# Patient Record
Sex: FEMALE | Race: WHITE | NOT HISPANIC OR LATINO | Employment: OTHER | URBAN - METROPOLITAN AREA
[De-identification: names, ages, dates, MRNs, and addresses within clinical notes are randomized per-mention and may not be internally consistent; named-entity substitution may affect disease eponyms.]

---

## 2021-11-18 ENCOUNTER — OFFICE VISIT (OUTPATIENT)
Dept: OTHER | Facility: CLINIC | Age: 61
End: 2021-11-18

## 2021-11-18 VITALS
SYSTOLIC BLOOD PRESSURE: 163 MMHG | RESPIRATION RATE: 20 BRPM | OXYGEN SATURATION: 98 % | HEIGHT: 64 IN | DIASTOLIC BLOOD PRESSURE: 88 MMHG | WEIGHT: 131.2 LBS | BODY MASS INDEX: 22.4 KG/M2 | HEART RATE: 75 BPM

## 2021-11-18 DIAGNOSIS — C15.9 ADENOCARCINOMA OF ESOPHAGUS (HCC): ICD-10-CM

## 2021-11-18 DIAGNOSIS — K22.711 BARRETT'S ESOPHAGUS WITH HIGH GRADE DYSPLASIA: Primary | ICD-10-CM

## 2021-11-18 PROCEDURE — 99203 OFFICE O/P NEW LOW 30 MIN: CPT | Performed by: THORACIC SURGERY (CARDIOTHORACIC VASCULAR SURGERY)

## 2021-11-18 RX ORDER — PANTOPRAZOLE SODIUM 40 MG/1
40 TABLET, DELAYED RELEASE ORAL 2 TIMES DAILY
COMMUNITY
Start: 2021-10-18 | End: 2022-01-03 | Stop reason: SDUPTHER

## 2021-11-18 RX ORDER — METOPROLOL TARTRATE 50 MG/1
50 TABLET, FILM COATED ORAL DAILY
COMMUNITY
Start: 2021-10-20 | End: 2022-02-10

## 2021-11-18 RX ORDER — METHIMAZOLE 10 MG/1
10 TABLET ORAL 3 TIMES DAILY
COMMUNITY
Start: 2021-11-01 | End: 2022-12-09 | Stop reason: SDUPTHER

## 2021-11-18 RX ORDER — PRAVASTATIN SODIUM 20 MG
20 TABLET ORAL DAILY
COMMUNITY
Start: 2021-07-09

## 2021-11-18 RX ORDER — APIXABAN 5 MG/1
5 TABLET, FILM COATED ORAL EVERY 12 HOURS SCHEDULED
COMMUNITY
Start: 2021-10-20

## 2021-11-18 NOTE — PROGRESS NOTES
"Chief Complaint    Dysphagia    Subjective          Maya Ribera presents to [facility]  History of Present Illness     61-year-old female seen in the lung care center at [facility] today November 18, 2021 as part of our multidisciplinary thoracic oncology clinic.  I have reviewed her history her x-rays and have examined her.  Thank you for asking us participate in the care of Ms. Ribera.    Patient was having some pain with swallowing.  This pain was felt under the right breast and radiated to the spine.  She is also had several episodes of difficulty swallowing.  Food is never really been stuck in her esophagus.  She has lost 5 to 10 pounds of weight over the last few months.  She underwent EGD and colonoscopy.  Colonoscopy was negative.  EGD showed abnormality in the esophagus.  Biopsies showed high-grade dysplasia and adenocarcinoma in situ.  She has been referred here for further evaluation and treatment.    She is a lifelong smoker of about 1 pack of cigarettes per day.  She is active without significant shortness of breath.  She has no cough or hemoptysis.  She has no hoarseness or change in her voice.  She has had no pleuritic pain.  She has history of enlarged thyroid with hyperthyroidism and is currently on thyroid medication.  The hyperthyroid caused atrial fibrillation and she is now on Eliquis.  She also has a prolapsed mitral valve.  She has no personal history of cancer but does have a strong family history of cancer.  Mother and sister has both had breast cancer.  She is recently had a mammogram which was negative.    Objective   Vital Signs:   /88   Pulse 75   Resp 20   Ht 162.6 cm (64\")   Wt 59.5 kg (131 lb 3.2 oz)   SpO2 98%   BMI 22.52 kg/m²     Physical Exam     Neck: There are no masses and no cervical or supraclavicular adenopathy    Pulmonary: Lungs are clear to auscultation with equal breath sounds bilaterally    Cardiac: Regular rate and " rhythm.  I hear no murmurs.  No gallops and no dependent edema.    Abdomen: Soft and nontender.  No masses or organomegaly.  Good bowel sounds in all quadrants.    Result Review :   The following data was reviewed by: Hayder Finch III, MD on 11/18/2021:    CT scan of the chest performed October 22, 2021 was independently reviewed.  There is thickening at the mid esophagus.  There is no mediastinal lymphadenopathy.  No infiltrates nodules or masses.  No pleural effusions.  Enlarged thyroid gland.         Assessment and Plan    Diagnoses and all orders for this visit:    1. Cotto's esophagus with high grade dysplasia (Primary)  -     NM PET/CT Skull Base to Mid Thigh; Future  -     Ambulatory Referral to Gastroenterology    2. Adenocarcinoma of esophagus (HCC)  -     NM PET/CT Skull Base to Mid Thigh; Future  -     Ambulatory Referral to Gastroenterology      It appears that this lady has Cotto's esophagus with high-grade dysplasia and adenocarcinoma in situ.  I have recommended an EUS endoscopy to give more detail staging.  She will also need a CT PET scan for staging purposes.  Once the studies are available we can then make a decision about further treatment.  Based on today's findings I would recommend that we forego neoadjuvant radiation chemotherapy and proceed directly to esophagectomy.  We will make that decision after reviewing the PET scan and EUS.  I will keep you informed of her progress.  Thank you for allowing us to participate in the care of Ms. Ribera.    I spent 32 minutes caring for Maya on this date of service. This time includes time spent by me in the following activities:preparing for the visit, reviewing tests, obtaining and/or reviewing a separately obtained history, performing a medically appropriate examination and/or evaluation , counseling and educating the patient/family/caregiver, ordering medications, tests, or procedures, referring and communicating with other health care  professionals , documenting information in the medical record, independently interpreting results and communicating that information with the patient/family/caregiver and care coordination  Follow Up   Return for Return to clinic with test results.  Patient was given instructions and counseling regarding her condition or for health maintenance advice. Please see specific information pulled into the AVS if appropriate.

## 2021-11-23 ENCOUNTER — HOSPITAL ENCOUNTER (OUTPATIENT)
Dept: PET IMAGING | Facility: HOSPITAL | Age: 61
Discharge: HOME | End: 2021-11-23

## 2021-11-23 DIAGNOSIS — C15.9 ADENOCARCINOMA OF ESOPHAGUS (HCC): ICD-10-CM

## 2021-11-23 DIAGNOSIS — K22.711 BARRETT'S ESOPHAGUS WITH HIGH GRADE DYSPLASIA: ICD-10-CM

## 2021-11-23 LAB — GLUCOSE BLDC GLUCOMTR-MCNC: 102 MG/DL (ref 70–130)

## 2021-11-23 PROCEDURE — A9552 F18 FDG: HCPCS | Performed by: THORACIC SURGERY (CARDIOTHORACIC VASCULAR SURGERY)

## 2021-11-23 PROCEDURE — 82962 GLUCOSE BLOOD TEST: CPT

## 2021-11-23 PROCEDURE — 78815 PET IMAGE W/CT SKULL-THIGH: CPT

## 2021-11-23 PROCEDURE — 0 FLUDEOXYGLUCOSE F18 SOLUTION: Performed by: THORACIC SURGERY (CARDIOTHORACIC VASCULAR SURGERY)

## 2021-11-23 RX ADMIN — FLUDEOXYGLUCOSE F18 1 DOSE: 300 INJECTION INTRAVENOUS at 11:17

## 2021-12-01 ENCOUNTER — MDT ASSESSMENT (OUTPATIENT)
Dept: OTHER | Facility: HOSPITAL | Age: 61
End: 2021-12-01

## 2021-12-01 NOTE — PROGRESS NOTES
MULTIDISCIPLINARY TREATMENT PLANNING CONFERENCE  DATE: 12/2/2021       SPECIALTY: Thoracic Conference   PRESENTER: Hayder Finch MD   SITE: Esophagus        Please discuss clinical/working stage, TNM, Stage Group, National Treatment Guidelines, and Prognostic Indicators.       CONFERENCE SUMMARY:  Patient was seen in the multidisciplinary thoracic oncology conference with complaints of painful swallowing and dysphagia over the last several months.  She has lost 10 to 15 pounds because of this.  She underwent EGD and colonoscopy.  Colonoscopy was normal.  EGD showed a tumor at the GE junction.  Biopsies showed severe dysplasia with carcinoma in situ.  CT scan of the chest showed thickening of the mid esophagus with no lymphadenopathy.  CT PET scan uptake in the esophagus with uptake in the gastrohepatic lymph nodes.  My initial impression was that this was a stage I carcinoma of the esophagus and that we could proceed directly to surgical resection and bypass neoadjuvant therapy.  Prior to making that decision I requested a endoscopic ultrasound.  This cannot be done here at [facility] until after January 1.  I tried [facility] as well as [facility] and was unable to get anyone to do an EUS any sooner.  Dr Moseley suggested that we try the gastroenterologist at [facility].  We will contact the [facility] [place] operated by [facility] gastroenterology group and request EUS.  Once that is completed we will then make a decision about going directly to surgical resection versus neoadjuvant therapy.                          AJCC STAGE: Clinical stage I        REFERRAL SUPPORT   Psychosocial Assessment:  []                Clinical Trials:  []                Genetic Testing:  []                Geriatric Assessment:  []                Smoking Cessation:  []                Nutrition Assessment:  []                Social Work Evaluation/Barriers to Care:  []                Behavioral Oncology Evaluation:  []                Palliative  Care:  []      EVIDENCE BASED NATIONAL TREATMENT GUIDELINES:  []                   SOCIAL HISTORY:   reports that she has been smoking cigarettes. She has been smoking about 1.00 pack per day. She has never used smokeless tobacco.                  PAST MEDICAL HISTORY:   has no past medical history on file.                  PAST SURGICAL HISTORY:  @                 IMAGING:  NM PET/CT Skull Base to Mid Thigh    Result Date: 11/24/2021  1.  Intensely FDG avid thickening within the mid to distal esophagus representing patient's known neoplasm.. There are a few mildly enlarged left hilar and gastrohepatic nodes which demonstrate FDG uptake mildly above that of mediastinal blood pool. Unfortunately findings are indeterminate and metastatic disease cannot be excluded in these areas. 2.   A few scattered subcentimeter pulmonary nodules are present bilaterally and indeterminate. Continued close attention on follow-up with chest CT in 3 months is recommended. 3.  Other findings as above.  This report was finalized on 11/24/2021 8:33 AM by Dr. Rory Hollingsworth M.D.                      SURGICAL PROCEDURE / PATHOLOGY:      8/27/2021: [id number]-[id number] ([facility])                                 Labs & Biomarkers:

## 2021-12-03 RX ORDER — SUCRALFATE ORAL 1 G/10ML
1 SUSPENSION ORAL 4 TIMES DAILY
COMMUNITY
End: 2022-05-09 | Stop reason: HOSPADM

## 2021-12-04 ENCOUNTER — LAB (OUTPATIENT)
Dept: LAB | Facility: HOSPITAL | Age: 61
End: 2021-12-04

## 2021-12-04 DIAGNOSIS — Z01.818 PREOP EXAMINATION: Primary | ICD-10-CM

## 2021-12-04 LAB — SARS-COV-2 ORF1AB RESP QL NAA+PROBE: NOT DETECTED

## 2021-12-04 PROCEDURE — U0004 COV-19 TEST NON-CDC HGH THRU: HCPCS

## 2021-12-04 PROCEDURE — C9803 HOPD COVID-19 SPEC COLLECT: HCPCS

## 2021-12-06 DIAGNOSIS — C15.9 ADENOCARCINOMA OF ESOPHAGUS (HCC): Primary | ICD-10-CM

## 2021-12-07 ENCOUNTER — HOSPITAL ENCOUNTER (OUTPATIENT)
Facility: HOSPITAL | Age: 61
Setting detail: HOSPITAL OUTPATIENT SURGERY
Discharge: HOME | End: 2021-12-07
Attending: INTERNAL MEDICINE | Admitting: INTERNAL MEDICINE

## 2021-12-07 ENCOUNTER — ANESTHESIA EVENT (OUTPATIENT)
Dept: GASTROENTEROLOGY | Facility: HOSPITAL | Age: 61
End: 2021-12-07

## 2021-12-07 ENCOUNTER — ON CAMPUS - OUTPATIENT (OUTPATIENT)
Dept: URBAN - METROPOLITAN AREA HOSPITAL 85 | Facility: HOSPITAL | Age: 61
End: 2021-12-07

## 2021-12-07 ENCOUNTER — ANESTHESIA (OUTPATIENT)
Dept: GASTROENTEROLOGY | Facility: HOSPITAL | Age: 61
End: 2021-12-07

## 2021-12-07 VITALS
SYSTOLIC BLOOD PRESSURE: 142 MMHG | WEIGHT: 127.43 LBS | DIASTOLIC BLOOD PRESSURE: 68 MMHG | RESPIRATION RATE: 19 BRPM | HEIGHT: 64 IN | HEART RATE: 80 BPM | OXYGEN SATURATION: 100 % | TEMPERATURE: 98.2 F | BODY MASS INDEX: 21.75 KG/M2

## 2021-12-07 VITALS
SYSTOLIC BLOOD PRESSURE: 121 MMHG | HEART RATE: 88 BPM | OXYGEN SATURATION: 98 % | RESPIRATION RATE: 5 BRPM | DIASTOLIC BLOOD PRESSURE: 55 MMHG

## 2021-12-07 DIAGNOSIS — C15.9 MALIGNANT NEOPLASM OF ESOPHAGUS, UNSPECIFIED: ICD-10-CM

## 2021-12-07 DIAGNOSIS — C15.9 ESOPHAGEAL CANCER (HCC): ICD-10-CM

## 2021-12-07 DIAGNOSIS — R59.9 ENLARGED LYMPH NODES, UNSPECIFIED: ICD-10-CM

## 2021-12-07 PROCEDURE — 88173 CYTOPATH EVAL FNA REPORT: CPT | Performed by: INTERNAL MEDICINE

## 2021-12-07 PROCEDURE — 43242 EGD US FINE NEEDLE BX/ASPIR: CPT | Performed by: INTERNAL MEDICINE

## 2021-12-07 PROCEDURE — 25010000002 LEVOFLOXACIN PER 250 MG: Performed by: ANESTHESIOLOGIST ASSISTANT

## 2021-12-07 PROCEDURE — 25010000002 PROPOFOL 10 MG/ML EMULSION: Performed by: ANESTHESIOLOGIST ASSISTANT

## 2021-12-07 RX ORDER — PROPOFOL 10 MG/ML
VIAL (ML) INTRAVENOUS AS NEEDED
Status: DISCONTINUED | OUTPATIENT
Start: 2021-12-07 | End: 2021-12-07 | Stop reason: SURG

## 2021-12-07 RX ORDER — METRONIDAZOLE 500 MG/1
TABLET ORAL
Status: DISCONTINUED
Start: 2021-12-07 | End: 2021-12-07 | Stop reason: HOSPADM

## 2021-12-07 RX ORDER — LEVOFLOXACIN 5 MG/ML
INJECTION, SOLUTION INTRAVENOUS AS NEEDED
Status: DISCONTINUED | OUTPATIENT
Start: 2021-12-07 | End: 2021-12-07 | Stop reason: SURG

## 2021-12-07 RX ORDER — SODIUM CHLORIDE 0.9 % (FLUSH) 0.9 %
3 SYRINGE (ML) INJECTION EVERY 12 HOURS SCHEDULED
Status: DISCONTINUED | OUTPATIENT
Start: 2021-12-07 | End: 2021-12-07 | Stop reason: HOSPADM

## 2021-12-07 RX ORDER — LEVOFLOXACIN 5 MG/ML
INJECTION, SOLUTION INTRAVENOUS
Status: COMPLETED
Start: 2021-12-07 | End: 2021-12-07

## 2021-12-07 RX ORDER — ONDANSETRON 2 MG/ML
4 INJECTION INTRAMUSCULAR; INTRAVENOUS ONCE AS NEEDED
Status: DISCONTINUED | OUTPATIENT
Start: 2021-12-07 | End: 2021-12-07 | Stop reason: HOSPADM

## 2021-12-07 RX ORDER — SODIUM CHLORIDE 9 MG/ML
50 INJECTION, SOLUTION INTRAVENOUS CONTINUOUS
Status: DISCONTINUED | OUTPATIENT
Start: 2021-12-07 | End: 2021-12-07 | Stop reason: HOSPADM

## 2021-12-07 RX ORDER — SODIUM CHLORIDE 0.9 % (FLUSH) 0.9 %
10 SYRINGE (ML) INJECTION AS NEEDED
Status: DISCONTINUED | OUTPATIENT
Start: 2021-12-07 | End: 2021-12-07 | Stop reason: HOSPADM

## 2021-12-07 RX ORDER — LIDOCAINE HYDROCHLORIDE 10 MG/ML
INJECTION, SOLUTION EPIDURAL; INFILTRATION; INTRACAUDAL; PERINEURAL AS NEEDED
Status: DISCONTINUED | OUTPATIENT
Start: 2021-12-07 | End: 2021-12-07 | Stop reason: SURG

## 2021-12-07 RX ADMIN — PROPOFOL 50 MG: 10 INJECTION, EMULSION INTRAVENOUS at 16:37

## 2021-12-07 RX ADMIN — PROPOFOL 50 MG: 10 INJECTION, EMULSION INTRAVENOUS at 16:38

## 2021-12-07 RX ADMIN — GLYCOPYRROLATE 0.2 MCG: 0.2 INJECTION, SOLUTION INTRAMUSCULAR; INTRAVITREAL at 16:32

## 2021-12-07 RX ADMIN — SODIUM CHLORIDE 50 ML/HR: 0.9 INJECTION, SOLUTION INTRAVENOUS at 14:58

## 2021-12-07 RX ADMIN — LIDOCAINE HYDROCHLORIDE 80 MG: 10 INJECTION, SOLUTION EPIDURAL; INFILTRATION; INTRACAUDAL; PERINEURAL at 16:32

## 2021-12-07 RX ADMIN — SODIUM CHLORIDE: 0.9 INJECTION, SOLUTION INTRAVENOUS at 16:30

## 2021-12-07 RX ADMIN — PROPOFOL 60 MG: 10 INJECTION, EMULSION INTRAVENOUS at 16:35

## 2021-12-07 RX ADMIN — PROPOFOL 50 MG: 10 INJECTION, EMULSION INTRAVENOUS at 16:45

## 2021-12-07 RX ADMIN — PROPOFOL 50 MG: 10 INJECTION, EMULSION INTRAVENOUS at 16:41

## 2021-12-07 RX ADMIN — LEVOFLOXACIN 500 MG: 500 INJECTION, SOLUTION INTRAVENOUS at 16:59

## 2021-12-07 RX ADMIN — PROPOFOL 50 MG: 10 INJECTION, EMULSION INTRAVENOUS at 16:49

## 2021-12-07 RX ADMIN — PROPOFOL 40 MG: 10 INJECTION, EMULSION INTRAVENOUS at 16:57

## 2021-12-07 RX ADMIN — PROPOFOL 50 MG: 10 INJECTION, EMULSION INTRAVENOUS at 16:53

## 2021-12-07 NOTE — H&P
GI CONSULT  NOTE:    Referring Provider:    Iggy Nicole MD  [unfilled]    Chief complaint: <principal problem not specified>    Subjective .       Pre op diagnosis  Esophageal cancer (HCC) [C15.9]      History of present illness:      Maya Ribera is a 61 y.o. female who presents today for Procedure(s):  EUS WITH STAGING AND FINE NEEDLE BIOPSY for the indications listed below.     The updated Patient Profile was reviewed prior to the procedure, in conjunction with the Physical Exam, including medical conditions, surgical procedures, medications, allergies, family history and social history.     Pre-operatively, I reviewed the indication(s) for the procedure, the risks of the procedure [including but not limited to: unexpected bleeding possibly requiring hospitalization and/or unplanned repeat procedures, perforation possibly requiring surgical treatment, missed lesions and complications of sedation/MAC (also explained by anesthesia staff)].     I have evaluated the patient for risks associated with the planned anesthesia and the procedure to be performed and find the patient an acceptable candidate for IV sedation.    Multiple opportunities were provided for any questions or concerns, and all questions were answered satisfactorily before any anesthesia was administered. We will proceed with the planned procedure.    Past Medical History:  Past Medical History:   Diagnosis Date   • A-fib (HCC)    • Disease of thyroid gland    • Gastric ulcer    • GERD (gastroesophageal reflux disease)    • Hyperlipidemia    • Hypertension    • Irritable bowel        Past Surgical History:  Past Surgical History:   Procedure Laterality Date   • CHOLECYSTECTOMY     • EXTERNAL EAR SURGERY     • KNEE SURGERY Left    • REPLACEMENT TOTAL KNEE Left        Social History:  Social History     Tobacco Use   • Smoking status: Current Every Day Smoker     Packs/day: 1.50     Years: 40.00     Pack years: 60.00     Types:  Cigarettes   • Smokeless tobacco: Never Used   Vaping Use   • Vaping Use: Never used   Substance Use Topics   • Alcohol use: Not Currently   • Drug use: Never       Family History:  Family History   Problem Relation Age of Onset   • Breast cancer Mother    • Diabetes Mother    • Bipolar disorder Father    • Diabetes Father    • Hypertension Father    • Breast cancer Sister    • COPD Sister    • Diabetes Sister    • Alcohol abuse Brother    • Asthma Brother    • Bipolar disorder Brother    • Diabetes Brother    • Seizures Brother    • Breast cancer Maternal Grandmother    • Diabetes Maternal Grandmother    • Diabetes Maternal Grandfather    • Diabetes Paternal Grandmother    • Diabetes Paternal Grandfather        Medications:  Medications Prior to Admission   Medication Sig Dispense Refill Last Dose   • Eliquis 5 MG tablet tablet Take 5 mg by mouth Every 12 (Twelve) Hours. LD 12/4   Past Week at Unknown time   • methIMAzole (TAPAZOLE) 10 MG tablet Take 5 mg by mouth Daily. Take DOS   12/7/2021 at Unknown time   • metoprolol tartrate (LOPRESSOR) 50 MG tablet Take 50 mg by mouth 2 (Two) Times a Day. Take DOS   12/7/2021 at Unknown time   • pantoprazole (PROTONIX) 40 MG EC tablet Take 40 mg by mouth 2 (Two) Times a Day.   12/6/2021 at Unknown time   • pravastatin (PRAVACHOL) 20 MG tablet Take 20 mg by mouth Daily.   12/7/2021 at Unknown time   • sucralfate (CARAFATE) 1 GM/10ML suspension Take 1 g by mouth 4 (Four) Times a Day.   12/6/2021 at Unknown time       Scheduled Meds:   Continuous Infusions:sodium chloride, 50 mL/hr, Last Rate: 50 mL/hr (12/07/21 1458)      PRN Meds:.    ALLERGIES:  Codeine    ROS:  The following systems were reviewed and negative;  Constitution:  No fevers, chills, no unintentional weight loss  Skin: no rash, no jaundice  Eyes:  No blurry vision, no eye pain  HENT:  No change in hearing or smell  Resp:  No dyspnea or cough  CV:  No chest pain or palpitations  :  No dysuria,  "hematuria  Musculoskeletal:  No leg cramps or arthralgias  Neuro:  No tremor, no numbness  Psych:  No depression or confsusion    Objective     Vital Signs:   Vitals:    12/03/21 1611 12/07/21 1438   BP:  144/57   BP Location:  Left arm   Patient Position:  Sitting   Pulse:  63   Resp:  15   Temp:  98.2 °F (36.8 °C)   TempSrc:  Oral   SpO2:  97%   Weight: 56.7 kg (125 lb) 57.8 kg (127 lb 6.8 oz)   Height: 162.6 cm (64\") 162.6 cm (64\")       Physical Exam:       General Appearance:    Awake and alert, in no acute distress   Head:    Normocephalic, without obvious abnormality, atraumatic   Throat:   No oral lesions, no thrush, oral mucosa moist   Lungs:     respirations regular, even and unlabored   Skin:   No rash, no jaundice       Results Review:  Lab Results (last 24 hours)     ** No results found for the last 24 hours. **          Imaging Results (Last 24 Hours)     ** No results found for the last 24 hours. **           I reviewed the patient's labs and imaging.    ASSESSMENT AND PLAN:      Active Problems:    * No active hospital problems. *       Procedure(s):  EUS WITH STAGING AND FINE NEEDLE BIOPSY      I discussed the patient's findings and my recommendations with the patient.    Amrit Green MD  12/07/21  16:06 EST              "

## 2021-12-07 NOTE — DISCHARGE INSTRUCTIONS
A responsible adult should stay with you and you should rest quietly for the rest of the day.    Do not drink alcohol, drive, operate any heavy machinery or power tools or make any legal/important decisions for the next 24 hours.     Progress your diet as tolerated.  If you begin to experience severe pain, increased shortness of breath, racing heartbeat or a fever above 101 F, seek immediate medical attention.     Follow up with MD as instructed. Call office for results in 3 to 5 days if needed.  Recommendations:  1. No NSAIDs or blood thinners for 7 days.   2. Follow up bx results   3.  Follow-up with oncology and thoracic surgery

## 2021-12-07 NOTE — ANESTHESIA POSTPROCEDURE EVALUATION
Patient: Maya Ribera    Procedure Summary     Date: 12/07/21 Room / Location: [facility] ENDOSCOPY 2 / [facility] ENDOSCOPY    Anesthesia Start: 1629 Anesthesia Stop: 1712    Procedure: Esophagogastroduodenoscopy with endoscopic ultrasound  WITH STAGING AND FINE NEEDLE BIOPSY (N/A ) Diagnosis:       Esophageal cancer (HCC)      (Esophageal cancer (HCC) [C15.9])    Surgeons: Amrit Green MD Provider: Filiberto Manuel MD    Anesthesia Type: MAC ASA Status: 3          Anesthesia Type: MAC    Vitals  Vitals Value Taken Time   /67 12/07/21 1727   Temp     Pulse 82 12/07/21 1728   Resp 22 12/07/21 1711   SpO2 96 % 12/07/21 1728   Vitals shown include unvalidated device data.        Post Anesthesia Care and Evaluation    Patient location during evaluation: PACU  Patient participation: complete - patient participated  Level of consciousness: awake  Pain scale: See nurse's notes for pain score.  Pain management: adequate  Airway patency: patent  Anesthetic complications: No anesthetic complications  PONV Status: none  Cardiovascular status: acceptable  Respiratory status: acceptable  Hydration status: acceptable    Comments: Patient seen and examined postoperatively; vital signs stable; SpO2 greater than or equal to 90%; cardiopulmonary status stable; nausea/vomiting adequately controlled; pain adequately controlled; no apparent anesthesia complications; patient discharged from anesthesia care when discharge criteria were met

## 2021-12-07 NOTE — OP NOTE
"ESOPHAGOGASTRODUODENOSCOPY WITH ULTRASOUND AND FINE NEEDLE ASPIRATION Procedure Report    Patient Name:  Maya Ribera  YOB: 1960    Date of Surgery:  12/7/2021     Pre-Op Diagnosis:  Esophageal cancer (HCC) [C15.9]       Post-Op Diagnosis Codes:     * Esophageal cancer (HCC) [C15.9]   Celiac axis lymph node  Mediastinal lymph node      Procedure/CPT® Codes:      Procedure(s):  Esophagogastroduodenoscopy with endoscopic ultrasound  WITH STAGING AND FINE NEEDLE BIOPSY    Staff:  Surgeon(s):  Amrit Green MD      Anesthesia: Monitored Anesthesia Care    Description of Procedure:  A description of the procedure as well as risks, benefits and alternative methods were explained to the patient who voiced understanding and signed the corresponding consent form. A physical exam was performed and vital signs were monitored throughout the procedure.    An upper GI endoscope was placed into the mouth and proceeded through the esophagus, stomach and second portion of the duodenum without difficulty. The scope was then retroflexed and the fundus was visualized. The procedure was not difficult and there were no immediate complications.    A pentex Radial echoendoscope was advanced into the mouth, esophagus, and into the stomach. The celiac axis was visualized, next the scope was used to visualize the pancreatic neck, body, and tail as well as the L lobe of the liver. The scope was advanced into the duodenal bulb where the pancreatic head and ampulla were visualized as well as the biliary tree and R lobe of the liver. The scope was then advanced to the second portion of the duodenum where the uncinate was brought into view and the ampulla in the \" kissing the papilla\" view. The scope was then withdrawn back into the stomach and out of the esophagus. There was no blood loss.    Next, a Linear echoendoscope was advanced into the mouth, esophagus, and into the stomach. The celiac axis was visualized, next the " "scope was used to visualize the pancreatic neck, body, and tail as well as the L lobe of the liver. The scope was advanced into the duodenal bulb where the pancreatic head and ampulla were visualized as well as the biliary tree and R lobe of the liver. The scope was then advanced to the second portion of the duodenum where the uncinate was brought into view and the ampulla in the \" kissing the papilla\" view.  FNB of the celiac axis lymph node was visualized and FNB x1 was performed using 25-gauge acquire needle with 4 slides made at the bedside.  The tools were removed and the scope was withdrawn.  There was no blood loss.       Impression:    EGD Findings:   1.  A 5 cm esophageal mass was present in the distal third of the esophagus.  There was esophagus distal to the mass as it did not extend to the GE junction or into the cardia.  2.  Normal gastric mucosa entire stomach  3.  Normal duodenum mucosa visualized to D2      EUS Findings:   1.  Esophageal mass extending into and through the muscularis propria to the adventitia.  It did not penetrate through the adventitia.  2.  A single 1 cm gastrohepatic/celiac axis lymph node was visualized, FNB x1 was performed using a 25-gauge FNB needle with slides made at the bedside.  3.  Normal pancreatic EUS  4.  No obvious lesions in the liver    This is most likely a T2 N1 M0 distal esophageal cancer.  The brenden status is pending on biopsy results      Recommendations:  1. No NSAIDs or blood thinners for 7 days.   2. Follow up bx results   3.  Follow-up with oncology and thoracic surgery      Amrit Green MD     Date: 12/7/2021    Time: 17:12 EST        "

## 2021-12-07 NOTE — ANESTHESIA PREPROCEDURE EVALUATION
Anesthesia Evaluation     Patient summary reviewed and Nursing notes reviewed   NPO Solid Status: > 8 hours  NPO Liquid Status: > 8 hours           Airway   Mallampati: II  TM distance: >3 FB  Neck ROM: full  No difficulty expected  Dental - normal exam     Pulmonary - normal exam   (+) a smoker,   Cardiovascular - normal exam    (+) hypertension, dysrhythmias Atrial Fib, hyperlipidemia,       Neuro/Psych  GI/Hepatic/Renal/Endo    (+)  GERD, PUD,      Musculoskeletal     Abdominal  - normal exam    Bowel sounds: normal.   Substance History      OB/GYN          Other                      Anesthesia Plan    ASA 3     MAC     intravenous induction     Anesthetic plan, all risks, benefits, and alternatives have been provided, discussed and informed consent has been obtained with: patient.

## 2021-12-08 ENCOUNTER — LAB (OUTPATIENT)
Dept: LAB | Facility: HOSPITAL | Age: 61
End: 2021-12-08

## 2021-12-08 ENCOUNTER — CONSULT (OUTPATIENT)
Dept: ONCOLOGY | Facility: CLINIC | Age: 61
End: 2021-12-08

## 2021-12-08 VITALS
RESPIRATION RATE: 18 BRPM | OXYGEN SATURATION: 97 % | BODY MASS INDEX: 21.41 KG/M2 | HEART RATE: 73 BPM | DIASTOLIC BLOOD PRESSURE: 79 MMHG | WEIGHT: 125.4 LBS | HEIGHT: 64 IN | SYSTOLIC BLOOD PRESSURE: 148 MMHG | TEMPERATURE: 97.3 F

## 2021-12-08 DIAGNOSIS — C15.9 ADENOCARCINOMA OF ESOPHAGUS (HCC): Primary | ICD-10-CM

## 2021-12-08 DIAGNOSIS — C15.5 MALIGNANT NEOPLASM OF LOWER THIRD OF ESOPHAGUS (HCC): Primary | ICD-10-CM

## 2021-12-08 LAB
BASOPHILS # BLD AUTO: 0.06 10*3/MM3 (ref 0–0.2)
BASOPHILS NFR BLD AUTO: 0.5 % (ref 0–1.5)
DEPRECATED RDW RBC AUTO: 42.2 FL (ref 37–54)
EOSINOPHIL # BLD AUTO: 0.17 10*3/MM3 (ref 0–0.4)
EOSINOPHIL NFR BLD AUTO: 1.4 % (ref 0.3–6.2)
ERYTHROCYTE [DISTWIDTH] IN BLOOD BY AUTOMATED COUNT: 13 % (ref 12.3–15.4)
HCT VFR BLD AUTO: 46.1 % (ref 34–46.6)
HGB BLD-MCNC: 15 G/DL (ref 12–15.9)
IMM GRANULOCYTES # BLD AUTO: 0.06 10*3/MM3 (ref 0–0.05)
IMM GRANULOCYTES NFR BLD AUTO: 0.5 % (ref 0–0.5)
LYMPHOCYTES # BLD AUTO: 3.23 10*3/MM3 (ref 0.7–3.1)
LYMPHOCYTES NFR BLD AUTO: 25.9 % (ref 19.6–45.3)
MCH RBC QN AUTO: 28.7 PG (ref 26.6–33)
MCHC RBC AUTO-ENTMCNC: 32.5 G/DL (ref 31.5–35.7)
MCV RBC AUTO: 88.1 FL (ref 79–97)
MONOCYTES # BLD AUTO: 0.94 10*3/MM3 (ref 0.1–0.9)
MONOCYTES NFR BLD AUTO: 7.5 % (ref 5–12)
NEUTROPHILS NFR BLD AUTO: 64.2 % (ref 42.7–76)
NEUTROPHILS NFR BLD AUTO: 8 10*3/MM3 (ref 1.7–7)
NRBC BLD AUTO-RTO: 0 /100 WBC (ref 0–0.2)
PLATELET # BLD AUTO: 236 10*3/MM3 (ref 140–450)
PMV BLD AUTO: 10.3 FL (ref 6–12)
RBC # BLD AUTO: 5.23 10*6/MM3 (ref 3.77–5.28)
WBC NRBC COR # BLD: 12.46 10*3/MM3 (ref 3.4–10.8)

## 2021-12-08 PROCEDURE — 36415 COLL VENOUS BLD VENIPUNCTURE: CPT

## 2021-12-08 PROCEDURE — 99205 OFFICE O/P NEW HI 60 MIN: CPT | Performed by: INTERNAL MEDICINE

## 2021-12-08 PROCEDURE — 85025 COMPLETE CBC W/AUTO DIFF WBC: CPT

## 2021-12-08 NOTE — PROGRESS NOTES
Subjective Discussed patient's recent status, findings thus far and potential treatment plan.      REASON FOR CONSULTATION: Distal esophageal cancer  Provide an opinion on any further workup or treatment                             REQUESTING PHYSICIAN: Iggy Nicole MD, Hayder Finch MD    RECORDS OBTAINED:  Records of the patients history including those obtained from the referring provider were reviewed and summarized in detail.    HISTORY OF PRESENT ILLNESS:  The patient is a 61 y.o. year old female who is here for an opinion about the above issue.    History of Present Illness      The patient is a 61-year-old female who had been seen in multidisciplinary thoracic oncology conference with complaints of painful swallowing and dysphagia over the previous several months with 10 to 15 pound weight loss.  This led to EGD and colonoscopy with normal colonoscopy but EGD demonstrated tumor at the GE junction with biopsies consistent with severe dysplasia including carcinoma in situ.  The patient states that she saw a number of physicians and attempting to have a diagnosis completed.  CT of chest demonstrated thickening of the mid esophagus with no lymphadenopathy and CT PET demonstrated uptake in the esophagus gastropathic lymph nodes it is felt that she likely had early stage carcinoma of the esophagus and that we could present to record with surgical resection.  Endoscopic ultrasound, however, was requested and we could not have this done any sooner than GI physicians available at [facility].     This was performed 12/7/2021 revealing a 5 cm esophageal mass present in the distal third esophagus without extension into the GE junction into the cardia, the mass extended into into the muscularis propria into the adventitia not penetrating through the adventitia with a single 1 cm gastrohepatic/celiac axis lymph node and FNB x1 performed-?  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      The patient is seen with her sister and son all indicating that she has had difficulty with swallowing since late summer likely July 2021 and has been attempting to have a diagnosis made since that time.  They are all somewhat frustrated about the length of time to obtain an answer for this problem and we have spent considerable time discussing her findings thus far and how we might proceed to treat what appears to be a contained malignancy.  This has, additionally, been discussed with Dr. Stef cruz who feels that she is not immediately and operative candidate and should proceed with chemo radiation therapy initially-neoadjuvant treatment before consideration for subsequent surgery.  This has been discussed with the patient, her sister and her son again in detail 12/8/2021.    Past Medical History:   Diagnosis Date   • A-fib (HCC)    • Disease of thyroid gland    • Gastric ulcer    • GERD (gastroesophageal reflux disease)    • Hyperlipidemia    • Hypertension    • Irritable bowel         Past Surgical History:   Procedure Laterality Date   • CHOLECYSTECTOMY     • EXTERNAL EAR SURGERY     • KNEE SURGERY Left    • REPLACEMENT TOTAL KNEE Left         Current Outpatient Medications on File Prior to Visit   Medication Sig Dispense Refill   • Eliquis 5 MG tablet tablet Take 5 mg by mouth Every 12 (Twelve) Hours. LD 12/4     • methIMAzole (TAPAZOLE) 10 MG tablet Take 5 mg by mouth Daily. Take DOS     • metoprolol tartrate (LOPRESSOR) 50 MG tablet Take 50 mg by mouth 2 (Two) Times a Day. Take DOS     • pantoprazole (PROTONIX) 40 MG EC tablet Take 40 mg by mouth 2 (Two) Times a Day.     • pravastatin (PRAVACHOL) 20 MG tablet Take 20 mg by mouth Daily.     • sucralfate (CARAFATE) 1 GM/10ML suspension Take 1 g by mouth 4 (Four) Times a Day.       Current Facility-Administered  Medications on File Prior to Visit   Medication Dose Route Frequency Provider Last Rate Last Admin   • [DISCONTINUED] metroNIDAZOLE (FLAGYL) 500 MG tablet  - ADS Override Pull            • [DISCONTINUED] ondansetron (ZOFRAN) injection 4 mg  4 mg Intravenous Once PRN Amrit Green MD       • [DISCONTINUED] sodium chloride 0.9 % infusion  50 mL/hr Intravenous Continuous Amrit Green MD 50 mL/hr at 12/07/21 1630 New Bag at 12/07/21 1630        ALLERGIES:    Allergies   Allergen Reactions   • Codeine Hives        Social History     Socioeconomic History   • Marital status: Single   Tobacco Use   • Smoking status: Current Every Day Smoker     Packs/day: 1.50     Years: 40.00     Pack years: 60.00     Types: Cigarettes   • Smokeless tobacco: Never Used   Vaping Use   • Vaping Use: Never used   Substance and Sexual Activity   • Alcohol use: Not Currently   • Drug use: Never   • Sexual activity: Defer        Family History   Problem Relation Age of Onset   • Breast cancer Mother    • Diabetes Mother    • Bipolar disorder Father    • Diabetes Father    • Hypertension Father    • Breast cancer Sister    • COPD Sister    • Diabetes Sister    • Alcohol abuse Brother    • Asthma Brother    • Bipolar disorder Brother    • Diabetes Brother    • Seizures Brother    • Breast cancer Maternal Grandmother    • Diabetes Maternal Grandmother    • Diabetes Maternal Grandfather    • Diabetes Paternal Grandmother    • Diabetes Paternal Grandfather         Review of Systems   Constitutional: Positive for activity change, appetite change, fatigue and unexpected weight change (15 to 20 pound weight loss-5 to 6 months).   HENT: Negative.    Eyes: Negative.    Respiratory: Positive for cough and shortness of breath.    Cardiovascular: Positive for chest pain (Including swallowing).   Gastrointestinal: Negative for diarrhea and nausea.        Dysphagia, requiring her food to be cut very small pieces to slowly eat   Endocrine: Negative.   "  Genitourinary: Negative.    Musculoskeletal: Negative.    Neurological: Negative.    Psychiatric/Behavioral: Negative.         Objective     Vitals:    12/08/21 1507   BP: 148/79   Pulse: 73   Resp: 18   Temp: 97.3 °F (36.3 °C)   TempSrc: Temporal   SpO2: 97%   Weight: 56.9 kg (125 lb 6.4 oz)   Height: 162.6 cm (64.02\")   PainSc: 0-No pain     Current Status 12/8/2021   ECOG score 0       Physical Exam  Constitutional:       Appearance: Normal appearance.   HENT:      Head: Normocephalic and atraumatic.      Nose: Nose normal.      Mouth/Throat:      Mouth: Mucous membranes are moist.      Pharynx: Oropharynx is clear.   Eyes:      Extraocular Movements: Extraocular movements intact.      Conjunctiva/sclera: Conjunctivae normal.      Pupils: Pupils are equal, round, and reactive to light.   Cardiovascular:      Rate and Rhythm: Normal rate and regular rhythm.      Pulses: Normal pulses.      Heart sounds: Normal heart sounds.   Pulmonary:      Effort: Pulmonary effort is normal.      Breath sounds: Normal breath sounds.   Abdominal:      General: Bowel sounds are normal.      Palpations: Abdomen is soft. There is mass (Fullness epigastric region).      Tenderness: There is abdominal tenderness (Midepigastrium).   Musculoskeletal:         General: Normal range of motion.      Cervical back: Normal range of motion and neck supple.   Skin:     General: Skin is warm and dry.      Findings: No rash.   Neurological:      General: No focal deficit present.      Mental Status: She is alert and oriented to person, place, and time.           RECENT LABS:  Hematology WBC   Date Value Ref Range Status   12/08/2021 12.46 (H) 3.40 - 10.80 10*3/mm3 Final     RBC   Date Value Ref Range Status   12/08/2021 5.23 3.77 - 5.28 10*6/mm3 Final     Hemoglobin   Date Value Ref Range Status   12/08/2021 15.0 12.0 - 15.9 g/dL Final     Hematocrit   Date Value Ref Range Status   12/08/2021 46.1 34.0 - 46.6 % Final     Platelets   Date Value " Ref Range Status   12/08/2021 236 140 - 450 10*3/mm3 Final           Assessment/Plan          61-year-old female with a history of hyperthyroidism, previous gastric ulcer, GE reflux, atrial fibrillation, hyperlipidemia, hypertension irritable bowel syndrome, 60-pack-year history of smoking with dysphagia and odynophagia developing over several months associated 10 to 15 pound weight loss and EGD and colonoscopy in June 2021 demonstrating severe dysplasia including carcinoma in situ.  Again there has been a number of physicians involved in the patient's case and several months before she was ultimately seen by Dr. Finch at [facility].  CT scan of the chest demonstrating thickening of the mid esophagus with no lymphadenopathy and PET/CT demonstrated uptake in the mid to distal esophagus with SUV intensely elevated at 16 with a few nonenlarged left hilar and gastrohepatic lymph nodes with mild increased FDG activity, few scattered subcentimeter pulmonary nodules bilaterally indeterminate.     There was the possibility of moving to resection initially but EUS was felt necessary and ultimately obtained at [facility] performed 12/7/2021 revealing a 5 cm esophageal mass present in the distal third esophagus without extension into the GE junction into the cardia, the mass extended into into the muscularis propria into the adventitia not penetrating through the adventitia with a single 1 cm gastrohepatic/celiac axis lymph node and FNB x1 performed-?  T2 N1 M0 distal esophageal cancer-clinical stage III, pathologic stage IIIa  In contacting the [facility] pathology department the results of this FNB are not yet available and will be by 12/9/2021.      The patient is seen with her sister and son all indicating that she has had difficulty with swallowing since late summer likely July 2021 and has been attempting to have a diagnosis made since that time.  They are all somewhat frustrated about  the length of time to obtain an answer for this problem and we have spent considerable time discussing her findings thus far and how we might proceed to treat what appears to be a contained malignancy.    This has, additionally, been discussed with Dr. Stef cruz who feels that she is not immediately and operative candidate and should proceed with chemo radiation therapy initially-neoadjuvant treatment before consideration for subsequent surgery.  This has been discussed with the patient, her sister and her son again in detail 12/8/2021.  There are several additional issues that need to be addressed including surgical jejunostomy tube placement, port placement, radiation therapy consultation, chemotherapy teaching that will need to be obtained before proceeding further as well as additional pain control.  Again a lengthy conversation is held with the patient, her sister and her son.    Plan:  *Initiate pain control with hydrocodone/acetaminophen 7.5/325 mg / 15 cc solution, dispense 118 mL bottles x2    *General surgical referral to Dr. Bhanu Hollis-requesting port placement and jejunostomy feeding tube    *Oncology nutrition consultation    *Radiation therapy consultation-concurrent weekly Carboplatin Taxol/XRT    *Teaching for weekly Carboplatin Taxol    * to see per general concerns    *Return approximately 12/20 MD to initiate chemotherapy    I spent72 minutes caring for Maya on this date of service. This time includes time spent by me in the following activities: preparing for the visit, reviewing tests, obtaining and/or reviewing a separately obtained history, performing a medically appropriate examination and/or evaluation, counseling and educating the patient/family/caregiver, ordering medications, tests, or procedures, referring and communicating with other health care professionals, documenting information in the medical record, independently interpreting results and communicating  that information with the patient/family/caregiver and care coordination.

## 2021-12-09 LAB
LAB AP CASE REPORT: NORMAL
LAB AP DIAGNOSIS COMMENT: NORMAL
PATH REPORT.FINAL DX SPEC: NORMAL
PATH REPORT.GROSS SPEC: NORMAL

## 2021-12-13 ENCOUNTER — LAB (OUTPATIENT)
Dept: LAB | Facility: HOSPITAL | Age: 61
End: 2021-12-13

## 2021-12-13 ENCOUNTER — OFFICE VISIT (OUTPATIENT)
Dept: SURGERY | Facility: CLINIC | Age: 61
End: 2021-12-13

## 2021-12-13 ENCOUNTER — TRANSCRIBE ORDERS (OUTPATIENT)
Dept: ADMINISTRATIVE | Facility: HOSPITAL | Age: 61
End: 2021-12-13

## 2021-12-13 VITALS — HEIGHT: 64 IN | WEIGHT: 126.4 LBS | BODY MASS INDEX: 21.58 KG/M2

## 2021-12-13 DIAGNOSIS — C15.5 MALIGNANT NEOPLASM OF LOWER THIRD OF ESOPHAGUS (HCC): Primary | ICD-10-CM

## 2021-12-13 DIAGNOSIS — Z01.818 OTHER SPECIFIED PRE-OPERATIVE EXAMINATION: Primary | ICD-10-CM

## 2021-12-13 LAB — SARS-COV-2 ORF1AB RESP QL NAA+PROBE: NOT DETECTED

## 2021-12-13 PROCEDURE — U0004 COV-19 TEST NON-CDC HGH THRU: HCPCS | Performed by: SURGERY

## 2021-12-13 PROCEDURE — C9803 HOPD COVID-19 SPEC COLLECT: HCPCS | Performed by: SURGERY

## 2021-12-13 PROCEDURE — 99203 OFFICE O/P NEW LOW 30 MIN: CPT | Performed by: SURGERY

## 2021-12-13 NOTE — PROGRESS NOTES
Cc: Esophageal cancer    History of presenting illness:   This is a nice 61-year-old lady with dysphagia beginning this summer and some associated weight loss of around 10 or 15 pounds since that time.  She had an upper endoscopy done a couple of weeks ago which demonstrated a near obstructing mass in the esophagus.  She had biopsies which demonstrated severe dysplasia with carcinoma in situ, but the PET demonstrated an approximately 5 cm mass.  There is no obvious metastatic disease and it is felt that may well be resectable but plans have been made for neoadjuvant chemoradiation.  She is really not able to swallow much at this time and as such she was referred for port placement and feeding access.    Past Medical History: Hypertension, hyperlipidemia, atrial fibrillation    Past Surgical History: Laparoscopic cholecystectomy done at least 10 years ago, recent upper endoscopy, left total knee replacement    Medications: Currently includes methimazole, metoprolol, Pravachol, Carafate and Eliquis which has been held for the last several days    Allergies: Codeine    Social History: Patient is a 1 pack/day smoker for the last 40 years she is everything to quit    Family History: Breast cancer in her mother and sister, negative for known esophageal or colorectal cancer    Review of Systems:  Constitutional: Positive for unintentional weight loss of around 10 to 15 pounds over the last few months, negative for fever or chills  Neck: Positive for dysphagia and odynophagia  Respiratory: negative for SOB, cough, hemoptysis or wheezing  Cardiovascular: negative for chest pain, palpitations or peripheral edema  Gastrointestinal: Negative for nausea, vomiting, abdominal pain      Physical Exam:  BMI: 21.7  General: alert and oriented, appropriate, no acute distress  Eyes: No scleral icterus, extraocular movements are intact  Neck: Supple without lymphadenopathy or thyromegaly, trachea is in the midline  Respiratory: There is  good bilateral chest expansion, no use of accessory muscles is noted  Cardiovascular: No jugular venous distention or peripheral edema is seen  Gastrointestinal: Soft and benign, there is no mass, there is no hernia    Laboratory data: Recent white blood cell count 12.4 hemoglobin 15 platelet count normal    Imaging data: CT PET reviewed by me.  There is a 5 cm esophageal mass.  There is some mildly enlarged lymph nodes which have intermediate FDG avid uptake but no clear evidence of metastatic disease is identified.      Assessment and plan:   -Esophageal cancer, felt at this time to be potentially resectable.  Plans are for neoadjuvant chemoradiation and then reevaluation with consideration for possible future esophagogastrectomy.  -Tentative plans are for treatment to start next week.  I have recommended that we arrange for port placement as well as feeding jejunostomy.  I have sent a message to Dr. Finch in case he thinks that gastrostomy would be more appropriate, but I think that is unlikely at this time as there is no clear evidence that she is unresectable.  She seems to be otherwise in reasonably good health.  She has a history of atrial fibrillation but her Eliquis has been held for her recent endoscopy and EUS.  We had a discussion surrounding the risks associated with port placement and feeding access.  Patient understands these risks include, but not be limited to, bleeding, infection, pneumothorax, device failure, increased risk of blood clots and with regard to feeding access the possibility of infection, pain, clogging of the tube, possible need for revision, drainage and dislodgment.  She also understands that with jejunostomy she will likely need to be connected to the pump for longer periods of time as bolus feeding will not be an option.  I have tentatively scheduled her for Wednesday of this week.      Bhanu Hollis MD, FACS  General, Minimally Invasive and Endoscopic Surgery  [facility]    [address], [address]  [place], [place], [address]  P: [phone]  F: [phone]

## 2021-12-13 NOTE — H&P (VIEW-ONLY)
Cc: Esophageal cancer    History of presenting illness:   This is a nice 61-year-old lady with dysphagia beginning this summer and some associated weight loss of around 10 or 15 pounds since that time.  She had an upper endoscopy done a couple of weeks ago which demonstrated a near obstructing mass in the esophagus.  She had biopsies which demonstrated severe dysplasia with carcinoma in situ, but the PET demonstrated an approximately 5 cm mass.  There is no obvious metastatic disease and it is felt that may well be resectable but plans have been made for neoadjuvant chemoradiation.  She is really not able to swallow much at this time and as such she was referred for port placement and feeding access.    Past Medical History: Hypertension, hyperlipidemia, atrial fibrillation    Past Surgical History: Laparoscopic cholecystectomy done at least 10 years ago, recent upper endoscopy, left total knee replacement    Medications: Currently includes methimazole, metoprolol, Pravachol, Carafate and Eliquis which has been held for the last several days    Allergies: Codeine    Social History: Patient is a 1 pack/day smoker for the last 40 years she is everything to quit    Family History: Breast cancer in her mother and sister, negative for known esophageal or colorectal cancer    Review of Systems:  Constitutional: Positive for unintentional weight loss of around 10 to 15 pounds over the last few months, negative for fever or chills  Neck: Positive for dysphagia and odynophagia  Respiratory: negative for SOB, cough, hemoptysis or wheezing  Cardiovascular: negative for chest pain, palpitations or peripheral edema  Gastrointestinal: Negative for nausea, vomiting, abdominal pain      Physical Exam:  BMI: 21.7  General: alert and oriented, appropriate, no acute distress  Eyes: No scleral icterus, extraocular movements are intact  Neck: Supple without lymphadenopathy or thyromegaly, trachea is in the midline  Respiratory: There is  good bilateral chest expansion, no use of accessory muscles is noted  Cardiovascular: No jugular venous distention or peripheral edema is seen  Gastrointestinal: Soft and benign, there is no mass, there is no hernia    Laboratory data: Recent white blood cell count 12.4 hemoglobin 15 platelet count normal    Imaging data: CT PET reviewed by me.  There is a 5 cm esophageal mass.  There is some mildly enlarged lymph nodes which have intermediate FDG avid uptake but no clear evidence of metastatic disease is identified.      Assessment and plan:   -Esophageal cancer, felt at this time to be potentially resectable.  Plans are for neoadjuvant chemoradiation and then reevaluation with consideration for possible future esophagogastrectomy.  -Tentative plans are for treatment to start next week.  I have recommended that we arrange for port placement as well as feeding jejunostomy.  I have sent a message to Dr. Finch in case he thinks that gastrostomy would be more appropriate, but I think that is unlikely at this time as there is no clear evidence that she is unresectable.  She seems to be otherwise in reasonably good health.  She has a history of atrial fibrillation but her Eliquis has been held for her recent endoscopy and EUS.  We had a discussion surrounding the risks associated with port placement and feeding access.  Patient understands these risks include, but not be limited to, bleeding, infection, pneumothorax, device failure, increased risk of blood clots and with regard to feeding access the possibility of infection, pain, clogging of the tube, possible need for revision, drainage and dislodgment.  She also understands that with jejunostomy she will likely need to be connected to the pump for longer periods of time as bolus feeding will not be an option.  I have tentatively scheduled her for Wednesday of this week.      Bhanu Hollis MD, FACS  General, Minimally Invasive and Endoscopic Surgery  [facility]    [address], [address]  [place], [place], [address]  P: [phone]  F: [phone]

## 2021-12-15 ENCOUNTER — ANESTHESIA EVENT (OUTPATIENT)
Dept: PERIOP | Facility: HOSPITAL | Age: 61
End: 2021-12-15

## 2021-12-15 ENCOUNTER — APPOINTMENT (OUTPATIENT)
Dept: GENERAL RADIOLOGY | Facility: HOSPITAL | Age: 61
End: 2021-12-15

## 2021-12-15 ENCOUNTER — ANESTHESIA (OUTPATIENT)
Dept: PERIOP | Facility: HOSPITAL | Age: 61
End: 2021-12-15

## 2021-12-15 ENCOUNTER — HOSPITAL ENCOUNTER (OUTPATIENT)
Facility: HOSPITAL | Age: 61
Discharge: HOME | End: 2021-12-20
Attending: SURGERY | Admitting: SURGERY

## 2021-12-15 DIAGNOSIS — C15.5 MALIGNANT NEOPLASM OF LOWER THIRD OF ESOPHAGUS (HCC): ICD-10-CM

## 2021-12-15 LAB
ANION GAP SERPL CALCULATED.3IONS-SCNC: 9.7 MMOL/L (ref 5–15)
BUN SERPL-MCNC: 11 MG/DL (ref 8–23)
BUN/CREAT SERPL: 23.9 (ref 7–25)
CALCIUM SPEC-SCNC: 9.7 MG/DL (ref 8.6–10.5)
CHLORIDE SERPL-SCNC: 103 MMOL/L (ref 98–107)
CO2 SERPL-SCNC: 25.3 MMOL/L (ref 22–29)
CREAT SERPL-MCNC: 0.46 MG/DL (ref 0.57–1)
GFR SERPL CREATININE-BSD FRML MDRD: 138 ML/MIN/1.73
GLUCOSE SERPL-MCNC: 124 MG/DL (ref 65–99)
POTASSIUM SERPL-SCNC: 4 MMOL/L (ref 3.5–5.2)
QT INTERVAL: 408 MS
SODIUM SERPL-SCNC: 138 MMOL/L (ref 136–145)

## 2021-12-15 PROCEDURE — 44300 OPEN BOWEL TO SKIN: CPT | Performed by: SURGERY

## 2021-12-15 PROCEDURE — 25010000002 SUCCINYLCHOLINE PER 20 MG: Performed by: NURSE ANESTHETIST, CERTIFIED REGISTERED

## 2021-12-15 PROCEDURE — 25010000002 HEPARIN (PORCINE) PER 1000 UNITS: Performed by: SURGERY

## 2021-12-15 PROCEDURE — 93005 ELECTROCARDIOGRAM TRACING: CPT | Performed by: ANESTHESIOLOGY

## 2021-12-15 PROCEDURE — 0 AMPICILLIN-SULBACTAM PER 1.5 G: Performed by: SURGERY

## 2021-12-15 PROCEDURE — 36561 INSERT TUNNELED CV CATH: CPT | Performed by: SURGERY

## 2021-12-15 PROCEDURE — 80048 BASIC METABOLIC PNL TOTAL CA: CPT | Performed by: SURGERY

## 2021-12-15 PROCEDURE — 76000 FLUOROSCOPY <1 HR PHYS/QHP: CPT

## 2021-12-15 PROCEDURE — 25010000002 PROPOFOL 10 MG/ML EMULSION: Performed by: NURSE ANESTHETIST, CERTIFIED REGISTERED

## 2021-12-15 PROCEDURE — 25010000002 ONDANSETRON PER 1 MG: Performed by: NURSE ANESTHETIST, CERTIFIED REGISTERED

## 2021-12-15 PROCEDURE — 77001 FLUOROGUIDE FOR VEIN DEVICE: CPT | Performed by: SURGERY

## 2021-12-15 PROCEDURE — 25010000002 DEXAMETHASONE PER 1 MG: Performed by: NURSE ANESTHETIST, CERTIFIED REGISTERED

## 2021-12-15 PROCEDURE — 93010 ELECTROCARDIOGRAM REPORT: CPT | Performed by: INTERNAL MEDICINE

## 2021-12-15 PROCEDURE — 25010000002 HYDROMORPHONE PER 4 MG: Performed by: NURSE ANESTHETIST, CERTIFIED REGISTERED

## 2021-12-15 PROCEDURE — C1729 CATH, DRAINAGE: HCPCS | Performed by: SURGERY

## 2021-12-15 PROCEDURE — 25010000002 NEOSTIGMINE 5 MG/10ML SOLUTION: Performed by: NURSE ANESTHETIST, CERTIFIED REGISTERED

## 2021-12-15 PROCEDURE — G0378 HOSPITAL OBSERVATION PER HR: HCPCS

## 2021-12-15 PROCEDURE — 25010000002 FENTANYL CITRATE (PF) 50 MCG/ML SOLUTION: Performed by: NURSE ANESTHETIST, CERTIFIED REGISTERED

## 2021-12-15 PROCEDURE — 25010000002 KETOROLAC TROMETHAMINE PER 15 MG: Performed by: NURSE ANESTHETIST, CERTIFIED REGISTERED

## 2021-12-15 PROCEDURE — C1788 PORT, INDWELLING, IMP: HCPCS | Performed by: SURGERY

## 2021-12-15 DEVICE — POWERPORT CLEARVUE IMPLANTABLE PORT WITH ATTACHABLE 8F POLYURETHANE OPEN-ENDED SINGLE-LUMEN VENOUS CATHETER INTERMEDIATE KIT
Type: IMPLANTABLE DEVICE | Site: CHEST | Status: FUNCTIONAL
Brand: POWERPORT CLEARVUE

## 2021-12-15 RX ORDER — PANTOPRAZOLE SODIUM 40 MG/1
40 TABLET, DELAYED RELEASE ORAL 2 TIMES DAILY
Status: DISCONTINUED | OUTPATIENT
Start: 2021-12-15 | End: 2021-12-20 | Stop reason: HOSPADM

## 2021-12-15 RX ORDER — HYDRALAZINE HYDROCHLORIDE 20 MG/ML
5 INJECTION INTRAMUSCULAR; INTRAVENOUS
Status: DISCONTINUED | OUTPATIENT
Start: 2021-12-15 | End: 2021-12-15 | Stop reason: HOSPADM

## 2021-12-15 RX ORDER — EPHEDRINE SULFATE 50 MG/ML
5 INJECTION, SOLUTION INTRAVENOUS ONCE AS NEEDED
Status: DISCONTINUED | OUTPATIENT
Start: 2021-12-15 | End: 2021-12-15 | Stop reason: HOSPADM

## 2021-12-15 RX ORDER — SUCCINYLCHOLINE CHLORIDE 20 MG/ML
INJECTION INTRAMUSCULAR; INTRAVENOUS AS NEEDED
Status: DISCONTINUED | OUTPATIENT
Start: 2021-12-15 | End: 2021-12-15 | Stop reason: SURG

## 2021-12-15 RX ORDER — METHIMAZOLE 5 MG/1
5 TABLET ORAL DAILY
Status: DISCONTINUED | OUTPATIENT
Start: 2021-12-15 | End: 2021-12-20 | Stop reason: HOSPADM

## 2021-12-15 RX ORDER — SODIUM CHLORIDE 0.9 % (FLUSH) 0.9 %
3-10 SYRINGE (ML) INJECTION AS NEEDED
Status: DISCONTINUED | OUTPATIENT
Start: 2021-12-15 | End: 2021-12-15 | Stop reason: HOSPADM

## 2021-12-15 RX ORDER — LIDOCAINE HYDROCHLORIDE 10 MG/ML
0.5 INJECTION, SOLUTION EPIDURAL; INFILTRATION; INTRACAUDAL; PERINEURAL ONCE AS NEEDED
Status: DISCONTINUED | OUTPATIENT
Start: 2021-12-15 | End: 2021-12-15 | Stop reason: HOSPADM

## 2021-12-15 RX ORDER — FENTANYL CITRATE 50 UG/ML
50 INJECTION, SOLUTION INTRAMUSCULAR; INTRAVENOUS
Status: DISCONTINUED | OUTPATIENT
Start: 2021-12-15 | End: 2021-12-15 | Stop reason: HOSPADM

## 2021-12-15 RX ORDER — GLYCOPYRROLATE 0.2 MG/ML
INJECTION INTRAMUSCULAR; INTRAVENOUS AS NEEDED
Status: DISCONTINUED | OUTPATIENT
Start: 2021-12-15 | End: 2021-12-15 | Stop reason: SURG

## 2021-12-15 RX ORDER — FLUMAZENIL 0.1 MG/ML
0.2 INJECTION INTRAVENOUS AS NEEDED
Status: DISCONTINUED | OUTPATIENT
Start: 2021-12-15 | End: 2021-12-15 | Stop reason: HOSPADM

## 2021-12-15 RX ORDER — FAMOTIDINE 10 MG/ML
20 INJECTION, SOLUTION INTRAVENOUS ONCE
Status: COMPLETED | OUTPATIENT
Start: 2021-12-15 | End: 2021-12-15

## 2021-12-15 RX ORDER — NEOSTIGMINE METHYLSULFATE 0.5 MG/ML
INJECTION, SOLUTION INTRAVENOUS AS NEEDED
Status: DISCONTINUED | OUTPATIENT
Start: 2021-12-15 | End: 2021-12-15 | Stop reason: SURG

## 2021-12-15 RX ORDER — LIDOCAINE HYDROCHLORIDE 20 MG/ML
INJECTION, SOLUTION INFILTRATION; PERINEURAL AS NEEDED
Status: DISCONTINUED | OUTPATIENT
Start: 2021-12-15 | End: 2021-12-15 | Stop reason: SURG

## 2021-12-15 RX ORDER — LABETALOL HYDROCHLORIDE 5 MG/ML
5 INJECTION, SOLUTION INTRAVENOUS
Status: DISCONTINUED | OUTPATIENT
Start: 2021-12-15 | End: 2021-12-15 | Stop reason: HOSPADM

## 2021-12-15 RX ORDER — ROCURONIUM BROMIDE 10 MG/ML
INJECTION, SOLUTION INTRAVENOUS AS NEEDED
Status: DISCONTINUED | OUTPATIENT
Start: 2021-12-15 | End: 2021-12-15 | Stop reason: SURG

## 2021-12-15 RX ORDER — NALOXONE HCL 0.4 MG/ML
0.2 VIAL (ML) INJECTION AS NEEDED
Status: DISCONTINUED | OUTPATIENT
Start: 2021-12-15 | End: 2021-12-15 | Stop reason: HOSPADM

## 2021-12-15 RX ORDER — PROMETHAZINE HYDROCHLORIDE 25 MG/1
25 SUPPOSITORY RECTAL ONCE AS NEEDED
Status: DISCONTINUED | OUTPATIENT
Start: 2021-12-15 | End: 2021-12-15 | Stop reason: HOSPADM

## 2021-12-15 RX ORDER — IBUPROFEN 600 MG/1
600 TABLET ORAL ONCE AS NEEDED
Status: DISCONTINUED | OUTPATIENT
Start: 2021-12-15 | End: 2021-12-15 | Stop reason: HOSPADM

## 2021-12-15 RX ORDER — ONDANSETRON 2 MG/ML
4 INJECTION INTRAMUSCULAR; INTRAVENOUS EVERY 6 HOURS PRN
Status: DISCONTINUED | OUTPATIENT
Start: 2021-12-15 | End: 2021-12-20 | Stop reason: HOSPADM

## 2021-12-15 RX ORDER — ONDANSETRON 2 MG/ML
4 INJECTION INTRAMUSCULAR; INTRAVENOUS ONCE AS NEEDED
Status: DISCONTINUED | OUTPATIENT
Start: 2021-12-15 | End: 2021-12-15 | Stop reason: HOSPADM

## 2021-12-15 RX ORDER — KETOROLAC TROMETHAMINE 30 MG/ML
INJECTION, SOLUTION INTRAMUSCULAR; INTRAVENOUS AS NEEDED
Status: DISCONTINUED | OUTPATIENT
Start: 2021-12-15 | End: 2021-12-15 | Stop reason: SURG

## 2021-12-15 RX ORDER — MAGNESIUM HYDROXIDE 1200 MG/15ML
LIQUID ORAL AS NEEDED
Status: DISCONTINUED | OUTPATIENT
Start: 2021-12-15 | End: 2021-12-15 | Stop reason: HOSPADM

## 2021-12-15 RX ORDER — DEXAMETHASONE SODIUM PHOSPHATE 10 MG/ML
INJECTION INTRAMUSCULAR; INTRAVENOUS AS NEEDED
Status: DISCONTINUED | OUTPATIENT
Start: 2021-12-15 | End: 2021-12-15 | Stop reason: SURG

## 2021-12-15 RX ORDER — MIDAZOLAM HYDROCHLORIDE 1 MG/ML
1 INJECTION INTRAMUSCULAR; INTRAVENOUS
Status: DISCONTINUED | OUTPATIENT
Start: 2021-12-15 | End: 2021-12-15 | Stop reason: HOSPADM

## 2021-12-15 RX ORDER — HYDROMORPHONE HYDROCHLORIDE 1 MG/ML
0.5 INJECTION, SOLUTION INTRAMUSCULAR; INTRAVENOUS; SUBCUTANEOUS
Status: DISCONTINUED | OUTPATIENT
Start: 2021-12-15 | End: 2021-12-15 | Stop reason: HOSPADM

## 2021-12-15 RX ORDER — FENTANYL CITRATE 50 UG/ML
INJECTION, SOLUTION INTRAMUSCULAR; INTRAVENOUS AS NEEDED
Status: DISCONTINUED | OUTPATIENT
Start: 2021-12-15 | End: 2021-12-15 | Stop reason: SURG

## 2021-12-15 RX ORDER — DIPHENHYDRAMINE HYDROCHLORIDE 50 MG/ML
12.5 INJECTION INTRAMUSCULAR; INTRAVENOUS
Status: DISCONTINUED | OUTPATIENT
Start: 2021-12-15 | End: 2021-12-15 | Stop reason: HOSPADM

## 2021-12-15 RX ORDER — SODIUM CHLORIDE, SODIUM LACTATE, POTASSIUM CHLORIDE, CALCIUM CHLORIDE 600; 310; 30; 20 MG/100ML; MG/100ML; MG/100ML; MG/100ML
9 INJECTION, SOLUTION INTRAVENOUS CONTINUOUS
Status: DISCONTINUED | OUTPATIENT
Start: 2021-12-15 | End: 2021-12-15

## 2021-12-15 RX ORDER — WOUND DRESSING ADHESIVE - LIQUID
LIQUID MISCELLANEOUS AS NEEDED
Status: DISCONTINUED | OUTPATIENT
Start: 2021-12-15 | End: 2021-12-15 | Stop reason: HOSPADM

## 2021-12-15 RX ORDER — SODIUM CHLORIDE 0.9 % (FLUSH) 0.9 %
3 SYRINGE (ML) INJECTION EVERY 12 HOURS SCHEDULED
Status: DISCONTINUED | OUTPATIENT
Start: 2021-12-15 | End: 2021-12-15 | Stop reason: HOSPADM

## 2021-12-15 RX ORDER — PROMETHAZINE HYDROCHLORIDE 25 MG/1
25 TABLET ORAL ONCE AS NEEDED
Status: DISCONTINUED | OUTPATIENT
Start: 2021-12-15 | End: 2021-12-15 | Stop reason: HOSPADM

## 2021-12-15 RX ORDER — NALOXONE HCL 0.4 MG/ML
0.1 VIAL (ML) INJECTION
Status: DISCONTINUED | OUTPATIENT
Start: 2021-12-15 | End: 2021-12-20 | Stop reason: HOSPADM

## 2021-12-15 RX ORDER — METOPROLOL TARTRATE 50 MG/1
50 TABLET, FILM COATED ORAL 2 TIMES DAILY
Status: DISCONTINUED | OUTPATIENT
Start: 2021-12-15 | End: 2021-12-20 | Stop reason: HOSPADM

## 2021-12-15 RX ORDER — HYDROMORPHONE HYDROCHLORIDE 1 MG/ML
0.25 INJECTION, SOLUTION INTRAMUSCULAR; INTRAVENOUS; SUBCUTANEOUS
Status: DISCONTINUED | OUTPATIENT
Start: 2021-12-15 | End: 2021-12-17

## 2021-12-15 RX ORDER — DIPHENHYDRAMINE HCL 25 MG
25 CAPSULE ORAL
Status: DISCONTINUED | OUTPATIENT
Start: 2021-12-15 | End: 2021-12-15 | Stop reason: HOSPADM

## 2021-12-15 RX ORDER — ONDANSETRON 2 MG/ML
INJECTION INTRAMUSCULAR; INTRAVENOUS AS NEEDED
Status: DISCONTINUED | OUTPATIENT
Start: 2021-12-15 | End: 2021-12-15 | Stop reason: SURG

## 2021-12-15 RX ORDER — PROPOFOL 10 MG/ML
VIAL (ML) INTRAVENOUS AS NEEDED
Status: DISCONTINUED | OUTPATIENT
Start: 2021-12-15 | End: 2021-12-15 | Stop reason: SURG

## 2021-12-15 RX ORDER — ONDANSETRON 4 MG/1
4 TABLET, FILM COATED ORAL EVERY 6 HOURS PRN
Status: DISCONTINUED | OUTPATIENT
Start: 2021-12-15 | End: 2021-12-20 | Stop reason: HOSPADM

## 2021-12-15 RX ADMIN — FENTANYL CITRATE 50 MCG: 50 INJECTION INTRAMUSCULAR; INTRAVENOUS at 13:56

## 2021-12-15 RX ADMIN — PROPOFOL 120 MG: 10 INJECTION, EMULSION INTRAVENOUS at 12:16

## 2021-12-15 RX ADMIN — SUCCINYLCHOLINE CHLORIDE 110 MG: 20 INJECTION, SOLUTION INTRAMUSCULAR; INTRAVENOUS; PARENTERAL at 12:16

## 2021-12-15 RX ADMIN — FAMOTIDINE 20 MG: 10 INJECTION INTRAVENOUS at 11:38

## 2021-12-15 RX ADMIN — SODIUM CHLORIDE, POTASSIUM CHLORIDE, SODIUM LACTATE AND CALCIUM CHLORIDE 9 ML/HR: 600; 310; 30; 20 INJECTION, SOLUTION INTRAVENOUS at 15:45

## 2021-12-15 RX ADMIN — FENTANYL CITRATE 50 MCG: 0.05 INJECTION, SOLUTION INTRAMUSCULAR; INTRAVENOUS at 13:09

## 2021-12-15 RX ADMIN — SODIUM CHLORIDE 3 G: 9 INJECTION, SOLUTION INTRAVENOUS at 12:06

## 2021-12-15 RX ADMIN — ROCURONIUM BROMIDE 20 MG: 50 INJECTION INTRAVENOUS at 12:26

## 2021-12-15 RX ADMIN — METOPROLOL TARTRATE 50 MG: 50 TABLET, FILM COATED ORAL at 21:01

## 2021-12-15 RX ADMIN — GLYCOPYRROLATE 0.4 MG: 0.2 INJECTION INTRAMUSCULAR; INTRAVENOUS at 13:27

## 2021-12-15 RX ADMIN — HYDROMORPHONE HYDROCHLORIDE 0.5 MG: 1 INJECTION, SOLUTION INTRAMUSCULAR; INTRAVENOUS; SUBCUTANEOUS at 14:16

## 2021-12-15 RX ADMIN — FENTANYL CITRATE 25 MCG: 0.05 INJECTION, SOLUTION INTRAMUSCULAR; INTRAVENOUS at 12:54

## 2021-12-15 RX ADMIN — DEXAMETHASONE SODIUM PHOSPHATE 8 MG: 10 INJECTION INTRAMUSCULAR; INTRAVENOUS at 12:21

## 2021-12-15 RX ADMIN — SODIUM CHLORIDE, POTASSIUM CHLORIDE, SODIUM LACTATE AND CALCIUM CHLORIDE 9 ML/HR: 600; 310; 30; 20 INJECTION, SOLUTION INTRAVENOUS at 11:38

## 2021-12-15 RX ADMIN — ONDANSETRON 4 MG: 2 INJECTION INTRAMUSCULAR; INTRAVENOUS at 12:58

## 2021-12-15 RX ADMIN — FENTANYL CITRATE 25 MCG: 0.05 INJECTION, SOLUTION INTRAMUSCULAR; INTRAVENOUS at 12:57

## 2021-12-15 RX ADMIN — SODIUM CHLORIDE, PRESERVATIVE FREE 3 ML: 5 INJECTION INTRAVENOUS at 11:42

## 2021-12-15 RX ADMIN — PANTOPRAZOLE SODIUM 40 MG: 40 TABLET, DELAYED RELEASE ORAL at 21:01

## 2021-12-15 RX ADMIN — KETOROLAC TROMETHAMINE 30 MG: 30 INJECTION, SOLUTION INTRAMUSCULAR at 13:11

## 2021-12-15 RX ADMIN — HYDROCODONE BITARTRATE AND ACETAMINOPHEN 15 ML: 7.5; 325 SOLUTION ORAL at 21:01

## 2021-12-15 RX ADMIN — LIDOCAINE HYDROCHLORIDE 100 MG: 20 INJECTION, SOLUTION INFILTRATION; PERINEURAL at 12:16

## 2021-12-15 RX ADMIN — NEOSTIGMINE METHYLSULFATE 2 MG: 0.5 INJECTION INTRAVENOUS at 13:27

## 2021-12-15 RX ADMIN — HYDROMORPHONE HYDROCHLORIDE 0.5 MG: 1 INJECTION, SOLUTION INTRAMUSCULAR; INTRAVENOUS; SUBCUTANEOUS at 15:23

## 2021-12-15 NOTE — ANESTHESIA POSTPROCEDURE EVALUATION
"Patient: Maya Ribera    Procedure Summary     Date: 12/15/21 Room / Location: [facility] OR 03 / [facility] MAIN OR    Anesthesia Start: 1211 Anesthesia Stop: 1349    Procedures:       INSERTION OF PORTACATH (Left )      open JEJUNOSTOMY tube placement (N/A Abdomen) Diagnosis:       Malignant neoplasm of lower third of esophagus (HCC)      (Malignant neoplasm of lower third of esophagus (HCC) [C15.5])    Surgeons: Bhanu Hollis MD Provider: Jenniffer Tony MD    Anesthesia Type: general ASA Status: 3          Anesthesia Type: general    Vitals  Vitals Value Taken Time   /51 12/15/21 1706   Temp 36.5 °C (97.7 °F) 12/15/21 1346   Pulse 57 12/15/21 1714   Resp 12 12/15/21 1635   SpO2 98 % 12/15/21 1714   Vitals shown include unvalidated device data.        Post Anesthesia Care and Evaluation    Patient location during evaluation: bedside  Patient participation: complete - patient participated  Level of consciousness: awake  Pain management: adequate  Airway patency: patent  Anesthetic complications: No anesthetic complications    Cardiovascular status: acceptable  Respiratory status: acceptable  Hydration status: acceptable    Comments: /54 (BP Location: Right arm, Patient Position: Lying)   Pulse 57   Temp 36.5 °C (97.7 °F) (Oral)   Resp 12   Ht 162.6 cm (64\")   Wt 57 kg (125 lb 9.6 oz)   SpO2 98%   BMI 21.56 kg/m²       "

## 2021-12-15 NOTE — ANESTHESIA PREPROCEDURE EVALUATION
Anesthesia Evaluation     NPO Solid Status: > 8 hours  NPO Liquid Status: > 2 hours           Airway   Mallampati: II  TM distance: >3 FB  Neck ROM: full  Dental          Pulmonary - normal exam   (+) a smoker Current,   Cardiovascular - normal exam  Exercise tolerance: good (4-7 METS)    PT is on anticoagulation therapy  Patient on routine beta blocker and Beta blocker given within 24 hours of surgery  Rhythm: regular  Rate: normal    (+) hypertension, valvular problems/murmurs (mild bileaflet MVP on echo from [facility]) MVP and MR, dysrhythmias (EKG pending today, on exam feels like sinus ) Atrial Fib, hyperlipidemia,       Neuro/Psych- negative ROS  GI/Hepatic/Renal/Endo    (+)  GERD, PUD,  thyroid problem hyperthyroidism    ROS Comment: Esophageal cancer and cannot swallow solids, has some difficulty with liquids     Musculoskeletal     Abdominal    Substance History      OB/GYN          Other      history of cancer active                    Anesthesia Plan    ASA 3     general   Rapid sequence(RSI for esophageal cancer )  intravenous induction     Anesthetic plan, all risks, benefits, and alternatives have been provided, discussed and informed consent has been obtained with: patient.

## 2021-12-15 NOTE — ANESTHESIA PROCEDURE NOTES
Airway  Urgency: elective    Date/Time: 12/15/2021 12:17 PM  Airway not difficult    General Information and Staff    Patient location during procedure: OR  Anesthesiologist: Jenniffer Tony MD  CRNA: Chandana Paniagua CRNA    Indications and Patient Condition  Indications for airway management: airway protection    Preoxygenated: yes  Mask difficulty assessment: 0 - not attempted    Final Airway Details  Final airway type: endotracheal airway      Successful airway: ETT  Cuffed: yes   Successful intubation technique: direct laryngoscopy and RSI  Facilitating devices/methods: intubating stylet  Endotracheal tube insertion site: oral  Blade: Holt  Blade size: 2  ETT size (mm): 7.0  Cormack-Lehane Classification: grade I - full view of glottis  Placement verified by: chest auscultation and capnometry   Measured from: lips  ETT/EBT  to lips (cm): 22  Number of attempts at approach: 1  Assessment: lips, teeth, and gum same as pre-op and atraumatic intubation    Additional Comments  Pre 02 100%, RSII, DL x1, atraumatic intubation, BLBS, Positive ETC02.

## 2021-12-15 NOTE — OP NOTE
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    Anesthesia:  General (general endotracheal tube)    EBL:   none    Specimens:   None    Indications:  · 61-year-old female with near obstructing esophageal cancer and associated malnutrition presenting for neoadjuvant treatment and feeding access.    Findings:   · None    Recommendations:   · Routine port and J-tube care    Description of procedure:    After obtaining informed consent, patient brought to the operating room placed under a general anesthetic.  Her chest and neck were sterilely prepped and draped.  Access to the left subclavian vein was gained and the wire was then passed through the needle under fluoroscopic guidance and directed to the superior vena cava.  Needle was withdrawn.  Site for the pocket was marked and created through the skin.  Subcutaneous tissues were  with electrocautery and then a pocket was bluntly created.  The stick site was enlarged with a #11 blade and the vein dilator and sheath were passed over the wire under fluoroscopic guidance into the superior vena cava.  The vein dilator and wire were then withdrawn and replaced with the 8 Persian catheter which was directed to the superior vena cava.  The peel-away sheath was removed.  The catheter was tunneled down into the pocket, cut to the appropriate length and then attached to the Bard clearvue port.  The port was secured to the chest wall with 3-0  Prolene.  I was able to easily withdraw blood and then flushed the catheter.  The catheter was then locked with the concentrated heparin solution.  Subcutaneous tissues were reapproximated with 3-0 Vicryl and skin was closed with glue.    Drapes were then taken away and the abdomen was then sterilely prepped and draped.  Supra umbilical skin incision made dissected through the subcutaneous tissues onto the fascia.  Fascia was incised in the midline.  A small Kehinde wound retractor was introduced and the omentum was reflected superiorly.  The transverse colon was identified and lifted up.  Small bowel was identified and run back to the level of the ligament of Treitz.  Approximately 20 cm distal to this an area appropriate for jejunostomy was identified.  A 20 Filipino T-tube was then brought through the abdominal wall and trimmed appropriately.  A pursestring suture of 2-0 silk was then placed on the antimesenteric side of the small bowel and then an incision of the small bowel was made in the midst of this pursestring suture.  The 20 Filipino feeding tube was introduced and oriented appropriately.  The superior side is oriented proximally and the inferior side is oriented distally.  The tube was then Stammed up against the abdominal wall with 2-0 silk suture.  The tube was secured to the skin with a 2-0 nylon suture.  The fascia was closed with running 0 PDS.  Subcutaneous tissues were reapproximated with 3-0 Vicryl and skin was closed with 4-0 Monocryl.    Bhanu Hollis MD  General and Endoscopic Surgery  [facility]    [address], [address]  [place], [place], [address]  P: [phone]  F: [phone]

## 2021-12-16 ENCOUNTER — TELEPHONE (OUTPATIENT)
Dept: ONCOLOGY | Facility: CLINIC | Age: 61
End: 2021-12-16

## 2021-12-16 ENCOUNTER — APPOINTMENT (OUTPATIENT)
Dept: GENERAL RADIOLOGY | Facility: HOSPITAL | Age: 61
End: 2021-12-16

## 2021-12-16 LAB
ANION GAP SERPL CALCULATED.3IONS-SCNC: 11.1 MMOL/L (ref 5–15)
BASOPHILS # BLD AUTO: 0.03 10*3/MM3 (ref 0–0.2)
BASOPHILS NFR BLD AUTO: 0.2 % (ref 0–1.5)
BUN SERPL-MCNC: 16 MG/DL (ref 8–23)
BUN/CREAT SERPL: 37.2 (ref 7–25)
CALCIUM SPEC-SCNC: 9.1 MG/DL (ref 8.6–10.5)
CHLORIDE SERPL-SCNC: 103 MMOL/L (ref 98–107)
CO2 SERPL-SCNC: 23.9 MMOL/L (ref 22–29)
CREAT SERPL-MCNC: 0.43 MG/DL (ref 0.57–1)
DEPRECATED RDW RBC AUTO: 39.4 FL (ref 37–54)
EOSINOPHIL # BLD AUTO: 0.01 10*3/MM3 (ref 0–0.4)
EOSINOPHIL NFR BLD AUTO: 0.1 % (ref 0.3–6.2)
ERYTHROCYTE [DISTWIDTH] IN BLOOD BY AUTOMATED COUNT: 12.1 % (ref 12.3–15.4)
GFR SERPL CREATININE-BSD FRML MDRD: 149 ML/MIN/1.73
GLUCOSE SERPL-MCNC: 105 MG/DL (ref 65–99)
HCT VFR BLD AUTO: 40.5 % (ref 34–46.6)
HGB BLD-MCNC: 13.1 G/DL (ref 12–15.9)
IMM GRANULOCYTES # BLD AUTO: 0.05 10*3/MM3 (ref 0–0.05)
IMM GRANULOCYTES NFR BLD AUTO: 0.3 % (ref 0–0.5)
LYMPHOCYTES # BLD AUTO: 1.94 10*3/MM3 (ref 0.7–3.1)
LYMPHOCYTES NFR BLD AUTO: 12.9 % (ref 19.6–45.3)
MCH RBC QN AUTO: 28.7 PG (ref 26.6–33)
MCHC RBC AUTO-ENTMCNC: 32.3 G/DL (ref 31.5–35.7)
MCV RBC AUTO: 88.8 FL (ref 79–97)
MONOCYTES # BLD AUTO: 1.04 10*3/MM3 (ref 0.1–0.9)
MONOCYTES NFR BLD AUTO: 6.9 % (ref 5–12)
NEUTROPHILS NFR BLD AUTO: 11.98 10*3/MM3 (ref 1.7–7)
NEUTROPHILS NFR BLD AUTO: 79.6 % (ref 42.7–76)
NRBC BLD AUTO-RTO: 0 /100 WBC (ref 0–0.2)
PLATELET # BLD AUTO: 246 10*3/MM3 (ref 140–450)
PMV BLD AUTO: 10.5 FL (ref 6–12)
POTASSIUM SERPL-SCNC: 4.2 MMOL/L (ref 3.5–5.2)
RBC # BLD AUTO: 4.56 10*6/MM3 (ref 3.77–5.28)
SODIUM SERPL-SCNC: 138 MMOL/L (ref 136–145)
WBC NRBC COR # BLD: 15.05 10*3/MM3 (ref 3.4–10.8)

## 2021-12-16 PROCEDURE — 71045 X-RAY EXAM CHEST 1 VIEW: CPT

## 2021-12-16 PROCEDURE — 99024 POSTOP FOLLOW-UP VISIT: CPT | Performed by: SURGERY

## 2021-12-16 PROCEDURE — 80048 BASIC METABOLIC PNL TOTAL CA: CPT | Performed by: SURGERY

## 2021-12-16 PROCEDURE — G0378 HOSPITAL OBSERVATION PER HR: HCPCS

## 2021-12-16 PROCEDURE — 85025 COMPLETE CBC W/AUTO DIFF WBC: CPT | Performed by: SURGERY

## 2021-12-16 PROCEDURE — 25010000002 HYDROMORPHONE PER 4 MG: Performed by: SURGERY

## 2021-12-16 PROCEDURE — 99213 OFFICE O/P EST LOW 20 MIN: CPT | Performed by: NURSE PRACTITIONER

## 2021-12-16 RX ADMIN — HYDROMORPHONE HYDROCHLORIDE 0.25 MG: 1 INJECTION, SOLUTION INTRAMUSCULAR; INTRAVENOUS; SUBCUTANEOUS at 04:56

## 2021-12-16 RX ADMIN — HYDROCODONE BITARTRATE AND ACETAMINOPHEN 15 ML: 7.5; 325 SOLUTION ORAL at 20:19

## 2021-12-16 RX ADMIN — METHIMAZOLE 5 MG: 5 TABLET ORAL at 08:53

## 2021-12-16 RX ADMIN — METOPROLOL TARTRATE 50 MG: 50 TABLET, FILM COATED ORAL at 08:53

## 2021-12-16 RX ADMIN — HYDROMORPHONE HYDROCHLORIDE 0.25 MG: 1 INJECTION, SOLUTION INTRAMUSCULAR; INTRAVENOUS; SUBCUTANEOUS at 09:00

## 2021-12-16 RX ADMIN — METOPROLOL TARTRATE 50 MG: 50 TABLET, FILM COATED ORAL at 21:13

## 2021-12-16 RX ADMIN — PANTOPRAZOLE SODIUM 40 MG: 40 TABLET, DELAYED RELEASE ORAL at 08:53

## 2021-12-16 RX ADMIN — HYDROMORPHONE HYDROCHLORIDE 0.25 MG: 1 INJECTION, SOLUTION INTRAMUSCULAR; INTRAVENOUS; SUBCUTANEOUS at 02:01

## 2021-12-16 RX ADMIN — HYDROCODONE BITARTRATE AND ACETAMINOPHEN 15 ML: 7.5; 325 SOLUTION ORAL at 14:21

## 2021-12-16 RX ADMIN — HYDROCODONE BITARTRATE AND ACETAMINOPHEN 15 ML: 7.5; 325 SOLUTION ORAL at 04:19

## 2021-12-16 RX ADMIN — PANTOPRAZOLE SODIUM 40 MG: 40 TABLET, DELAYED RELEASE ORAL at 21:14

## 2021-12-16 NOTE — PROGRESS NOTES
Postop day 1 port/J-tube    Subjective:  Complains of abdominal pain, seemingly fairly tolerable.  Taking clear liquids.  Has been up and walking.    Objective:  Afebrile, vitals are stable  General: Awake and alert, no distress  Abdomen: Soft and appropriately tender, dressing is clean, J-tube site is clean.  Port site okay.    Morning labs are pending.    Assessment and plan:  -Esophageal cancer, now status post port placement and J-tube placement.  -Nutrition to see to initiate J-tube feeds today  - to see to assist with planning for home tube feeds  -Hopefully home tomorrow/Saturday    Bhanu Hollis MD  General and Endoscopic Surgery  [facility]    [address], [address]  [place], [place], [address]  P: [phone]  F: [phone]

## 2021-12-16 NOTE — PROGRESS NOTES
"    Chief Complaint: Esophageal mass  S/P: J-tube and port placement  POD # 1    Subjective:  Symptoms:  Stable.  No shortness of breath, cough or weakness.    Diet:  Adequate intake.  No nausea or vomiting.    Activity level: Impaired due to weakness.    Pain:  She complains of pain that is mild.  Pain is well controlled.        Vital Signs:  Temp:  [96.8 °F (36 °C)-97.9 °F (36.6 °C)] 96.8 °F (36 °C)  Heart Rate:  [57-73] 59  Resp:  [12-16] 15  BP: (105-169)/(53-83) 110/62    Intake & Output (last day)       12/15 0701  12/16 0700 12/16 0701  12/17 0700    I.V. (mL/kg) 1000 (17.5)     NG/GT 60     Total Intake(mL/kg) 1060 (18.6)     Urine (mL/kg/hr) 300     Total Output 300     Net +760                 Objective:  General Appearance:  Comfortable and well-appearing.    Vital signs: (most recent): Blood pressure 112/62, pulse 55, temperature 96.8 °F (36 °C), temperature source Oral, resp. rate 16, height 162.6 cm (64.02\"), weight 57 kg (125 lb 9.6 oz), SpO2 95 %.    Output: Producing urine.    HEENT: Normal HEENT exam.    Lungs:  Normal effort.  She is not in respiratory distress.    Heart: Normal rate.    Abdomen: Abdomen is soft.  (J-tube site intact).    Extremities: Normal range of motion.    Neurological: Patient is alert.    Pupils:  Pupils are equal, round, and reactive to light.    Skin:  Warm.  (Dermabond to port site intact.  No purulence or erythema.)            Results Review:     I reviewed the patient's new clinical results.  I reviewed the patient's new imaging results and agree with the interpretation.  I reviewed the patient's other test results and agree with the interpretation  Discussed with patient, RN and Dr. Finch    Imaging Results (Last 24 Hours)     Procedure Component Value Units Date/Time    IR PICC W Fluoro Surgery Only [[id number]] Collected: 12/15/21 1305     Updated: 12/15/21 1309    Narrative:      IR PICC W FLUORO SURGERY ONLY-     INDICATIONS: Chest port placement     TECHNIQUE: " FLUOROSCOPIC ASSISTANCE IN THE OPERATING ROOM.     FINDINGS:     14 intraoperative fluoroscopic spot views were obtained and recorded the  PACS for review, revealing left chest port extending to the cavoatrial  junction region. Please see operative report for full details.     Fluoroscopy time: 16.7 seconds       Impression:         As described.     This report was finalized on 12/15/2021 1:05 PM by Dr. Dano Parmar M.D.             Lab Results:     Lab Results (last 24 hours)     Procedure Component Value Units Date/Time    Basic Metabolic Panel [[id number]]  (Abnormal) Collected: 12/16/21 0722    Specimen: Blood Updated: 12/16/21 0956     Glucose 105 mg/dL      BUN 16 mg/dL      Creatinine 0.43 mg/dL      Sodium 138 mmol/L      Potassium 4.2 mmol/L      Chloride 103 mmol/L      CO2 23.9 mmol/L      Calcium 9.1 mg/dL      eGFR Non African Amer 149 mL/min/1.73      BUN/Creatinine Ratio 37.2     Anion Gap 11.1 mmol/L     Narrative:      GFR Normal >60  Chronic Kidney Disease <60  Kidney Failure <15      CBC & Differential [[id number]]  (Abnormal) Collected: 12/16/21 0722    Specimen: Blood Updated: 12/16/21 0903    Narrative:      The following orders were created for panel order CBC & Differential.  Procedure                               Abnormality         Status                     ---------                               -----------         ------                     CBC Auto Differential[[id number]]        Abnormal            Final result                 Please view results for these tests on the individual orders.    CBC Auto Differential [[id number]]  (Abnormal) Collected: 12/16/21 0722    Specimen: Blood Updated: 12/16/21 0903     WBC 15.05 10*3/mm3      RBC 4.56 10*6/mm3      Hemoglobin 13.1 g/dL      Hematocrit 40.5 %      MCV 88.8 fL      MCH 28.7 pg      MCHC 32.3 g/dL      RDW 12.1 %      RDW-SD 39.4 fl      MPV 10.5 fL      Platelets 246 10*3/mm3      Neutrophil % 79.6 %      Lymphocyte % 12.9 %       Monocyte % 6.9 %      Eosinophil % 0.1 %      Basophil % 0.2 %      Immature Grans % 0.3 %      Neutrophils, Absolute 11.98 10*3/mm3      Lymphocytes, Absolute 1.94 10*3/mm3      Monocytes, Absolute 1.04 10*3/mm3      Eosinophils, Absolute 0.01 10*3/mm3      Basophils, Absolute 0.03 10*3/mm3      Immature Grans, Absolute 0.05 10*3/mm3      nRBC 0.0 /100 WBC            Assessment/Plan       Esophageal cancer (HCC)       Assessment & Plan    Ms. Ribera is an established patient of Dr. Finch for esophageal malignancy.  She is s/p port and J-tube placement by Dr. Hollis yesterday.  She is scheduled to begin chemotherapy on Monday.  We will arrange a follow-up appointment for her after she has completed her therapy for further evaluation of esophagectomy.      Saba Whitfield DNP, APRN  [facility]  12/16/21  12:11 EST        I wore protective equipment throughout this patient encounter including a face mask, gloves and protective eyewear.  Hand hygiene was performed before donning protective equipment and after removal when leaving the room.

## 2021-12-16 NOTE — PLAN OF CARE
Goal Outcome Evaluation:      A&O x 4, room air, ambulating in room and to  with assist of 1, tube feedings began this shift currently at 10mL/hr to advance by 10 to 20 at 2230, no complaints of pain at present, tolerating clear liquid diet well, WCTM.

## 2021-12-16 NOTE — CONSULTS
"Adult Nutrition  Assessment/PES    Patient Name:  Maya Ribera  YOB: 1960  MRN: [id number]  Admit Date:  12/15/2021    Assessment Date:  12/16/2021    Comments:  Nutrition Consult: Tube Feed assessment  61 y.o female with esophageal cancer.  Has had 15 lb weight loss since about July 2021.  Spoke with patient over the phone who reports her usual weight was 140 lb.  Current weight: 125 lb. She has been able to tolerate minimal PO intake, small portions, and softer foods.    Limited MSA completed. Based on her minimal PO intake and percentage of weight loss, meets criteria for chronic illness related - moderate malnutrition.      POD 1 s/p MP placement and PEJ tube d/t Esophagel CA.  To begin tube feeds today via Jtube. Currently on a clear liquid diet.      Recommend:   1.  Start Nutren 2.0 at 10ml/hr.  Advance by 10mL every 8hrs to goal rate of 60ml/hr (x 16hrs per day).  Flush with 30ml/hr free water (x 16hrs per day).    2.  Monitor K, Mg, and Phos and replace as needed.     RD to follow for tolerance of TF advancement.       Reason for Assessment     Row Name 12/16/21 1109          Reason for Assessment    Reason For Assessment TF/PN; physician consult     Diagnosis cancer diagnosis/related complications; other (see comments)  Malignant neoplasm of lower third of esophagus. s/p port placement and Jtube placement     Identified At Risk by Screening Criteria tube feeding or parenteral nutrition                Nutrition/Diet History     Row Name 12/16/21 1110          Nutrition/Diet History    Typical Food/Fluid Intake s/p MP placement and PEJ tube d/t Espophagel CA.  To begin tube feeds today.                Anthropometrics     Row Name 12/16/21 1129 12/16/21 1114       Anthropometrics    Height 162.6 cm (64.02\") 162.6 cm (64.02\")    Weight -- 57 kg (125 lb 9.6 oz)  not weighed by RD       Ideal Body Weight (IBW)    Ideal Body Weight (IBW) (kg) 55.04 55.04    % Ideal Body Weight -- 103.51       " "Usual Body Weight (UBW)    Usual Body Weight -- 63.5 kg (140 lb)    % Usual Body Weight -- 89.71    % of Usual Body Weight Assessment -- 85-95% - mild deficit    Weight Loss -- unintentional  15 lb weight loss over 5 months       Body Mass Index (BMI)    BMI (kg/m2) -- 21.59    BMI Assessment -- BMI 18.5-24.9: normal               Labs/Tests/Procedures/Meds     Row Name 12/16/21 1126          Labs/Procedures/Meds    Lab Results Reviewed reviewed, pertinent     Lab Results Comments Glucose, Cr, WBC            Diagnostic Tests/Procedures    Diagnostic Test/Procedure Reviewed reviewed, pertinent     Diagnostic Test/Procedures Comments s/p MP placement and PEJ tube d/t Espophagel CA            Medications    Pertinent Medications Reviewed reviewed, pertinent     Pertinent Medications Comments Dilaudid, Protonix                Physical Findings     Row Name 12/16/21 1128          Physical Findings    Tubes jejunostomy tube     Skin surgical incision; other (see comments)  abdominal incision                Estimated/Assessed Needs     Row Name 12/16/21 1129 12/16/21 1114       Calculation Measurements    Weight Used For Calculations 57 kg (125 lb 10.6 oz) --    Height 162.6 cm (64.02\") 162.6 cm (64.02\")       Estimated/Assessed Needs    Additional Documentation KCAL/KG (Group); Protein Requirements (Group); Fluid Requirements (Group) --       KCAL/KG    KCAL/KG 35 Kcal/Kg (kcal); 40 Kcal/Kg (kcal) --    35 Kcal/Kg (kcal) 1995 --    40 Kcal/Kg (kcal) 2280 --       Protein Requirements    Weight Used For Protein Calculations 57 kg (125 lb 10.6 oz) --    Est Protein Requirement Amount (gms/kg) 1.5 gm protein --    Estimated Protein Requirements (gms/day) 85.5 --       Fluid Requirements    Fluid Requirements (mL/day) 1995 --    Estimated Fluid Requirement Method RDA Method --    RDA Method (mL) 1995 --               Nutrition Prescription Ordered     Row Name 12/16/21 1135          Nutrition Prescription PO    Current PO " Diet Clear Liquid                Evaluation of Received Nutrient/Fluid Intake     Row Name 12/16/21 1136          Intake Assessment    Energy/Calorie Requirement Assessment not meeting needs                  Malnutrition Severity Assessment     Row Name 12/16/21 1137          Malnutrition Severity Assessment    Malnutrition Type Chronic Disease - Related Malnutrition            Insufficient Energy Intake     Insufficient Energy Intake Findings Moderate     Insufficient Energy Intake  <75% of est. energy requirement for > or equal to 3 months            Unintentional Weight Loss     Unintentional Weight Loss Findings Moderate     Unintentional Weight Loss  Weight loss of 10% in six months            Criteria Met (Must meet criteria for severity in at least 2 of these categories: M Wasting, Fat Loss, Fluid, Secondary Signs, Wt. Status, Intake)    Patient meets criteria for  Moderate (non-severe) Malnutrition                 Problem/Interventions:   Problem 1     Row Name 12/16/21 1139 12/16/21 1136       Nutrition Diagnoses Problem 1    Problem 1 Malnutrition Needs Alternate Route    Etiology (related to) Medical Diagnosis Medical Diagnosis    Oncology Esophageal cancer Esophageal cancer    Signs/Symptoms (evidenced by) Report of Mnimal PO Intake; Unintended Weight Change Clear Liquid Diet; Report of Mnimal PO Intake; Unintended Weight Change; Other (comment)  s/p jtube placement    Unintended Weight Change Loss Loss    Number of Pounds Lost 15 15    Weight loss time period 5 months 5 months                     Intervention Goal     Row Name 12/16/21 1140          Intervention Goal    General Maintain nutrition; Nutrition support treatment; Meet nutritional needs for age/condition; Disease management/therapy     PO Tolerate PO     TF/PN Tolerate TF at goal; Inititiate TF/PN     Weight Maintain weight                Nutrition Intervention     Row Name 12/16/21 1140          Nutrition Intervention    RD/Tech Action  Follow Tx progress; Care plan reviewd; Recommend/ordered     Recommended/Ordered EN                Nutrition Prescription     Row Name 12/16/21 1140          Nutrition Prescription EN    Enteral Prescription Enteral begin/change; Enteral to supply     Enteral Route Jejunostomy     Product Nutren 2 angeles     TF Delivery Method Cyclic     Cyclic TF Goal Volume (mL) 60 mL     Cyclic TF Starting Volume (mL) 20 mL     Cyclic TF Cycle Over (hrs)  16     Water flush (mL)  30 mL     Water Flush Frequency Per hour     New EN Prescription Ordered? Yes            EN to Supply    Kcal/Day 1920 Kcal/Day     Kcal/Kg 33.7 Kcal/Kg     Protein (gm/day) 80.6 gm/day     Meet Estimated Kcal Need (%) 96 %     Meet Estimated Protein Need (%) 94 %     TF Free H2O (mL) 662.4 mL     Total Free H2O (mL/day) 1142 mL/day                Education/Evaluation     Row Name 12/16/21 1519          Education    Education Will Instruct as appropriate            Monitor/Evaluation    Monitor Per protocol; PO intake; Pertinent labs; TF delivery/tolerance; Weight; Symptoms     Education Follow-up Reinforce PRN                 Electronically signed by:  Angelica Shelton RD  12/16/21 11:45 EST

## 2021-12-16 NOTE — TELEPHONE ENCOUNTER
S/W Rebecca in radiation about scheduling pt for consultation. Their first availability is 12/23. D/W Dr. Harrell, he is OK with this date. Informed Rebecca who will call pt to schedule.     ----- Message from Aleida Jernigan sent at 12/16/2021 12:29 PM EST -----  Hi Angelica [phone] [facility] asking if you could give her a call. Thank you, Papi

## 2021-12-16 NOTE — PLAN OF CARE
Goal Outcome Evaluation:  Plan of Care Reviewed With: patient        Progress: no change  Outcome Summary: s/p MP placement and PEJ tube d/t Espophagel CA.  Abdominal cramp like pain throughout the night.  Dilaudid x 1, Hycet via PEJ x 2 this shift.  RD consulted for TF recommendation.  MP not accessed.  Significant other at bedside.  Cont to monitor.

## 2021-12-17 ENCOUNTER — APPOINTMENT (OUTPATIENT)
Dept: LAB | Facility: HOSPITAL | Age: 61
End: 2021-12-17

## 2021-12-17 PROCEDURE — 25010000002 HYDROMORPHONE PER 4 MG: Performed by: SURGERY

## 2021-12-17 PROCEDURE — G0378 HOSPITAL OBSERVATION PER HR: HCPCS

## 2021-12-17 PROCEDURE — 99024 POSTOP FOLLOW-UP VISIT: CPT | Performed by: SURGERY

## 2021-12-17 RX ADMIN — METOPROLOL TARTRATE 50 MG: 50 TABLET, FILM COATED ORAL at 11:02

## 2021-12-17 RX ADMIN — PANTOPRAZOLE SODIUM 40 MG: 40 TABLET, DELAYED RELEASE ORAL at 20:57

## 2021-12-17 RX ADMIN — HYDROCODONE BITARTRATE AND ACETAMINOPHEN 15 ML: 7.5; 325 SOLUTION ORAL at 01:44

## 2021-12-17 RX ADMIN — PANTOPRAZOLE SODIUM 40 MG: 40 TABLET, DELAYED RELEASE ORAL at 11:02

## 2021-12-17 RX ADMIN — HYDROMORPHONE HYDROCHLORIDE 0.25 MG: 1 INJECTION, SOLUTION INTRAMUSCULAR; INTRAVENOUS; SUBCUTANEOUS at 02:27

## 2021-12-17 RX ADMIN — APIXABAN 5 MG: 5 TABLET, FILM COATED ORAL at 14:26

## 2021-12-17 RX ADMIN — METHIMAZOLE 5 MG: 5 TABLET ORAL at 11:02

## 2021-12-17 RX ADMIN — APIXABAN 5 MG: 5 TABLET, FILM COATED ORAL at 20:57

## 2021-12-17 RX ADMIN — METOPROLOL TARTRATE 50 MG: 50 TABLET, FILM COATED ORAL at 20:58

## 2021-12-17 RX ADMIN — HYDROCODONE BITARTRATE AND ACETAMINOPHEN 15 ML: 7.5; 325 SOLUTION ORAL at 17:41

## 2021-12-17 RX ADMIN — HYDROCODONE BITARTRATE AND ACETAMINOPHEN 15 ML: 7.5; 325 SOLUTION ORAL at 12:14

## 2021-12-17 NOTE — PLAN OF CARE
Goal Outcome Evaluation:           Progress: improving  Outcome Summary: Bowel sounds improving, increased tube feeds to 40 ml/hr and tolerating well, pt continues to moan in pain with ambulation- utilizing hycet for pain, will continue to monitor.

## 2021-12-17 NOTE — PLAN OF CARE
Goal Outcome Evaluation:  Plan of Care Reviewed With: patient, significant other        Progress: no change  Outcome Summary: Tolerating TF.  Currently at 20 ml/h with 30ml/h H2O flush.  d/t be increased to 30 ml at shift change.  Dilaudid x 1.  Encouraging Hycet for pain.  Pain tolerable w/o activity but patient grimace & moans with activity.  DC planned today or tomorrow.  Chemo due Monday.  Cont to monitor.

## 2021-12-17 NOTE — PROGRESS NOTES
Postop day 2 port/J-tube placement    Subjective:  Overall doing well, pain adequately controlled.  She has been up and around.  Currently tolerating tube feeds at 30 cc an hour and taking minimal clear liquids.    Objective:  Patient is afebrile with stable vitals  General: Awake and alert, no distress  Abdomen: Incisions are in good order, J-tube site is clean  Port site also clean    Assessment and plan:  -Esophageal cancer  -Malnutrition  -Now 2 days out from port placement and jejunostomy, doing well to this point  -Continue to advance tube feeds to goal  -Home health to follow, anticipate possible discharge tomorrow if she continues to progress    Bhanu Hollis MD  General and Endoscopic Surgery  [facility]    [address], [address]  [place], [place], [address]  P: [phone]  F: [phone]

## 2021-12-18 PROBLEM — E44.0 MODERATE MALNUTRITION: Status: ACTIVE | Noted: 2021-12-18

## 2021-12-18 PROCEDURE — 25010000002 ONDANSETRON PER 1 MG: Performed by: SURGERY

## 2021-12-18 PROCEDURE — G0378 HOSPITAL OBSERVATION PER HR: HCPCS

## 2021-12-18 PROCEDURE — 99024 POSTOP FOLLOW-UP VISIT: CPT | Performed by: SURGERY

## 2021-12-18 RX ORDER — AMOXICILLIN 250 MG
1 CAPSULE ORAL 2 TIMES DAILY
Status: DISCONTINUED | OUTPATIENT
Start: 2021-12-18 | End: 2021-12-20 | Stop reason: HOSPADM

## 2021-12-18 RX ADMIN — APIXABAN 5 MG: 5 TABLET, FILM COATED ORAL at 21:34

## 2021-12-18 RX ADMIN — PANTOPRAZOLE SODIUM 40 MG: 40 TABLET, DELAYED RELEASE ORAL at 21:33

## 2021-12-18 RX ADMIN — ONDANSETRON 4 MG: 2 INJECTION INTRAMUSCULAR; INTRAVENOUS at 07:54

## 2021-12-18 RX ADMIN — HYDROCODONE BITARTRATE AND ACETAMINOPHEN 15 ML: 7.5; 325 SOLUTION ORAL at 07:54

## 2021-12-18 RX ADMIN — METHIMAZOLE 5 MG: 5 TABLET ORAL at 10:30

## 2021-12-18 RX ADMIN — ONDANSETRON 4 MG: 2 INJECTION INTRAMUSCULAR; INTRAVENOUS at 22:09

## 2021-12-18 RX ADMIN — ONDANSETRON 4 MG: 2 INJECTION INTRAMUSCULAR; INTRAVENOUS at 13:55

## 2021-12-18 RX ADMIN — METOPROLOL TARTRATE 50 MG: 50 TABLET, FILM COATED ORAL at 21:33

## 2021-12-18 RX ADMIN — APIXABAN 5 MG: 5 TABLET, FILM COATED ORAL at 10:30

## 2021-12-18 RX ADMIN — HYDROCODONE BITARTRATE AND ACETAMINOPHEN 15 ML: 7.5; 325 SOLUTION ORAL at 00:54

## 2021-12-18 RX ADMIN — METOPROLOL TARTRATE 50 MG: 50 TABLET, FILM COATED ORAL at 10:28

## 2021-12-18 RX ADMIN — DOCUSATE SODIUM 50MG AND SENNOSIDES 8.6MG 1 TABLET: 8.6; 5 TABLET, FILM COATED ORAL at 10:28

## 2021-12-18 RX ADMIN — HYDROCODONE BITARTRATE AND ACETAMINOPHEN 15 ML: 7.5; 325 SOLUTION ORAL at 16:00

## 2021-12-18 RX ADMIN — DOCUSATE SODIUM 50MG AND SENNOSIDES 8.6MG 1 TABLET: 8.6; 5 TABLET, FILM COATED ORAL at 21:33

## 2021-12-18 RX ADMIN — PANTOPRAZOLE SODIUM 40 MG: 40 TABLET, DELAYED RELEASE ORAL at 10:29

## 2021-12-18 NOTE — NURSING NOTE
At this time TF remains off as pt has c.o feeling too full and wanted to see if we stopped TF this would help her to feel better.

## 2021-12-18 NOTE — PROGRESS NOTES
Postop day 3 after port and J-tube placement    Subjective:  Felt she was doing better yesterday but began to have a little bit more pain overnight.  As her tube feeds have increased she has had a little bit of bloating and nausea and had a little bit of heaving which she thinks exacerbated her abdominal pain.  She is taking a small amount of clear liquids.  She really has not had any GI function to this point.    Objective:  Afebrile with stable vitals  General: Awake and alert, no distress but appears uncomfortable  Abdomen: Soft, appropriately tender, incision is in good order, J-tube site is clean    Assessment and plan:  -Advanced esophageal cancer  -Malnutrition  -Now 3 days out from port placement and open jejunostomy  -Still does not look quite well enough to go home.  Her tube feeds are up to 50 cc an hour currently with a goal of 60.  I think we would be wise to watch her another day.  Slow progress overall.    Bhanu Hollis MD  General and Endoscopic Surgery  [facility]    [address], [address]  [place], [place], [address]  P: [phone]  F: [phone]

## 2021-12-19 LAB
ANION GAP SERPL CALCULATED.3IONS-SCNC: 10.2 MMOL/L (ref 5–15)
BASOPHILS # BLD AUTO: 0.04 10*3/MM3 (ref 0–0.2)
BASOPHILS NFR BLD AUTO: 0.3 % (ref 0–1.5)
BUN SERPL-MCNC: 8 MG/DL (ref 8–23)
BUN/CREAT SERPL: 18.6 (ref 7–25)
CALCIUM SPEC-SCNC: 9.6 MG/DL (ref 8.6–10.5)
CHLORIDE SERPL-SCNC: 100 MMOL/L (ref 98–107)
CO2 SERPL-SCNC: 25.8 MMOL/L (ref 22–29)
CREAT SERPL-MCNC: 0.43 MG/DL (ref 0.57–1)
DEPRECATED RDW RBC AUTO: 36.8 FL (ref 37–54)
EOSINOPHIL # BLD AUTO: 0.3 10*3/MM3 (ref 0–0.4)
EOSINOPHIL NFR BLD AUTO: 2.3 % (ref 0.3–6.2)
ERYTHROCYTE [DISTWIDTH] IN BLOOD BY AUTOMATED COUNT: 11.8 % (ref 12.3–15.4)
GFR SERPL CREATININE-BSD FRML MDRD: 149 ML/MIN/1.73
GLUCOSE SERPL-MCNC: 141 MG/DL (ref 65–99)
HCT VFR BLD AUTO: 40.6 % (ref 34–46.6)
HGB BLD-MCNC: 13.8 G/DL (ref 12–15.9)
IMM GRANULOCYTES # BLD AUTO: 0.05 10*3/MM3 (ref 0–0.05)
IMM GRANULOCYTES NFR BLD AUTO: 0.4 % (ref 0–0.5)
LYMPHOCYTES # BLD AUTO: 2.11 10*3/MM3 (ref 0.7–3.1)
LYMPHOCYTES NFR BLD AUTO: 16 % (ref 19.6–45.3)
MCH RBC QN AUTO: 29.1 PG (ref 26.6–33)
MCHC RBC AUTO-ENTMCNC: 34 G/DL (ref 31.5–35.7)
MCV RBC AUTO: 85.5 FL (ref 79–97)
MONOCYTES # BLD AUTO: 1.04 10*3/MM3 (ref 0.1–0.9)
MONOCYTES NFR BLD AUTO: 7.9 % (ref 5–12)
NEUTROPHILS NFR BLD AUTO: 73.1 % (ref 42.7–76)
NEUTROPHILS NFR BLD AUTO: 9.66 10*3/MM3 (ref 1.7–7)
NRBC BLD AUTO-RTO: 0 /100 WBC (ref 0–0.2)
PLATELET # BLD AUTO: 237 10*3/MM3 (ref 140–450)
PMV BLD AUTO: 10.4 FL (ref 6–12)
POTASSIUM SERPL-SCNC: 3.9 MMOL/L (ref 3.5–5.2)
RBC # BLD AUTO: 4.75 10*6/MM3 (ref 3.77–5.28)
SODIUM SERPL-SCNC: 136 MMOL/L (ref 136–145)
WBC NRBC COR # BLD: 13.2 10*3/MM3 (ref 3.4–10.8)

## 2021-12-19 PROCEDURE — 85025 COMPLETE CBC W/AUTO DIFF WBC: CPT | Performed by: SURGERY

## 2021-12-19 PROCEDURE — G0378 HOSPITAL OBSERVATION PER HR: HCPCS

## 2021-12-19 PROCEDURE — 80048 BASIC METABOLIC PNL TOTAL CA: CPT | Performed by: SURGERY

## 2021-12-19 PROCEDURE — 99024 POSTOP FOLLOW-UP VISIT: CPT | Performed by: SURGERY

## 2021-12-19 RX ORDER — BISACODYL 10 MG
10 SUPPOSITORY, RECTAL RECTAL DAILY
Status: DISCONTINUED | OUTPATIENT
Start: 2021-12-19 | End: 2021-12-20 | Stop reason: HOSPADM

## 2021-12-19 RX ADMIN — PANTOPRAZOLE SODIUM 40 MG: 40 TABLET, DELAYED RELEASE ORAL at 20:55

## 2021-12-19 RX ADMIN — DOCUSATE SODIUM 50MG AND SENNOSIDES 8.6MG 1 TABLET: 8.6; 5 TABLET, FILM COATED ORAL at 08:43

## 2021-12-19 RX ADMIN — METOPROLOL TARTRATE 50 MG: 50 TABLET, FILM COATED ORAL at 08:43

## 2021-12-19 RX ADMIN — HYDROCODONE BITARTRATE AND ACETAMINOPHEN 15 ML: 7.5; 325 SOLUTION ORAL at 16:44

## 2021-12-19 RX ADMIN — APIXABAN 5 MG: 5 TABLET, FILM COATED ORAL at 22:46

## 2021-12-19 RX ADMIN — METOPROLOL TARTRATE 50 MG: 50 TABLET, FILM COATED ORAL at 20:55

## 2021-12-19 RX ADMIN — HYDROCODONE BITARTRATE AND ACETAMINOPHEN 15 ML: 7.5; 325 SOLUTION ORAL at 21:03

## 2021-12-19 RX ADMIN — BISACODYL 10 MG: 10 SUPPOSITORY RECTAL at 14:09

## 2021-12-19 RX ADMIN — PANTOPRAZOLE SODIUM 40 MG: 40 TABLET, DELAYED RELEASE ORAL at 08:43

## 2021-12-19 RX ADMIN — HYDROCODONE BITARTRATE AND ACETAMINOPHEN 15 ML: 7.5; 325 SOLUTION ORAL at 05:22

## 2021-12-19 RX ADMIN — METHIMAZOLE 5 MG: 5 TABLET ORAL at 08:42

## 2021-12-19 RX ADMIN — APIXABAN 5 MG: 5 TABLET, FILM COATED ORAL at 08:43

## 2021-12-19 RX ADMIN — DOCUSATE SODIUM 50MG AND SENNOSIDES 8.6MG 1 TABLET: 8.6; 5 TABLET, FILM COATED ORAL at 20:54

## 2021-12-19 NOTE — PROGRESS NOTES
Postop day #4    Patient states that overall she feels better.  She is currently tolerating her tube feeds at 60 cc an hour.  She feels full.  She has passed some flatus but no bowel movement.    Objective:  Afebrile with stable vitals  General: Appears fairly comfortable  Abdomen: Remains tender, mildly distended.  Incision okay.    Labs reviewed.  White blood cell count slightly improved.  Chemistries look okay.    Assessment and plan:  -Esophageal cancer  -Malnutrition, now status post port placement and J-tube  -Tube feeds are up to goal.  We are going to try to stimulate her to get a bowel movement today.  Possible discharge later today but more likely tomorrow.  She will need home health.    Bhanu Hollis MD  General and Endoscopic Surgery  [facility]    [address], [address]  [place], [address]  P: [phone]  F: [phone]

## 2021-12-19 NOTE — PLAN OF CARE
Goal Outcome Evaluation:  Plan of Care Reviewed With: patient, significant other           Outcome Summary: pt a.o with c/o pain/discomfort not neccesarily from pt herself but from SO. pt up in chair, I have educated several times the s/e 2/2 tube feeding, placement, dx in general but SO feels too much feeding and that the main cause is TF. Adv can stop for a bit like yesterday if that is what they want and they adv no they would just leave TF infusing? pain meds gvn but no real c/o nausea to me. pt up ad mario in room but not in halls as of yet. clears tolerated per pt No s/s of distress noted. Will continue to monitor and support as needed.   Patient was seen and assessed while wearing personal protective equipment including facemask, protective eyewear and gloves.  Hand hygiene performed prior to entering the room and upon exiting with doffing of gloves.

## 2021-12-20 ENCOUNTER — APPOINTMENT (OUTPATIENT)
Dept: ONCOLOGY | Facility: HOSPITAL | Age: 61
End: 2021-12-20

## 2021-12-20 ENCOUNTER — READMISSION MANAGEMENT (OUTPATIENT)
Dept: CALL CENTER | Facility: HOSPITAL | Age: 61
End: 2021-12-20

## 2021-12-20 ENCOUNTER — APPOINTMENT (OUTPATIENT)
Dept: LAB | Facility: HOSPITAL | Age: 61
End: 2021-12-20

## 2021-12-20 ENCOUNTER — HOME HEALTH ADMISSION (OUTPATIENT)
Dept: HOME HEALTH SERVICES | Facility: HOME HEALTHCARE | Age: 61
End: 2021-12-20

## 2021-12-20 VITALS
TEMPERATURE: 97.7 F | DIASTOLIC BLOOD PRESSURE: 61 MMHG | WEIGHT: 125.6 LBS | RESPIRATION RATE: 16 BRPM | OXYGEN SATURATION: 83 % | BODY MASS INDEX: 21.44 KG/M2 | HEIGHT: 64 IN | HEART RATE: 95 BPM | SYSTOLIC BLOOD PRESSURE: 105 MMHG

## 2021-12-20 PROCEDURE — 99024 POSTOP FOLLOW-UP VISIT: CPT | Performed by: SURGERY

## 2021-12-20 PROCEDURE — G0378 HOSPITAL OBSERVATION PER HR: HCPCS

## 2021-12-20 RX ADMIN — BISACODYL 10 MG: 10 SUPPOSITORY RECTAL at 09:22

## 2021-12-20 RX ADMIN — METHIMAZOLE 5 MG: 5 TABLET ORAL at 09:22

## 2021-12-20 RX ADMIN — METOPROLOL TARTRATE 50 MG: 50 TABLET, FILM COATED ORAL at 09:22

## 2021-12-20 RX ADMIN — MAGNESIUM HYDROXIDE 10 ML: 2400 SUSPENSION ORAL at 15:55

## 2021-12-20 RX ADMIN — PANTOPRAZOLE SODIUM 40 MG: 40 TABLET, DELAYED RELEASE ORAL at 09:22

## 2021-12-20 RX ADMIN — DOCUSATE SODIUM 50MG AND SENNOSIDES 8.6MG 1 TABLET: 8.6; 5 TABLET, FILM COATED ORAL at 09:22

## 2021-12-20 RX ADMIN — APIXABAN 5 MG: 5 TABLET, FILM COATED ORAL at 09:22

## 2021-12-20 RX ADMIN — HYDROCODONE BITARTRATE AND ACETAMINOPHEN 15 ML: 7.5; 325 SOLUTION ORAL at 13:46

## 2021-12-20 NOTE — CASE MANAGEMENT/SOCIAL WORK
Case Management Discharge Note      Final Note: Home with family, Margie SHEN and [facility]. Tube feed/supplies delivered to bedside. Per Varsha/Margie SHEN, nursing will see this evening. No additional needs noted.....PRC    Provided Post Acute Provider List?: Yes  Post Acute Provider List: Home Health    Selected Continued Care - Admitted Since 12/15/2021     Destination    No services have been selected for the patient.              Durable Medical Equipment    No services have been selected for the patient.              Dialysis/Infusion     Service Provider Selected Services Address Phone Fax Patient Preferred    [facility]  Infusion and IV Therapy [address], [facility] [address] [phone] --          Home Medical Care Coordination complete.    Service Provider Selected Services Address Phone Fax Patient Preferred    [facility]  Home Health Services [address], [place], [address], [facility] [address] [phone] --          Therapy    No services have been selected for the patient.              Community Resources    No services have been selected for the patient.              Community [facility]    No services have been selected for the patient.                       Final Discharge Disposition Code: 06 - home with home health care

## 2021-12-20 NOTE — CASE MANAGEMENT/SOCIAL WORK
Continued Stay Note  [facility]     Patient Name: Maya Ribera  MRN: [id number]  Today's Date: 12/20/2021    Admit Date: 12/15/2021     Discharge Plan     Row Name 12/20/21 1328       Plan    Plan Plans dc home with family, [facility] and [facility].    Patient/Family in Agreement with Plan yes    Plan Comments Received call from [facility]. Says unsure if able to take if [facility] can't accept. Call placed to [facility]  to confirm if able to accept (noted in EPIC). Says [facility] will follow ([facility]st choice). Call placed to [facility]. Says able to accept and tube feed/supplies are covered at 85%. Will inform patient/family at bedside. Continue to follow.....[facility]    Row Name 12/20/21 3804       Plan    Plan Home with partner,  referral pending    Patient/Family in Agreement with Plan yes    Plan Comments CCP spoke with pt re: d/c planning. Facesheet verified. Pt resides in a single level home, uses no DME, is unable to recall HH agency used in the distant past, and denies past SNF. Pt uses [facility] [address] [facility]. Pt agreeable to HH referrals, first choice [facility] (Varsha denies due to staffing), second choice [facility] (Maura robledo). CCP to follow. Stacy Smyth LCSW               Discharge Codes    No documentation.               Expected Discharge Date and Time     Expected Discharge Date Expected Discharge Time    Dec 21, 2021             Le Rodrigues, RN

## 2021-12-20 NOTE — PAYOR COMM NOTE
"INITIAL CLINICAL FOR REVIEW  OBSERVATION TO INPATIENT     REF #[id number]  F:  [phone]        Maya Burleson (61 y.o. Female)             Date of Birth Social Security Number Address Home Phone MRN    1960  [id number] [facility] [address] [phone] [id number]    Orthodox Marital Status             Unknown Single       Admission Date Admission Type Admitting Provider Attending Provider Department, Room/Bed    12/15/21 Elective Bhanu Hollis MD Englund, Graham D, MD [facility] [address] [place], P398/1    Discharge Date Discharge Disposition Discharge Destination                         Attending Provider: Bhanu Hollis MD    Allergies: Codeine    Isolation: None   Infection: None   Code Status: CPR   Advance Care Planning Activity    Ht: 162.6 cm (64.02\")   Wt: 57 kg (125 lb 9.6 oz)    Admission Cmt: None   Principal Problem: Esophageal cancer (HCC) [C15.9]                 Active Insurance as of 12/15/2021     Primary Coverage     Payor Plan Insurance Group Employer/Plan Group    On license of [facility] BLUE [id number]     Payor Plan Address Payor Plan Phone Number Payor Plan Fax Number Effective Dates    [address]   1/2/2010 - None Entered    [place] [address]       Subscriber Name Subscriber Birth Date Member ID       MAYA BURLESON 1960 [id number]                 Emergency Contacts      (Rel.) Home Phone Work Phone Mobile Phone    [email or web address] (Other) [phone] -- [phone]    Haile Burleson (Son) -- -- [phone]               History & Physical      Bhanu Hollis MD at 12/15/21 1108          H&P reviewed. The patient was examined and there are no changes to the H&P.          Electronically signed by Bhanu Hollis MD at 12/15/21 1100   Source Note        Cc: Esophageal cancer    History of presenting illness:   This is a nice 61-year-old lady with dysphagia beginning this summer and some associated weight loss of around 10 " or 15 pounds since that time.  She had an upper endoscopy done a couple of weeks ago which demonstrated a near obstructing mass in the esophagus.  She had biopsies which demonstrated severe dysplasia with carcinoma in situ, but the PET demonstrated an approximately 5 cm mass.  There is no obvious metastatic disease and it is felt that may well be resectable but plans have been made for neoadjuvant chemoradiation.  She is really not able to swallow much at this time and as such she was referred for port placement and feeding access.    Past Medical History: Hypertension, hyperlipidemia, atrial fibrillation    Past Surgical History: Laparoscopic cholecystectomy done at least 10 years ago, recent upper endoscopy, left total knee replacement    Medications: Currently includes methimazole, metoprolol, Pravachol, Carafate and Eliquis which has been held for the last several days    Allergies: Codeine    Social History: Patient is a 1 pack/day smoker for the last 40 years she is everything to quit    Family History: Breast cancer in her mother and sister, negative for known esophageal or colorectal cancer    Review of Systems:  Constitutional: Positive for unintentional weight loss of around 10 to 15 pounds over the last few months, negative for fever or chills  Neck: Positive for dysphagia and odynophagia  Respiratory: negative for SOB, cough, hemoptysis or wheezing  Cardiovascular: negative for chest pain, palpitations or peripheral edema  Gastrointestinal: Negative for nausea, vomiting, abdominal pain      Physical Exam:  BMI: 21.7  General: alert and oriented, appropriate, no acute distress  Eyes: No scleral icterus, extraocular movements are intact  Neck: Supple without lymphadenopathy or thyromegaly, trachea is in the midline  Respiratory: There is good bilateral chest expansion, no use of accessory muscles is noted  Cardiovascular: No jugular venous distention or peripheral edema is seen  Gastrointestinal: Soft and  benign, there is no mass, there is no hernia    Laboratory data: Recent white blood cell count 12.4 hemoglobin 15 platelet count normal    Imaging data: CT PET reviewed by me.  There is a 5 cm esophageal mass.  There is some mildly enlarged lymph nodes which have intermediate FDG avid uptake but no clear evidence of metastatic disease is identified.      Assessment and plan:   -Esophageal cancer, felt at this time to be potentially resectable.  Plans are for neoadjuvant chemoradiation and then reevaluation with consideration for possible future esophagogastrectomy.  -Tentative plans are for treatment to start next week.  I have recommended that we arrange for port placement as well as feeding jejunostomy.  I have sent a message to Dr. Finch in case he thinks that gastrostomy would be more appropriate, but I think that is unlikely at this time as there is no clear evidence that she is unresectable.  She seems to be otherwise in reasonably good health.  She has a history of atrial fibrillation but her Eliquis has been held for her recent endoscopy and EUS.  We had a discussion surrounding the risks associated with port placement and feeding access.  Patient understands these risks include, but not be limited to, bleeding, infection, pneumothorax, device failure, increased risk of blood clots and with regard to feeding access the possibility of infection, pain, clogging of the tube, possible need for revision, drainage and dislodgment.  She also understands that with jejunostomy she will likely need to be connected to the pump for longer periods of time as bolus feeding will not be an option.  I have tentatively scheduled her for Wednesday of this week.      Bhanu Hollis MD, FACS  General, Minimally Invasive and Endoscopic Surgery  [facility]    [address], [address]  [place], [place], [address]  P: [phone]  F: [phone]           Electronically signed by Bhanu Hollis MD at 12/13/21  1327             Bhanu Hollis MD at 12/13/21 1042        Cc: Esophageal cancer    History of presenting illness:   This is a nice 61-year-old lady with dysphagia beginning this summer and some associated weight loss of around 10 or 15 pounds since that time.  She had an upper endoscopy done a couple of weeks ago which demonstrated a near obstructing mass in the esophagus.  She had biopsies which demonstrated severe dysplasia with carcinoma in situ, but the PET demonstrated an approximately 5 cm mass.  There is no obvious metastatic disease and it is felt that may well be resectable but plans have been made for neoadjuvant chemoradiation.  She is really not able to swallow much at this time and as such she was referred for port placement and feeding access.    Past Medical History: Hypertension, hyperlipidemia, atrial fibrillation    Past Surgical History: Laparoscopic cholecystectomy done at least 10 years ago, recent upper endoscopy, left total knee replacement    Medications: Currently includes methimazole, metoprolol, Pravachol, Carafate and Eliquis which has been held for the last several days    Allergies: Codeine    Social History: Patient is a 1 pack/day smoker for the last 40 years she is everything to quit    Family History: Breast cancer in her mother and sister, negative for known esophageal or colorectal cancer    Review of Systems:  Constitutional: Positive for unintentional weight loss of around 10 to 15 pounds over the last few months, negative for fever or chills  Neck: Positive for dysphagia and odynophagia  Respiratory: negative for SOB, cough, hemoptysis or wheezing  Cardiovascular: negative for chest pain, palpitations or peripheral edema  Gastrointestinal: Negative for nausea, vomiting, abdominal pain      Physical Exam:  BMI: 21.7  General: alert and oriented, appropriate, no acute distress  Eyes: No scleral icterus, extraocular movements are intact  Neck: Supple without lymphadenopathy  or thyromegaly, trachea is in the midline  Respiratory: There is good bilateral chest expansion, no use of accessory muscles is noted  Cardiovascular: No jugular venous distention or peripheral edema is seen  Gastrointestinal: Soft and benign, there is no mass, there is no hernia    Laboratory data: Recent white blood cell count 12.4 hemoglobin 15 platelet count normal    Imaging data: CT PET reviewed by me.  There is a 5 cm esophageal mass.  There is some mildly enlarged lymph nodes which have intermediate FDG avid uptake but no clear evidence of metastatic disease is identified.      Assessment and plan:   -Esophageal cancer, felt at this time to be potentially resectable.  Plans are for neoadjuvant chemoradiation and then reevaluation with consideration for possible future esophagogastrectomy.  -Tentative plans are for treatment to start next week.  I have recommended that we arrange for port placement as well as feeding jejunostomy.  I have sent a message to Dr. Finch in case he thinks that gastrostomy would be more appropriate, but I think that is unlikely at this time as there is no clear evidence that she is unresectable.  She seems to be otherwise in reasonably good health.  She has a history of atrial fibrillation but her Eliquis has been held for her recent endoscopy and EUS.  We had a discussion surrounding the risks associated with port placement and feeding access.  Patient understands these risks include, but not be limited to, bleeding, infection, pneumothorax, device failure, increased risk of blood clots and with regard to feeding access the possibility of infection, pain, clogging of the tube, possible need for revision, drainage and dislodgment.  She also understands that with jejunostomy she will likely need to be connected to the pump for longer periods of time as bolus feeding will not be an option.  I have tentatively scheduled her for Wednesday of this week.      Bhanu Hollis MD,  FACS  General, Minimally Invasive and Endoscopic Surgery  [facility]    [address], [address]  [place], [address]  P: [phone]  F: [phone]           Electronically signed by Bhanu Hollis MD at 12/13/21 1327       {Outbreak/Travel/Exposure Documentation......;  Question Available Choices Patient Response   COVID-19 Outbreak Screen:  Do you currently have a new onset of the following symptoms?        Fever/Chills, Cough, Shortness of air, Loss of taste or smell, No, Unknown  No (12/15/21 1028)   COVID-19 Outbreak Screen: In the last 14 days, have you had contact with anyone who is ill, has show any of the symptoms listed above and/or has been diagnosis with the 2019 Novel Coronavirus? This includes any immediate household members but excludes any patients with whom you have been in contact within your normal work duties wearing proper PPE, if you are a healthcare worker.  Yes, No, Unknown              No (12/15/21 1028)   COVID-19 Outbreak Screen: Who was notified? Free text (not recorded)   Ebola Screening Outbreak Screen: Have you traveled to the [place] or [place] within the past 21 days?  Yes, No, Unknown (not recorded)   Ebola Screening Outbreak Screen: Do you have ANY of the following symptoms: Fever/Chills, Vomiting, Diarrhea, Fatigue, Headache, Muscle pain, Unexplained bleeding, Abdominal (stomach) pain, No, Unknown (not recorded)   Ebola Screening Outbreak Screen: Name of Person notified Free text (not recorded)   Travel Screen: Have you traveled in the last month? If so, to what country have you traveled? If [place] what state? Yes, No, Unknown  List of all countries  List of all States No (12/15/21 1028)  (not recorded)  (not recorded)   Infection Risk: Do you currently have the following symptoms?  (If cough is selected, the Tuberculosis Screen is performed.) Cough, Fever, Rash, No No (12/15/21 1028)   Tuberculosis Screen: Do you have any of the  following Tuberculosis Risks?  · Have you lived or spent time with anyone who had or may have TB?  · Have you lived in or visited any of the following areas for more than one month: [place], [place], [place], [place] or [place], the [place] or [place]?  · Do you have HIV/AIDS?  · Have you lived in or worked in a nursing home, homeless shelter, correctional facility, or substance abuse treatment facility?   · No    If Yes do you have any of the following symptoms? Yes responses display to the right    If Yes, symptoms listed are:  Cough greater than or equal to 3 weeks, Loss of appetite, Unexplained weight loss, Night sweats, Bloody sputum or hemoptysis, Hoarseness, Fever, Fatigue, Chest pain, No (not recorded)  (not recorded)   Exposure Screen: Have you been exposed to any of these contagious diseases in the last month? Measles, Chickenpox, Meningitis, Pertussis, Whooping Cough, No No (12/15/21 1028)       Vital Signs (last day)     Date/Time Temp Temp src Pulse Resp BP Patient Position SpO2    12/20/21 0758 97.1 (36.2) Oral 77 -- 118/73 Lying 93    12/20/21 0430 97 (36.1) Oral 83 16 112/64 Lying 93    12/19/21 2034 97.4 (36.3) Oral 81 16 147/75 Lying 96    12/19/21 1450 98.9 (37.2) -- 83 16 113/64 -- 93    12/19/21 0759 97.2 (36.2) -- 81 16 119/70 -- 94    12/19/21 0431 97.2 (36.2) Oral 71 18 130/75 Lying 94          Oxygen Therapy (last day)     Date/Time SpO2 Device (Oxygen Therapy) Flow (L/min) Oxygen Concentration (%) ETCO2 (mmHg)    12/20/21 0822 -- room air -- -- --    12/20/21 0758 93 room air -- -- --    12/20/21 0430 93 room air -- -- --    12/19/21 2103 -- room air -- -- --    12/19/21 2034 96 room air -- -- --    12/19/21 1450 93 room air -- -- --    12/19/21 0759 94 room air -- -- --    12/19/21 0431 94 room air -- -- --          Lines, Drains & Airways     Active LDAs     Name Placement date Placement time Site Days    Peripheral IV 12/15/21 1032 Anterior; Right Forearm 12/15/21  1032   Forearm  5    Gastrostomy/Enterostomy Jejunostomy 1 20 Fr. LLQ 12/15/21  1323  LLQ  4    Single Lumen Implantable Port 12/15/21 Left Chest 12/15/21  1243  Chest  4                  Medication Administration Report for Maya Ribera as of 12/20/21 1044   Legend:    Given Hold Not Given Due Canceled Entry Other Actions    Time Time (Time) Time  Time-Action       Discontinued     Completed     Future     MAR Hold     Linked           Medications 12/14/21 12/15/21 12/16/21 12/17/21 12/18/21 12/19/21 12/20/21    apixaban (ELIQUIS) tablet 5 mg  Dose: 5 mg  Freq: Every 12 Hours Scheduled Route: PO  Indications of Use: ATRIAL FIBRILLATION  Start: 12/17/21 1145   Admin Instructions:   Tablet may be crushed and suspended in 60 mL of water or D5W and immediately delivered via NG tube.          1426-Given       2057-Given           1030-Given       2134-Given           0843-Given       2246-Given [C]           0922-Given       2100           bisacodyl (DULCOLAX) suppository 10 mg  Dose: 10 mg  Freq: Daily Route: RE  Start: 12/19/21 1130            1409-Given           0922-Given           methIMAzole (TAPAZOLE) tablet 5 mg  Dose: 5 mg  Freq: Daily Route: PO  Start: 12/15/21 1900   Admin Instructions:   Caution: Look alike/sound alike drug alert        (2101)-Not Given [C]           0853-Given           1102-Given           1030-Given           0842-Given           0922-Given           metoprolol tartrate (LOPRESSOR) tablet 50 mg  Dose: 50 mg  Freq: 2 Times Daily Route: PO  Start: 12/15/21 2100        2101-Given           0853-Given       2113-Given           1102-Given       2058-Given           1028-Given       2133-Given           0843-Given       2055-Given           0922-Given       2100           pantoprazole (PROTONIX) EC tablet 40 mg  Dose: 40 mg  Freq: 2 Times Daily Route: PO  Start: 12/15/21 2100   Admin Instructions:   Swallow whole; do not crush, split, or chew.        2101-Given           0853-Given        2114-Given           1102-Given       2057-Given           1029-Given       2133-Given           0843-Given       2055-Given           0922-Given       2100           sennosides-docusate (PERICOLACE) 8.6-50 MG per tablet 1 tablet  Dose: 1 tablet  Freq: 2 Times Daily Route: PO  Start: 12/18/21 1015           1028-Given       2133-Given           0843-Given       2054-Given           0922-Given       2100          Completed Medications  Medications 12/14/21 12/15/21 12/16/21 12/17/21 12/18/21 12/19/21 12/20/21       ampicillin-sulbactam (UNASYN) 3 g in sodium chloride 0.9 % 100 mL IVPB-VTB  Dose: 3 g  Freq: Once Route: IV  Indications of Use: PERIOPERATIVE PHARMACOPROPHYLAXIS  Start: 12/15/21 1011   End: 12/15/21 1919   Admin Instructions:   Administer Within 1 Hour of Surgical Incision  Redose 2 Hours From Pre-Op Dose if Procedure Ongoing or Blood Loss Greater Than 1.5 Liters  Activate vial before using.        1206-New Bag       1919-Stopped                famotidine (PEPCID) injection 20 mg  Dose: 20 mg  Freq: Once Route: IV  Start: 12/15/21 1111   End: 12/15/21 1138   Admin Instructions:   Dilute to 10 mL total volume and give IV push over 2 minutes.        1138-Given               Discontinued Medications  Medications 12/14/21 12/15/21 12/16/21 12/17/21 12/18/21 12/19/21 12/20/21       sodium chloride 0.9 % flush 3 mL  Dose: 3 mL  Freq: Every 12 Hours Scheduled Route: IV  Start: 12/15/21 1111   End: 12/15/21 1344        1142-Given                     ,   Medication Administration Report for Maya Ribera as of 12/20/21 1044   Legend:    Given Hold Not Given Due Canceled Entry Other Actions    Time Time (Time) Time  Time-Action       Discontinued     Completed     Future     MAR Hold     Linked           Medications 12/14/21 12/15/21 12/16/21 12/17/21 12/18/21 12/19/21 12/20/21   Discontinued Medications    lactated ringers infusion  Rate: 9 mL/hr Dose: 9 mL/hr  Freq: Continuous Route: IV  Start: 12/15/21  1111   End: 12/15/21 1813        1138-New Bag       1211-Currently Infusing       1349-Anesthesia Volume Adjustment       1545-New Bag       1919-Stopped                      and   Medication Administration Report for Maya Ribera as of 12/20/21 1044   Legend:    Given Hold Not Given Due Canceled Entry Other Actions    Time Time (Time) Time  Time-Action       Discontinued     Completed     Future     MAR Hold     Linked           Medications 12/14/21 12/15/21 12/16/21 12/17/21 12/18/21 12/19/21 12/20/21    HYDROcodone-acetaminophen (HYCET) 7.5-325 MG/15ML solution 15 mL  Dose: 15 mL  Freq: Every 4 Hours PRN Route: PO  PRN Reason: Moderate Pain   Start: 12/19/21 2100   Admin Instructions:   [JOSH]    Do not exceed 4 grams of acetaminophen in a 24 hr period. Max dose of 2gm for AST/ALT greater than 120 units/L        If given for pain, use the following pain scale:   Mild Pain = Pain Score of 1-3, CPOT 1-2  Moderate Pain = Pain Score of 4-6, CPOT 3-4  Severe Pain = Pain Score of 7-10, CPOT 5-8            2103-Given            HYDROmorphone (DILAUDID) injection 0.25 mg  Dose: 0.25 mg  Freq: Every 3 Hours PRN Route: IV  PRN Reason: Severe Pain   Start: 12/15/21 1812   End: 12/17/21 1047   Admin Instructions:   If given for pain, use the following pain scale:  Mild Pain = Pain Score of 1-3, CPOT 1-2  Moderate Pain = Pain Score of 4-6, CPOT 3-4  Severe Pain = Pain Score of 7-10, CPOT 5-8         0201-Given       0456-Given       0900-Given           0227-Given             And  naloxone (NARCAN) injection 0.1 mg  Dose: 0.1 mg  Freq: Every 5 Minutes PRN Route: IV  PRN Reason: Respiratory Depression  Start: 12/15/21 1812   Admin Instructions:   If respiratory rate is less than 8 breaths/minute or patient is difficult to arouse stop any narcotics and contact physician. Administer slow IV push. Repeat as ordered until patient's respiratory rate is greater than 12 breaths/minute.              ondansetron (ZOFRAN) tablet 4  mg  Dose: 4 mg  Freq: Every 6 Hours PRN Route: PO  PRN Reasons: Nausea,Vomiting  Start: 12/15/21 1812   Admin Instructions:   Give Zofran first. Give Phenergan if Zofran not effective. Then if Phenergan not effective, give metoclopramide.           0754-Not Given:  See Alt       1355-Not Given:  See Alt       2209-Not Given:  See Alt            Or  ondansetron (ZOFRAN) injection 4 mg  Dose: 4 mg  Freq: Every 6 Hours PRN Route: IV  PRN Reasons: Nausea,Vomiting  Start: 12/15/21 1812   Admin Instructions:   Give Zofran first. Give Phenergan if Zofran not effective. Then if Phenergan not effective, give metoclopramide.           0754-Given [C]       1355-Given       2209-Given            Discontinued Medications  Medications 12/14/21 12/15/21 12/16/21 12/17/21 12/18/21 12/19/21 12/20/21       bupivacaine (PF) 0.25 % 30 mL mixture  Freq: As Needed  Start: 12/15/21 1236   End: 12/15/21 1343        1236-Given [C]                diphenhydrAMINE (BENADRYL) capsule 25 mg  Dose: 25 mg  Freq: Every 30 Minutes PRN Route: PO  PRN Reason: Itching  PRN Comment: May repeat x 1  Indications of Use: EXTRAPYRAMIDAL REACTION,PRURITUS  Start: 12/15/21 1338   End: 12/15/21 1805   Admin Instructions:   Caution: Look alike/sound alike drug alert. This med may be ordered in other forms and routes. Before giving verify the last time the drug was given by any route/form.                diphenhydrAMINE (BENADRYL) injection 12.5 mg  Dose: 12.5 mg  Freq: Every 15 Minutes PRN Route: IV  PRN Reason: Itching  PRN Comment: May repeat x 1  Start: 12/15/21 1338   End: 12/15/21 1805   Admin Instructions:   Caution: Look alike/sound alike drug alert. This med may be ordered in other forms and routes. Before giving verify the last time the drug was given by any route/form.                ePHEDrine injection 5 mg  Dose: 5 mg  Freq: Once As Needed Route: IV  PRN Comment: symptomatic hypotension - Notify attending anesthesiologist if this needs to be  given  Start: 12/15/21 1338   End: 12/15/21 1805   Admin Instructions:   Caution: Look alike/sound alike drug alert   Dilute with NS to 5-10 mg/mL.  Central line preferred, if unavailable use large bore IV access with frequent nurse monitoring of IV site.              fentaNYL citrate (PF) (SUBLIMAZE) injection 50 mcg  Dose: 50 mcg  Freq: Every 5 Minutes PRN Route: IV  PRN Reasons: Moderate Pain ,Severe Pain   Start: 12/15/21 1338   End: 12/15/21 1805   Admin Instructions:   May alternate fentanyl with hydromorphone using fentanyl first.    Maximum total dose of fentanyl is 200 mcg.  If given for pain, use the following pain scale:  Mild Pain = Pain Score of 1-3, CPOT 1-2  Moderate Pain = Pain Score of 4-6, CPOT 3-4  Severe Pain = Pain Score of 7-10, CPOT 5-8        1356-Given                fentaNYL citrate (PF) (SUBLIMAZE) injection 50 mcg  Dose: 50 mcg  Freq: Every 10 Minutes PRN Route: IV  PRN Reason: Severe Pain   Start: 12/15/21 1109   End: 12/15/21 1344   Admin Instructions:   Maximum total dose of fentanyl is 100 mcg.  If given for pain, use the following pain scale:  Mild Pain = Pain Score of 1-3, CPOT 1-2  Moderate Pain = Pain Score of 4-6, CPOT 3-4  Severe Pain = Pain Score of 7-10, CPOT 5-8              flumazenil (ROMAZICON) injection 0.2 mg  Dose: 0.2 mg  Freq: As Needed Route: IV  PRN Comment: for benzodiazepine induced unresponsiveness or sedation  Indications of Use: BENZODIAZEPINE-INDUCED SEDATION  Start: 12/15/21 1338   End: 12/15/21 1805   Admin Instructions:   Notify Anesthesia if given  ** give IV over 15-30 seconds **              hydrALAZINE (APRESOLINE) injection 5 mg  Dose: 5 mg  Freq: Every 10 Minutes PRN Route: IV  PRN Reason: High Blood Pressure  PRN Comment: for systolic blood pressure greater than 180 mmHg or diastolic blood pressure greater than 105 mmHg  Start: 12/15/21 1338   End: 12/15/21 1805   Admin Instructions:   Up to 20 mg.  Caution: Look alike/sound alike drug alert               HYDROcodone-acetaminophen (HYCET) 7.5-325 MG/15ML solution 15 mL  Dose: 15 mL  Freq: Every 6 Hours PRN Route: PO  PRN Reason: Moderate Pain   Start: 12/15/21 1813   End: 12/19/21 2053   Admin Instructions:   [JOSH]    Do not exceed 4 grams of acetaminophen in a 24 hr period. Max dose of 2gm for AST/ALT greater than 120 units/L        If given for pain, use the following pain scale:   Mild Pain = Pain Score of 1-3, CPOT 1-2  Moderate Pain = Pain Score of 4-6, CPOT 3-4  Severe Pain = Pain Score of 7-10, CPOT 5-8        2101-Given           0419-Given       1421-Given       2019-Given           0144-Given       1214-Given       1741-Given           0054-Given       0754-Given       1600-Given           0522-Given       1644-Given            HYDROmorphone (DILAUDID) injection 0.5 mg  Dose: 0.5 mg  Freq: Every 5 Minutes PRN Route: IV  PRN Reasons: Moderate Pain ,Severe Pain   Start: 12/15/21 1404   End: 12/15/21 1805   Admin Instructions:   If given for pain, use the following pain scale:  Mild Pain = Pain Score of 1-3, CPOT 1-2  Moderate Pain = Pain Score of 4-6, CPOT 3-4  Severe Pain = Pain Score of 7-10, CPOT 5-8        1416-Given       1523-Given                ibuprofen (ADVIL,MOTRIN) tablet 600 mg  Dose: 600 mg  Freq: Once As Needed Route: PO  PRN Reason: Mild Pain   Start: 12/15/21 1338   End: 12/15/21 1805   Admin Instructions:   If given for pain, use the following pain scale:  Mild Pain = Pain Score of 1-3, CPOT 1-2  Moderate Pain = Pain Score of 4-6, CPOT 3-4  Severe Pain = Pain Score of 7-10, CPOT 5-8              labetalol (NORMODYNE,TRANDATE) injection 5 mg  Dose: 5 mg  Freq: Every 5 Minutes PRN Route: IV  PRN Reason: High Blood Pressure  PRN Comment: for systolic blood pressure greater than 180 mmHg or diastolic blood pressure greater than 105 mmHg  Start: 12/15/21 1338   End: 12/15/21 1805   Admin Instructions:   Hold for heart rate less than 60.  Give by slow IV Push each 20mg (or less) over 2 minutes               lidocaine PF 1% (XYLOCAINE) injection 0.5 mL  Dose: 0.5 mL  Freq: Once As Needed Route: IJ  PRN Comment: IV Start  Start: 12/15/21 1109   End: 12/15/21 1344              Mastisol liquid adhesive  Freq: As Needed  Start: 12/15/21 1332   End: 12/15/21 1343        1332-Given                midazolam (VERSED) injection 1 mg  Dose: 1 mg  Freq: Every 10 Minutes PRN Route: IV  PRN Comment: Anxiety prophylaxis, Pre-op comfort  Start: 12/15/21 1109   End: 12/15/21 1344   Admin Instructions:   May repeat dose in 10 minutes one time then contact provider for additional orders.                naloxone (NARCAN) injection 0.2 mg  Dose: 0.2 mg  Freq: As Needed Route: IV  PRN Reasons: Opioid Reversal,Respiratory Depression  PRN Comment: unresponsiveness, decrease oxygen saturation  Indications of Use: ACUTE RESPIRATORY FAILURE,OPIOID-INDUCED RESPIRATORY DEPRESSION  Start: 12/15/21 1338   End: 12/15/21 1805   Admin Instructions:   Notify Anesthesia if given              ondansetron (ZOFRAN) injection 4 mg  Dose: 4 mg  Freq: Once As Needed Route: IV  PRN Reasons: Nausea,Vomiting  Indications of Use: POSTOPERATIVE NAUSEA AND VOMITING  Start: 12/15/21 1338   End: 12/15/21 1805   Admin Instructions:   If BOTH ondansetron (ZOFRAN) and promethazine (PHENERGAN) are ordered use ondansetron first and THEN promethazine IF ondansetron is ineffective.              promethazine (PHENERGAN) suppository 25 mg  Dose: 25 mg  Freq: Once As Needed Route: RE  PRN Reasons: Nausea,Vomiting  Start: 12/15/21 1338   End: 12/15/21 1805   Admin Instructions:   If BOTH ondansetron (ZOFRAN) and promethazine (PHENERGAN) are ordered use ondansetron first and THEN promethazine IF ondansetron is ineffective.             Or  promethazine (PHENERGAN) tablet 25 mg  Dose: 25 mg  Freq: Once As Needed Route: PO  PRN Reasons: Nausea,Vomiting  Start: 12/15/21 1338   End: 12/15/21 1805   Admin Instructions:   If BOTH ondansetron (ZOFRAN) and promethazine  (PHENERGAN) are ordered use ondansetron first and THEN promethazine IF ondansetron is ineffective.                sodium chloride (NS) irrigation solution  Freq: As Needed  Start: 12/15/21 1236   End: 12/15/21 1343        1236-Given [C]                sodium chloride 0.9 % flush 3-10 mL  Dose: 3-10 mL  Freq: As Needed Route: IV  PRN Reason: Line Care  Start: 12/15/21 1109   End: 12/15/21 1344              sodium chloride 500 mL with heparin (porcine) 5000 UNIT/ML 5,000 Units mixture  Freq: As Needed  Start: 12/15/21 1237   End: 12/15/21 1343        1237-Given [C]                sodium chloride 9 mL with heparin (porcine) 1,000 Units mixture  Freq: As Needed  Start: 12/15/21 1237   End: 12/15/21 1343        1237-Given [C]                       Orders (active)      Start     Ordered    12/19/21 2100  HYDROcodone-acetaminophen (HYCET) 7.5-325 MG/15ML solution 15 mL  Every 4 Hours PRN         12/19/21 2053 12/19/21 1130  bisacodyl (DULCOLAX) suppository 10 mg  Daily         12/19/21 1040    12/19/21 0822  Diet Clear Liquid  Diet Effective Now         12/19/21 0821    12/18/21 1015  sennosides-docusate (PERICOLACE) 8.6-50 MG per tablet 1 tablet  2 Times Daily         12/18/21 0922    12/17/21 1145  apixaban (ELIQUIS) tablet 5 mg  Every 12 Hours Scheduled         12/17/21 1050    12/16/21 1154  Elevate Head Of Bed 30-45 Degrees  Continuous         12/16/21 1202    12/16/21 1154  Nutren 2.0 (TwoCal); Tube Feeding Type: Cyclic / Intermittent; Feeding Start Time: 6:00 AM; Feeding End Time: 10:00 PM; Cyclic Feeding Start Rate (mL/hr): Other; Other: 10; To Goal Rate of (mL/hr): 60; Supplemental Bolus Feeding: No; Total Daily V...  Diet Effective Now        Comments: Start tube feeds at 10ml/hr and increase by 10ml every 8hrs to goal rate of 60ml/hr.   Once feeds at goal rate, adjust to cyclic feeding schedule of 60ml/hr x 16hrs per day.    12/16/21 1202    12/16/21 0206  Opioid Administration - Continuous Pulse Oximetry  "(SpO2) Monitoring  Per Order Details         12/16/21 0205 12/16/21 0206  Opioid Administration - Document SpO2 Value With Each Set of Vitals & Any Change in Patient Status  Per Order Details         12/16/21 0205    12/16/21 0206  Opioid Administration - Notify Provider Pulse Oximetry (SpO2)  Until Discontinued         12/16/21 0205    12/15/21 2100  metoprolol tartrate (LOPRESSOR) tablet 50 mg  2 Times Daily         12/15/21 1813    12/15/21 2100  pantoprazole (PROTONIX) EC tablet 40 mg  2 Times Daily         12/15/21 1813    12/15/21 1900  methIMAzole (TAPAZOLE) tablet 5 mg  Daily         12/15/21 1813    12/15/21 1814  Elevate Head Of Bed 30-45 Degrees  Continuous         12/15/21 1813    12/15/21 1814  Administer Tube Feeding Via Feeding Tube Already in Place  Continuous         12/15/21 1813    12/15/21 1814  Inpatient Case Management  Consult  Once        Provider:  (Not yet assigned)    12/15/21 1813    12/15/21 1813  Strict Intake and Output  Every Shift         12/15/21 1813    12/15/21 1813  Maintain Sequential Compression Device  Continuous         12/15/21 1813    12/15/21 1813  Code Status and Medical Interventions:  Continuous         12/15/21 1813    12/15/21 1813  Ambulate Patient  Every Shift       12/15/21 1813    12/15/21 1812  ondansetron (ZOFRAN) tablet 4 mg  Every 6 Hours PRN        \"Or\" Linked Group Details    12/15/21 1813    12/15/21 1812  ondansetron (ZOFRAN) injection 4 mg  Every 6 Hours PRN        \"Or\" Linked Group Details    12/15/21 1813    12/15/21 1812  naloxone (NARCAN) injection 0.1 mg  Every 5 Minutes PRN        \"And\" Linked Group Details    12/15/21 1813    Unscheduled  Oxygen Therapy- Nasal Cannula; Titrate for SPO2: per policy  Continuous PRN       12/15/21 1340    Unscheduled  Sadi and Document Tube Depth (in cm)  As Needed       12/15/21 1813    Unscheduled  Up in Chair  As Needed       12/15/21 1813    Unscheduled  Sadi and Document Tube Depth (in cm)  As " Needed       12/16/21 1202                   Operative/Procedure Notes       Bhanu Hollis MD at 12/15/21 1231  Version 1 of 1       Operative Note :   Bhanu Hollis MD    Maya Ribera  1960    Procedure Date: 12/15/21    Pre-op Diagnosis:  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    Anesthesia:  General (general endotracheal tube)    EBL:   none    Specimens:   None    Indications:  · 61-year-old female with near obstructing esophageal cancer and associated malnutrition presenting for neoadjuvant treatment and feeding access.    Findings:   · None    Recommendations:   · Routine port and J-tube care    Description of procedure:    After obtaining informed consent, patient brought to the operating room placed under a general anesthetic.  Her chest and neck were sterilely prepped and draped.  Access to the left subclavian vein was gained and the wire was then passed through the needle under fluoroscopic guidance and directed to the superior vena cava.  Needle was withdrawn.  Site for the pocket was marked and created through the skin.  Subcutaneous tissues were  with electrocautery and then a pocket was bluntly created.  The stick site was enlarged with a #11 blade and the vein dilator and sheath were passed over the wire under fluoroscopic guidance into the superior vena cava.  The vein dilator and wire were then withdrawn and replaced with the 8 Maltese catheter which was directed to the superior vena cava.  The peel-away sheath was removed.  The catheter was tunneled down into  the pocket, cut to the appropriate length and then attached to the Bard clearvue port.  The port was secured to the chest wall with 3-0 Prolene.  I was able to easily withdraw blood and then flushed the catheter.  The catheter was then locked with the concentrated heparin solution.  Subcutaneous tissues were reapproximated with 3-0 Vicryl and skin was closed with glue.    Drapes were then taken away and the abdomen was then sterilely prepped and draped.  Supra umbilical skin incision made dissected through the subcutaneous tissues onto the fascia.  Fascia was incised in the midline.  A small Kehinde wound retractor was introduced and the omentum was reflected superiorly.  The transverse colon was identified and lifted up.  Small bowel was identified and run back to the level of the ligament of Treitz.  Approximately 20 cm distal to this an area appropriate for jejunostomy was identified.  A 20 [place] T-tube was then brought through the abdominal wall and trimmed appropriately.  A pursestring suture of 2-0 silk was then placed on the antimesenteric side of the small bowel and then an incision of the small bowel was made in the midst of this pursestring suture.  The 20 [place] feeding tube was introduced and oriented appropriately.  The superior side is oriented proximally and the inferior side is oriented distally.  The tube was then Stammed up against the abdominal wall with 2-0 silk suture.  The tube was secured to the skin with a 2-0 nylon suture.  The fascia was closed with running 0 PDS.  Subcutaneous tissues were reapproximated with 3-0 Vicryl and skin was closed with 4-0 Monocryl.    Bhanu Hollis MD  General and Endoscopic Surgery  [facility]    [address] [place] [address], [address]  [place], [place], [address]  P: [phone]  F: [phone]        Electronically signed by Bhanu Hollis MD at 12/15/21 7284          Physician Progress Notes       Bhanu Hollis MD at 12/19/21 1212        Postop day  #4    Patient states that overall she feels better.  She is currently tolerating her tube feeds at 60 cc an hour.  She feels full.  She has passed some flatus but no bowel movement.    Objective:  Afebrile with stable vitals  General: Appears fairly comfortable  Abdomen: Remains tender, mildly distended.  Incision okay.    Labs reviewed.  White blood cell count slightly improved.  Chemistries look okay.    Assessment and plan:  -Esophageal cancer  -Malnutrition, now status post port placement and J-tube  -Tube feeds are up to goal.  We are going to try to stimulate her to get a bowel movement today.  Possible discharge later today but more likely tomorrow.  She will need home health.    Bhanu Hollis MD  General and Endoscopic Surgery  [facility]    [address], [address]  [place], [place], [address]  P: [phone]  F: [phone]        Electronically signed by Bhanu Hollis MD at 12/19/21 1213     Bhanu Hollis MD at 12/18/21 0919        Postop day 3 after port and J-tube placement    Subjective:  Felt she was doing better yesterday but began to have a little bit more pain overnight.  As her tube feeds have increased she has had a little bit of bloating and nausea and had a little bit of heaving which she thinks exacerbated her abdominal pain.  She is taking a small amount of clear liquids.  She really has not had any GI function to this point.    Objective:  Afebrile with stable vitals  General: Awake and alert, no distress but appears uncomfortable  Abdomen: Soft, appropriately tender, incision is in good order, J-tube site is clean    Assessment and plan:  -Advanced esophageal cancer  -Malnutrition  -Now 3 days out from port placement and open jejunostomy  -Still does not look quite well enough to go home.  Her tube feeds are up to 50 cc an hour currently with a goal of 60.  I think we would be wise to watch her another day.  Slow progress overall.    Bhanu Hollis MD  General  and Endoscopic Surgery  [facility]    [address], [address]  [place], [address]  P: [phone]  F: [phone]        Electronically signed by Bhanu Hollis MD at 12/18/21 0921     Bhanu Hollis MD at 12/17/21 1046        Postop day 2 port/J-tube placement    Subjective:  Overall doing well, pain adequately controlled.  She has been up and around.  Currently tolerating tube feeds at 30 cc an hour and taking minimal clear liquids.    Objective:  Patient is afebrile with stable vitals  General: Awake and alert, no distress  Abdomen: Incisions are in good order, J-tube site is clean  Port site also clean    Assessment and plan:  -Esophageal cancer  -Malnutrition  -Now 2 days out from port placement and jejunostomy, doing well to this point  -Continue to advance tube feeds to goal  -Home health to follow, anticipate possible discharge tomorrow if she continues to progress    Bhanu Hollis MD  General and Endoscopic Surgery  [facility]    [address], [address]  [place], [address]  P: [phone]  F: [phone]        Electronically signed by Bhanu Hollis MD at 12/17/21 1047     Saba Whitfield, DNP, APRN at 12/16/21 1211     Attestation signed by Hayder Finch III, MD at 12/17/21 0718    I have reviewed this documentation and agree.                        Chief Complaint: Esophageal mass  S/P: J-tube and port placement  POD # 1    Subjective:  Symptoms:  Stable.  No shortness of breath, cough or weakness.    Diet:  Adequate intake.  No nausea or vomiting.    Activity level: Impaired due to weakness.    Pain:  She complains of pain that is mild.  Pain is well controlled.        Vital Signs:  Temp:  [96.8 °F (36 °C)-97.9 °F (36.6 °C)] 96.8 °F (36 °C)  Heart Rate:  [57-73] 59  Resp:  [12-16] 15  BP: (105-169)/(53-83) 110/62    Intake & Output (last day)       12/15 0701  12/16 0700 12/16 0701  12/17 0700    I.V. (mL/kg) 1000 (17.5)     NG/GT 60   "   Total Intake(mL/kg) 1060 (18.6)     Urine (mL/kg/hr) 300     Total Output 300     Net +760                 Objective:  General Appearance:  Comfortable and well-appearing.    Vital signs: (most recent): Blood pressure 112/62, pulse 55, temperature 96.8 °F (36 °C), temperature source Oral, resp. rate 16, height 162.6 cm (64.02\"), weight 57 kg (125 lb 9.6 oz), SpO2 95 %.    Output: Producing urine.    HEENT: Normal HEENT exam.    Lungs:  Normal effort.  She is not in respiratory distress.    Heart: Normal rate.    Abdomen: Abdomen is soft.  (J-tube site intact).    Extremities: Normal range of motion.    Neurological: Patient is alert.    Pupils:  Pupils are equal, round, and reactive to light.    Skin:  Warm.  (Dermabond to port site intact.  No purulence or erythema.)            Results Review:     I reviewed the patient's new clinical results.  I reviewed the patient's new imaging results and agree with the interpretation.  I reviewed the patient's other test results and agree with the interpretation  Discussed with patient, RN and Dr. Finch    Imaging Results (Last 24 Hours)     Procedure Component Value Units Date/Time    IR PICC W Fluoro Surgery Only [[id number]] Collected: 12/15/21 1305     Updated: 12/15/21 1309    Narrative:      IR PICC W FLUORO SURGERY ONLY-     INDICATIONS: Chest port placement     TECHNIQUE: FLUOROSCOPIC ASSISTANCE IN THE OPERATING ROOM.     FINDINGS:     14 intraoperative fluoroscopic spot views were obtained and recorded the  PACS for review, revealing left chest port extending to the cavoatrial  junction region. Please see operative report for full details.     Fluoroscopy time: 16.7 seconds       Impression:         As described.     This report was finalized on 12/15/2021 1:05 PM by Dr. Dano Parmar M.D.             Lab Results:     Lab Results (last 24 hours)     Procedure Component Value Units Date/Time    Basic Metabolic Panel [[id number]]  (Abnormal) Collected: 12/16/21 " 0722    Specimen: Blood Updated: 12/16/21 0956     Glucose 105 mg/dL      BUN 16 mg/dL      Creatinine 0.43 mg/dL      Sodium 138 mmol/L      Potassium 4.2 mmol/L      Chloride 103 mmol/L      CO2 23.9 mmol/L      Calcium 9.1 mg/dL      eGFR Non African Amer 149 mL/min/1.73      BUN/Creatinine Ratio 37.2     Anion Gap 11.1 mmol/L     Narrative:      GFR Normal >60  Chronic Kidney Disease <60  Kidney Failure <15      CBC & Differential [[id number]]  (Abnormal) Collected: 12/16/21 0722    Specimen: Blood Updated: 12/16/21 0903    Narrative:      The following orders were created for panel order CBC & Differential.  Procedure                               Abnormality         Status                     ---------                               -----------         ------                     CBC Auto Differential[[id number]]        Abnormal            Final result                 Please view results for these tests on the individual orders.    CBC Auto Differential [[id number]]  (Abnormal) Collected: 12/16/21 0722    Specimen: Blood Updated: 12/16/21 0903     WBC 15.05 10*3/mm3      RBC 4.56 10*6/mm3      Hemoglobin 13.1 g/dL      Hematocrit 40.5 %      MCV 88.8 fL      MCH 28.7 pg      MCHC 32.3 g/dL      RDW 12.1 %      RDW-SD 39.4 fl      MPV 10.5 fL      Platelets 246 10*3/mm3      Neutrophil % 79.6 %      Lymphocyte % 12.9 %      Monocyte % 6.9 %      Eosinophil % 0.1 %      Basophil % 0.2 %      Immature Grans % 0.3 %      Neutrophils, Absolute 11.98 10*3/mm3      Lymphocytes, Absolute 1.94 10*3/mm3      Monocytes, Absolute 1.04 10*3/mm3      Eosinophils, Absolute 0.01 10*3/mm3      Basophils, Absolute 0.03 10*3/mm3      Immature Grans, Absolute 0.05 10*3/mm3      nRBC 0.0 /100 WBC            Assessment/Plan       Esophageal cancer (HCC)       Assessment & Plan    Ms. Ribera is an established patient of Dr. Finch for esophageal malignancy.  She is s/p port and J-tube placement by Dr. Hollis yesterday.  She is  scheduled to begin chemotherapy on Monday.  We will arrange a follow-up appointment for her after she has completed her therapy for further evaluation of esophagectomy.      Saba Whitfield DNP, APRN  [facility]  12/16/21  12:11 EST        I wore protective equipment throughout this patient encounter including a face mask, gloves and protective eyewear.  Hand hygiene was performed before donning protective equipment and after removal when leaving the room.      Electronically signed by Hayder Finch III, MD at 12/17/21 0749     Bhanu Hollis MD at 12/16/21 0836        Postop day 1 port/J-tube    Subjective:  Complains of abdominal pain, seemingly fairly tolerable.  Taking clear liquids.  Has been up and walking.    Objective:  Afebrile, vitals are stable  General: Awake and alert, no distress  Abdomen: Soft and appropriately tender, dressing is clean, J-tube site is clean.  Port site okay.    Morning labs are pending.    Assessment and plan:  -Esophageal cancer, now status post port placement and J-tube placement.  -Nutrition to see to initiate J-tube feeds today  - to see to assist with planning for home tube feeds  -Hopefully home tomorrow/Saturday    Bhanu Hollis MD  General and Endoscopic Surgery  [facility]    [address], [address]  [place], [place], [address]  P: [phone]  F: [phone]        Electronically signed by Bhanu Hollis MD at 12/16/21 7048

## 2021-12-20 NOTE — PROGRESS NOTES
Postop day 5    Continues to improve, wants to go home today.  Tolerating tube feeds.  Pain is improving.    Objective:  Afebrile with stable vitals  General: Awake and alert without distress  Abdomen: Soft, mildly tender, incision looks good.    Assessment and plan:  -Esophageal cancer  -Malnutrition  -Discharge home today.  We will give magnesium citrate.  Follow-up with me in the office in 1 week.    Bhanu Hollis MD  General and Endoscopic Surgery  [facility]    [address], [address]  [place], [place], [address]  P: [phone]  F: [phone]       No

## 2021-12-20 NOTE — PROGRESS NOTES
Adult Nutrition  Assessment/PES    Patient Name:  Maya Ribera  YOB: 1960  MRN: [id number]  Admit Date:  12/15/2021    Assessment Date:  12/20/2021  Nutrition follow up.   Spoke with patient-tolerating PEJ TF Nutren 2.0 goal rate @ 60 cc/hr x 16 hours. Fluid flushes 30 cc q hour. Went over home regimen: TF need to run for 16 hours (doesn't have to run 16 hours consecutive).  Clear liquids-tolerating water and ice chips. Will continue to follow/monitor.   Reason for Assessment     Row Name 12/20/21 1027          Reason for Assessment    Reason For Assessment follow-up protocol; TF/PN                Nutrition/Diet History     Row Name 12/20/21 1027          Nutrition/Diet History    Typical Food/Fluid Intake PEJ, tolerating TF Nutren 2.0 @ 60 cc/hr x 16 hours; awaiting bowel movement                  Labs/Tests/Procedures/Meds     Row Name 12/20/21 1028          Labs/Procedures/Meds    Lab Results Reviewed reviewed, pertinent     Lab Results Comments Glu            Diagnostic Tests/Procedures    Diagnostic Test/Procedure Reviewed reviewed, pertinent            Medications    Pertinent Medications Reviewed reviewed, pertinent                Physical Findings     Row Name 12/20/21 1028          Physical Findings    Gastrointestinal feeding tube     Tubes jejunostomy tube     Skin surgical incision                  Nutrition Prescription Ordered     Row Name 12/20/21 1028          Nutrition Prescription PO    Current PO Diet Clear Liquid            Nutrition Prescription EN    Enteral Route Jejunostomy     Product Nutren 2.0 (TwoCal)     TF Delivery Method Cyclic     Cyclic TF Goal Rate (mL/hr) 60 mL/hr     Cyclic TF Cycle Over (hrs)  16     Water flush (mL)  30 mL     Water Flush Frequency Per hour                Evaluation of Received Nutrient/Fluid Intake     Row Name 12/20/21 1028          Calories Evaluation    Enteral Calories (kcal) 1920            Protein Evaluation    Enteral Protein (gm)  80.6            Intake Assessment    Energy/Calorie Requirement Assessment meeting needs     Protein Requirement Assessment meeting needs            Fluid Intake Evaluation    Enteral (Free Water) Fluid (mL) 662.4     Free Water Flush Fluid (mL) 480     Total Free Water Intake (mL) 1142.4 mL                     Problem/Interventions:           Intervention Goal     Row Name 12/20/21 1028          Intervention Goal    General Maintain nutrition; Nutrition support treatment; Disease management/therapy; Meet nutritional needs for age/condition     TF/PN Tolerate TF at goal; Maintain TF/PN     Weight Maintain weight                Nutrition Intervention     Row Name 12/20/21 1029          Nutrition Intervention    RD/Tech Action Follow Tx progress; Care plan reviewd; Interview for preference                Nutrition Prescription     Row Name 12/20/21 1029          Nutrition Prescription EN    Enteral Prescription Continue same protocol                Education/Evaluation     Row Name 12/20/21 1029          Education    Education Will Instruct as appropriate            Monitor/Evaluation    Monitor Per protocol; I&O; Pertinent labs; TF delivery/tolerance; Weight; Skin status; Symptoms                 Electronically signed by:  Tana Finn RD  12/20/21 10:29 EST

## 2021-12-20 NOTE — PROGRESS NOTES
Discharge Planning Assessment  [facility]     Patient Name: Maya Ribera  MRN: [id number]  Today's Date: 12/20/2021    Admit Date: 12/15/2021     Discharge Needs Assessment     Row Name 12/20/21 1039       Living Environment    Lives With significant other    Name(s) of Who Lives With Patient SOCristy, [phone]    Current Living Arrangements home/apartment/condo    Primary Care Provided by self    Provides Primary Care For no one    Family Caregiver if Needed child(anay), adult; significant other    Family Caregiver Names son, Haile Ribera, [phone]    Quality of Family Relationships helpful; involved; supportive    Able to Return to Prior Arrangements yes       Resource/Environmental Concerns    Resource/Environmental Concerns none       Transition Planning    Patient/Family Anticipates Transition to home with family; home with help/services    Patient/Family Anticipated Services at Transition home health care    Transportation Anticipated family or friend will provide       Discharge Needs Assessment    Equipment Currently Used at Home none    Concerns to be Addressed discharge planning    Outpatient/Agency/Support Group Needs homecare agency; infusion therapy, home    Discharge Facility/Level of Care Needs home with home health    Provided Post Acute Provider List? Yes    Post Acute Provider List Home Health    Discharge Coordination/Progress HH referral pending               Discharge Plan     Row Name 12/20/21 105       Plan    Plan Home with partner, HH referral pending    Patient/Family in Agreement with Plan yes    Plan Comments CCP spoke with pt re: d/c planning. Facesheet verified. Pt resides in a single level home, uses no DME, is unable to recall HH agency used in the distant past, and denies past SNF. [facility]. Pt agreeable to HH referrals, first choice [facility] (Varsha denies due to staffing), second choice [facility] (Maura Gould to venkat). CCP to follow.  Stacy Smyth LCSW              Continued Care and Services - Admitted Since 12/15/2021     Home Medical Care     Service Provider Request Status Selected Services Address Phone Fax Patient Preferred     [facility]  Pending - Request Sent N/A [address] [address] [phone] --    [facility]  Declined  Unable to staff. N/A [address] [facility], [address], [facility] [address] [phone] --                 Demographic Summary     Row Name 12/20/21 1039       General Information    Admission Type inpatient    Arrived From home    Referral Source admission list    Reason for Consult discharge planning    Preferred Language English               Functional Status     Row Name 12/20/21 1039       Functional Status    Usual Activity Tolerance good    Current Activity Tolerance good       Functional Status, IADL    Medications independent    Meal Preparation independent    Housekeeping independent    Laundry independent    Shopping independent       Mental Status Summary    Recent Changes in Mental Status/Cognitive Functioning no changes               Psychosocial    No documentation.                Abuse/Neglect    No documentation.                Legal    No documentation.                Substance Abuse    No documentation.                Patient Forms    No documentation.                   Khushi Smyth LCSW

## 2021-12-20 NOTE — DISCHARGE PLACEMENT REQUEST
"Maya Burleson (61 y.o. Female)             Date of Birth Social Security Number Address Home Phone MRN    1960  [address] [id number] [phone] [id number]    Rastafarian Marital Status             Unknown Single       Admission Date Admission Type Admitting Provider Attending Provider Department, Room/Bed    12/15/21 Elective Bhanu Hollis MD Englund, Graham D, MD [address], P398/1    Discharge Date Discharge Disposition Discharge Destination                         Attending Provider: Bhanu Hollis MD    Allergies: Codeine    Isolation: None   Infection: None   Code Status: CPR   Advance Care Planning Activity    Ht: 162.6 cm (64.02\")   Wt: 57 kg (125 lb 9.6 oz)    Admission Cmt: None   Principal Problem: Esophageal cancer (HCC) [C15.9]                 Active Insurance as of 12/15/2021     Primary Coverage     Payor Plan Insurance Group Employer/Plan Group    [id number]     Payor Plan Address Payor Plan Phone Number Payor Plan Fax Number Effective Dates    [address]   1/2/2010 - None Entered    [place] [address]       Subscriber Name Subscriber Birth Date Member ID       MAYA BURLESON 1960 [id number]                 Emergency Contacts      (Rel.) Home Phone Work Phone Mobile Phone    magan paz (Other) [phone] -- [phone]    Haile Burleson (Son) -- -- [phone]              "

## 2021-12-20 NOTE — DISCHARGE SUMMARY
Discharge Summary    Patient name: Maya Ribera    Medical record number: [id number]    Admission date: 12/15/2021  Discharge date: 12/20/2021    Attending physician: Bhanu Hollis MD    Primary care physician: Iggy Nicole MD    Referring physician: Bhanu Hollis MD  [address] [address]    Consulting physician(s): None    Condition on discharge: improving    Primary Diagnoses: Esophageal cancer    Secondary Diagnoses: Malnutrition    Operative Procedure: Port placement and open jejunostomy    Hospital Course: The patient is a  61 y.o. female that was admitted to the hospital after undergoing the operation as described in her operative report.  Postoperatively she had a slow recovery.  Tube feeds were gradually advanced.  Pain control was an issue initially but by day 5 she was doing well.    Discharge medications:      Discharge Medications      Continue These Medications      Instructions Start Date   Eliquis 5 MG tablet tablet  Generic drug: apixaban   5 mg, Oral, Every 12 Hours Scheduled, LD 12/4      HYDROcodone-acetaminophen 7.5-325 MG/15ML solution  Commonly known as: HYCET   15 mL, Oral, Every 6 Hours PRN      methIMAzole 10 MG tablet  Commonly known as: TAPAZOLE   5 mg, Oral, Daily, Take DOS      metoprolol tartrate 50 MG tablet  Commonly known as: LOPRESSOR   50 mg, Oral, 2 Times Daily, Take DOS      pantoprazole 40 MG EC tablet  Commonly known as: PROTONIX   40 mg, Oral, 2 Times Daily      pravastatin 20 MG tablet  Commonly known as: PRAVACHOL   20 mg, Oral, Daily      sucralfate 1 GM/10ML suspension  Commonly known as: CARAFATE   1 g, Oral, 4 Times Daily             Discharge instructions: Tube feeds per nutrition recommendations.  No lifting over 10 pounds.      Follow-up appointment: Follow up with Bhanu Hollis MD in the office in 1 week. Call for appointment at [phone].

## 2021-12-21 ENCOUNTER — OFFICE VISIT (OUTPATIENT)
Dept: RADIATION ONCOLOGY | Facility: HOSPITAL | Age: 61
End: 2021-12-21

## 2021-12-21 ENCOUNTER — CONSULT (OUTPATIENT)
Dept: RADIATION ONCOLOGY | Facility: HOSPITAL | Age: 61
End: 2021-12-21

## 2021-12-21 VITALS
BODY MASS INDEX: 21.82 KG/M2 | SYSTOLIC BLOOD PRESSURE: 143 MMHG | DIASTOLIC BLOOD PRESSURE: 77 MMHG | WEIGHT: 127.2 LBS | HEART RATE: 93 BPM | OXYGEN SATURATION: 97 %

## 2021-12-21 DIAGNOSIS — C15.5 MALIGNANT NEOPLASM OF LOWER THIRD OF ESOPHAGUS (HCC): Primary | ICD-10-CM

## 2021-12-21 PROCEDURE — 99204 OFFICE O/P NEW MOD 45 MIN: CPT | Performed by: RADIOLOGY

## 2021-12-21 PROCEDURE — G0463 HOSPITAL OUTPT CLINIC VISIT: HCPCS | Performed by: RADIOLOGY

## 2021-12-21 PROCEDURE — 77263 THER RADIOLOGY TX PLNG CPLX: CPT | Performed by: RADIOLOGY

## 2021-12-21 NOTE — PAYOR COMM NOTE
"DISCHARGED  REF #[id number]      Maya Ribera (61 y.o. Female)             Date of Birth Social Security Number Address Home Phone MRN    1960  [address] [facility] [id number] [phone] [id number]    Muslim Marital Status             Unknown Single       Admission Date Admission Type Admitting Provider Attending Provider Department, Room/Bed    12/15/21 Elective Bhanu Esparza MD  [address], P398/1    Discharge Date Discharge Disposition Discharge Destination          12/20/2021 Home or Self Care              Attending Provider: (none)   Allergies: Codeine    Isolation: None   Infection: None   Code Status: Prior   Advance Care Planning Activity    Ht: 162.6 cm (64.02\")   Wt: 57 kg (125 lb 9.6 oz)    Admission Cmt: None   Principal Problem: Esophageal cancer (HCC) [C15.9]                 Active Insurance as of 12/15/2021     Primary Coverage     Payor Plan Insurance Group Employer/Plan Group    [id number]     Payor Plan Address Payor Plan Phone Number Payor Plan Fax Number Effective Dates    [address]   1/2/2010 - None Entered    [place] [address]       Subscriber Name Subscriber Birth Date Member ID       MAYA RIBERA 1960 [id number]                 Emergency Contacts      (Rel.) Home Phone Work Phone Mobile Phone    magan paz (Other) [phone] -- [phone]    Haile Ribera (Son) -- -- [phone]            Discharge Order (From admission, onward)     Start     Ordered    12/20/21 1721  Discharge patient  Once        Expected Discharge Date: 12/20/21    Discharge Disposition: Home or Self Care    Physician of Record for Attribution - Please select from Treatment Team: BHANU ESPARZA [[id number]]    Review needed by CMO to determine Physician of Record: No       Question Answer Comment   Physician of Record for Attribution - Please select from Treatment Team BHANU ESPARZA    Review " needed by CMO to determine Physician of Record No        12/20/21 1721    12/20/21 1525  Discharge patient  Once,   Status:  Canceled        Expected Discharge Date: 12/20/21    Discharge Disposition: Home or Self Care    Physician of Record for Attribution - Please select from Treatment Team: LIBAN ESPARZA [[id number]]    Review needed by CMO to determine Physician of Record: No       Question Answer Comment   Physician of Record for Attribution - Please select from Treatment Team LIBAN ESPARZA    Review needed by CMO to determine Physician of Record No        12/20/21 1529

## 2021-12-21 NOTE — OUTREACH NOTE
Prep Survey      Responses   [facility] facility patient discharged from? [facility]   Is LACE score < 7 ? No   Emergency Room discharge w/ pulse ox? No   Eligibility Readm Mgmt   Discharge diagnosis Port placement and open jejunostomy d/t esophageal cancer   Does the patient have one of the following disease processes/diagnoses(primary or secondary)? General Surgery   Does the patient have Home health ordered? Yes   [facility]    Is there a DME ordered? Yes   What DME was ordered? [facility]   Prep survey completed? Yes          Madison Holt RN

## 2021-12-21 NOTE — PROGRESS NOTES
"Radiation Oncology Consult Note    Name: Maya Ribera  YOB: 1960  MRN #: [id number]  Date of Service: 12/21/2021  Referring Provider: Iggy Harrell MD  [address] [place] [address]  Primary Care Provider: Iggy Nicole MD          DIAGNOSIS: Distal esophageal cancer    REASON FOR CONSULTATION/CHIEF COMPLAINT:  \"esophageal cancer\"  I was asked to see the patient at the request of the referring provider noted below for advice and recommendations regarding this diagnosis and the role of radiation therapy.                              REQUESTING PHYSICIAN:  Iggy Harrell Md  [address] [place] [address]    RECORDS OBTAINED:  Records of the patients history including those obtained from the referring provider were reviewed and summarized in detail.    HISTORY OF PRESENT ILLNESS:  Maya Ribera is a 61 y.o. female who was having pain with swallowing; noted as under the right breast and radiated to the spine; she also had several episodes of difficulty swallowing, but notes that food has never really been stuck in her esophagus; she notes a 5-10 lb weight loss over the last few month; She had work-up (see below) at  including CTs, EGD and Colonoscopy; colonoscopy was negative. EGD showed abnormality in the esophagus. Biopsies showed high-grade dysplasia and adenocarcinoma in situ (with pathology comment for underlying invasive component if clinically correlating but not called).        She saw Dr. Finch 11/18/2021.  He ordered a PET/CT, referred her to Dr. Cooley and presented her case at tumor board.    PETCT was on 11/23/2021--A long segment of masslike FDG uptake in the distal esophagus was noted with significant wall thickening on non-contrast CT images; SUV 16. No mediastinal adenopathy adenopathy. Non-specific 1 cm left hilar node measures 1 cm in short axis dimension, SUV 3.3.  IMPRESSION:  Intensely FDG avid thickening within the mid " to distal esophagus representing the patient's known neoplasm.  There are a few mildly enlarged left hilar and gastrohepatic nodes which have mild FDG uptake and are called indeterminate.    After Multi-D Treatment planning conference he was sent to [facility] for EUS and biopsies:    [facility] performed 12/7/2021 revealing a 5 cm esophageal mass present in the distal third esophagus without extension into the GE junction or into the cardia, the mass extended into into the muscularis propria into the adventitia not penetrating through the adventitia with a single 1 cm gastrohepatic/celiac axis lymph node and FNB x1 performed- [facility] called it a  T2 N1 M0 distal esophageal cancer-clinical stage III, pathologic stage IIIa   EGD findings:   1.  A 5 cm esophageal mass was present in the distal  third of the  esophagus.  There was esophagus distal to the mass as it  did not  extend to the GE junction or into the cardia.   2.  Normal gastric mucosa entire stomach   3.  Normal duodenum mucosa visualized to D2      EUS findings:1.  Esophageal mass extending into and through the  muscularis propria to the adventitia.  It did not penetrate through the  adventitia.   2.  A single 1 cm gastrohepatic/celiac axis lymph node was visualized,  FNB x1 was performed using a 25-gauge FNB needle with slides  made at the bedside.   3.  Normal pancreatic EUS   4.  No obvious lesions in the liver    The gastrohepatic lymph node with FNA showed only benign lymphcytes and no evidence of malignancy.    On 12/15/2021--she went for an open jejunostomy with Dr. Hollis and also got a left subclavian port.    She is asked to have short interval for healing before neoadjuvant chemoradiation. Dr. Hollis plans to see her on 12/27/2021 and potentially release her for chemo at that time.    She is a lifelong smoker of about 1 pack per day, active--discussed cessation today.  She has no cough or hemoptysis.  She has no hoarseness or change in voice.   She has a history of enlarged thyroid with hyperthyroidism and is currently on thyroid medication (caused atrial fibrillation and is now on Eliquis).  She also has a prolapsed mitral valve.  She has a strong family history of cancer--Mother and sister both had breast cancer. Her most recent MMG was negative.    The following portions of the patient's history were reviewed and updated as appropriate: allergies, current medications, past family history, past medical history, past social history, past surgical history and problem list. Reviewed with the patient and remain unchanged.    PAST MEDICAL HISTORY:  she  has a past medical history of A-fib (HCC), Boil, breast (12/13/2021), Disease of thyroid gland, Esophageal cancer (HCC), Gastric ulcer, GERD (gastroesophageal reflux disease), Hyperlipidemia, Hypertension, Irritable bowel, and Mitral valve prolapse.  MEDICATIONS:   Current Outpatient Medications:   •  Eliquis 5 MG tablet tablet, Take 5 mg by mouth Every 12 (Twelve) Hours. LD 12/4, Disp: , Rfl:   •  HYDROcodone-acetaminophen (HYCET) 7.5-325 MG/15ML solution, Take 15 mL by mouth Every 6 (Six) Hours As Needed for Moderate Pain ., Disp: 236 mL, Rfl: 0  •  methIMAzole (TAPAZOLE) 10 MG tablet, Take 5 mg by mouth Daily. Take DOS, Disp: , Rfl:   •  metoprolol tartrate (LOPRESSOR) 50 MG tablet, Take 50 mg by mouth 2 (Two) Times a Day. Take DOS, Disp: , Rfl:   •  pantoprazole (PROTONIX) 40 MG EC tablet, Take 40 mg by mouth 2 (Two) Times a Day., Disp: , Rfl:   •  pravastatin (PRAVACHOL) 20 MG tablet, Take 20 mg by mouth Daily., Disp: , Rfl:   •  sucralfate (CARAFATE) 1 GM/10ML suspension, Take 1 g by mouth 4 (Four) Times a Day., Disp: , Rfl:   No current facility-administered medications for this visit.  ALLERGIES:   Allergies   Allergen Reactions   • Codeine Hives     Patient states this is when she was very young.      PAST SURGICAL HISTORY: she has a past surgical history that includes Cholecystectomy; External ear  surgery; Knee surgery (Left); Replacement total knee (Left); Upper endoscopic ultrasound w/ FNA (N/A, 12/7/2021); Colonoscopy (08/27/2021); Venous Access Device (Port) (Left, 12/15/2021); and Jejunostomy (N/A, 12/15/2021).  PREVIOUS RADIOTHERAPY OR CHEMOTHERAPY: no  FAMILY HISTORY: her family history includes Alcohol abuse in her brother; Asthma in her brother; Bipolar disorder in her brother and father; Breast cancer in her maternal grandmother, mother, and sister; COPD in her sister; Diabetes in her brother, father, maternal grandfather, maternal grandmother, mother, paternal grandfather, paternal grandmother, sister, and sister; Hypertension in her father, sister, sister, and son; Kidney cancer in her sister; Seizures in her brother.  SOCIAL HISTORY: she  reports that she has been smoking cigarettes. She has a 40.00 pack-year smoking history. She has never used smokeless tobacco. She reports previous alcohol use. She reports that she does not use drugs.  PAIN AND PAIN MANAGEMENT:   Vitals:    12/21/21 1704   BP: 143/77   Pulse: 93   SpO2: 97%   Weight: 57.7 kg (127 lb 3.2 oz)   PainSc:   6   PainLoc: Abdomen     NUTRITIONAL STATUS: no issues  KPS:80      PHQ-9 Total Score: 10   Patient screened positive for depression based on a PHQ-9 score of 10 on 12/21/2021. Follow-up recommendations include: Referral to Social Work.    Review of Systems: Filled out and reviewed; discussed recent feeding tube; notes fatigue, weight change and appetite chagne as noted above. Some nausea, post feeding tube discomfort, some chronic back pain and confusion/nervousness about diagnosis.      Otherwise negative as below.     General: No fevers, chills, weight change, or drenching night sweats. Skin: No rashes or jaundice.  HEENT: No change in vision or hearing, no headaches.  Neck: No dysphagia or masses.  Heme/Lymph: No easy bruising or bleeding.  Respiratory System: No shortness of breath or cough.  Cardiovascular: No chest pain,  palpitations, or dyspnea on exertion.  - Pacemaker. GI: As noted above  : No dysuria or hematuria.  Endocrine: No heat or cold intolerance. Musculoskeletal: No myalgias or arthralgias.  Neuro: No weakness, numbness, syncope, or seizures. Psych: No mood changes or depression. Ext: Denies swelling.        Objective     Vitals:  Vitals:    12/21/21 1704   BP: 143/77   Pulse: 93   SpO2: 97%   Weight: 57.7 kg (127 lb 3.2 oz)   PainSc:   6   PainLoc: Abdomen         PHYSICAL EXAM:  GENERAL: in no apparent distress, sitting comfortably in room.    HEENT: normocephalic, atraumatic. Pupils are equal, round, reactive to light. Sclera anicteric. Conjunctiva not injected. Oropharynx without erythema, ulcerations or thrush.   NECK: Supple with no masses.  LYMPHATIC: no cervical, supraclavicular or axillary adenopathy appreciated bilaterally.   CARDIOVASCULAR: S1 & S2 detected; no murmurs, rubs or gallops.  CHEST: clear to auscultation bilaterally; no wheezes, crackles or rubs. Work of breathing normal.  ABDOMEN: Limited exam given recent procedure, + BS.   MUSCULOSKELETAL: no tenderness to palpation along the spine or scapulae. Normal range of motion.  EXTREMITIES: no clubbing, cyanosis, edema.  SKIN: no erythema, rashes, ulcerations noted.   NEUROLOGIC: cranial nerves II-XII grossly intact bilaterally. No focal neurologic deficits.  PSYCHIATRIC:  alert, aware, and appropriate.      PERTINENT IMAGING/PATHOLOGY/LABS (Medical Decision Making):     COORDINATION OF CARE: A copy of this note is sent to the referring provider.    PATHOLOGY (Reviewed): Biopsy 08/27/2021 at --Stomach biopsies negative; distal esophagus at 32 cm--squamocolumnar and detached gastric mucosa with mild chronic inflammation consistent with gastroesophagitis; no definite malignancy or metaplasia at this area.  Esophageal lesion at 35-37 cm---Markedly dysplastic squamous mucosa (comment called carcinoma in situ and noted that since the fragments were  superficial, a formal underlying invasive carcinoma cannot be excluded; asked to correlate with endoscopic impression.      IMAGING (Reviewed): CT chest 10/22/2021---Thickening of the mid/distal thoracic esophagus with no mediastinal adenopathy.  The radiologist noted an extent of 4 cm craniocaudal of short segment circumferential wall thickening seen of the esophagus mid/distal, thickness of 1.2 cm.    LABS (Reviewed):  Hematology WBC   Date Value Ref Range Status   12/19/2021 13.20 (H) 3.40 - 10.80 10*3/mm3 Final     RBC   Date Value Ref Range Status   12/19/2021 4.75 3.77 - 5.28 10*6/mm3 Final     Hemoglobin   Date Value Ref Range Status   12/19/2021 13.8 12.0 - 15.9 g/dL Final     Hematocrit   Date Value Ref Range Status   12/19/2021 40.6 34.0 - 46.6 % Final     Platelets   Date Value Ref Range Status   12/19/2021 237 140 - 450 10*3/mm3 Final      Chemistry   Lab Results   Component Value Date    GLUCOSE 141 (H) 12/19/2021    BUN 8 12/19/2021    CREATININE 0.43 (L) 12/19/2021    EGFRIFNONA 149 12/19/2021    BCR 18.6 12/19/2021    K 3.9 12/19/2021    CO2 25.8 12/19/2021    CALCIUM 9.6 12/19/2021       Assessment/Plan     ASSESSMENT AND PLAN:  Distal esophagus adenocarcinoma  [id number] vs. [id number]    Neoadjuvant chemoradiation being considered.    Needs 4D CT sim and planning.    Discussed radiation therapy in great detail today.      Healing and will get assessment from Dr. Hollis on interval healing before any chemoradiation.    Case will require IMRT/IGRT given the size of the lesion for cardiac, lung, liver, spinal cord sparing.    Dosing will be an initial 4140 cGy in 23 fractions; I will discuss with Dr. Cooley and Dr. Finch her surgical resectability and will consider boost accordingly.  All questions are answered.      This assessment comes from my review of the imaging, pathology, physician notes and other pertinent information as mentioned.    DISPOSITION: CT sim ordered.      TIME SPENT WITH PATIENT:    I spent 45 minutes caring for Maya on this date of service. This time includes time spent by me in the following activities: preparing for the visit, reviewing tests, obtaining and/or reviewing a separately obtained history, performing a medically appropriate examination and/or evaluation, counseling and educating the patient/family/caregiver, documenting information in the medical record and care coordination           CC: Iggy Harrell, Iggy Baird MD Robert Linker, MD John A Cox, MD  12/21/2021  5:25 PM EST

## 2021-12-22 ENCOUNTER — CLINICAL SUPPORT (OUTPATIENT)
Dept: OTHER | Facility: HOSPITAL | Age: 61
End: 2021-12-22

## 2021-12-22 ENCOUNTER — TELEPHONE (OUTPATIENT)
Dept: SURGERY | Facility: CLINIC | Age: 61
End: 2021-12-22

## 2021-12-22 VITALS — WEIGHT: 127 LBS | HEIGHT: 64 IN | BODY MASS INDEX: 21.68 KG/M2

## 2021-12-22 DIAGNOSIS — C15.5 MALIGNANT NEOPLASM OF LOWER THIRD OF ESOPHAGUS (HCC): Primary | ICD-10-CM

## 2021-12-22 PROCEDURE — 77334 RADIATION TREATMENT AID(S): CPT | Performed by: RADIOLOGY

## 2021-12-22 PROCEDURE — 77470 SPECIAL RADIATION TREATMENT: CPT | Performed by: RADIOLOGY

## 2021-12-22 PROCEDURE — 77399 UNLISTED PX MED RADJ PHYSICS: CPT | Performed by: RADIOLOGY

## 2021-12-22 NOTE — TELEPHONE ENCOUNTER
Patient's partner called stating pt has had quite a bit of drainage from her J-tube site s/p port & open jejunostomy 12/15. They have changed her bandage multiple time since being discharged from the hospital on 12/20. Her follow-up appointment is scheduled 12/27. Advised to continue changing her dressing when it becomes saturated to keep the moisture off of her skin. Advised that I would send a message to MD for any further recommendations.

## 2021-12-22 NOTE — PROGRESS NOTES
"Outpatient Oncology Nutrition  Assessment/PES    Patient Name:  Maya Ribera  YOB: 1960  MRN: [id number]    Assessment Date:  12/22/2021    Comments: Met patient and her significant other today in radiation oncology. The patient has had issues with constipation but had a BM earlier. Also having some leakage from JT site which has been addressed by surgeons office. TF is infusion x 16 hours, adding water to the bag of Nutren 2.0 and infusion over 8 hours at 90cc/hr. The patient feels full and uncomfortable. Her wife is very stressed about making sure all of the nutrition is infused, worried about how they will fit it all in once treatment begins. Explained that this will have to be flexible on treatment days. Also suggested that if needs the TF rate could be decreased if she was open to being on the pump for a longer period of time. They are not interested in that option now. They are very overwhelmed by the diagnosis, upset that they don't know when chemotherapy will begin, upset about radiation time, the amount of time they will have to be here getting treatment,  upset about having to use the feeding tube, etc. Offered my support and explained RD role in the patients treatment. Will follow closely and be available as needed.      General Info     Row Name 12/22/21 1627       Oncology Specific Assessment    Type of treatment Chemotherapy; Radiation    Frequency of treatment radiation scheduled to begin 1/3    Reported symptoms Weight loss; Dysphagia    Row Name 12/22/21 1626       Today's Session    Person(s) attending today's session Patient; Significant other       General Information    Oncology patient? yes  esophageal cancer, s/p open JT               Physical Findings     Row Name 12/22/21 1627          Physical Findings    Gastrointestinal feeding tube     Tubes jejunostomy tube                Anthropometrics     Row Name 12/22/21 1627          Anthropometrics    Height 162.6 cm (64.02\") " "    Weight 57.6 kg (127 lb)            Ideal Body Weight (IBW)    Ideal Body Weight (IBW) (kg) 55.04     % Ideal Body Weight 104.66            Usual Body Weight (UBW)    Usual Body Weight 63.5 kg (140 lb)     % Usual Body Weight 90.71     Weight Loss unintentional     Weight Loss Time Frame 6 months            Body Mass Index (BMI)    BMI (kg/m2) 21.83                  Home Nutrition Report     Row Name 12/22/21 1628          Nutrition Prescription EN    Enteral Route Jejunostomy     Product Nutren 2.0 (TwoCal)     TF Delivery Method Cyclic     Cyclic TF Goal Volume (mL) 960 mL     Cyclic TF Goal Rate (mL/hr) --  plus water, infusing at 90 over 16 hours     Cyclic TF Cycle Over (hrs)  16                Estimated/Assessed Needs     Row Name 12/22/21 1630 12/22/21 1629       Calculation Measurements    Weight Used For Calculations 57.6 kg (127 lb) 57.6 kg (127 lb)       Estimated/Assessed Needs    Additional Documentation -- KCAL/KG (Group); Protein Requirements (Group); Fluid Requirements (Group)       KCAL/KG    KCAL/KG -- 30 Kcal/Kg (kcal); 35 Kcal/Kg (kcal)    30 Kcal/Kg (kcal) 1728.21 1728.21    35 Kcal/Kg (kcal) 2016.245 2016.245       Protein Requirements    Weight Used For Protein Calculations -- 57.6 kg (127 lb)    Est Protein Requirement Amount (gms/kg) -- 1.5 gm protein    Estimated Protein Requirements (gms/day) -- 86.41       Fluid Requirements    Fluid Requirements (mL/day) -- 2000    Estimated Fluid Requirement Method -- RDA Method    RDA Method (mL) -- 2000    Row Name 12/22/21 1627          Calculation Measurements    Height 162.6 cm (64.02\")                Evaluation of Prescribed Nutrient/Fluid Intake     Row Name 12/22/21 1630          Calculation Measurements    Weight Used For Calculations 57.6 kg (127 lb)            Evaluation of Prescribed Nutrient/Fluid Intake    Nutrition Prescribed Protein Evaluation; Calorie Evaluation; Fluid Evaluation; Fiber Evaluation            Calories at Prescribed " Goal    Enteral Calories (kcal) 1920     Total Calories (kcal) 1920     Total Calories (kcal/Kg) 33.33 kcal/kg     % Kcal Needs 100            Protein at Prescribed Goal    Weight Used for Protein Calculation 57.6 kg (127 lb)     Enteral Protein (gm) 80     Total Protein (gm) 80     Total Protein (Gm/Kg) 1.39 gm/kg     % Protein Needs 100            Fluid at Prescribed Goal    Enteral  Fluid (mL) 960     Free Water Flush Fluid (mL) 480     Total Fluid Intake (mL) 1440     Total Fluid Intake (mL/kg) 25 mL/kg            Fiber at Prescribed Goal    Fiber 0                Labs/Tests/Procedures/Meds     Row Name 12/22/21 1631          Labs/Procedures/Meds    Lab Results Reviewed reviewed            Diagnostic Tests/Procedures    Diagnostic Test/Procedure Reviewed reviewed            Medications    Pertinent Medications Reviewed reviewed     Pertinent Medications Comments hycet, protonix, carafate                   Problem/Interventions:   Problem 1     Row Name 12/22/21 1631          Nutrition Diagnoses Problem 1    Problem 1 Needs Alternate     Etiology (related to) Medical Diagnosis; Factors Affecting Nutrition; Functional Diagnosis     Oncology Esophageal cancer     Functional Diagnosis Dysphagia     Signs/Symptoms (evidenced by) Unintended Weight Change     Unintended Weight Change Loss     Number of Pounds Lost 15     Weight loss time period 6 mo                        Intervention Goal     Row Name 12/22/21 1632          Intervention Goal    General Nutrition support treatment; Disease management/therapy; Provide information regarding MNT for treatment/condition; Meet nutritional needs for age/condition     PO Tolerate PO     TF/PN Maintain TF/PN; Tolerate TF at goal; Deliver estimated need (%)     Deliver % of Estimated Need 100 %     Weight No significant weight loss                    Education/Evaluation     Row Name 12/22/21 1632          Education    Education Education topics; Provided education regarding      Education Topics TF instruction            Monitor/Evaluation    Monitor PO intake; Pertinent labs; TF delivery/tolerance; Weight     Education Follow-up --  follow throughout treatment                 Electronically signed by:  Tracey Lozano RD, LD  12/22/21 16:33 EST

## 2021-12-23 ENCOUNTER — READMISSION MANAGEMENT (OUTPATIENT)
Dept: CALL CENTER | Facility: HOSPITAL | Age: 61
End: 2021-12-23

## 2021-12-23 NOTE — OUTREACH NOTE
General Surgery Week 1 Survey      Responses   [facility] patient discharged from? [facility]   Does the patient have one of the following disease processes/diagnoses(primary or secondary)? General Surgery   Week 1 attempt successful? Yes   Call start time 1219   Call end time 1225   Discharge diagnosis Port placement and open jejunostomy d/t esophageal cancer   Is patient permission given to speak with other caregiver? No   Meds reviewed with patient/caregiver? Yes   Does the patient have all medications related to this admission filled (includes all antibiotics, pain medications, etc.) Yes   Is the patient taking all medications as directed (includes completed medication regime)? Yes   Does the patient have a follow up appointment scheduled with their surgeon? Yes  [Monday 12/27/21]   Has the patient kept scheduled appointments due by today? N/A   What is the Home health agency?  [facility]    Has home health visited the patient within 72 hours of discharge? Yes   What DME was ordered? [facility] for tube feeding.    Has all DME been delivered? Yes   Psychosocial issues? No   Did the patient receive a copy of their discharge instructions? Yes   Nursing interventions Reviewed instructions with patient   What is the patient's perception of their health status since discharge? Improving   Nursing interventions Nurse provided patient education   Is the patient /caregiver able to teach back basic post-op care? Keep incision areas clean,dry and protected   Is the patient/caregiver able to teach back signs and symptoms of incisional infection? Increased redness, swelling or pain at the incisonal site,  Increased drainage or bleeding,  Pus or odor from incision,  Fever   Is the patient/caregiver able to teach back steps to recovery at home? Set small, achievable goals for return to baseline health,  Rest and rebuild strength, gradually increase activity   Is the patient/caregiver able to teach back the  hierarchy of who to call/visit for symptoms/problems? PCP, Specialist, Home health nurse, Urgent Care, ED, 911 Yes   Week 1 call completed? Yes          Pati Donovan RN

## 2021-12-27 ENCOUNTER — OFFICE VISIT (OUTPATIENT)
Dept: SURGERY | Facility: CLINIC | Age: 61
End: 2021-12-27

## 2021-12-27 VITALS — BODY MASS INDEX: 21.51 KG/M2 | HEIGHT: 64 IN | WEIGHT: 126 LBS

## 2021-12-27 DIAGNOSIS — C15.5 MALIGNANT NEOPLASM OF LOWER THIRD OF ESOPHAGUS (HCC): Primary | ICD-10-CM

## 2021-12-27 PROCEDURE — 99024 POSTOP FOLLOW-UP VISIT: CPT | Performed by: SURGERY

## 2021-12-27 NOTE — PROGRESS NOTES
Postop port placement/jejunostomy    Overall doing well.  Abdominal pain is improving, tolerating J-tube feeds, even taken a little bit by mouth and now having good bowel function.    Objective:  Weight 126 pounds today  General: Awake and alert, chronic ill appearance but no acute distress  Abdomen: Soft and benign, incision is healing nicely, J-tube site is clean with minimal induration and irritation around the site    Assessment and plan:  -Esophageal cancer  -Malnutrition  -Continue J-tube feeds  -Patient appears to be healing well, she is nearly 2 weeks out and from my standpoint probably okay to start chemotherapy  -I will see her back in about 4 weeks time    Bhanu Hollis MD  General and Endoscopic Surgery  [facility]    [address], [address]  [place], [place], [address]  P: [phone]  F: [phone]

## 2021-12-28 DIAGNOSIS — C15.5 MALIGNANT NEOPLASM OF LOWER THIRD OF ESOPHAGUS (HCC): Primary | ICD-10-CM

## 2021-12-28 NOTE — PROGRESS NOTES
Pt hospitalized last week and missed MD visit with Dr. Harrell and chemo start. Per Dr. Harrell, he would like to see her this week with possible start of her chemo next week. Chemo education will also need to be rescheduled. Message sent to scheduling.

## 2021-12-29 ENCOUNTER — OFFICE VISIT (OUTPATIENT)
Dept: ONCOLOGY | Facility: CLINIC | Age: 61
End: 2021-12-29

## 2021-12-29 ENCOUNTER — LAB (OUTPATIENT)
Dept: LAB | Facility: HOSPITAL | Age: 61
End: 2021-12-29

## 2021-12-29 VITALS
OXYGEN SATURATION: 95 % | DIASTOLIC BLOOD PRESSURE: 71 MMHG | HEIGHT: 64 IN | HEART RATE: 76 BPM | BODY MASS INDEX: 20.84 KG/M2 | RESPIRATION RATE: 18 BRPM | SYSTOLIC BLOOD PRESSURE: 116 MMHG | WEIGHT: 122.1 LBS | TEMPERATURE: 97.7 F

## 2021-12-29 DIAGNOSIS — C15.5 MALIGNANT NEOPLASM OF LOWER THIRD OF ESOPHAGUS: ICD-10-CM

## 2021-12-29 DIAGNOSIS — C15.5 MALIGNANT NEOPLASM OF LOWER THIRD OF ESOPHAGUS (HCC): Primary | ICD-10-CM

## 2021-12-29 LAB
BASOPHILS # BLD AUTO: 0.07 10*3/MM3 (ref 0–0.2)
BASOPHILS NFR BLD AUTO: 0.5 % (ref 0–1.5)
DEPRECATED RDW RBC AUTO: 41.6 FL (ref 37–54)
EOSINOPHIL # BLD AUTO: 0.23 10*3/MM3 (ref 0–0.4)
EOSINOPHIL NFR BLD AUTO: 1.8 % (ref 0.3–6.2)
ERYTHROCYTE [DISTWIDTH] IN BLOOD BY AUTOMATED COUNT: 12.9 % (ref 12.3–15.4)
HCT VFR BLD AUTO: 41 % (ref 34–46.6)
HGB BLD-MCNC: 13.5 G/DL (ref 12–15.9)
IMM GRANULOCYTES # BLD AUTO: 0.1 10*3/MM3 (ref 0–0.05)
IMM GRANULOCYTES NFR BLD AUTO: 0.8 % (ref 0–0.5)
LYMPHOCYTES # BLD AUTO: 2.91 10*3/MM3 (ref 0.7–3.1)
LYMPHOCYTES NFR BLD AUTO: 22.5 % (ref 19.6–45.3)
MCH RBC QN AUTO: 29 PG (ref 26.6–33)
MCHC RBC AUTO-ENTMCNC: 32.9 G/DL (ref 31.5–35.7)
MCV RBC AUTO: 88 FL (ref 79–97)
MONOCYTES # BLD AUTO: 0.95 10*3/MM3 (ref 0.1–0.9)
MONOCYTES NFR BLD AUTO: 7.3 % (ref 5–12)
NEUTROPHILS NFR BLD AUTO: 67.1 % (ref 42.7–76)
NEUTROPHILS NFR BLD AUTO: 8.7 10*3/MM3 (ref 1.7–7)
NRBC BLD AUTO-RTO: 0 /100 WBC (ref 0–0.2)
PLATELET # BLD AUTO: 207 10*3/MM3 (ref 140–450)
PMV BLD AUTO: 10.8 FL (ref 6–12)
RBC # BLD AUTO: 4.66 10*6/MM3 (ref 3.77–5.28)
WBC NRBC COR # BLD: 12.96 10*3/MM3 (ref 3.4–10.8)

## 2021-12-29 PROCEDURE — 36415 COLL VENOUS BLD VENIPUNCTURE: CPT

## 2021-12-29 PROCEDURE — 85025 COMPLETE CBC W/AUTO DIFF WBC: CPT

## 2021-12-29 PROCEDURE — 99215 OFFICE O/P EST HI 40 MIN: CPT | Performed by: INTERNAL MEDICINE

## 2021-12-30 PROCEDURE — 77338 DESIGN MLC DEVICE FOR IMRT: CPT | Performed by: RADIOLOGY

## 2021-12-30 PROCEDURE — 77293 RESPIRATOR MOTION MGMT SIMUL: CPT | Performed by: RADIOLOGY

## 2021-12-30 PROCEDURE — 77300 RADIATION THERAPY DOSE PLAN: CPT | Performed by: RADIOLOGY

## 2021-12-30 PROCEDURE — 77301 RADIOTHERAPY DOSE PLAN IMRT: CPT | Performed by: RADIOLOGY

## 2021-12-30 NOTE — PROGRESS NOTES
Subjective Discussed findings with patient and her partner  REASON FOR FOLLOW-UP: Distal esophageal cancer      History of Present Illness      The patient is a 61-year-old female who had been seen in multidisciplinary thoracic oncology conference with complaints of painful swallowing and dysphagia over the previous several months with 10 to 15 pound weight loss.  This led to EGD and colonoscopy with normal colonoscopy but EGD demonstrated tumor at the GE junction with biopsies consistent with severe dysplasia including carcinoma in situ.  The patient states that she saw a number of physicians and attempting to have a diagnosis completed.  CT of chest demonstrated thickening of the mid esophagus with no lymphadenopathy and CT PET demonstrated uptake in the esophagus gastropathic lymph nodes it is felt that she likely had early stage carcinoma of the esophagus and that we could present to record with surgical resection.  Endoscopic ultrasound, however, was requested and we could not have this done any sooner than GI physicians available at [facility].     This was performed 12/7/2021 revealing a 5 cm esophageal mass present in the distal third esophagus without extension into the GE junction into the cardia, the mass extended into into the muscularis propria into the adventitia not penetrating through the adventitia with a single 1 cm gastrohepatic/celiac axis lymph node and FNB x1 performed-?  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      The patient is seen with her sister and son all indicating that she has had difficulty with swallowing since late summer likely July 2021 and has been attempting to have a diagnosis made since that time.  They are all somewhat frustrated about the length of time to obtain an answer for this problem and we have  "spent considerable time discussing her findings thus far and how we might proceed to treat what appears to be a contained malignancy.  This has, additionally, been discussed with Dr. Stef cruz who feels that she is not immediately and operative candidate and should proceed with chemo radiation therapy initially-neoadjuvant treatment before consideration for subsequent surgery.  This has been discussed with the patient, her sister and her son again in detail 12/8/2021.  We went on to initiate pain control with liquid hydrocodone, referred the patient to general surgery and oncology nutrition.    The patient was seen by  who proceeded 12/15/2021 to PowerPort placement and open jejunostomy.  She was seen during her hospital stay by oncology nutrition required hospitalization up to including 12/19/2021.    The patient required hospitalization to 12/20/2021 and after discharge along with her partner's health was able to gradually improve her caloric intake and was seen back 12/27 by Dr. Hollis who felt she had improved enough to initiate chemotherapy.  The patient was seen by radiation therapy 12/21 with plans to proceed with 40 CT simulation and planning-IMRT/IGR T-4140 cGy in 23 fractions with pleased to be considered.    The patient is seen back with her partner Cristy 12/29/2021 now able to \"manage\" and we have discussed extensively concurrent chemotherapy radiation therapy using Carboplatin/Taxol weekly.  Currently the patient is scheduled to begin radiation therapy 1/3/2021, undergoing teaching 1/4/2021 and will begin concurrent chemotherapy 1/5/2021 seeing in NP that day.  Past Medical History:   Diagnosis Date   • A-fib (HCC)    • Boil, breast 12/13/2021    HEALING UNDER LEFT BREAST    • Disease of thyroid gland    • Esophageal cancer (HCC)    • Gastric ulcer    • GERD (gastroesophageal reflux disease)    • Hyperlipidemia    • Hypertension    • Irritable bowel    • Mitral valve prolapse     " FOLLOWED BY          Past Surgical History:   Procedure Laterality Date   • CHOLECYSTECTOMY     • COLONOSCOPY  08/27/2021    [facility]   • EXTERNAL EAR SURGERY     • JEJUNOSTOMY N/A 12/15/2021    Procedure: open JEJUNOSTOMY tube placement;  Surgeon: Bhanu Hollis MD;  Location: [facility];  Service: General;  Laterality: N/A;   • KNEE SURGERY Left    • REPLACEMENT TOTAL KNEE Left    • UPPER ENDOSCOPIC ULTRASOUND W/ FNA N/A 12/7/2021    Procedure: Esophagogastroduodenoscopy with endoscopic ultrasound  WITH STAGING AND FINE NEEDLE BIOPSY;  Surgeon: Amrit Green MD;  Location: [facility] ENDOSCOPY;  Service: Gastroenterology;  Laterality: N/A;  post op: inlet patch, esophageal mass, T2   • VENOUS ACCESS DEVICE (PORT) INSERTION Left 12/15/2021    Procedure: INSERTION OF PORTACATH;  Surgeon: Bhanu Hollis MD;  Location: [facility];  Service: General;  Laterality: Left;        Current Outpatient Medications on File Prior to Visit   Medication Sig Dispense Refill   • Eliquis 5 MG tablet tablet Take 5 mg by mouth Every 12 (Twelve) Hours. LD 12/4     • HYDROcodone-acetaminophen (HYCET) 7.5-325 MG/15ML solution Take 15 mL by mouth Every 6 (Six) Hours As Needed for Moderate Pain . 236 mL 0   • methIMAzole (TAPAZOLE) 10 MG tablet Take 5 mg by mouth Daily. Take DOS     • metoprolol tartrate (LOPRESSOR) 50 MG tablet Take 50 mg by mouth 2 (Two) Times a Day. Take DOS     • pantoprazole (PROTONIX) 40 MG EC tablet Take 40 mg by mouth 2 (Two) Times a Day.     • pravastatin (PRAVACHOL) 20 MG tablet Take 20 mg by mouth Daily.     • sucralfate (CARAFATE) 1 GM/10ML suspension Take 1 g by mouth 4 (Four) Times a Day.       No current facility-administered medications on file prior to visit.        ALLERGIES:    Allergies   Allergen Reactions   • Codeine Hives     Patient states this is when she was very young.         Social History     Socioeconomic History   • Marital status: Single   • Number of children:  "1   • Years of education: High school   Tobacco Use   • Smoking status: Current Every Day Smoker     Packs/day: 1.00     Years: 40.00     Pack years: 40.00     Types: Cigarettes     Start date: 1981   • Smokeless tobacco: Never Used   • Tobacco comment: last cigarette 12/15 @ 0800   Vaping Use   • Vaping Use: Never used   Substance and Sexual Activity   • Alcohol use: Not Currently   • Drug use: Never   • Sexual activity: Defer        Family History   Problem Relation Age of Onset   • Breast cancer Mother    • Diabetes Mother    • Bipolar disorder Father    • Diabetes Father    • Hypertension Father    • Breast cancer Sister    • COPD Sister    • Diabetes Sister    • Hypertension Sister    • Alcohol abuse Brother    • Asthma Brother    • Bipolar disorder Brother    • Diabetes Brother    • Seizures Brother    • Breast cancer Maternal Grandmother    • Diabetes Maternal Grandmother    • Diabetes Maternal Grandfather    • Diabetes Paternal Grandmother    • Diabetes Paternal Grandfather    • Hypertension Son    • Kidney cancer Sister    • Hypertension Sister    • Diabetes Sister    • Malig Hyperthermia Neg Hx         Review of Systems   Constitutional: Positive for activity change, appetite change, fatigue and unexpected weight change (15 to 20 pound weight loss-5 to 6 months).   HENT: Negative.    Eyes: Negative.    Respiratory: Positive for cough and shortness of breath.    Cardiovascular: Positive for chest pain (Including swallowing).   Gastrointestinal: Negative for diarrhea and nausea.        Dysphagia, requiring her food to be cut very small pieces to slowly eat   Endocrine: Negative.    Genitourinary: Negative.    Musculoskeletal: Negative.    Neurological: Negative.    Psychiatric/Behavioral: Negative.         Objective     Vitals:    12/29/21 1146   BP: 116/71   Pulse: 76   Resp: 18   Temp: 97.7 °F (36.5 °C)   TempSrc: Temporal   SpO2: 95%   Weight: 55.4 kg (122 lb 1.6 oz)   Height: 162.6 cm (64.02\")   PainSc:  "  6   PainLoc: Back  Comment: middle of back     Current Status 12/29/2021   ECOG score 0       Physical Exam  Constitutional:       Appearance: Normal appearance.   HENT:      Head: Normocephalic and atraumatic.      Nose: Nose normal.      Mouth/Throat:      Mouth: Mucous membranes are moist.      Pharynx: Oropharynx is clear.   Eyes:      Extraocular Movements: Extraocular movements intact.      Conjunctiva/sclera: Conjunctivae normal.      Pupils: Pupils are equal, round, and reactive to light.   Cardiovascular:      Rate and Rhythm: Normal rate and regular rhythm.      Pulses: Normal pulses.      Heart sounds: Normal heart sounds.   Pulmonary:      Effort: Pulmonary effort is normal.      Breath sounds: Normal breath sounds.   Abdominal:      General: Bowel sounds are normal.      Palpations: Abdomen is soft. There is mass (Fullness epigastric region).      Tenderness: There is abdominal tenderness (Midepigastrium).   Musculoskeletal:         General: Normal range of motion.      Cervical back: Normal range of motion and neck supple.   Skin:     General: Skin is warm and dry.      Findings: No rash.   Neurological:      General: No focal deficit present.      Mental Status: She is alert and oriented to person, place, and time.           RECENT LABS:  Hematology WBC   Date Value Ref Range Status   12/29/2021 12.96 (H) 3.40 - 10.80 10*3/mm3 Final     RBC   Date Value Ref Range Status   12/29/2021 4.66 3.77 - 5.28 10*6/mm3 Final     Hemoglobin   Date Value Ref Range Status   12/29/2021 13.5 12.0 - 15.9 g/dL Final     Hematocrit   Date Value Ref Range Status   12/29/2021 41.0 34.0 - 46.6 % Final     Platelets   Date Value Ref Range Status   12/29/2021 207 140 - 450 10*3/mm3 Final           Assessment/Plan          61-year-old female with a history of hyperthyroidism, previous gastric ulcer, GE reflux, atrial fibrillation, hyperlipidemia, hypertension irritable bowel syndrome, 60-pack-year history of smoking with  dysphagia and odynophagia developing over several months associated 10 to 15 pound weight loss and EGD and colonoscopy in June 2021 demonstrating severe dysplasia including carcinoma in situ.  Again there has been a number of physicians involved in the patient's case and several months before she was ultimately seen by Dr. Finch at [facility].  CT scan of the chest demonstrating thickening of the mid esophagus with no lymphadenopathy and PET/CT demonstrated uptake in the mid to distal esophagus with SUV intensely elevated at 16 with a few nonenlarged left hilar and gastrohepatic lymph nodes with mild increased FDG activity, few scattered subcentimeter pulmonary nodules bilaterally indeterminate.     There was the possibility of moving to resection initially but EUS was felt necessary and ultimately obtained at [facility] performed 12/7/2021 revealing a 5 cm esophageal mass present in the distal third esophagus without extension into the GE junction into the cardia, the mass extended into into the muscularis propria into the adventitia not penetrating through the adventitia with a single 1 cm gastrohepatic/celiac axis lymph node and FNB x1 performed-?  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      The patient is seen with her sister and son all indicating that she has had difficulty with swallowing since late summer likely July 2021 and has been attempting to have a diagnosis made since that time.  They are all somewhat frustrated about the length of time to obtain an answer for this problem and we have spent considerable time discussing her findings thus far and how we might proceed to treat what appears to be a contained malignancy.    This has, additionally, been discussed with Dr. Finch directly who feels that she is not  "immediately and operative candidate and should proceed with chemo radiation therapy initially-neoadjuvant treatment before consideration for subsequent surgery.  This has been discussed with the patient, her sister and her son again in detail 12/8/2021.  There are several additional issues that need to be addressed including surgical jejunostomy tube placement, port placement, radiation therapy consultation, chemotherapy teaching that will need to be obtained before proceeding further as well as additional pain control.  Again a lengthy conversation is held with the patient, her sister and her son Amisha plans initiate pain control, general surgical referral, oncology nutrition assessment and radiation therapy consultation.    The patient was seen by  who proceeded 12/15/2021 to PowerPort placement and open jejunostomy.  She was seen during her hospital stay by oncology nutrition required hospitalization up to including 12/19/2021.    The patient required hospitalization to 12/20/2021 and after discharge along with her partner's health was able to gradually improve her caloric intake and was seen back 12/27 by Dr. Hollis who felt she had improved enough to initiate chemotherapy.  The patient was seen by radiation therapy 12/21 with plans to proceed with 40 CT simulation and planning-IMRT/IGR T-4140 cGy in 23 fractions with pleased to be considered.    The patient is seen back with her partner Cristy 12/29/2021 now able to \"manage\" and we have discussed extensively concurrent chemotherapy radiation therapy using Carboplatin/Taxol weekly.  Currently the patient is scheduled to begin radiation therapy 1/3/2021, undergoing teaching 1/4/2021 and will begin concurrent chemotherapy 1/5/2021 seeing in NP that day.      Plan:  *Schedule as above with initial radiation therapy to begin 1/3/2021, chemotherapy education 1/4/2021, initial chemotherapy with weekly Carboplatin Taxol 1/5/2021 (NP visit)  *1 week later " MD follow-up with second week Carboplatin/Taxol.  *Medications reviewed, pain controlled    I spent46 minutes caring for Maya on this date of service. This time includes time spent by me in the following activities: preparing for the visit, reviewing tests, obtaining and/or reviewing a separately obtained history, performing a medically appropriate examination and/or evaluation, counseling and educating the patient/family/caregiver, ordering medications, tests, or procedures, referring and communicating with other health care professionals, documenting information in the medical record, independently interpreting results and communicating that information with the patient/family/caregiver and care coordination.

## 2022-01-01 ENCOUNTER — APPOINTMENT (OUTPATIENT)
Dept: RADIATION ONCOLOGY | Facility: HOSPITAL | Age: 62
End: 2022-01-01

## 2022-01-03 ENCOUNTER — APPOINTMENT (OUTPATIENT)
Dept: RADIATION ONCOLOGY | Facility: HOSPITAL | Age: 62
End: 2022-01-03

## 2022-01-03 ENCOUNTER — READMISSION MANAGEMENT (OUTPATIENT)
Dept: CALL CENTER | Facility: HOSPITAL | Age: 62
End: 2022-01-03

## 2022-01-03 DIAGNOSIS — C15.5 MALIGNANT NEOPLASM OF LOWER THIRD OF ESOPHAGUS: ICD-10-CM

## 2022-01-03 PROCEDURE — 77386: CPT | Performed by: RADIOLOGY

## 2022-01-03 PROCEDURE — 77427 RADIATION TX MANAGEMENT X5: CPT | Performed by: RADIOLOGY

## 2022-01-03 PROCEDURE — 77014 CHG CT GUIDANCE RADIATION THERAPY FLDS PLACEMENT: CPT | Performed by: RADIOLOGY

## 2022-01-03 PROCEDURE — 77386 CHG INTENSITY MODULATED RADIATION TX DLVR COMPLEX: CPT | Performed by: RADIOLOGY

## 2022-01-03 RX ORDER — PANTOPRAZOLE SODIUM 40 MG/1
40 TABLET, DELAYED RELEASE ORAL DAILY
Qty: 30 TABLET | Refills: 2 | Status: SHIPPED | OUTPATIENT
Start: 2022-01-03 | End: 2022-01-19 | Stop reason: SDUPTHER

## 2022-01-03 NOTE — OUTREACH NOTE
General Surgery Week 2 Survey      Responses   [facility] patient discharged from? [place]   Does the patient have one of the following disease processes/diagnoses(primary or secondary)? General Surgery   Week 2 attempt successful? Yes   Call start time 1617   Call end time 1622   Discharge diagnosis Port placement and open jejunostomy d/t esophageal cancer   Person spoke with today (if not patient) and relationship patient   Meds reviewed with patient/caregiver? Yes   Is the patient having any side effects they believe may be caused by any medication additions or changes? No   Does the patient have all medications related to this admission filled (includes all antibiotics, pain medications, etc.) Yes   Is the patient taking all medications as directed (includes completed medication regime)? Yes   Does the patient have a follow up appointment scheduled with their surgeon? Yes   Has the patient kept scheduled appointments due by today? Yes   What is the Home health agency?  [facility]    Home health comments Refused    Psychosocial issues? No   What is the patient's perception of their health status since discharge? Improving   Nursing interventions Nurse provided patient education   Is the patient /caregiver able to teach back basic post-op care? Take showers only when approved by MD-sponge bathe until then,  No tub bath, swimming, or hot tub until instructed by MD,  Keep incision areas clean,dry and protected,  Lifting as instructed by MD in discharge instructions   Is the patient/caregiver able to teach back signs and symptoms of incisional infection? Increased redness, swelling or pain at the incisonal site,  Increased drainage or bleeding,  Incisional warmth,  Pus or odor from incision,  Fever   Is the patient/caregiver able to teach back steps to recovery at home? Rest and rebuild strength, gradually increase activity,  Eat a well-balance diet   If the patient is a current smoker, are they able to  teach back resources for cessation? [phone]   Week 2 call completed? Yes   Wrap up additional comments Pt states she is doing ok,  reports no increased redness, drainage, swelling at surgical site. No questions/concerns.          Princess Cheema RN

## 2022-01-03 NOTE — TELEPHONE ENCOUNTER
Pt came up to office stating that she needs a refill on her Hycet. She is also requesting a refill on her protonix. Per Dr. Harrell, pt only needs to take this once daily now. Informed pt of this. Scripts sent to pt's pharmacy.

## 2022-01-04 ENCOUNTER — APPOINTMENT (OUTPATIENT)
Dept: RADIATION ONCOLOGY | Facility: HOSPITAL | Age: 62
End: 2022-01-04

## 2022-01-04 ENCOUNTER — TELEMEDICINE (OUTPATIENT)
Dept: ONCOLOGY | Facility: CLINIC | Age: 62
End: 2022-01-04

## 2022-01-04 DIAGNOSIS — C15.5 MALIGNANT NEOPLASM OF LOWER THIRD OF ESOPHAGUS: Primary | ICD-10-CM

## 2022-01-04 PROCEDURE — 77386: CPT | Performed by: RADIOLOGY

## 2022-01-04 PROCEDURE — 77014 CHG CT GUIDANCE RADIATION THERAPY FLDS PLACEMENT: CPT | Performed by: RADIOLOGY

## 2022-01-04 PROCEDURE — 77336 RADIATION PHYSICS CONSULT: CPT | Performed by: RADIOLOGY

## 2022-01-04 PROCEDURE — 77386 CHG INTENSITY MODULATED RADIATION TX DLVR COMPLEX: CPT | Performed by: RADIOLOGY

## 2022-01-04 PROCEDURE — 99215 OFFICE O/P EST HI 40 MIN: CPT | Performed by: NURSE PRACTITIONER

## 2022-01-04 RX ORDER — ONDANSETRON HYDROCHLORIDE 8 MG/1
8 TABLET, FILM COATED ORAL 3 TIMES DAILY PRN
Qty: 30 TABLET | Refills: 5 | Status: SHIPPED | OUTPATIENT
Start: 2022-01-04 | End: 2022-01-21

## 2022-01-04 NOTE — PROGRESS NOTES
____________________PATIENT EDUCATION____________________    PATIENT EDUCATION:  Today I met with the patient to discuss the chemotherapy regimen recommended for treatment of her Malignant neoplasm of lower third of esophagus (HCC)  - ondansetron (ZOFRAN) 8 MG tablet  .  The patient was given explanation of treatment premed side effects including office policy that prohibits patients to drive if sedating medications are administered, MD explanation given regarding benefits, side effects, toxicities and goals of treatment.  The patient received a Chemotherapy/Biotherapy Plan Summary including diagnosis and explanation of specific treatment plan.    SIDE EFFECTS:  Common side effects were discussed with the patient and her partner.  Discussion included where applicable hair loss/discoloration, anemia/fatigue, infection/chills/fever, appetite, bleeding risk/precautions, constipation, diarrhea, mouth sores, taste alteration, loss of appetite, nausea/vomiting, peripheral neuropathy, skin/nail changes, rash, muscle aches/weakness, photosensitivity, weight gain/loss, hearing loss, dizziness, menopausal symptoms, menstrual irregularity, sterility, high blood pressure, heart damage, liver damage, lung damage, kidney damage, DVT/PE risk, fluid retention, pleural/pericardial effusion, somnolence, electrolyte/LFT imbalance, vein exercises and/or the possible need for vascular access/port placement.  The patient was advised that although uncommon, leakage of an infused medication from the vein or venous access device may lead to skin breakdown and/or other tissue damage.  The patient was advised that he/she may have pain, bleeding, and/or bruising from the insertion of a needle in their vein or venous access device (port).  The patient was further advised that, in spite of proper technique, infection with redness and irritation may rarely occur at the site where the needle was inserted.  The patient was advised that if  complications occur, additional medical treatment is available.      Discussion also included side effects specific to drugs in the treatment plan, specifically carboplatin, taxol.    Reproductive risks were discussed, including appropriate use of birth control and protection during sexual relations.    I spent 55 minutes caring for Maya on this date of service. This time includes time spent by me in the following activities: preparing for the visit, counseling and educating the patient/family/caregiver and documenting information in the medical record.

## 2022-01-05 ENCOUNTER — INFUSION (OUTPATIENT)
Dept: ONCOLOGY | Facility: HOSPITAL | Age: 62
End: 2022-01-05

## 2022-01-05 ENCOUNTER — OFFICE VISIT (OUTPATIENT)
Dept: ONCOLOGY | Facility: CLINIC | Age: 62
End: 2022-01-05

## 2022-01-05 ENCOUNTER — APPOINTMENT (OUTPATIENT)
Dept: RADIATION ONCOLOGY | Facility: HOSPITAL | Age: 62
End: 2022-01-05

## 2022-01-05 ENCOUNTER — RADIATION ONCOLOGY WEEKLY ASSESSMENT (OUTPATIENT)
Dept: RADIATION ONCOLOGY | Facility: HOSPITAL | Age: 62
End: 2022-01-05

## 2022-01-05 ENCOUNTER — CLINICAL SUPPORT (OUTPATIENT)
Dept: OTHER | Facility: HOSPITAL | Age: 62
End: 2022-01-05

## 2022-01-05 VITALS — WEIGHT: 122.1 LBS | HEIGHT: 64 IN | BODY MASS INDEX: 20.84 KG/M2

## 2022-01-05 VITALS
BODY MASS INDEX: 21.27 KG/M2 | WEIGHT: 124 LBS | SYSTOLIC BLOOD PRESSURE: 111 MMHG | OXYGEN SATURATION: 94 % | HEART RATE: 78 BPM | DIASTOLIC BLOOD PRESSURE: 60 MMHG

## 2022-01-05 VITALS
RESPIRATION RATE: 18 BRPM | DIASTOLIC BLOOD PRESSURE: 73 MMHG | OXYGEN SATURATION: 95 % | HEART RATE: 80 BPM | WEIGHT: 122.1 LBS | SYSTOLIC BLOOD PRESSURE: 122 MMHG | BODY MASS INDEX: 20.84 KG/M2 | HEIGHT: 64 IN | TEMPERATURE: 97.5 F

## 2022-01-05 VITALS — DIASTOLIC BLOOD PRESSURE: 83 MMHG | SYSTOLIC BLOOD PRESSURE: 138 MMHG | HEART RATE: 72 BPM

## 2022-01-05 DIAGNOSIS — C15.5 MALIGNANT NEOPLASM OF LOWER THIRD OF ESOPHAGUS: ICD-10-CM

## 2022-01-05 DIAGNOSIS — C15.5 MALIGNANT NEOPLASM OF LOWER THIRD OF ESOPHAGUS: Primary | ICD-10-CM

## 2022-01-05 DIAGNOSIS — Z79.899 HIGH RISK MEDICATION USE: ICD-10-CM

## 2022-01-05 PROBLEM — Q39.8 SEVERE ESOPHAGEAL DYSPLASIA: Status: ACTIVE | Noted: 2021-10-18

## 2022-01-05 PROBLEM — R63.4 WEIGHT LOSS: Status: ACTIVE | Noted: 2019-07-11

## 2022-01-05 PROBLEM — I83.90 ASYMPTOMATIC VARICOSE VEINS OF UNSPECIFIED LOWER EXTREMITY: Status: ACTIVE | Noted: 2019-12-12

## 2022-01-05 PROBLEM — L30.9 ECZEMA: Status: ACTIVE | Noted: 2021-10-18

## 2022-01-05 PROBLEM — R74.8 ALKALINE PHOSPHATASE RAISED: Status: ACTIVE | Noted: 2017-10-05

## 2022-01-05 PROBLEM — Z12.11 SCREENING FOR MALIGNANT NEOPLASM OF COLON: Status: ACTIVE | Noted: 2021-08-18

## 2022-01-05 PROBLEM — Z12.4 ENCOUNTER FOR SCREENING FOR MALIGNANT NEOPLASM OF CERVIX: Status: ACTIVE | Noted: 2017-10-02

## 2022-01-05 PROBLEM — E05.90 HYPERTHYROIDISM: Status: ACTIVE | Noted: 2019-07-15

## 2022-01-05 PROBLEM — K58.9 IRRITABLE BOWEL SYNDROME: Status: ACTIVE | Noted: 2021-10-18

## 2022-01-05 PROBLEM — M19.90 ARTHRITIS: Status: ACTIVE | Noted: 2021-10-18

## 2022-01-05 PROBLEM — K22.89 SEVERE ESOPHAGEAL DYSPLASIA: Status: ACTIVE | Noted: 2021-10-18

## 2022-01-05 PROBLEM — K20.90 ESOPHAGITIS: Status: ACTIVE | Noted: 2021-09-21

## 2022-01-05 PROBLEM — I48.0 PAROXYSMAL ATRIAL FIBRILLATION: Status: ACTIVE | Noted: 2019-11-18

## 2022-01-05 PROBLEM — K25.9 GASTRIC ULCER: Status: ACTIVE | Noted: 2021-10-18

## 2022-01-05 PROBLEM — R10.13 EPIGASTRIC DISCOMFORT: Status: ACTIVE | Noted: 2021-08-18

## 2022-01-05 PROBLEM — N81.10 FEMALE CYSTOCELE: Status: ACTIVE | Noted: 2019-11-08

## 2022-01-05 PROBLEM — Q39.9 SEVERE ESOPHAGEAL DYSPLASIA: Status: ACTIVE | Noted: 2021-10-18

## 2022-01-05 PROBLEM — K44.9 HIATAL HERNIA: Status: ACTIVE | Noted: 2021-09-21

## 2022-01-05 LAB
ALBUMIN SERPL-MCNC: 3.8 G/DL (ref 3.5–5.2)
ALBUMIN/GLOB SERPL: 1.2 G/DL (ref 1.1–2.4)
ALP SERPL-CCNC: 109 U/L (ref 38–116)
ALT SERPL W P-5'-P-CCNC: 6 U/L (ref 0–33)
ANION GAP SERPL CALCULATED.3IONS-SCNC: 10.8 MMOL/L (ref 5–15)
AST SERPL-CCNC: 15 U/L (ref 0–32)
BASOPHILS # BLD AUTO: 0.04 10*3/MM3 (ref 0–0.2)
BASOPHILS NFR BLD AUTO: 0.4 % (ref 0–1.5)
BILIRUB SERPL-MCNC: 0.3 MG/DL (ref 0.2–1.2)
BUN SERPL-MCNC: 16 MG/DL (ref 6–20)
BUN/CREAT SERPL: 36.4 (ref 7.3–30)
CALCIUM SPEC-SCNC: 10.2 MG/DL (ref 8.5–10.2)
CHLORIDE SERPL-SCNC: 100 MMOL/L (ref 98–107)
CO2 SERPL-SCNC: 27.2 MMOL/L (ref 22–29)
CREAT SERPL-MCNC: 0.44 MG/DL (ref 0.6–1.1)
DEPRECATED RDW RBC AUTO: 43.7 FL (ref 37–54)
EOSINOPHIL # BLD AUTO: 0.23 10*3/MM3 (ref 0–0.4)
EOSINOPHIL NFR BLD AUTO: 2.1 % (ref 0.3–6.2)
ERYTHROCYTE [DISTWIDTH] IN BLOOD BY AUTOMATED COUNT: 13.2 % (ref 12.3–15.4)
GFR SERPL CREATININE-BSD FRML MDRD: 145 ML/MIN/1.73
GLOBULIN UR ELPH-MCNC: 3.2 GM/DL (ref 1.8–3.5)
GLUCOSE SERPL-MCNC: 152 MG/DL (ref 74–124)
HCT VFR BLD AUTO: 41.6 % (ref 34–46.6)
HGB BLD-MCNC: 13.4 G/DL (ref 12–15.9)
IMM GRANULOCYTES # BLD AUTO: 0.02 10*3/MM3 (ref 0–0.05)
IMM GRANULOCYTES NFR BLD AUTO: 0.2 % (ref 0–0.5)
LYMPHOCYTES # BLD AUTO: 1.47 10*3/MM3 (ref 0.7–3.1)
LYMPHOCYTES NFR BLD AUTO: 13.2 % (ref 19.6–45.3)
MCH RBC QN AUTO: 29.1 PG (ref 26.6–33)
MCHC RBC AUTO-ENTMCNC: 32.2 G/DL (ref 31.5–35.7)
MCV RBC AUTO: 90.2 FL (ref 79–97)
MONOCYTES # BLD AUTO: 1.07 10*3/MM3 (ref 0.1–0.9)
MONOCYTES NFR BLD AUTO: 9.6 % (ref 5–12)
NEUTROPHILS NFR BLD AUTO: 74.5 % (ref 42.7–76)
NEUTROPHILS NFR BLD AUTO: 8.32 10*3/MM3 (ref 1.7–7)
NRBC BLD AUTO-RTO: 0 /100 WBC (ref 0–0.2)
PLATELET # BLD AUTO: 297 10*3/MM3 (ref 140–450)
PMV BLD AUTO: 9.2 FL (ref 6–12)
POTASSIUM SERPL-SCNC: 4.6 MMOL/L (ref 3.5–4.7)
PROT SERPL-MCNC: 7 G/DL (ref 6.3–8)
RBC # BLD AUTO: 4.61 10*6/MM3 (ref 3.77–5.28)
SODIUM SERPL-SCNC: 138 MMOL/L (ref 134–145)
WBC NRBC COR # BLD: 11.15 10*3/MM3 (ref 3.4–10.8)

## 2022-01-05 PROCEDURE — 25010000002 PALONOSETRON PER 25 MCG: Performed by: INTERNAL MEDICINE

## 2022-01-05 PROCEDURE — 25010000002 PACLITAXEL PER 1 MG: Performed by: INTERNAL MEDICINE

## 2022-01-05 PROCEDURE — 96375 TX/PRO/DX INJ NEW DRUG ADDON: CPT

## 2022-01-05 PROCEDURE — 25010000002 CARBOPLATIN PER 50 MG: Performed by: INTERNAL MEDICINE

## 2022-01-05 PROCEDURE — 77386: CPT | Performed by: RADIOLOGY

## 2022-01-05 PROCEDURE — 25010000002 DIPHENHYDRAMINE PER 50 MG: Performed by: INTERNAL MEDICINE

## 2022-01-05 PROCEDURE — 96413 CHEMO IV INFUSION 1 HR: CPT

## 2022-01-05 PROCEDURE — 96417 CHEMO IV INFUS EACH ADDL SEQ: CPT

## 2022-01-05 PROCEDURE — 77386 CHG INTENSITY MODULATED RADIATION TX DLVR COMPLEX: CPT | Performed by: RADIOLOGY

## 2022-01-05 PROCEDURE — FACE2FACE: Performed by: RADIOLOGY

## 2022-01-05 PROCEDURE — 25010000002 DEXAMETHASONE SODIUM PHOSPHATE 100 MG/10ML SOLUTION: Performed by: INTERNAL MEDICINE

## 2022-01-05 PROCEDURE — 99214 OFFICE O/P EST MOD 30 MIN: CPT | Performed by: NURSE PRACTITIONER

## 2022-01-05 PROCEDURE — 80053 COMPREHEN METABOLIC PANEL: CPT

## 2022-01-05 PROCEDURE — 85025 COMPLETE CBC W/AUTO DIFF WBC: CPT

## 2022-01-05 PROCEDURE — 77014 CHG CT GUIDANCE RADIATION THERAPY FLDS PLACEMENT: CPT | Performed by: RADIOLOGY

## 2022-01-05 RX ORDER — DIPHENHYDRAMINE HYDROCHLORIDE 50 MG/ML
50 INJECTION INTRAMUSCULAR; INTRAVENOUS AS NEEDED
Status: CANCELLED | OUTPATIENT
Start: 2022-01-05

## 2022-01-05 RX ORDER — SODIUM CHLORIDE 9 MG/ML
250 INJECTION, SOLUTION INTRAVENOUS ONCE
Status: COMPLETED | OUTPATIENT
Start: 2022-01-05 | End: 2022-01-05

## 2022-01-05 RX ORDER — PALONOSETRON 0.05 MG/ML
0.25 INJECTION, SOLUTION INTRAVENOUS ONCE
Status: COMPLETED | OUTPATIENT
Start: 2022-01-05 | End: 2022-01-05

## 2022-01-05 RX ORDER — FAMOTIDINE 10 MG/ML
20 INJECTION, SOLUTION INTRAVENOUS ONCE
Status: COMPLETED | OUTPATIENT
Start: 2022-01-05 | End: 2022-01-05

## 2022-01-05 RX ORDER — FAMOTIDINE 10 MG/ML
20 INJECTION, SOLUTION INTRAVENOUS AS NEEDED
Status: CANCELLED | OUTPATIENT
Start: 2022-01-05

## 2022-01-05 RX ADMIN — FAMOTIDINE 20 MG: 10 INJECTION INTRAVENOUS at 09:47

## 2022-01-05 RX ADMIN — PALONOSETRON HYDROCHLORIDE 0.25 MG: 0.25 INJECTION INTRAVENOUS at 09:48

## 2022-01-05 RX ADMIN — SODIUM CHLORIDE 250 ML: 9 INJECTION, SOLUTION INTRAVENOUS at 09:48

## 2022-01-05 RX ADMIN — DEXAMETHASONE SODIUM PHOSPHATE 12 MG: 10 INJECTION, SOLUTION INTRAMUSCULAR; INTRAVENOUS at 10:07

## 2022-01-05 RX ADMIN — DIPHENHYDRAMINE HYDROCHLORIDE 50 MG: 50 INJECTION, SOLUTION INTRAMUSCULAR; INTRAVENOUS at 09:51

## 2022-01-05 RX ADMIN — CARBOPLATIN 290 MG: 10 INJECTION INTRAVENOUS at 12:19

## 2022-01-05 RX ADMIN — PACLITAXEL 80 MG: 6 INJECTION, SOLUTION INTRAVENOUS at 11:05

## 2022-01-05 NOTE — NURSING NOTE
Patient's 1st Taxol/Carbo Tx. Pt wore cold mits on both hands and feet during Taxol administration. Pt tolerated tx well. RN advised patient to increase PO fluids when at home.

## 2022-01-05 NOTE — PROGRESS NOTES
Subjective Discussed findings with patient and her partner  REASON FOR FOLLOW-UP: Distal esophageal cancer      History of Present Illness    This is a 61-year-old female who presents to the office today for initiation of concurrent chemotherapy with weekly carboplatin/Taxol. She is currently taking Hycet for pain. She reports that he pain is controlled with her current medication. She continues use of her feeding tube. She states she is utilizing her tube for 8-10 hours a day. She does have some oral intake and is drinking a 20g boost a day. She is currently tolerating oral intake and denies nausea or vomiting. She denies diarrhea or constipation. She denies any new concerns today.       ONCOLOGY HISTORY     The patient is a 61-year-old female who had been seen in multidisciplinary thoracic oncology conference with complaints of painful swallowing and dysphagia over the previous several months with 10 to 15 pound weight loss.  This led to EGD and colonoscopy with normal colonoscopy but EGD demonstrated tumor at the GE junction with biopsies consistent with severe dysplasia including carcinoma in situ.  The patient states that she saw a number of physicians and attempting to have a diagnosis completed.  CT of chest demonstrated thickening of the mid esophagus with no lymphadenopathy and CT PET demonstrated uptake in the esophagus gastropathic lymph nodes it is felt that she likely had early stage carcinoma of the esophagus and that we could present to record with surgical resection.  Endoscopic ultrasound, however, was requested and we could not have this done any sooner than GI physicians available at [facility].     This was performed 12/7/2021 revealing a 5 cm esophageal mass present in the distal third esophagus without extension into the GE junction into the cardia, the mass extended into into the muscularis propria into the adventitia not penetrating through the adventitia with a single 1 cm  "gastrohepatic/celiac axis lymph node and FNB x1 performed-?  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      The patient is seen with her sister and son all indicating that she has had difficulty with swallowing since late summer likely July 2021 and has been attempting to have a diagnosis made since that time.  They are all somewhat frustrated about the length of time to obtain an answer for this problem and we have spent considerable time discussing her findings thus far and how we might proceed to treat what appears to be a contained malignancy.  This has, additionally, been discussed with Dr. Stef cruz who feels that she is not immediately and operative candidate and should proceed with chemo radiation therapy initially-neoadjuvant treatment before consideration for subsequent surgery.  This has been discussed with the patient, her sister and her son again in detail 12/8/2021.  We went on to initiate pain control with liquid hydrocodone, referred the patient to general surgery and oncology nutrition.    The patient was seen by  who proceeded 12/15/2021 to PowerPort placement and open jejunostomy.  She was seen during her hospital stay by oncology nutrition required hospitalization up to including 12/19/2021.    The patient required hospitalization to 12/20/2021 and after discharge along with her partner's health was able to gradually improve her caloric intake and was seen back 12/27 by Dr. Hollis who felt she had improved enough to initiate chemotherapy.  The patient was seen by radiation therapy 12/21 with plans to proceed with 40 CT simulation and planning-IMRT/IGR T-4140 cGy in 23 fractions with pleased to be considered.    The patient is seen back with her partner Cristy 12/29/2021 now able to \"manage\" and we have discussed " extensively concurrent chemotherapy radiation therapy using Carboplatin/Taxol weekly.    Currently the patient is scheduled to begin radiation therapy 1/3/2021, undergoing teaching 1/4/2021 and will begin concurrent chemotherapy 1/5/2021      Past Medical History:   Diagnosis Date   • A-fib (HCC)    • Boil, breast 12/13/2021    HEALING UNDER LEFT BREAST    • Disease of thyroid gland    • Esophageal cancer (HCC)    • Gastric ulcer    • GERD (gastroesophageal reflux disease)    • Hyperlipidemia    • Hypertension    • Irritable bowel    • Mitral valve prolapse     FOLLOWED BY          Past Surgical History:   Procedure Laterality Date   • CHOLECYSTECTOMY     • COLONOSCOPY  08/27/2021    [facility]   • EXTERNAL EAR SURGERY     • JEJUNOSTOMY N/A 12/15/2021    Procedure: open JEJUNOSTOMY tube placement;  Surgeon: Bhanu Hollis MD;  Location: [facility];  Service: General;  Laterality: N/A;   • KNEE SURGERY Left    • REPLACEMENT TOTAL KNEE Left    • UPPER ENDOSCOPIC ULTRASOUND W/ FNA N/A 12/7/2021    Procedure: Esophagogastroduodenoscopy with endoscopic ultrasound  WITH STAGING AND FINE NEEDLE BIOPSY;  Surgeon: Amrit Green MD;  Location: [facility] ENDOSCOPY;  Service: Gastroenterology;  Laterality: N/A;  post op: inlet patch, esophageal mass, T2   • VENOUS ACCESS DEVICE (PORT) INSERTION Left 12/15/2021    Procedure: INSERTION OF PORTACATH;  Surgeon: Bhanu Hollis MD;  Location: [facility];  Service: General;  Laterality: Left;        Current Outpatient Medications on File Prior to Visit   Medication Sig Dispense Refill   • Eliquis 5 MG tablet tablet Take 5 mg by mouth Every 12 (Twelve) Hours. LD 12/4     • HYDROcodone-acetaminophen (HYCET) 7.5-325 MG/15ML solution Take 15 mL by mouth Every 6 (Six) Hours As Needed for Moderate Pain . 236 mL 0   • methIMAzole (TAPAZOLE) 10 MG tablet Take 5 mg by mouth Daily. Take DOS     • metoprolol tartrate (LOPRESSOR) 50 MG tablet Take 50 mg by mouth 2  (Two) Times a Day. Take DOS     • ondansetron (ZOFRAN) 8 MG tablet Take 1 tablet by mouth 3 (Three) Times a Day As Needed for Nausea or Vomiting. 30 tablet 5   • pantoprazole (PROTONIX) 40 MG EC tablet Take 1 tablet by mouth Daily. 30 tablet 2   • pravastatin (PRAVACHOL) 20 MG tablet Take 20 mg by mouth Daily.     • sucralfate (CARAFATE) 1 GM/10ML suspension Take 1 g by mouth 4 (Four) Times a Day.       No current facility-administered medications on file prior to visit.        ALLERGIES:    Allergies   Allergen Reactions   • Codeine Hives     Patient states this is when she was very young.         Social History     Socioeconomic History   • Marital status: Single   • Number of children: 1   • Years of education: High school   Tobacco Use   • Smoking status: Current Every Day Smoker     Packs/day: 1.00     Years: 40.00     Pack years: 40.00     Types: Cigarettes     Start date: 1981   • Smokeless tobacco: Never Used   • Tobacco comment: last cigarette 12/15 @ 0800   Vaping Use   • Vaping Use: Never used   Substance and Sexual Activity   • Alcohol use: Not Currently   • Drug use: Never   • Sexual activity: Defer        Family History   Problem Relation Age of Onset   • Breast cancer Mother    • Diabetes Mother    • Bipolar disorder Father    • Diabetes Father    • Hypertension Father    • Breast cancer Sister    • COPD Sister    • Diabetes Sister    • Hypertension Sister    • Alcohol abuse Brother    • Asthma Brother    • Bipolar disorder Brother    • Diabetes Brother    • Seizures Brother    • Breast cancer Maternal Grandmother    • Diabetes Maternal Grandmother    • Diabetes Maternal Grandfather    • Diabetes Paternal Grandmother    • Diabetes Paternal Grandfather    • Hypertension Son    • Kidney cancer Sister    • Hypertension Sister    • Diabetes Sister    • Malig Hyperthermia Neg Hx           Objective     Vitals:    01/05/22 0751   BP: 122/73   Pulse: 80   Resp: 18   Temp: 97.5 °F (36.4 °C)   TempSrc:  "Temporal   SpO2: 95%   Weight: 55.4 kg (122 lb 1.6 oz)   Height: 162.6 cm (64.02\")   PainSc:   4   PainLoc: Back  Comment: middle of back     Current Status 1/5/2022   ECOG score 0       Physical Exam  Constitutional:       Appearance: Normal appearance.   HENT:      Head: Normocephalic and atraumatic.      Nose: Nose normal.      Mouth/Throat:      Mouth: Mucous membranes are moist.      Pharynx: Oropharynx is clear.   Eyes:      Extraocular Movements: Extraocular movements intact.      Conjunctiva/sclera: Conjunctivae normal.      Pupils: Pupils are equal, round, and reactive to light.   Cardiovascular:      Rate and Rhythm: Normal rate and regular rhythm.      Pulses: Normal pulses.      Heart sounds: Normal heart sounds.   Pulmonary:      Effort: Pulmonary effort is normal.      Breath sounds: Normal breath sounds.   Abdominal:      General: Bowel sounds are normal.      Palpations: Abdomen is soft. There is mass (Fullness epigastric region).      Tenderness: There is abdominal tenderness (Midepigastrium).   Musculoskeletal:         General: Normal range of motion.      Cervical back: Normal range of motion.   Skin:     General: Skin is warm and dry.      Findings: No rash.      Comments: Healing port site   Neurological:      General: No focal deficit present.      Mental Status: She is alert and oriented to person, place, and time.   Psychiatric:         Mood and Affect: Mood normal.        I have reviewed the above and todays findings are consistent with the above .   BRANDI Matias      RECENT LABS:  Hematology WBC   Date Value Ref Range Status   01/05/2022 11.15 (H) 3.40 - 10.80 10*3/mm3 Final     RBC   Date Value Ref Range Status   01/05/2022 4.61 3.77 - 5.28 10*6/mm3 Final     Hemoglobin   Date Value Ref Range Status   01/05/2022 13.4 12.0 - 15.9 g/dL Final     Hematocrit   Date Value Ref Range Status   01/05/2022 41.6 34.0 - 46.6 % Final     Platelets   Date Value Ref Range Status   01/05/2022 " 297 140 - 450 10*3/mm3 Final      *Labs reviewed 01/05/2022     Assessment/Plan          61-year-old female with a history of hyperthyroidism, previous gastric ulcer, GE reflux, atrial fibrillation, hyperlipidemia, hypertension irritable bowel syndrome,             1.  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  · here has been a number of physicians involved in the patient's case and several months before she was ultimately seen by Dr. Finch at [facility].   · CT scan of the chest demonstrating thickening of the mid esophagus with no lymphadenopathy and PET/CT demonstrated uptake in the mid to distal esophagus with SUV intensely elevated at 16 with a few nonenlarged left hilar and gastrohepatic lymph nodes with mild increased FDG activity, few scattered subcentimeter pulmonary nodules bilaterally indeterminate.  · EUS was felt necessary and ultimately obtained at [facility] performed 12/7/2021 revealing a 5 cm esophageal mass present in the distal third esophagus without extension into the GE junction into the cardia, the mass extended into into the muscularis propria into the adventitia not penetrating through the adventitia with a single 1 cm gastrohepatic/celiac axis lymph node and FNB x1 performed-?   · Discussed with Dr. Finch directly who feels that she is not immediately and operative candidate and should proceed with chemo radiation therapy initially-neoadjuvant treatment before consideration for subsequent surgery. Discussed extensively concurrent chemotherapy radiation therapy using Carboplatin/Taxol weekly.  ·  who proceeded 12/15/2021 to PowerPort placement and open jejunostomy.  She was seen during her  hospital stay by oncology nutrition required hospitalization up to including 12/19/2021.  · seen by radiation therapy 12/21 with plans to proceed with 40 CT simulation and planning-IMRT/IGR T-4140 cGy in 23 fractions with pleased to be considered.   · Radiation therapy initiated 1/3/2022  · 1/5/2021 cycle 1 weekly carboplatin/Taxol.  Patient has had her education session, and is ready to proceed.      2.  Nutrition  · Use of Jejunostomy tube. Continuous feed 8-10 hours a day. Tolerating small oral intake and supplementing with boost. Weight today unchanged from previous visit on 12/29/2022    3.  A. fib: On chronic anticoagulation with Eliquis 5 mg twice daily.      Plan:  1. Initiate cycle 1 of Carboplain/Taxol with concurrent radiation  2. Continue Hycet 7.5-325 for pain  3. Continue use of J-tube   4. Follow up in one week with NP for cycle 2 Carboplatin/Taxol  5. Follow up in two weeks with Dr. Harrell for cycle 3 Carboplatin/Taxol  6. Patient to call our office or return for new or worsening symptoms    This patient continues on high risk medication requiring close monitoring     BRANDI Matias       The patient was seen by both me and Brittany PAZ today, the plan of care was discussed.  I reviewed the progress note and agree with her documentation except as edited. BRANDI Matias

## 2022-01-05 NOTE — PROGRESS NOTES
"Outpatient Oncology Nutrition  Assessment/PES    Patient Name:  Maya Ribera  YOB: 1960  MRN: [id number]    Assessment Date:  1/5/2022    Comments:  Follow up in infusion today. Begins C1 Carbotaxol. Started radiation on Monday.  Weight 122 lb, decreased 4 lb x 2 weeks. The patient reports that TF is only infusing an average of 8-10 hours each day due to appointments and not being home or able to hook up to pump. She is drinking a Boost plus each day and has been able to eat small bites of food.   Goal is Nutren 2.0 x 16 hours per day (960ml Nutren 2.0 plus 480ml water at 90cc/hr x 16 hours). Encouraged them to increase infusion time as they are able to in order to come close to meeting needs.   Will follow closely for tolerance.      General Info     Row Name 01/05/22 1035       Today's Session    Person(s) attending today's session Patient; Significant other       General Information    Oncology patient? yes  esophageal cancer       Oncology Specific Assessment    Type of treatment Chemotherapy; Radiation    Frequency of treatment carboplatin, taxol    Goal of treatment Currative               Physical Findings     Row Name 01/05/22 1040          Physical Findings    Gastrointestinal feeding tube     Tubes jejunostomy tube                Anthropometrics     Row Name 01/05/22 1040          Anthropometrics    Height 162.6 cm (64.02\")     Weight 55.4 kg (122 lb 1.6 oz)            Ideal Body Weight (IBW)    Ideal Body Weight (IBW) (kg) 55.04     % Ideal Body Weight 100.63            Usual Body Weight (UBW)    Weight Loss unintentional     Weight Loss Time Frame 2 weeks            Body Mass Index (BMI)    BMI (kg/m2) 20.99                Nutritional Info/Activity     Row Name 01/05/22 1042       Nutritional Information    Supplemental Drinks/Foods/Additives Boost plus one per day               Home Nutrition Report     Row Name 01/05/22 1042          Home Nutrition Report    Fluid/Beverage Intake " "Oral supplements used     Typical Food/Fluid Intake soft foods, minimal intake, bites here and there     Supplemental Drinks/Foods/Additives Boost plus one per day     Factors Affecting Nutritional Intake difficulty/impaired swallowing            Nutrition Prescription EN    Enteral Route Jejunostomy     Product Nutren 2.0 (TwoCal)     TF Delivery Method Cyclic     Cyclic TF Goal Volume (mL) 960 mL     Cyclic TF Current Volume (mL) 480 mL     Cyclic TF Goal Rate (mL/hr) 60 mL/hr     Cyclic TF Cycle Over (hrs)  Goal is 16 hours though only infusing for about 8 hours     Water flush (mL)  30 mL  total 90cc/hr x 8 hours (480 ml TF plus 240ml water)     Water Flush Frequency Per hour                Estimated/Assessed Needs     Row Name 01/05/22 1046 01/05/22 1040       Calculation Measurements    Weight Used For Calculations 55.3 kg (122 lb) --    Height -- 162.6 cm (64.02\")       Estimated/Assessed Needs    Additional Documentation KCAL/KG (Group); Protein Requirements (Group); Fluid Requirements (Group) --       KCAL/KG    KCAL/KG 30 Kcal/Kg (kcal); 35 Kcal/Kg (kcal) --    30 Kcal/Kg (kcal) 1660.17 --    35 Kcal/Kg (kcal) 1936.865 --       Protein Requirements    Weight Used For Protein Calculations 55.3 kg (122 lb) --    Est Protein Requirement Amount (gms/kg) 1.5 gm protein --    Estimated Protein Requirements (gms/day) 83.01 --       Fluid Requirements    Fluid Requirements (mL/day) 2000 --    Estimated Fluid Requirement Method RDA Method --    RDA Method (mL) 2000 --               Evaluation of Prescribed Nutrient/Fluid Intake     Row Name 01/05/22 1046          Evaluation of Prescribed Nutrient/Fluid Intake    Nutrition Prescribed Protein Evaluation; Calorie Evaluation; RDI; Fluid Evaluation; Free Water Evaluation; Fiber Evaluation            Calories at Prescribed Goal    Enteral Calories (kcal) 960     Total Calories (kcal) 960     Total Calories (kcal/Kg) 17.35 kcal/kg     % Kcal Needs 50            Protein at " Prescribed Goal    Weight Used for Protein Calculation 55.3 kg (122 lb)     Enteral Protein (gm) 40     Total Protein (gm) 40     Total Protein (Gm/Kg) 0.72 gm/kg     % Protein Needs 48            Fluid at Prescribed Goal    Enteral  Fluid (mL) 332     Free Water Flush Fluid (mL) 240     Total Fluid Intake (mL) 572     Total Fluid Intake (mL/kg) 10.34 mL/kg            Recommended Daily Intake at Prescribed Goal    RDI Not Met                Labs/Tests/Procedures/Meds     Row Name 01/05/22 1048          Labs/Procedures/Meds    Lab Results Reviewed reviewed            Diagnostic Tests/Procedures    Diagnostic Test/Procedure Reviewed reviewed            Medications    Pertinent Medications Reviewed reviewed     Pertinent Medications Comments eliquis, hycet, lopressor, zofran, protonix, carafate. Aloxi, decadron with chemo today                   Problem/Interventions:   Problem 1     Row Name 01/05/22 1053          Nutrition Diagnoses Problem 1    Problem 1 Needs Alternate Route     Etiology (related to) Medical Diagnosis; Functional Diagnosis     Oncology Esophageal cancer     Functional Diagnosis Dysphagia     Signs/Symptoms (evidenced by) Unintended Weight Change     Unintended Weight Change Loss     Number of Pounds Lost 15     Weight loss time period 6 months                        Intervention Goal     Row Name 01/05/22 1055          Intervention Goal    General Maintain nutrition; Nutrition support treatment; Meet nutritional needs for age/condition; Disease management/therapy     PO Tolerate PO; Modify texture/consistency     TF/PN Tolerate TF at goal; Deliver estimated need (%)     Deliver % of Estimated Need 100 %     Weight Appropriate weight gain                  Nutrition Prescription     Row Name 01/05/22 1055          Nutrition Prescription EN    Enteral Prescription Enteral begin/change; Enteral to supply     Enteral Route Jejunostomy     Product Nutren 2 [place]     TF Delivery Method Cyclic     Cyclic TF  Goal Volume (mL) 960 mL     Cyclic TF Goal Rate (mL/hr) 60 mL/hr     Cyclic TF Cycle Over (hrs)  x16 hours     Water flush (mL)  30 mL     Water Flush Frequency Per hour  TF + water to run at 90cc/hr over 16 hours (960ml TF plus 480ml water)            EN to Supply    Kcal/Day 1920 Kcal/Day     Kcal/Kg 34.6 Kcal/Kg     Protein (gm/day) 80.6 gm/day     Meet Estimated Kcal Need (%) 100 %     Meet Estimated Protein Need (%) 100 %     TF Free H2O (mL) 664 mL     Total Free H2O (mL/day) 1144 mL/day     Fiber Per Day (gm/day) 0 gm/day                Education/Evaluation     Row Name 01/05/22 1058          Monitor/Evaluation    Education Follow-up --  will follow closely throughout treatment                 Electronically signed by:  Tracey Lozano RD, DEVIN  01/05/22 10:59 EST

## 2022-01-06 ENCOUNTER — APPOINTMENT (OUTPATIENT)
Dept: RADIATION ONCOLOGY | Facility: HOSPITAL | Age: 62
End: 2022-01-06

## 2022-01-07 ENCOUNTER — APPOINTMENT (OUTPATIENT)
Dept: RADIATION ONCOLOGY | Facility: HOSPITAL | Age: 62
End: 2022-01-07

## 2022-01-07 PROCEDURE — 77386: CPT | Performed by: RADIOLOGY

## 2022-01-07 PROCEDURE — 77386 CHG INTENSITY MODULATED RADIATION TX DLVR COMPLEX: CPT | Performed by: RADIOLOGY

## 2022-01-07 PROCEDURE — 77014 CHG CT GUIDANCE RADIATION THERAPY FLDS PLACEMENT: CPT | Performed by: RADIOLOGY

## 2022-01-07 NOTE — PROGRESS NOTES
Patient Name: Maya Ribera Order Date: 2022       : 1960        MRN #: [id number] Diagnosis:   Encounter Diagnosis   Name Primary?   • Malignant neoplasm of lower third of esophagus (HCC) Yes                     RADIATION ON TREATMENT VISIT NOTE - CHEST    Treatment Summary    [x] Concurrent Chemo         Treatment Site Ref. ID Energy Dose/Fx (cGy) #Fx Dose Correction (cGy) Total Dose (cGy) Start Date End Date Elapsed Days   RA Esoph 41 Rx: Esophagus 6X 180 3 / 23 0 540 1/3/2022 2022 2          General:           Review of Systems    [x] No new complaints [] Fever/chills  Night sweats  [] Decreased energy level [] Decreased appetite [] Oxygen   [] Dry cough [] Productive cough [] SOB  [] Hemoptysis [] Dysphagia      [] Fatigue,  severity: ---------------- [] Pain,  severity: ----------------, location:     Pain medication regimen: NONE      Esophagitis regimen: NONE      Skin regimen: NONE     Comments/Notes:  No issues with feeding tube  Weight up 2 lbs.    KPS: 80%       Vital Signs: /60   Pulse 78   Wt 56.2 kg (124 lb)   SpO2 94%   BMI 21.27 kg/m²     Weight:   Wt Readings from Last 3 Encounters:   22 56.2 kg (124 lb)   22 55.4 kg (122 lb 1.6 oz)   22 55.4 kg (122 lb 1.6 oz)       Medication:   Current Outpatient Medications:   •  Eliquis 5 MG tablet tablet, Take 5 mg by mouth Every 12 (Twelve) Hours. LD , Disp: , Rfl:   •  HYDROcodone-acetaminophen (HYCET) 7.5-325 MG/15ML solution, Take 15 mL by mouth Every 6 (Six) Hours As Needed for Moderate Pain ., Disp: 236 mL, Rfl: 0  •  methIMAzole (TAPAZOLE) 10 MG tablet, Take 5 mg by mouth Daily. Take DOS, Disp: , Rfl:   •  metoprolol tartrate (LOPRESSOR) 50 MG tablet, Take 50 mg by mouth 2 (Two) Times a Day. Take DOS, Disp: , Rfl:   •  ondansetron (ZOFRAN) 8 MG tablet, Take 1 tablet by mouth 3 (Three) Times a Day As Needed for Nausea or Vomiting., Disp: 30 tablet, Rfl: 5  •  pantoprazole (PROTONIX) 40 MG EC tablet,  Take 1 tablet by mouth Daily., Disp: 30 tablet, Rfl: 2  •  pravastatin (PRAVACHOL) 20 MG tablet, Take 20 mg by mouth Daily., Disp: , Rfl:   •  sucralfate (CARAFATE) 1 GM/10ML suspension, Take 1 g by mouth 4 (Four) Times a Day., Disp: , Rfl:        LABS (Reviewed):  Hematology WBC   Date Value Ref Range Status   01/05/2022 11.15 (H) 3.40 - 10.80 10*3/mm3 Final     RBC   Date Value Ref Range Status   01/05/2022 4.61 3.77 - 5.28 10*6/mm3 Final     Hemoglobin   Date Value Ref Range Status   01/05/2022 13.4 12.0 - 15.9 g/dL Final     Hematocrit   Date Value Ref Range Status   01/05/2022 41.6 34.0 - 46.6 % Final     Platelets   Date Value Ref Range Status   01/05/2022 297 140 - 450 10*3/mm3 Final      Chemistry   Lab Results   Component Value Date    GLUCOSE 152 (H) 01/05/2022    BUN 16 01/05/2022    CREATININE 0.44 (L) 01/05/2022    EGFRIFNONA 145 01/05/2022    BCR 36.4 (H) 01/05/2022    K 4.6 01/05/2022    CO2 27.2 01/05/2022    CALCIUM 10.2 01/05/2022    ALBUMIN 3.80 01/05/2022    AST 15 01/05/2022    ALT 6 01/05/2022         Physical Exam:         Pulmonary: [] Cough:     [] Dyspnea:  Neck:  [] Lymphadenopathy  Lungs:  [x] Clear to auscultation  CVS:  [x] Regular rate & rhythm  Skin:  stGstrstastdstest:st st1st GI:  [] Dysphagia:     [] Esophagitis:     Comments/Notes: labs reviewed with patient.    [x] Review of labs, images, dosimetry, dose delivered, & treatment parameters.    Comments:     [x] Patient treatment setup reviewed.    Comments:     Recommendations: continue XRT and supportive measures.    [x] Continue RT  [] Change RT Plan [] Hold RT, length:                Confidentiality of this medical record shall be maintained except when use or disclosure is required or permitted by law, regulation or written authorization by the patient.

## 2022-01-10 ENCOUNTER — APPOINTMENT (OUTPATIENT)
Dept: RADIATION ONCOLOGY | Facility: HOSPITAL | Age: 62
End: 2022-01-10

## 2022-01-10 DIAGNOSIS — C15.5 MALIGNANT NEOPLASM OF LOWER THIRD OF ESOPHAGUS: Primary | ICD-10-CM

## 2022-01-10 PROCEDURE — 77386: CPT | Performed by: RADIOLOGY

## 2022-01-10 PROCEDURE — 77386 CHG INTENSITY MODULATED RADIATION TX DLVR COMPLEX: CPT | Performed by: RADIOLOGY

## 2022-01-10 PROCEDURE — 77014 CHG CT GUIDANCE RADIATION THERAPY FLDS PLACEMENT: CPT | Performed by: RADIOLOGY

## 2022-01-11 ENCOUNTER — APPOINTMENT (OUTPATIENT)
Dept: OTHER | Facility: HOSPITAL | Age: 62
End: 2022-01-11

## 2022-01-11 PROCEDURE — 77014 CHG CT GUIDANCE RADIATION THERAPY FLDS PLACEMENT: CPT | Performed by: RADIOLOGY

## 2022-01-11 PROCEDURE — 77427 RADIATION TX MANAGEMENT X5: CPT | Performed by: RADIOLOGY

## 2022-01-11 PROCEDURE — 77386 CHG INTENSITY MODULATED RADIATION TX DLVR COMPLEX: CPT | Performed by: RADIOLOGY

## 2022-01-11 PROCEDURE — 77386: CPT | Performed by: RADIOLOGY

## 2022-01-12 ENCOUNTER — INFUSION (OUTPATIENT)
Dept: ONCOLOGY | Facility: HOSPITAL | Age: 62
End: 2022-01-12

## 2022-01-12 ENCOUNTER — RADIATION ONCOLOGY WEEKLY ASSESSMENT (OUTPATIENT)
Dept: RADIATION ONCOLOGY | Facility: HOSPITAL | Age: 62
End: 2022-01-12

## 2022-01-12 ENCOUNTER — OFFICE VISIT (OUTPATIENT)
Dept: ONCOLOGY | Facility: CLINIC | Age: 62
End: 2022-01-12

## 2022-01-12 ENCOUNTER — DOCUMENTATION (OUTPATIENT)
Dept: OTHER | Facility: HOSPITAL | Age: 62
End: 2022-01-12

## 2022-01-12 ENCOUNTER — APPOINTMENT (OUTPATIENT)
Dept: RADIATION ONCOLOGY | Facility: HOSPITAL | Age: 62
End: 2022-01-12

## 2022-01-12 ENCOUNTER — CLINICAL SUPPORT (OUTPATIENT)
Dept: OTHER | Facility: HOSPITAL | Age: 62
End: 2022-01-12

## 2022-01-12 VITALS — DIASTOLIC BLOOD PRESSURE: 65 MMHG | HEART RATE: 82 BPM | SYSTOLIC BLOOD PRESSURE: 105 MMHG

## 2022-01-12 VITALS
WEIGHT: 120.7 LBS | RESPIRATION RATE: 18 BRPM | HEART RATE: 91 BPM | DIASTOLIC BLOOD PRESSURE: 77 MMHG | HEIGHT: 64 IN | OXYGEN SATURATION: 94 % | BODY MASS INDEX: 20.61 KG/M2 | TEMPERATURE: 98.4 F | SYSTOLIC BLOOD PRESSURE: 138 MMHG

## 2022-01-12 VITALS — WEIGHT: 124 LBS | BODY MASS INDEX: 21.27 KG/M2

## 2022-01-12 DIAGNOSIS — C15.5 MALIGNANT NEOPLASM OF LOWER THIRD OF ESOPHAGUS: Primary | ICD-10-CM

## 2022-01-12 DIAGNOSIS — C15.5 MALIGNANT NEOPLASM OF LOWER THIRD OF ESOPHAGUS: ICD-10-CM

## 2022-01-12 DIAGNOSIS — E44.0 MODERATE MALNUTRITION: ICD-10-CM

## 2022-01-12 DIAGNOSIS — Z79.899 HIGH RISK MEDICATION USE: ICD-10-CM

## 2022-01-12 LAB
ALBUMIN SERPL-MCNC: 4 G/DL (ref 3.5–5.2)
ALBUMIN/GLOB SERPL: 1.3 G/DL (ref 1.1–2.4)
ALP SERPL-CCNC: 109 U/L (ref 38–116)
ALT SERPL W P-5'-P-CCNC: 8 U/L (ref 0–33)
ANION GAP SERPL CALCULATED.3IONS-SCNC: 11.5 MMOL/L (ref 5–15)
AST SERPL-CCNC: 15 U/L (ref 0–32)
BASOPHILS # BLD AUTO: 0.03 10*3/MM3 (ref 0–0.2)
BASOPHILS NFR BLD AUTO: 0.4 % (ref 0–1.5)
BILIRUB SERPL-MCNC: 0.4 MG/DL (ref 0.2–1.2)
BUN SERPL-MCNC: 18 MG/DL (ref 6–20)
BUN/CREAT SERPL: 36 (ref 7.3–30)
CALCIUM SPEC-SCNC: 10.1 MG/DL (ref 8.5–10.2)
CHLORIDE SERPL-SCNC: 97 MMOL/L (ref 98–107)
CO2 SERPL-SCNC: 28.5 MMOL/L (ref 22–29)
CREAT SERPL-MCNC: 0.5 MG/DL (ref 0.6–1.1)
DEPRECATED RDW RBC AUTO: 41.2 FL (ref 37–54)
EOSINOPHIL # BLD AUTO: 0.1 10*3/MM3 (ref 0–0.4)
EOSINOPHIL NFR BLD AUTO: 1.2 % (ref 0.3–6.2)
ERYTHROCYTE [DISTWIDTH] IN BLOOD BY AUTOMATED COUNT: 12.8 % (ref 12.3–15.4)
GFR SERPL CREATININE-BSD FRML MDRD: 125 ML/MIN/1.73
GLOBULIN UR ELPH-MCNC: 3 GM/DL (ref 1.8–3.5)
GLUCOSE SERPL-MCNC: 136 MG/DL (ref 74–124)
HCT VFR BLD AUTO: 39 % (ref 34–46.6)
HGB BLD-MCNC: 12.9 G/DL (ref 12–15.9)
IMM GRANULOCYTES # BLD AUTO: 0.05 10*3/MM3 (ref 0–0.05)
IMM GRANULOCYTES NFR BLD AUTO: 0.6 % (ref 0–0.5)
LYMPHOCYTES # BLD AUTO: 0.98 10*3/MM3 (ref 0.7–3.1)
LYMPHOCYTES NFR BLD AUTO: 11.6 % (ref 19.6–45.3)
MCH RBC QN AUTO: 28.9 PG (ref 26.6–33)
MCHC RBC AUTO-ENTMCNC: 33.1 G/DL (ref 31.5–35.7)
MCV RBC AUTO: 87.4 FL (ref 79–97)
MONOCYTES # BLD AUTO: 0.6 10*3/MM3 (ref 0.1–0.9)
MONOCYTES NFR BLD AUTO: 7.1 % (ref 5–12)
NEUTROPHILS NFR BLD AUTO: 6.69 10*3/MM3 (ref 1.7–7)
NEUTROPHILS NFR BLD AUTO: 79.1 % (ref 42.7–76)
NRBC BLD AUTO-RTO: 0 /100 WBC (ref 0–0.2)
PLATELET # BLD AUTO: 244 10*3/MM3 (ref 140–450)
PMV BLD AUTO: 10 FL (ref 6–12)
POTASSIUM SERPL-SCNC: 4.6 MMOL/L (ref 3.5–4.7)
PROT SERPL-MCNC: 7 G/DL (ref 6.3–8)
RBC # BLD AUTO: 4.46 10*6/MM3 (ref 3.77–5.28)
SODIUM SERPL-SCNC: 137 MMOL/L (ref 134–145)
WBC NRBC COR # BLD: 8.45 10*3/MM3 (ref 3.4–10.8)

## 2022-01-12 PROCEDURE — 96417 CHEMO IV INFUS EACH ADDL SEQ: CPT

## 2022-01-12 PROCEDURE — 25010000002 PALONOSETRON PER 25 MCG: Performed by: NURSE PRACTITIONER

## 2022-01-12 PROCEDURE — 96375 TX/PRO/DX INJ NEW DRUG ADDON: CPT

## 2022-01-12 PROCEDURE — 85025 COMPLETE CBC W/AUTO DIFF WBC: CPT

## 2022-01-12 PROCEDURE — 25010000002 DIPHENHYDRAMINE PER 50 MG: Performed by: NURSE PRACTITIONER

## 2022-01-12 PROCEDURE — 80053 COMPREHEN METABOLIC PANEL: CPT

## 2022-01-12 PROCEDURE — 25010000002 DEXAMETHASONE SODIUM PHOSPHATE 100 MG/10ML SOLUTION: Performed by: NURSE PRACTITIONER

## 2022-01-12 PROCEDURE — 25010000002 PACLITAXEL PER 1 MG: Performed by: NURSE PRACTITIONER

## 2022-01-12 PROCEDURE — 99214 OFFICE O/P EST MOD 30 MIN: CPT | Performed by: NURSE PRACTITIONER

## 2022-01-12 PROCEDURE — 96413 CHEMO IV INFUSION 1 HR: CPT

## 2022-01-12 PROCEDURE — FACE2FACE: Performed by: RADIOLOGY

## 2022-01-12 PROCEDURE — 77386 CHG INTENSITY MODULATED RADIATION TX DLVR COMPLEX: CPT | Performed by: RADIOLOGY

## 2022-01-12 PROCEDURE — 25010000002 CARBOPLATIN PER 50 MG: Performed by: NURSE PRACTITIONER

## 2022-01-12 PROCEDURE — 77386: CPT | Performed by: RADIOLOGY

## 2022-01-12 PROCEDURE — 77014 CHG CT GUIDANCE RADIATION THERAPY FLDS PLACEMENT: CPT | Performed by: RADIOLOGY

## 2022-01-12 RX ORDER — FAMOTIDINE 10 MG/ML
20 INJECTION, SOLUTION INTRAVENOUS ONCE
Status: CANCELLED | OUTPATIENT
Start: 2022-01-12

## 2022-01-12 RX ORDER — SODIUM CHLORIDE 9 MG/ML
250 INJECTION, SOLUTION INTRAVENOUS ONCE
Status: COMPLETED | OUTPATIENT
Start: 2022-01-12 | End: 2022-01-12

## 2022-01-12 RX ORDER — PALONOSETRON 0.05 MG/ML
0.25 INJECTION, SOLUTION INTRAVENOUS ONCE
Status: CANCELLED | OUTPATIENT
Start: 2022-01-12

## 2022-01-12 RX ORDER — SODIUM CHLORIDE 9 MG/ML
250 INJECTION, SOLUTION INTRAVENOUS ONCE
Status: CANCELLED | OUTPATIENT
Start: 2022-01-12

## 2022-01-12 RX ORDER — FAMOTIDINE 10 MG/ML
20 INJECTION, SOLUTION INTRAVENOUS AS NEEDED
Status: CANCELLED | OUTPATIENT
Start: 2022-01-12

## 2022-01-12 RX ORDER — PALONOSETRON 0.05 MG/ML
0.25 INJECTION, SOLUTION INTRAVENOUS ONCE
Status: COMPLETED | OUTPATIENT
Start: 2022-01-12 | End: 2022-01-12

## 2022-01-12 RX ORDER — FAMOTIDINE 10 MG/ML
20 INJECTION, SOLUTION INTRAVENOUS ONCE
Status: COMPLETED | OUTPATIENT
Start: 2022-01-12 | End: 2022-01-12

## 2022-01-12 RX ORDER — DIPHENHYDRAMINE HYDROCHLORIDE 50 MG/ML
50 INJECTION INTRAMUSCULAR; INTRAVENOUS AS NEEDED
Status: CANCELLED | OUTPATIENT
Start: 2022-01-12

## 2022-01-12 RX ADMIN — CARBOPLATIN 250 MG: 10 INJECTION INTRAVENOUS at 10:49

## 2022-01-12 RX ADMIN — DIPHENHYDRAMINE HYDROCHLORIDE 25 MG: 50 INJECTION, SOLUTION INTRAMUSCULAR; INTRAVENOUS at 08:54

## 2022-01-12 RX ADMIN — SODIUM CHLORIDE 250 ML: 9 INJECTION, SOLUTION INTRAVENOUS at 08:51

## 2022-01-12 RX ADMIN — FAMOTIDINE 20 MG: 10 INJECTION, SOLUTION INTRAVENOUS at 08:54

## 2022-01-12 RX ADMIN — PALONOSETRON HYDROCHLORIDE 0.25 MG: 0.25 INJECTION, SOLUTION INTRAVENOUS at 08:51

## 2022-01-12 RX ADMIN — DEXAMETHASONE SODIUM PHOSPHATE 12 MG: 10 INJECTION, SOLUTION INTRAMUSCULAR; INTRAVENOUS at 09:06

## 2022-01-12 RX ADMIN — PACLITAXEL 80 MG: 6 INJECTION, SOLUTION INTRAVENOUS at 09:49

## 2022-01-12 NOTE — PROGRESS NOTES
Subjective     REASON FOR FOLLOW-UP: Distal esophageal cancer      History of Present Illness    This is a 61 y.o. female with recent diagnosis of distal esophageal cancer, initiating concurrent chemoradiation with carboplatin/Taxol on 1/5/2022.  She returns today for week 2 of therapy.  She had some dry heaving over the weekend responsive to Zofran each time.  She has had difficulty with constipation mainly related to narcotics.  She is taking stool softener each morning and occasional MiraLAX and feels that this is helping.  Her energy remains fair.    She is receiving continuous feeding at night via a pump.  She is slowly working up to recommended 90 cc/hr to be given over 16 hours.  Of course this timing is difficult if she has appointments or other things taking her away from her home.  At this point she states she is getting about 8 hours consistently feeding running at that rate.  She is still getting and 1 boost during the day and able to take her pills by mouth at this time.  She has been followed by Tracey Lozano, oncology dietitian.    Her pain is currently well controlled.  She denies other concerns at this time.    ONCOLOGY HISTORY     The patient is a 61-year-old female who had been seen in multidisciplinary thoracic oncology conference with complaints of painful swallowing and dysphagia over the previous several months with 10 to 15 pound weight loss.  This led to EGD and colonoscopy with normal colonoscopy but EGD demonstrated tumor at the GE junction with biopsies consistent with severe dysplasia including carcinoma in situ.  The patient states that she saw a number of physicians and attempting to have a diagnosis completed.  CT of chest demonstrated thickening of the mid esophagus with no lymphadenopathy and CT PET demonstrated uptake in the esophagus gastropathic lymph nodes it is felt that she likely had early stage carcinoma of the esophagus and that we could present to record with surgical  resection.  Endoscopic ultrasound, however, was requested and we could not have this done any sooner than GI physicians available at [facility].     This was performed 12/7/2021 revealing a 5 cm esophageal mass present in the distal third esophagus without extension into the GE junction into the cardia, the mass extended into into the muscularis propria into the adventitia not penetrating through the adventitia with a single 1 cm gastrohepatic/celiac axis lymph node and FNB x1 performed-?  T2 N1 M0 distal esophageal cancer-clinical stage III, pathologic stage IIIa  In contacting the [facility] pathology department the results of this FNB are not yet available and will be by 12/9/2021.      The patient is seen with her sister and son all indicating that she has had difficulty with swallowing since late summer likely July 2021 and has been attempting to have a diagnosis made since that time.  They are all somewhat frustrated about the length of time to obtain an answer for this problem and we have spent considerable time discussing her findings thus far and how we might proceed to treat what appears to be a contained malignancy.  This has, additionally, been discussed with Dr. Stef cruz who feels that she is not immediately and operative candidate and should proceed with chemo radiation therapy initially-neoadjuvant treatment before consideration for subsequent surgery.  This has been discussed with the patient, her sister and her son again in detail 12/8/2021.  We went on to initiate pain control with liquid hydrocodone, referred the patient to general surgery and oncology nutrition.    The patient was seen by  who proceeded 12/15/2021 to PowerPort placement and open jejunostomy.  She was seen during her hospital stay by oncology nutrition required hospitalization up to including 12/19/2021.    The patient required hospitalization to 12/20/2021 and after discharge along with her  "partner's health was able to gradually improve her caloric intake and was seen back 12/27 by Dr. Hollis who felt she had improved enough to initiate chemotherapy.  The patient was seen by radiation therapy 12/21 with plans to proceed with 40 CT simulation and planning-IMRT/IGR T-4140 cGy in 23 fractions with pleased to be considered.    The patient is seen back with her partner Cristy 12/29/2021 now able to \"manage\" and we have discussed extensively concurrent chemotherapy radiation therapy using Carboplatin/Taxol weekly.    Currently the patient is scheduled to begin radiation therapy 1/3/2021, undergoing teaching 1/4/2021 and will begin concurrent chemotherapy 1/5/2021      Past Medical History:   Diagnosis Date   • A-fib (HCC)    • Boil, breast 12/13/2021    HEALING UNDER LEFT BREAST    • Disease of thyroid gland    • Esophageal cancer (HCC)    • Gastric ulcer    • GERD (gastroesophageal reflux disease)    • Hyperlipidemia    • Hypertension    • Irritable bowel    • Mitral valve prolapse     FOLLOWED BY          Past Surgical History:   Procedure Laterality Date   • CHOLECYSTECTOMY     • COLONOSCOPY  08/27/2021    [facility]   • EXTERNAL EAR SURGERY     • JEJUNOSTOMY N/A 12/15/2021    Procedure: open JEJUNOSTOMY tube placement;  Surgeon: Bhanu Hollis MD;  Location: [facility];  Service: General;  Laterality: N/A;   • KNEE SURGERY Left    • REPLACEMENT TOTAL KNEE Left    • UPPER ENDOSCOPIC ULTRASOUND W/ FNA N/A 12/7/2021    Procedure: Esophagogastroduodenoscopy with endoscopic ultrasound  WITH STAGING AND FINE NEEDLE BIOPSY;  Surgeon: Amrit Green MD;  Location: [facility] ENDOSCOPY;  Service: Gastroenterology;  Laterality: N/A;  post op: inlet patch, esophageal mass, T2   • VENOUS ACCESS DEVICE (PORT) INSERTION Left 12/15/2021    Procedure: INSERTION OF PORTACATH;  Surgeon: Bhanu Hollis MD;  Location: [facility];  Service: General;  Laterality: Left;        Current Outpatient " Medications on File Prior to Visit   Medication Sig Dispense Refill   • Eliquis 5 MG tablet tablet Take 5 mg by mouth Every 12 (Twelve) Hours. LD 12/4     • HYDROcodone-acetaminophen (HYCET) 7.5-325 MG/15ML solution Take 15 mL by mouth Every 6 (Six) Hours As Needed for Moderate Pain . 236 mL 0   • methIMAzole (TAPAZOLE) 10 MG tablet Take 5 mg by mouth Daily. Take DOS     • metoprolol tartrate (LOPRESSOR) 50 MG tablet Take 50 mg by mouth 2 (Two) Times a Day. Take DOS     • ondansetron (ZOFRAN) 8 MG tablet Take 1 tablet by mouth 3 (Three) Times a Day As Needed for Nausea or Vomiting. 30 tablet 5   • pantoprazole (PROTONIX) 40 MG EC tablet Take 1 tablet by mouth Daily. 30 tablet 2   • pravastatin (PRAVACHOL) 20 MG tablet Take 20 mg by mouth Daily.     • sucralfate (CARAFATE) 1 GM/10ML suspension Take 1 g by mouth 4 (Four) Times a Day.       No current facility-administered medications on file prior to visit.        ALLERGIES:    Allergies   Allergen Reactions   • Codeine Hives     Patient states this is when she was very young.         Social History     Socioeconomic History   • Marital status: Single   • Number of children: 1   • Years of education: High school   Tobacco Use   • Smoking status: Current Every Day Smoker     Packs/day: 1.00     Years: 40.00     Pack years: 40.00     Types: Cigarettes     Start date: 1981   • Smokeless tobacco: Never Used   • Tobacco comment: last cigarette 12/15 @ 0800   Vaping Use   • Vaping Use: Never used   Substance and Sexual Activity   • Alcohol use: Not Currently   • Drug use: Never   • Sexual activity: Defer        Family History   Problem Relation Age of Onset   • Breast cancer Mother    • Diabetes Mother    • Bipolar disorder Father    • Diabetes Father    • Hypertension Father    • Breast cancer Sister    • COPD Sister    • Diabetes Sister    • Hypertension Sister    • Alcohol abuse Brother    • Asthma Brother    • Bipolar disorder Brother    • Diabetes Brother    • Seizures  "Brother    • Breast cancer Maternal Grandmother    • Diabetes Maternal Grandmother    • Diabetes Maternal Grandfather    • Diabetes Paternal Grandmother    • Diabetes Paternal Grandfather    • Hypertension Son    • Kidney cancer Sister    • Hypertension Sister    • Diabetes Sister    • Malig Hyperthermia Neg Hx           Objective     Vitals:    01/12/22 0812   BP: 138/77   Pulse: 91   Resp: 18   Temp: 98.4 °F (36.9 °C)   TempSrc: Temporal   SpO2: 94%   Weight: 54.7 kg (120 lb 11.2 oz)   Height: 162.6 cm (64.02\")   PainSc: 0-No pain     Current Status 1/12/2022   ECOG score 0       Physical Exam  Constitutional:       Appearance: Normal appearance.   HENT:      Head: Normocephalic and atraumatic.      Nose: Nose normal.      Mouth/Throat:      Mouth: Mucous membranes are moist.      Pharynx: Oropharynx is clear.   Eyes:      Extraocular Movements: Extraocular movements intact.      Conjunctiva/sclera: Conjunctivae normal.      Pupils: Pupils are equal, round, and reactive to light.   Cardiovascular:      Rate and Rhythm: Normal rate and regular rhythm.      Pulses: Normal pulses.      Heart sounds: Normal heart sounds.   Pulmonary:      Effort: Pulmonary effort is normal.      Breath sounds: Normal breath sounds.   Abdominal:      General: Bowel sounds are normal.      Palpations: Abdomen is soft. There is mass (Fullness epigastric region).      Tenderness: There is abdominal tenderness (Midepigastrium).      Comments: J-tube clean, dry, and intact   Musculoskeletal:         General: Normal range of motion.      Cervical back: Normal range of motion.   Skin:     General: Skin is warm and dry.      Findings: No rash.   Neurological:      General: No focal deficit present.      Mental Status: She is alert and oriented to person, place, and time.   Psychiatric:         Mood and Affect: Mood normal.        I have reexamined the patient and the results are consistent with the previously documented exam except as updtaed. " Sharon Cobb, APRN       RECENT LABS:  Results from last 7 days   Lab Units 01/12/22  0735   WBC 10*3/mm3 8.45   NEUTROS ABS 10*3/mm3 6.69   HEMOGLOBIN g/dL 12.9   HEMATOCRIT % 39.0   PLATELETS 10*3/mm3 244     Results from last 7 days   Lab Units 01/12/22  0735   SODIUM mmol/L 137   POTASSIUM mmol/L 4.6   CHLORIDE mmol/L 97*   CO2 mmol/L 28.5   BUN mg/dL 18   CREATININE mg/dL 0.50*   CALCIUM mg/dL 10.1   ALBUMIN g/dL 4.00   BILIRUBIN mg/dL 0.4   ALK PHOS U/L 109   ALT (SGPT) U/L 8   AST (SGOT) U/L 15   GLUCOSE mg/dL 136*             *Labs reviewed 01/05/2022     Assessment/Plan          61-year-old female with a history of hyperthyroidism, previous gastric ulcer, GE reflux, atrial fibrillation, hyperlipidemia, hypertension irritable bowel syndrome,          1.  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  · here has been a number of physicians involved in the patient's case and several months before she was ultimately seen by Dr. Finch at [facility].   · CT scan of the chest demonstrating thickening of the mid esophagus with no lymphadenopathy and PET/CT demonstrated uptake in the mid to distal esophagus with SUV intensely elevated at 16 with a few nonenlarged left hilar and gastrohepatic lymph nodes with mild increased FDG activity, few scattered subcentimeter pulmonary nodules bilaterally indeterminate.  · EUS was felt necessary and ultimately obtained at [facility] performed 12/7/2021 revealing a 5 cm esophageal mass present in the distal third esophagus without extension into the GE junction into the cardia, the mass extended into into the muscularis propria into the adventitia not penetrating through the adventitia  with a single 1 cm gastrohepatic/celiac axis lymph node and FNB x1 performed-?   · Discussed with Dr. Stef cruz who feels that she is not immediately and operative candidate and should proceed with chemo radiation therapy initially-neoadjuvant treatment before consideration for subsequent surgery. Discussed extensively concurrent chemotherapy radiation therapy using Carboplatin/Taxol weekly.  ·  who proceeded 12/15/2021 to PowerPort placement and open jejunostomy.  She was seen during her hospital stay by oncology nutrition required hospitalization up to including 12/19/2021.  · seen by radiation therapy 12/21 with plans to proceed with 40 CT simulation and planning-IMRT/IGR T-4140 cGy in 23 fractions with pleased to be considered.   · Radiation therapy initiated 1/3/2022  · 1/5/2021 cycle 1 weekly carboplatin/Taxol.      2.  Nutrition  · Use of Jejunostomy tube. Continuous feed currently 8 hours a night, slowly working up to recommended 16 hours/day at 90 cc/h  · She is still trying to drink at least 1 boost a day and able to take pills by mouth.  · Continue to monitor weight closely.    3.  A. fib: On chronic anticoagulation with Eliquis 5 mg twice daily.    4. Narcotic induced constipation  · Patient currently taking 1 stool softener each morning and MiraLAX every few days as needed.  We discussed that she can add a stool softener at night as well or take MiraLAX every day if needed.  Encouraged her to keep up with this and not get behind so she does not get into more pain.    5.  Mild chemotherapy-induced nausea/dry heaving  · Positive to Zofran.  Continue as needed.      Plan:  1. Proceed with week 2 Carboplain/Taxol with concurrent radiation  2. Continue Hycet 7.5-325 for pain  3. Continue feedings via J-tube as outlined and continue follow-up with oncology dietitian.  4. Continue Zofran as needed for nausea.  5. Continue bowel regimen to control constipation.  6. Follow up in one week with   Sharri in week 3 Carboplatin/Taxol  7. Patient to call our office or return for new or worsening symptoms    This patient continues on high risk medication requiring close monitoring     Sharon Cobb, APRN

## 2022-01-12 NOTE — PROGRESS NOTES
Oncology Social Work  [facility]    PHQ 9 = 10   Distress Screening = 6 ( 12/8/2021) ( emotional concerns, fatigue, sleep, pain ).    Referral received from Dr. Pelletier to see for psychosocial support.  Chart reviewed. Patient recently diagnosed with esophageal cancer stage III. Patient to undergo concurrent chemo + radiation. Patient has J-Tube and states that she uses this for all her nutritional needs.     OSW spoke to patient after her radiation treatment.  Explained role of OSW and services we can assist with.  Patient lives with her partner, Cristy.  She is a retired  and worked for [employer] for 31 years.  She has health insurance through [employer].  Patient also reports that she has a cancer policy that she is working on opening a claim.   Patient is active and independent with ADL's.   She uses no DME at home and currently has no home care needs.  Patient with limited support but good support from her partner, and son.   Discussed her emotional/ mental health.  Patient became tearful as we talked about the diagnosis, hair loss and upcoming treatment.  Normalized feelings.  Offered information on support groups, peer support and supportive counseling.  Patient not interested at this time, but is glad to know it is available.  Patient states no history of mental illness or psychiatric medications.  She states that she has always been an upbeat person but that this cancer has definitely been a hard diagnosis to accept.     Discussed Living will and provided booklet in order to get this completed.     OSW will follow up in RAD Center during her treatments to check in for practical, home and emotional needs.     Julia Morrow, SHERLYW, CSW

## 2022-01-12 NOTE — PROGRESS NOTES
Outpatient Oncology Nutrition      Patient Name:  Maya Ribera  YOB: 1960  MRN: [id number]    Assessment Date:  1/12/2022    Comments:  Visited patient during infusion. Receiving C2 carboplatin and taxol, daily radiation. She is averaging about 8 hours of TF (2 cartons, 50% of goal and 240ml water). Hooking up around 7:30pm to 4:30am. She is taking small amounts by mouth but takes a long time to eat, chewing food well. Drinking Boost plus and regular Boost, one per day.Labs reviewed.    Weight 120 lb.   Wt Readings from Last 3 Encounters:   01/12/22 56.2 kg (124 lb)   01/12/22 54.7 kg (120 lb 11.2 oz)   01/05/22 56.2 kg (124 lb)     Suggested that as time allows to try to hook up at some time during the day for 4 hours and  infuse TF during the night for 12 hours.   I wrote out a sample schedule for her. Recommended 250ml Nutren 2.0 with 110ml water to infuse at 90 x 4 hours 3pm to 7pm, or 125ml Nutren plus 55ml water x 2 hours (example 10am to 12) at 90ml/hr then repeat later in the day (example 4-6pm) and then 750ml Nutren 2.0 plus 250ml water to infuse at 90ml/hr over 12 hours (8pm to 8am).   Hopefully this will help her get some sleep and night without having to turn off the pump and will help her get to TF goal.     Will follow for tolerance.                           Problem/Interventions:                      Electronically signed by:  Tracey Lozano RD, DEVIN  01/12/22 14:24 EST

## 2022-01-13 ENCOUNTER — APPOINTMENT (OUTPATIENT)
Dept: RADIATION ONCOLOGY | Facility: HOSPITAL | Age: 62
End: 2022-01-13

## 2022-01-13 PROCEDURE — 77014 CHG CT GUIDANCE RADIATION THERAPY FLDS PLACEMENT: CPT | Performed by: RADIOLOGY

## 2022-01-13 PROCEDURE — 77386: CPT | Performed by: RADIOLOGY

## 2022-01-13 PROCEDURE — 77386 CHG INTENSITY MODULATED RADIATION TX DLVR COMPLEX: CPT | Performed by: RADIOLOGY

## 2022-01-13 NOTE — PROGRESS NOTES
Patient Name: Maya Ribera Date: 2022       : 1960        MRN #: [id number] Diagnosis:   Encounter Diagnosis   Name Primary?   • Malignant neoplasm of lower third of esophagus (HCC) Yes                     RADIATION ON TREATMENT VISIT NOTE - GENERAL     Treatment Summary    [x] Concurrent Chemo         Treatment Site Ref. ID Energy Dose/Fx (cGy) #Fx Dose Correction (cGy) Total Dose (cGy) Start Date End Date Elapsed Days   RA Esoph 41 Rx: Esophagus 6X 180  0 1,260 1/3/2022 2022 9       General:           Review of Systems    [x] No new complaints [] Cough [] Dysphagia  [] Bowel changes [] Fever/chills [] Nausea/vomiting  [] Skin itching/soreness/breakdown  [] Fatigue,  severity: ---------------- [] Pain,  severity: ----------------, location:       Nausea regimen: NONE   Pain medication regimen: NONE   Bowel regimen: NONE   Skin regimen: Aquaphor     Comments/Notes:  Still taking some po  Weight is stable.    KPS: 90%       Vital Signs: Wt 56.2 kg (124 lb)   BMI 21.27 kg/m²     Weight:   Wt Readings from Last 3 Encounters:   22 56.2 kg (124 lb)   22 54.7 kg (120 lb 11.2 oz)   22 56.2 kg (124 lb)       Medication:   Current Outpatient Medications:   •  Eliquis 5 MG tablet tablet, Take 5 mg by mouth Every 12 (Twelve) Hours. LD , Disp: , Rfl:   •  HYDROcodone-acetaminophen (HYCET) 7.5-325 MG/15ML solution, Take 15 mL by mouth Every 6 (Six) Hours As Needed for Moderate Pain ., Disp: 236 mL, Rfl: 0  •  methIMAzole (TAPAZOLE) 10 MG tablet, Take 5 mg by mouth Daily. Take DOS, Disp: , Rfl:   •  metoprolol tartrate (LOPRESSOR) 50 MG tablet, Take 50 mg by mouth 2 (Two) Times a Day. Take DOS, Disp: , Rfl:   •  ondansetron (ZOFRAN) 8 MG tablet, Take 1 tablet by mouth 3 (Three) Times a Day As Needed for Nausea or Vomiting., Disp: 30 tablet, Rfl: 5  •  pantoprazole (PROTONIX) 40 MG EC tablet, Take 1 tablet by mouth Daily., Disp: 30 tablet, Rfl: 2  •  pravastatin (PRAVACHOL) 20 MG  tablet, Take 20 mg by mouth Daily., Disp: , Rfl:   •  sucralfate (CARAFATE) 1 GM/10ML suspension, Take 1 g by mouth 4 (Four) Times a Day., Disp: , Rfl:        LABS (Reviewed):  Hematology WBC   Date Value Ref Range Status   01/12/2022 8.45 3.40 - 10.80 10*3/mm3 Final     RBC   Date Value Ref Range Status   01/12/2022 4.46 3.77 - 5.28 10*6/mm3 Final     Hemoglobin   Date Value Ref Range Status   01/12/2022 12.9 12.0 - 15.9 g/dL Final     Hematocrit   Date Value Ref Range Status   01/12/2022 39.0 34.0 - 46.6 % Final     Platelets   Date Value Ref Range Status   01/12/2022 244 140 - 450 10*3/mm3 Final      Chemistry   Lab Results   Component Value Date    GLUCOSE 136 (H) 01/12/2022    BUN 18 01/12/2022    CREATININE 0.50 (L) 01/12/2022    EGFRIFNONA 125 01/12/2022    BCR 36.0 (H) 01/12/2022    K 4.6 01/12/2022    CO2 28.5 01/12/2022    CALCIUM 10.1 01/12/2022    ALBUMIN 4.00 01/12/2022    AST 15 01/12/2022    ALT 8 01/12/2022         Physical Exam:         Neck: [] Lymphadenopathy  Lungs: [x] Clear to auscultation  CVS: [x] Regular rate & rhythm  Skin: [x] stGstrstastdstest:st st1st Comments/Notes:     [x] Review of labs, images, dosimetry, dose delivered, & treatment parameters.   Comments:     [x] Patient treatment setup reviewed.    Comments:     Recommendations:  Continue XRT and supportive measures      [x] Continue RT  [] Change RT Plan [] Hold RT, length:                Confidentiality of this medical record shall be maintained except when use or disclosure is required or permitted by law, regulation or written authorization by the patient.

## 2022-01-14 ENCOUNTER — APPOINTMENT (OUTPATIENT)
Dept: RADIATION ONCOLOGY | Facility: HOSPITAL | Age: 62
End: 2022-01-14

## 2022-01-14 PROCEDURE — 77386: CPT | Performed by: RADIOLOGY

## 2022-01-14 PROCEDURE — 77014 CHG CT GUIDANCE RADIATION THERAPY FLDS PLACEMENT: CPT | Performed by: RADIOLOGY

## 2022-01-14 PROCEDURE — 77386 CHG INTENSITY MODULATED RADIATION TX DLVR COMPLEX: CPT | Performed by: RADIOLOGY

## 2022-01-17 ENCOUNTER — APPOINTMENT (OUTPATIENT)
Dept: RADIATION ONCOLOGY | Facility: HOSPITAL | Age: 62
End: 2022-01-17

## 2022-01-17 DIAGNOSIS — C15.5 MALIGNANT NEOPLASM OF LOWER THIRD OF ESOPHAGUS: Primary | ICD-10-CM

## 2022-01-17 DIAGNOSIS — C15.5 MALIGNANT NEOPLASM OF LOWER THIRD OF ESOPHAGUS: ICD-10-CM

## 2022-01-17 PROCEDURE — 77386: CPT | Performed by: RADIOLOGY

## 2022-01-17 PROCEDURE — 77014 CHG CT GUIDANCE RADIATION THERAPY FLDS PLACEMENT: CPT | Performed by: RADIOLOGY

## 2022-01-17 PROCEDURE — 77386 CHG INTENSITY MODULATED RADIATION TX DLVR COMPLEX: CPT | Performed by: RADIOLOGY

## 2022-01-18 ENCOUNTER — RADIATION ONCOLOGY WEEKLY ASSESSMENT (OUTPATIENT)
Dept: RADIATION ONCOLOGY | Facility: HOSPITAL | Age: 62
End: 2022-01-18

## 2022-01-18 ENCOUNTER — APPOINTMENT (OUTPATIENT)
Dept: RADIATION ONCOLOGY | Facility: HOSPITAL | Age: 62
End: 2022-01-18

## 2022-01-18 VITALS
WEIGHT: 122 LBS | DIASTOLIC BLOOD PRESSURE: 70 MMHG | BODY MASS INDEX: 20.93 KG/M2 | OXYGEN SATURATION: 98 % | SYSTOLIC BLOOD PRESSURE: 116 MMHG | HEART RATE: 100 BPM

## 2022-01-18 DIAGNOSIS — C15.5 MALIGNANT NEOPLASM OF LOWER THIRD OF ESOPHAGUS: Primary | ICD-10-CM

## 2022-01-18 PROCEDURE — 77014 CHG CT GUIDANCE RADIATION THERAPY FLDS PLACEMENT: CPT | Performed by: RADIOLOGY

## 2022-01-18 PROCEDURE — 77336 RADIATION PHYSICS CONSULT: CPT | Performed by: RADIOLOGY

## 2022-01-18 PROCEDURE — 77386 CHG INTENSITY MODULATED RADIATION TX DLVR COMPLEX: CPT | Performed by: RADIOLOGY

## 2022-01-18 PROCEDURE — 77427 RADIATION TX MANAGEMENT X5: CPT | Performed by: RADIOLOGY

## 2022-01-18 PROCEDURE — 77386: CPT | Performed by: RADIOLOGY

## 2022-01-18 NOTE — PROGRESS NOTES
Patient Name: Maya Ribera Order Date: 2022       : 1960 Physician: No primary care provider on file.       MRN #: [id number] Diagnosis:   Encounter Diagnosis   Name Primary?   • Malignant neoplasm of lower third of esophagus (HCC) Yes                     RADIATION ON TREATMENT VISIT NOTE - CHEST    Treatment Summary    [x] Concurrent Chemo   Regimen:       The patient has received 2160/4140 cGy  () to date.  General:     She is able to swallow with no difficulty.    Review of Systems    [] No new complaints [] Fever/chills  Night sweats  [] Decreased energy level [] Decreased appetite [] Oxygen   [] Dry cough [] Productive cough [] SOB  [] Hemoptysis [] Dysphagia      [x] Fatigue,  severity: ---------------- [] Pain,  severity: ----------------, location:     Pain medication regimen: NONE      Esophagitis regimen: NONE      Skin regimen: NONE     Comments/Notes:      KPS: Choose%       Vital Signs: /70   Pulse 100   Wt 55.3 kg (122 lb)   SpO2 98%   BMI 20.93 kg/m²     Weight:   Wt Readings from Last 3 Encounters:   22 55.3 kg (122 lb)   22 56.2 kg (124 lb)   22 54.7 kg (120 lb 11.2 oz)       Medication:   Current Outpatient Medications:   •  Eliquis 5 MG tablet tablet, Take 5 mg by mouth Every 12 (Twelve) Hours. LD , Disp: , Rfl:   •  HYDROcodone-acetaminophen (HYCET) 7.5-325 MG/15ML solution, Take 15 mL by mouth Every 6 (Six) Hours As Needed for Moderate Pain ., Disp: 236 mL, Rfl: 0  •  methIMAzole (TAPAZOLE) 10 MG tablet, Take 5 mg by mouth Daily. Take DOS, Disp: , Rfl:   •  metoprolol tartrate (LOPRESSOR) 50 MG tablet, Take 50 mg by mouth 2 (Two) Times a Day. Take DOS, Disp: , Rfl:   •  ondansetron (ZOFRAN) 8 MG tablet, Take 1 tablet by mouth 3 (Three) Times a Day As Needed for Nausea or Vomiting., Disp: 30 tablet, Rfl: 5  •  pantoprazole (PROTONIX) 40 MG EC tablet, Take 1 tablet by mouth Daily., Disp: 30 tablet, Rfl: 2  •  pravastatin (PRAVACHOL) 20 MG  tablet, Take 20 mg by mouth Daily., Disp: , Rfl:   •  sucralfate (CARAFATE) 1 GM/10ML suspension, Take 1 g by mouth 4 (Four) Times a Day., Disp: , Rfl:        LABS (Reviewed):  Hematology WBC   Date Value Ref Range Status   01/12/2022 8.45 3.40 - 10.80 10*3/mm3 Final     RBC   Date Value Ref Range Status   01/12/2022 4.46 3.77 - 5.28 10*6/mm3 Final     Hemoglobin   Date Value Ref Range Status   01/12/2022 12.9 12.0 - 15.9 g/dL Final     Hematocrit   Date Value Ref Range Status   01/12/2022 39.0 34.0 - 46.6 % Final     Platelets   Date Value Ref Range Status   01/12/2022 244 140 - 450 10*3/mm3 Final      Chemistry   Lab Results   Component Value Date    GLUCOSE 136 (H) 01/12/2022    BUN 18 01/12/2022    CREATININE 0.50 (L) 01/12/2022    EGFRIFNONA 125 01/12/2022    BCR 36.0 (H) 01/12/2022    K 4.6 01/12/2022    CO2 28.5 01/12/2022    CALCIUM 10.1 01/12/2022    ALBUMIN 4.00 01/12/2022    AST 15 01/12/2022    ALT 8 01/12/2022         Physical Exam:         Pulmonary: [] Cough:     [] Dyspnea:  Neck:  [] Lymphadenopathy  Lungs:  [] Clear to auscultation  CVS:  [] Regular rate & rhythm  Skin:  Grade: SELECT  GI:  [] Dysphagia:     [] Esophagitis:     Comments/Notes:     [x] Review of labs, images, dosimetry, dose delivered, & treatment parameters.    Comments:     [x] Patient treatment setup reviewed.    Comments:     Recommendations:     [x] Continue RT  [] Change RT Plan [] Hold RT, length:        Approved Electronically By:  Tamiko Bo MD, 1/18/2022, 14:08 EST          Confidentiality of this medical record shall be maintained except when use or disclosure is required or permitted by law, regulation or written authorization by the patient.

## 2022-01-18 NOTE — PROGRESS NOTES
Subjective Patient noting periodic nausea, difficulty with constipation requiring disimpaction    REASON FOR FOLLOW-UP: Distal esophageal cancer      History of Present Illness    This is a 61 y.o. female with recent diagnosis of distal esophageal cancer, initiating concurrent chemoradiation with carboplatin/Taxol on 1/5/2022.  She returns today for week 3  of therapy.  She had some dry heaving over the weekend responsive to Zofran each time.  She has had difficulty with constipation mainly related to narcotics.  She is taking stool softener each morning and occasional MiraLAX and feels that this is helping.  Unfortunately she did have to manually disimpact at least on 1 occasion.  She now continues with additional milk of magnesia.  Her energy remains fair.    She is receiving continuous feeding at night via a pump.  She is slowly working up to recommended 90 cc/hr to be given over 16 hours.  Of course this timing is difficult if she has appointments or other things taking her away from her home.  At this point she states she is getting about 8 hours consistently feeding running at that rate having come back as result of the diarrhea.  She is still getting and 1 boost during the day and able to take her pills by mouth at this time.  She has been followed by Tracey Lozano, oncology dietitian.    Her pain is currently well controlled.  She denies other concerns at this time.    The patient is next seen 1/19/2022 having received approximately half the course of scheduled radiation therapy documented 1/18/2022.    ONCOLOGY HISTORY     The patient is a 61-year-old female who had been seen in multidisciplinary thoracic oncology conference with complaints of painful swallowing and dysphagia over the previous several months with 10 to 15 pound weight loss.  This led to EGD and colonoscopy with normal colonoscopy but EGD demonstrated tumor at the GE junction with biopsies consistent with severe dysplasia including  carcinoma in situ.  The patient states that she saw a number of physicians and attempting to have a diagnosis completed.  CT of chest demonstrated thickening of the mid esophagus with no lymphadenopathy and CT PET demonstrated uptake in the esophagus gastropathic lymph nodes it is felt that she likely had early stage carcinoma of the esophagus and that we could present to record with surgical resection.  Endoscopic ultrasound, however, was requested and we could not have this done any sooner than GI physicians available at [facility].     This was performed 12/7/2021 revealing a 5 cm esophageal mass present in the distal third esophagus without extension into the GE junction into the cardia, the mass extended into into the muscularis propria into the adventitia not penetrating through the adventitia with a single 1 cm gastrohepatic/celiac axis lymph node and FNB x1 performed-?  T2 N1 M0 distal esophageal cancer-clinical stage III, pathologic stage IIIa  In contacting the [facility] pathology department the results of this FNB are not yet available and will be by 12/9/2021.      The patient is seen with her sister and son all indicating that she has had difficulty with swallowing since late summer likely July 2021 and has been attempting to have a diagnosis made since that time.  They are all somewhat frustrated about the length of time to obtain an answer for this problem and we have spent considerable time discussing her findings thus far and how we might proceed to treat what appears to be a contained malignancy.  This has, additionally, been discussed with Dr. Stef cruz who feels that she is not immediately and operative candidate and should proceed with chemo radiation therapy initially-neoadjuvant treatment before consideration for subsequent surgery.  This has been discussed with the patient, her sister and her son again in detail 12/8/2021.  We went on to initiate pain control with liquid  "hydrocodone, referred the patient to general surgery and oncology nutrition.    The patient was seen by  who proceeded 12/15/2021 to PowerPort placement and open jejunostomy.  She was seen during her hospital stay by oncology nutrition required hospitalization up to including 12/19/2021.    The patient required hospitalization to 12/20/2021 and after discharge along with her partner's health was able to gradually improve her caloric intake and was seen back 12/27 by Dr. Hollis who felt she had improved enough to initiate chemotherapy.  The patient was seen by radiation therapy 12/21 with plans to proceed with 40 CT simulation and planning-IMRT/IGR T-4140 cGy in 23 fractions with pleased to be considered.    The patient is seen back with her partner Cristy 12/29/2021 now able to \"manage\" and we have discussed extensively concurrent chemotherapy radiation therapy using Carboplatin/Taxol weekly.    Currently the patient is scheduled to begin radiation therapy 1/3/2021, undergoing teaching 1/4/2022 and will begin concurrent chemotherapy 1/5/2022      Past Medical History:   Diagnosis Date   • A-fib (HCC)    • Boil, breast 12/13/2021    HEALING UNDER LEFT BREAST    • Disease of thyroid gland    • Esophageal cancer (HCC)    • Gastric ulcer    • GERD (gastroesophageal reflux disease)    • Hyperlipidemia    • Hypertension    • Irritable bowel    • Mitral valve prolapse     FOLLOWED BY          Past Surgical History:   Procedure Laterality Date   • CHOLECYSTECTOMY     • COLONOSCOPY  08/27/2021    [facility]   • EXTERNAL EAR SURGERY     • JEJUNOSTOMY N/A 12/15/2021    Procedure: open JEJUNOSTOMY tube placement;  Surgeon: Bhanu Hollis MD;  Location: [facility];  Service: General;  Laterality: N/A;   • KNEE SURGERY Left    • REPLACEMENT TOTAL KNEE Left    • UPPER ENDOSCOPIC ULTRASOUND W/ FNA N/A 12/7/2021    Procedure: Esophagogastroduodenoscopy with endoscopic ultrasound  WITH STAGING AND " FINE NEEDLE BIOPSY;  Surgeon: Amrit Green MD;  Location: [facility] ENDOSCOPY;  Service: Gastroenterology;  Laterality: N/A;  post op: inlet patch, esophageal mass, T2   • VENOUS ACCESS DEVICE (PORT) INSERTION Left 12/15/2021    Procedure: INSERTION OF PORTACATH;  Surgeon: Bhanu Hollis MD;  Location: [facility] MAIN OR;  Service: General;  Laterality: Left;        Current Outpatient Medications on File Prior to Visit   Medication Sig Dispense Refill   • Eliquis 5 MG tablet tablet Take 5 mg by mouth Every 12 (Twelve) Hours. LD 12/4     • methIMAzole (TAPAZOLE) 10 MG tablet Take 5 mg by mouth Daily. Take DOS     • metoprolol tartrate (LOPRESSOR) 50 MG tablet Take 50 mg by mouth 2 (Two) Times a Day. Take DOS     • ondansetron (ZOFRAN) 8 MG tablet Take 1 tablet by mouth 3 (Three) Times a Day As Needed for Nausea or Vomiting. 30 tablet 5   • pravastatin (PRAVACHOL) 20 MG tablet Take 20 mg by mouth Daily.     • sucralfate (CARAFATE) 1 GM/10ML suspension Take 1 g by mouth 4 (Four) Times a Day.     • [DISCONTINUED] HYDROcodone-acetaminophen (HYCET) 7.5-325 MG/15ML solution Take 15 mL by mouth Every 6 (Six) Hours As Needed for Moderate Pain . 236 mL 0   • [DISCONTINUED] pantoprazole (PROTONIX) 40 MG EC tablet Take 1 tablet by mouth Daily. 30 tablet 2     No current facility-administered medications on file prior to visit.        ALLERGIES:    Allergies   Allergen Reactions   • Codeine Hives     Patient states this is when she was very young.         Social History     Socioeconomic History   • Marital status: Single   • Number of children: 1   • Years of education: High school   Tobacco Use   • Smoking status: Current Every Day Smoker     Packs/day: 1.00     Years: 40.00     Pack years: 40.00     Types: Cigarettes     Start date: 1981   • Smokeless tobacco: Never Used   • Tobacco comment: last cigarette 12/15 @ 0800   Vaping Use   • Vaping Use: Never used   Substance and Sexual Activity   • Alcohol use: Not Currently   •  "Drug use: Never   • Sexual activity: Defer        Family History   Problem Relation Age of Onset   • Breast cancer Mother    • Diabetes Mother    • Bipolar disorder Father    • Diabetes Father    • Hypertension Father    • Breast cancer Sister    • COPD Sister    • Diabetes Sister    • Hypertension Sister    • Alcohol abuse Brother    • Asthma Brother    • Bipolar disorder Brother    • Diabetes Brother    • Seizures Brother    • Breast cancer Maternal Grandmother    • Diabetes Maternal Grandmother    • Diabetes Maternal Grandfather    • Diabetes Paternal Grandmother    • Diabetes Paternal Grandfather    • Hypertension Son    • Kidney cancer Sister    • Hypertension Sister    • Diabetes Sister    • Malig Hyperthermia Neg Hx           Objective     Vitals:    01/19/22 0757   BP: 109/69   Pulse: 84   Resp: 18   Temp: 97.8 °F (36.6 °C)   TempSrc: Temporal   SpO2: 97%   Weight: 54.3 kg (119 lb 12.8 oz)   Height: 162.6 cm (64.02\")   PainSc: 0-No pain     Current Status 1/19/2022   ECOG score 0       Physical Exam  Constitutional:       Appearance: Normal appearance.   HENT:      Head: Normocephalic and atraumatic.      Nose: Nose normal.      Mouth/Throat:      Mouth: Mucous membranes are moist.      Pharynx: Oropharynx is clear.   Eyes:      Extraocular Movements: Extraocular movements intact.      Conjunctiva/sclera: Conjunctivae normal.      Pupils: Pupils are equal, round, and reactive to light.   Cardiovascular:      Rate and Rhythm: Normal rate and regular rhythm.      Pulses: Normal pulses.      Heart sounds: Normal heart sounds.   Pulmonary:      Effort: Pulmonary effort is normal.      Breath sounds: Normal breath sounds.   Abdominal:      General: Bowel sounds are normal.      Palpations: Abdomen is soft. There is mass (Fullness epigastric region).      Tenderness: There is abdominal tenderness (Midepigastrium).      Comments: J-tube clean, dry, and intact   Musculoskeletal:         General: Normal range of " motion.      Cervical back: Normal range of motion.   Skin:     General: Skin is warm and dry.      Findings: No rash.   Neurological:      General: No focal deficit present.      Mental Status: She is alert and oriented to person, place, and time.   Psychiatric:         Mood and Affect: Mood normal.        I have reexamined the patient and the results are consistent with the previously documented exam except as updtaed. Iggy Harrell MD       RECENT LABS:  Results from last 7 days   Lab Units 01/19/22  0733   WBC 10*3/mm3 2.35*   NEUTROS ABS 10*3/mm3 1.53*   HEMOGLOBIN g/dL 12.2   HEMATOCRIT % 37.9   PLATELETS 10*3/mm3 113*     Results from last 7 days   Lab Units 01/19/22  0733   SODIUM mmol/L 135   POTASSIUM mmol/L 4.1   CHLORIDE mmol/L 98   CO2 mmol/L 26.3   BUN mg/dL 18   CREATININE mg/dL 0.51*   CALCIUM mg/dL 9.0   ALBUMIN g/dL 3.70   BILIRUBIN mg/dL 0.2   ALK PHOS U/L 101   ALT (SGPT) U/L 20   AST (SGOT) U/L 29   GLUCOSE mg/dL 147*             *Labs reviewed 01/05/2022     Assessment/Plan          61-year-old female with a history of hyperthyroidism, previous gastric ulcer, GE reflux, atrial fibrillation, hyperlipidemia, hypertension irritable bowel syndrome,          1.  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  · here has been a number of physicians involved in the patient's case and several months before she was ultimately seen by Dr. Finch at [facility].   · CT scan of the chest demonstrating thickening of the mid esophagus with no lymphadenopathy and PET/CT demonstrated uptake in the mid to distal esophagus with SUV intensely elevated at 16 with a few nonenlarged left hilar and gastrohepatic lymph nodes with mild increased  FDG activity, few scattered subcentimeter pulmonary nodules bilaterally indeterminate.  · EUS was felt necessary and ultimately obtained at [facility] performed 12/7/2021 revealing a 5 cm esophageal mass present in the distal third esophagus without extension into the GE junction into the cardia, the mass extended into into the muscularis propria into the adventitia not penetrating through the adventitia with a single 1 cm gastrohepatic/celiac axis lymph node and FNB x1 performed-?   · Discussed with Dr. Stef cruz who feels that she is not immediately and operative candidate and should proceed with chemo radiation therapy initially-neoadjuvant treatment before consideration for subsequent surgery. Discussed extensively concurrent chemotherapy radiation therapy using Carboplatin/Taxol weekly.  ·  who proceeded 12/15/2021 to PowerPort placement and open jejunostomy.  She was seen during her hospital stay by oncology nutrition required hospitalization up to including 12/19/2021.  · seen by radiation therapy 12/21 with plans to proceed with 40 CT simulation and planning-IMRT/IGR T-4140 cGy in 23 fractions with pleased to be considered.   · Radiation therapy initiated 1/3/2022  · 1/5/2021 cycle 1 weekly carboplatin/Taxol.    · Patient status post week 2 on 1/12/2022  · Patient treated 1/19/2022-week 3    2.  Nutrition  · Use of Jejunostomy tube. Continuous feed currently 12 hours a night, slowly working up to recommended 16 hours/day at 90 cc/h  · She is still trying to drink at least 1 boost a day and able to take pills by mouth.  · Continue to monitor weight closely.    3.  A. fib: On chronic anticoagulation with Eliquis 5 mg twice daily.    4. Narcotic induced constipation  · Patient using stool softeners each morning, MiraLAX daily and milk of magnesia daily.    5.  Mild chemotherapy-induced nausea/dry heaving  · Positive to Zofran.  Continue as needed.      Plan:  1. Proceed with week 3  Carboplain/Taxol with concurrent radiation  2. Continue Hycet 7.5-325 for pain-E scribed to pharmacy, refill  3. Continue feedings via J-tube as outlined and continue follow-up with oncology dietitian.  4. Continue Zofran as needed for nausea.  5. Continue bowel regimen to control constipation.  6. Follow up in one week with NP in week for Carboplatin/Taxol  7. Patient to call our office or return for new or worsening symptoms    This patient continues on high risk medication requiring close monitoring     Iggy Harrell MD

## 2022-01-19 ENCOUNTER — CLINICAL SUPPORT (OUTPATIENT)
Dept: OTHER | Facility: HOSPITAL | Age: 62
End: 2022-01-19

## 2022-01-19 ENCOUNTER — INFUSION (OUTPATIENT)
Dept: ONCOLOGY | Facility: HOSPITAL | Age: 62
End: 2022-01-19

## 2022-01-19 ENCOUNTER — SPECIALTY PHARMACY (OUTPATIENT)
Dept: PHARMACY | Facility: HOSPITAL | Age: 62
End: 2022-01-19

## 2022-01-19 ENCOUNTER — OFFICE VISIT (OUTPATIENT)
Dept: ONCOLOGY | Facility: CLINIC | Age: 62
End: 2022-01-19

## 2022-01-19 ENCOUNTER — APPOINTMENT (OUTPATIENT)
Dept: RADIATION ONCOLOGY | Facility: HOSPITAL | Age: 62
End: 2022-01-19

## 2022-01-19 VITALS
RESPIRATION RATE: 18 BRPM | HEART RATE: 84 BPM | TEMPERATURE: 97.8 F | SYSTOLIC BLOOD PRESSURE: 109 MMHG | BODY MASS INDEX: 20.45 KG/M2 | OXYGEN SATURATION: 97 % | HEIGHT: 64 IN | WEIGHT: 119.8 LBS | DIASTOLIC BLOOD PRESSURE: 69 MMHG

## 2022-01-19 VITALS — DIASTOLIC BLOOD PRESSURE: 69 MMHG | HEART RATE: 81 BPM | SYSTOLIC BLOOD PRESSURE: 114 MMHG

## 2022-01-19 VITALS — BODY MASS INDEX: 20.54 KG/M2 | WEIGHT: 119.71 LBS

## 2022-01-19 DIAGNOSIS — C15.5 MALIGNANT NEOPLASM OF LOWER THIRD OF ESOPHAGUS: ICD-10-CM

## 2022-01-19 DIAGNOSIS — C15.5 MALIGNANT NEOPLASM OF LOWER THIRD OF ESOPHAGUS: Primary | ICD-10-CM

## 2022-01-19 LAB
ALBUMIN SERPL-MCNC: 3.7 G/DL (ref 3.5–5.2)
ALBUMIN/GLOB SERPL: 1.2 G/DL (ref 1.1–2.4)
ALP SERPL-CCNC: 101 U/L (ref 38–116)
ALT SERPL W P-5'-P-CCNC: 20 U/L (ref 0–33)
ANION GAP SERPL CALCULATED.3IONS-SCNC: 10.7 MMOL/L (ref 5–15)
AST SERPL-CCNC: 29 U/L (ref 0–32)
BASOPHILS # BLD AUTO: 0.02 10*3/MM3 (ref 0–0.2)
BASOPHILS NFR BLD AUTO: 0.9 % (ref 0–1.5)
BILIRUB SERPL-MCNC: 0.2 MG/DL (ref 0.2–1.2)
BUN SERPL-MCNC: 18 MG/DL (ref 6–20)
BUN/CREAT SERPL: 35.3 (ref 7.3–30)
CALCIUM SPEC-SCNC: 9 MG/DL (ref 8.5–10.2)
CHLORIDE SERPL-SCNC: 98 MMOL/L (ref 98–107)
CO2 SERPL-SCNC: 26.3 MMOL/L (ref 22–29)
CREAT SERPL-MCNC: 0.51 MG/DL (ref 0.6–1.1)
DEPRECATED RDW RBC AUTO: 41.5 FL (ref 37–54)
EOSINOPHIL # BLD AUTO: 0 10*3/MM3 (ref 0–0.4)
EOSINOPHIL NFR BLD AUTO: 0 % (ref 0.3–6.2)
ERYTHROCYTE [DISTWIDTH] IN BLOOD BY AUTOMATED COUNT: 12.9 % (ref 12.3–15.4)
GFR SERPL CREATININE-BSD FRML MDRD: 123 ML/MIN/1.73
GLOBULIN UR ELPH-MCNC: 3 GM/DL (ref 1.8–3.5)
GLUCOSE SERPL-MCNC: 147 MG/DL (ref 74–124)
HCT VFR BLD AUTO: 37.9 % (ref 34–46.6)
HGB BLD-MCNC: 12.2 G/DL (ref 12–15.9)
IMM GRANULOCYTES # BLD AUTO: 0.01 10*3/MM3 (ref 0–0.05)
IMM GRANULOCYTES NFR BLD AUTO: 0.4 % (ref 0–0.5)
LYMPHOCYTES # BLD AUTO: 0.4 10*3/MM3 (ref 0.7–3.1)
LYMPHOCYTES NFR BLD AUTO: 17 % (ref 19.6–45.3)
MCH RBC QN AUTO: 28.3 PG (ref 26.6–33)
MCHC RBC AUTO-ENTMCNC: 32.2 G/DL (ref 31.5–35.7)
MCV RBC AUTO: 87.9 FL (ref 79–97)
MONOCYTES # BLD AUTO: 0.39 10*3/MM3 (ref 0.1–0.9)
MONOCYTES NFR BLD AUTO: 16.6 % (ref 5–12)
NEUTROPHILS NFR BLD AUTO: 1.53 10*3/MM3 (ref 1.7–7)
NEUTROPHILS NFR BLD AUTO: 65.1 % (ref 42.7–76)
NRBC BLD AUTO-RTO: 0 /100 WBC (ref 0–0.2)
PLATELET # BLD AUTO: 113 10*3/MM3 (ref 140–450)
PMV BLD AUTO: 9.6 FL (ref 6–12)
POTASSIUM SERPL-SCNC: 4.1 MMOL/L (ref 3.5–4.7)
PROT SERPL-MCNC: 6.7 G/DL (ref 6.3–8)
RBC # BLD AUTO: 4.31 10*6/MM3 (ref 3.77–5.28)
SODIUM SERPL-SCNC: 135 MMOL/L (ref 134–145)
WBC NRBC COR # BLD: 2.35 10*3/MM3 (ref 3.4–10.8)

## 2022-01-19 PROCEDURE — 96375 TX/PRO/DX INJ NEW DRUG ADDON: CPT

## 2022-01-19 PROCEDURE — 99214 OFFICE O/P EST MOD 30 MIN: CPT | Performed by: INTERNAL MEDICINE

## 2022-01-19 PROCEDURE — 77014 CHG CT GUIDANCE RADIATION THERAPY FLDS PLACEMENT: CPT | Performed by: RADIOLOGY

## 2022-01-19 PROCEDURE — 80053 COMPREHEN METABOLIC PANEL: CPT

## 2022-01-19 PROCEDURE — 25010000002 DEXAMETHASONE SODIUM PHOSPHATE 100 MG/10ML SOLUTION: Performed by: INTERNAL MEDICINE

## 2022-01-19 PROCEDURE — 25010000002 CARBOPLATIN PER 50 MG: Performed by: INTERNAL MEDICINE

## 2022-01-19 PROCEDURE — 85025 COMPLETE CBC W/AUTO DIFF WBC: CPT

## 2022-01-19 PROCEDURE — 25010000002 PACLITAXEL PER 1 MG: Performed by: INTERNAL MEDICINE

## 2022-01-19 PROCEDURE — 96417 CHEMO IV INFUS EACH ADDL SEQ: CPT

## 2022-01-19 PROCEDURE — 25010000002 DIPHENHYDRAMINE PER 50 MG: Performed by: INTERNAL MEDICINE

## 2022-01-19 PROCEDURE — 25010000002 PALONOSETRON PER 25 MCG: Performed by: INTERNAL MEDICINE

## 2022-01-19 PROCEDURE — 96413 CHEMO IV INFUSION 1 HR: CPT

## 2022-01-19 PROCEDURE — 77386 CHG INTENSITY MODULATED RADIATION TX DLVR COMPLEX: CPT | Performed by: RADIOLOGY

## 2022-01-19 PROCEDURE — 77386: CPT | Performed by: RADIOLOGY

## 2022-01-19 RX ORDER — FAMOTIDINE 10 MG/ML
20 INJECTION, SOLUTION INTRAVENOUS ONCE
Status: CANCELLED | OUTPATIENT
Start: 2022-01-19

## 2022-01-19 RX ORDER — FAMOTIDINE 10 MG/ML
20 INJECTION, SOLUTION INTRAVENOUS AS NEEDED
Status: CANCELLED | OUTPATIENT
Start: 2022-01-19

## 2022-01-19 RX ORDER — PANTOPRAZOLE SODIUM 40 MG/1
40 TABLET, DELAYED RELEASE ORAL 2 TIMES DAILY
Qty: 60 TABLET | Refills: 2 | Status: SHIPPED | OUTPATIENT
Start: 2022-01-19 | End: 2022-01-21

## 2022-01-19 RX ORDER — PALONOSETRON 0.05 MG/ML
0.25 INJECTION, SOLUTION INTRAVENOUS ONCE
Status: COMPLETED | OUTPATIENT
Start: 2022-01-19 | End: 2022-01-19

## 2022-01-19 RX ORDER — SODIUM CHLORIDE 9 MG/ML
250 INJECTION, SOLUTION INTRAVENOUS ONCE
Status: CANCELLED | OUTPATIENT
Start: 2022-01-19

## 2022-01-19 RX ORDER — SODIUM CHLORIDE 9 MG/ML
250 INJECTION, SOLUTION INTRAVENOUS ONCE
Status: COMPLETED | OUTPATIENT
Start: 2022-01-19 | End: 2022-01-19

## 2022-01-19 RX ORDER — FAMOTIDINE 10 MG/ML
20 INJECTION, SOLUTION INTRAVENOUS ONCE
Status: COMPLETED | OUTPATIENT
Start: 2022-01-19 | End: 2022-01-19

## 2022-01-19 RX ORDER — PALONOSETRON 0.05 MG/ML
0.25 INJECTION, SOLUTION INTRAVENOUS ONCE
Status: CANCELLED | OUTPATIENT
Start: 2022-01-19

## 2022-01-19 RX ORDER — DIPHENHYDRAMINE HYDROCHLORIDE 50 MG/ML
50 INJECTION INTRAMUSCULAR; INTRAVENOUS AS NEEDED
Status: CANCELLED | OUTPATIENT
Start: 2022-01-19

## 2022-01-19 RX ADMIN — PALONOSETRON HYDROCHLORIDE 0.25 MG: 0.25 INJECTION, SOLUTION INTRAVENOUS at 08:49

## 2022-01-19 RX ADMIN — PACLITAXEL 80 MG: 6 INJECTION, SOLUTION INTRAVENOUS at 09:55

## 2022-01-19 RX ADMIN — DIPHENHYDRAMINE HYDROCHLORIDE 25 MG: 50 INJECTION, SOLUTION INTRAMUSCULAR; INTRAVENOUS at 09:06

## 2022-01-19 RX ADMIN — SODIUM CHLORIDE 250 ML: 9 INJECTION, SOLUTION INTRAVENOUS at 08:49

## 2022-01-19 RX ADMIN — FAMOTIDINE 20 MG: 10 INJECTION, SOLUTION INTRAVENOUS at 08:49

## 2022-01-19 RX ADMIN — DEXAMETHASONE SODIUM PHOSPHATE 12 MG: 10 INJECTION, SOLUTION INTRAMUSCULAR; INTRAVENOUS at 08:50

## 2022-01-19 RX ADMIN — CARBOPLATIN 250 MG: 10 INJECTION INTRAVENOUS at 10:57

## 2022-01-19 NOTE — PROGRESS NOTES
Received PA request for Protonix via CMM> sent in request to [facility] [employer]. Received an approval until 1-19-23.

## 2022-01-19 NOTE — PROGRESS NOTES
Outpatient Oncology Nutrition  Assessment/PES    Patient Name:  Maya Ribera  YOB: 1960  MRN: [id number]    Assessment Date:  1/19/2022    Comments:  Follow up in infusion. The patient is now using the feeding tube for 12 hours at night (750ml Nutren 2.0 plus 250ml water). Tolerating this well except having issues with constipation. Using senekot-s, miralax and MOM daily. Now having loose stools.   She is trying to eat and drink Boost plus during the day.  Weight 119 lb. Labs noted.   Will continue to monitor TF tolerance and po intake.         General Info       Row Name 01/19/22 1011       Today's Session    Person(s) attending today's session Patient       General Information    Oncology patient? yes       Oncology Specific Assessment    Type of treatment Chemotherapy; Radiation    Frequency of treatment carboplatin, taxol weekly, daily radiation 11/23 completed                   Physical Findings       Row Name 01/19/22 1034          Physical Findings    Gastrointestinal feeding tube; diarrhea; constipation                    Anthropometrics       Row Name 01/19/22 1034          Anthropometrics    Weight 54.3 kg (119 lb 11.4 oz)                      Home Nutrition Report       Row Name 01/19/22 1035          Home Nutrition Report    Fluid/Beverage Intake Oral supplements used     Typical Food/Fluid Intake soft foods     Supplemental Drinks/Foods/Additives boost plus     Factors Affecting Nutritional Intake difficulty/impaired swallowing            Nutrition Prescription EN    Enteral Route Jejunostomy     Product Nutren 2.0 (TwoCal)     TF Delivery Method Cyclic     Cyclic TF Goal Volume (mL) 1000 mL     Water flush (mL)  360 mL                    Estimated/Assessed Needs       Row Name 01/19/22 1038 01/19/22 1037       Calculation Measurements    Weight Used For Calculations 54.3 kg (119 lb 11.4 oz) 54.3 kg (119 lb 11.4 oz)       Estimated/Assessed Needs    Additional Documentation --  KCAL/KG (Group); Protein Requirements (Group); Fluid Requirements (Group)       KCAL/KG    KCAL/KG -- 30 Kcal/Kg (kcal); 35 Kcal/Kg (kcal)    30 Kcal/Kg (kcal) 1629 1629    35 Kcal/Kg (kcal) 1900.5 1900.5       Protein Requirements    Est Protein Requirement Amount (gms/kg) -- 1.2 gm protein       Fluid Requirements    Fluid Requirements (mL/day) -- 2000    Estimated Fluid Requirement Method -- RDA Method    RDA Method (mL) -- 2000                   Evaluation of Prescribed Nutrient/Fluid Intake       Row Name 01/19/22 1038          Calculation Measurements    Weight Used For Calculations 54.3 kg (119 lb 11.4 oz)            Evaluation of Prescribed Nutrient/Fluid Intake    Nutrition Prescribed Calorie Evaluation; Protein Evaluation; RDI; Free Water Evaluation; Fiber Evaluation            Calories at Prescribed Goal    Enteral Calories (kcal) 1500     Total Calories (kcal) 1500     Total Calories (kcal/Kg) 27.62 kcal/kg     % Kcal Needs 75            Protein at Prescribed Goal    Weight Used for Protein Calculation 54.3 kg (119 lb 11.4 oz)     Enteral Protein (gm) 63     Total Protein (gm) 63     Total Protein (Gm/Kg) 1.16 gm/kg            Free Water at Prescribed Goal    Free Water (mL) 519 mL     Free Water Flushes (mL) 769 mL     Total Free Water Intake (mL) 1288            Fiber at Prescribed Goal    Fiber 0            Recommended Daily Intake at Prescribed Goal    RDI Met                    Labs/Tests/Procedures/Meds       Row Name 01/19/22 1039          Labs/Procedures/Meds    Lab Results Reviewed reviewed            Diagnostic Tests/Procedures    Diagnostic Test/Procedure Reviewed reviewed            Medications    Pertinent Medications Reviewed reviewed     Pertinent Medications Comments miralax, senekot, zofran, carafate                       Problem/Interventions:   Problem 1       Row Name 01/19/22 1040          Nutrition Diagnoses Problem 1    Problem 1 Needs Alternate     Etiology (related to) Medical  Diagnosis     Oncology Esophageal cancer     Signs/Symptoms (evidenced by) Report/Observation; Report of Mnimal PO Intake     Reported/Observed By Patient     Reported GI Symptoms Diarrhea; Constipation                            Intervention Goal       Row Name 01/19/22 1041          Intervention Goal    General Nutrition support treatment     PO Tolerate PO     TF/PN Tolerate TF at goal; Deliver (%) goal     Deliver % of Goal 100 %     Weight Maintain weight                      Nutrition Prescription       Row Name 01/19/22 1041          Nutrition Prescription EN    Enteral Prescription Continue same protocol                    Education/Evaluation       Row Name 01/19/22 1041          Education    Education Will Instruct as appropriate            Monitor/Evaluation    Education Follow-up Reinforce PRN                     Electronically signed by:  Tracey Lozano RD, LD  01/19/22 10:41 EST

## 2022-01-20 ENCOUNTER — APPOINTMENT (OUTPATIENT)
Dept: RADIATION ONCOLOGY | Facility: HOSPITAL | Age: 62
End: 2022-01-20

## 2022-01-20 PROCEDURE — 77386: CPT | Performed by: RADIOLOGY

## 2022-01-20 PROCEDURE — 77386 CHG INTENSITY MODULATED RADIATION TX DLVR COMPLEX: CPT | Performed by: RADIOLOGY

## 2022-01-20 PROCEDURE — 77014 CHG CT GUIDANCE RADIATION THERAPY FLDS PLACEMENT: CPT | Performed by: RADIOLOGY

## 2022-01-21 ENCOUNTER — APPOINTMENT (OUTPATIENT)
Dept: RADIATION ONCOLOGY | Facility: HOSPITAL | Age: 62
End: 2022-01-21

## 2022-01-21 ENCOUNTER — TELEMEDICINE - AUDIO (OUTPATIENT)
Dept: NUTRITION | Facility: HOSPITAL | Age: 62
End: 2022-01-21

## 2022-01-21 PROCEDURE — 77386 CHG INTENSITY MODULATED RADIATION TX DLVR COMPLEX: CPT | Performed by: RADIOLOGY

## 2022-01-21 PROCEDURE — 77014 CHG CT GUIDANCE RADIATION THERAPY FLDS PLACEMENT: CPT | Performed by: RADIOLOGY

## 2022-01-21 PROCEDURE — 77386: CPT | Performed by: RADIOLOGY

## 2022-01-21 RX ORDER — LANSOPRAZOLE 30 MG/1
30 TABLET, ORALLY DISINTEGRATING, DELAYED RELEASE ORAL 2 TIMES DAILY
Qty: 30 TABLET | Refills: 0 | Status: SHIPPED | OUTPATIENT
Start: 2022-01-21 | End: 2022-02-07

## 2022-01-21 RX ORDER — ONDANSETRON 8 MG/1
8 TABLET, ORALLY DISINTEGRATING ORAL EVERY 8 HOURS PRN
Qty: 30 TABLET | Refills: 0 | Status: SHIPPED | OUTPATIENT
Start: 2022-01-21 | End: 2022-02-21 | Stop reason: SDUPTHER

## 2022-01-21 NOTE — PROGRESS NOTES
Outpatient Oncology Nutrition      Patient Name:  Maya Ribera  YOB: 1960  MRN: [id number]    Assessment Date:  1/21/2022    Comments:  Received phone call from Cristy and patient this afternoon asking questions about water flushes via JT. Patient is having increased difficulty swallowing and is not taking as much po. Getting 750ml Nutren 2.0 and 250ml water, 60ml water before hook up and after unhook. Suggested they do 120ml q 4 hours infusing this slowly. Also encouraged them to get 1 carton of Nutren 2.0 in during the day to make up for calories she was able to take po.   Also asked questions about changing meds from po due to swallowing difficulty. Discussed with Dr Harrell's nurse Angelica.  She will look at meds and call patient back, defer changes in cardiac meds to the prescribing provider or her cardiologist.   Will follow closely.         Electronically signed by:  Tracey Lozano RD, DEVIN  01/21/22 15:21 EST

## 2022-01-21 NOTE — TELEPHONE ENCOUNTER
Received message from Tracey Lozano stating that pt is having some difficulty swallowing her pills. She would like to try to switch over to liquids/dissolving tablets. D/W Cate in pharmacy and she recommended Prevacid ODT in place of the Protonix and we could switch her Zofran to ODT as well. Called pt and informed her of this. She v/u. Escribed scripts to her pharmacy.

## 2022-01-24 ENCOUNTER — SPECIALTY PHARMACY (OUTPATIENT)
Dept: PHARMACY | Facility: HOSPITAL | Age: 62
End: 2022-01-24

## 2022-01-24 ENCOUNTER — TELEPHONE (OUTPATIENT)
Dept: ONCOLOGY | Facility: CLINIC | Age: 62
End: 2022-01-24

## 2022-01-24 ENCOUNTER — RADIATION ONCOLOGY WEEKLY ASSESSMENT (OUTPATIENT)
Dept: RADIATION ONCOLOGY | Facility: HOSPITAL | Age: 62
End: 2022-01-24

## 2022-01-24 ENCOUNTER — APPOINTMENT (OUTPATIENT)
Dept: RADIATION ONCOLOGY | Facility: HOSPITAL | Age: 62
End: 2022-01-24

## 2022-01-24 VITALS
SYSTOLIC BLOOD PRESSURE: 118 MMHG | DIASTOLIC BLOOD PRESSURE: 74 MMHG | HEART RATE: 98 BPM | OXYGEN SATURATION: 98 % | BODY MASS INDEX: 20.52 KG/M2 | WEIGHT: 119.6 LBS

## 2022-01-24 DIAGNOSIS — C15.5 MALIGNANT NEOPLASM OF LOWER THIRD OF ESOPHAGUS: Primary | ICD-10-CM

## 2022-01-24 PROCEDURE — 77386: CPT | Performed by: RADIOLOGY

## 2022-01-24 PROCEDURE — FACE2FACE: Performed by: RADIOLOGY

## 2022-01-24 PROCEDURE — 77014 CHG CT GUIDANCE RADIATION THERAPY FLDS PLACEMENT: CPT | Performed by: RADIOLOGY

## 2022-01-24 PROCEDURE — 77386 CHG INTENSITY MODULATED RADIATION TX DLVR COMPLEX: CPT | Performed by: RADIOLOGY

## 2022-01-24 RX ORDER — FENTANYL 12 UG/H
1 PATCH TRANSDERMAL
Qty: 10 PATCH | Refills: 0 | Status: SHIPPED | OUTPATIENT
Start: 2022-01-24 | End: 2022-02-21 | Stop reason: SDUPTHER

## 2022-01-24 NOTE — TELEPHONE ENCOUNTER
Received message from Dr. Pelletier's nurse stating that Dr. Pelletier feels pt would benefit from a long acting pain medication, such as Fentanyl. Dr. Harrell is out of the office. Called pt. She states she is taking the Hycet every 6 hours and it is not really helping control her pain. Discussed with her the possibility of taking Fentanyl and she was agreeable to this. D/W Dr. Garcia (MD#2) who agreed pt should be started on Fentanyl and can take the Hycet for breakthrough. She will be started on 12mcg. Informed pt and she v/u.

## 2022-01-24 NOTE — PROGRESS NOTES
Angelica Fletcher, RN  Blanche Duran  Pt had to be switched to Prevacid ODT and Zofran ODT due to swallowing issues. Pt recently had both of these meds refilled. Not sure if they will require a PA but just wanted to give you a heads up. Thank you!      AFTER RECEIVING MESSAGE FROM ANGELICA- I RECEIVED A MESSAGE FROM [facility] FOR A PA FOR PREVACID. I SUBMITTED A PA REQUEST TO [facility] VIA [facility]. WAITING FOR RESPONSE.

## 2022-01-24 NOTE — PROGRESS NOTES
Patient Name: Maya Ribera Date: 2022       : 1960        MRN #: [id number] Diagnosis:   Encounter Diagnosis   Name Primary?   • Malignant neoplasm of lower third of esophagus (HCC) Yes                     RADIATION ON TREATMENT VISIT NOTE - CHEST    Treatment Summary    [x] Concurrent Chemo   Regimen: carbo/paclitaxel (every Wednesday)      Treatment Site Ref. ID Energy Dose/Fx (cGy) #Fx Dose Correction (cGy) Total Dose (cGy) Start Date End Date Elapsed Days   RA Esoph 41 Rx: Esophagus 6X 180 15 / 23 0 2,700 1/3/2022 2022 21       General:           Review of Systems    [] No new complaints [] Fever/chills  Night sweats  [x] Decreased energy level [] Decreased appetite [] Oxygen   [] Dry cough [] Productive cough [] SOB  [] Hemoptysis [] Dysphagia      [x] Fatigue,  severity: 1 Relief w/ Rest [x] Pain,  severity: 4, location: esophagus  Pain medication regimen: Hydrocodone      Esophagitis regimen: Other:  carafate--asking about numbing meds    Skin regimen: Aquaphor     Comments/Notes:   Discussed--no covid contacts; discussed urgent care for CXR and covid testing but her and SO did not feel needed.  Feel that symptoms are related to esophagitis.    Low grade temps, 99.0  No SOB  Some pain in chest she relates to her esophagus.    KPS: 80%       Vital Signs: /74   Pulse 98   Wt 54.3 kg (119 lb 9.6 oz)   SpO2 98%   BMI 20.52 kg/m²     Weight:   Wt Readings from Last 3 Encounters:   22 54.3 kg (119 lb 9.6 oz)   22 54.3 kg (119 lb 11.4 oz)   22 54.3 kg (119 lb 12.8 oz)       Medication:   Current Outpatient Medications:   •  sucralfate (CARAFATE) 1 GM/10ML suspension, Take 1 g by mouth 4 (Four) Times a Day., Disp: , Rfl:   •  Eliquis 5 MG tablet tablet, Take 5 mg by mouth Every 12 (Twelve) Hours. LD , Disp: , Rfl:   •  HYDROcodone-acetaminophen (HYCET) 7.5-325 MG/15ML solution, Take 15 mL by mouth Every 6 (Six) Hours As Needed for Moderate Pain ., Disp: 236 mL,  Rfl: 0  •  lansoprazole (Prevacid SoluTab) 30 MG Tablet Delayed Release Dispersible disintegrating tablet, Take 1 tablet by mouth 2 (Two) Times a Day., Disp: 30 tablet, Rfl: 0  •  methIMAzole (TAPAZOLE) 10 MG tablet, Take 5 mg by mouth Daily. Take DOS, Disp: , Rfl:   •  metoprolol tartrate (LOPRESSOR) 50 MG tablet, Take 50 mg by mouth 2 (Two) Times a Day. Take DOS, Disp: , Rfl:   •  ondansetron ODT (Zofran ODT) 8 MG disintegrating tablet, Place 1 tablet on the tongue Every 8 (Eight) Hours As Needed for Nausea or Vomiting., Disp: 30 tablet, Rfl: 0  •  pravastatin (PRAVACHOL) 20 MG tablet, Take 20 mg by mouth Daily., Disp: , Rfl:        LABS (Reviewed):  Hematology WBC   Date Value Ref Range Status   01/19/2022 2.35 (L) 3.40 - 10.80 10*3/mm3 Final     RBC   Date Value Ref Range Status   01/19/2022 4.31 3.77 - 5.28 10*6/mm3 Final     Hemoglobin   Date Value Ref Range Status   01/19/2022 12.2 12.0 - 15.9 g/dL Final     Hematocrit   Date Value Ref Range Status   01/19/2022 37.9 34.0 - 46.6 % Final     Platelets   Date Value Ref Range Status   01/19/2022 113 (L) 140 - 450 10*3/mm3 Final      Chemistry   Lab Results   Component Value Date    GLUCOSE 147 (H) 01/19/2022    BUN 18 01/19/2022    CREATININE 0.51 (L) 01/19/2022    EGFRIFNONA 123 01/19/2022    BCR 35.3 (H) 01/19/2022    K 4.1 01/19/2022    CO2 26.3 01/19/2022    CALCIUM 9.0 01/19/2022    ALBUMIN 3.70 01/19/2022    AST 29 01/19/2022    ALT 20 01/19/2022         Physical Exam:         Pulmonary: [] Cough:     [] Dyspnea:  Neck:  [] Lymphadenopathy  Lungs:  [x] Clear to auscultation  CVS:  [x] Regular rate & rhythm  Skin:  Grade: SELECT  GI:  [] Dysphagia:     [x] Esophagitis: grade I-II  Comments/Notes:     [x] Review of labs, images, dosimetry, dose delivered, & treatment parameters.    Comments:     [x] Patient treatment setup reviewed.    Comments:     Recommendations: Calling in [facility]  Asking Dr. Cooley's clinic to consider adding a long-acting pain  medication at this point in treatment to handle tolerance.  Discussed CXR/covid testing but patient/SO did not feel indicated.  Will monitor but told them to have very short threshold--no cough (lungs sounded clear)    [x] Continue RT  [] Change RT Plan [] Hold RT, length:        Approved Electronically By:  Ciro Pelletier MD, 1/24/2022, 13:32 EST          Confidentiality of this medical record shall be maintained except when use or disclosure is required or permitted by law, regulation or written authorization by the patient.

## 2022-01-25 ENCOUNTER — APPOINTMENT (OUTPATIENT)
Dept: RADIATION ONCOLOGY | Facility: HOSPITAL | Age: 62
End: 2022-01-25

## 2022-01-25 ENCOUNTER — TELEPHONE (OUTPATIENT)
Dept: ONCOLOGY | Facility: CLINIC | Age: 62
End: 2022-01-25

## 2022-01-25 PROCEDURE — 77427 RADIATION TX MANAGEMENT X5: CPT | Performed by: RADIOLOGY

## 2022-01-25 PROCEDURE — 77014 CHG CT GUIDANCE RADIATION THERAPY FLDS PLACEMENT: CPT | Performed by: RADIOLOGY

## 2022-01-25 PROCEDURE — 77336 RADIATION PHYSICS CONSULT: CPT | Performed by: RADIOLOGY

## 2022-01-25 PROCEDURE — 77386 CHG INTENSITY MODULATED RADIATION TX DLVR COMPLEX: CPT | Performed by: RADIOLOGY

## 2022-01-25 PROCEDURE — 77386: CPT | Performed by: RADIOLOGY

## 2022-01-25 NOTE — TELEPHONE ENCOUNTER
Called and s/w Irais at [facility]. She was asking the reason pt is needing Fentanyl. Gave her the cancer diagnosis for patient and she v/u. No further questions and she will start processing the Fentanyl.

## 2022-01-25 NOTE — TELEPHONE ENCOUNTER
Caller: BERTHA    Relationship: [facility]     Best call back number: [phone]    What is the best time to reach you: ANYTIME UNITL 7PM     Who are you requesting to speak with (clinical staff, provider,  specific staff member):     Do you know the name of the person who called: BERTHA     What was the call regarding:    SCRIPT SENT OVER FOR FENTANYL PATCHES     NEEDING CLARIFICATION ON DX, PATIENT HAS NOT BEEN ON CHRONIC PAIN MEDS  BEFORE      Do you require a callback: YES

## 2022-01-26 ENCOUNTER — CLINICAL SUPPORT (OUTPATIENT)
Dept: OTHER | Facility: HOSPITAL | Age: 62
End: 2022-01-26

## 2022-01-26 ENCOUNTER — APPOINTMENT (OUTPATIENT)
Dept: RADIATION ONCOLOGY | Facility: HOSPITAL | Age: 62
End: 2022-01-26

## 2022-01-26 ENCOUNTER — OFFICE VISIT (OUTPATIENT)
Dept: ONCOLOGY | Facility: CLINIC | Age: 62
End: 2022-01-26

## 2022-01-26 ENCOUNTER — INFUSION (OUTPATIENT)
Dept: ONCOLOGY | Facility: HOSPITAL | Age: 62
End: 2022-01-26

## 2022-01-26 VITALS
TEMPERATURE: 97.3 F | HEIGHT: 64 IN | SYSTOLIC BLOOD PRESSURE: 109 MMHG | OXYGEN SATURATION: 97 % | DIASTOLIC BLOOD PRESSURE: 59 MMHG | HEART RATE: 97 BPM | WEIGHT: 116.4 LBS | RESPIRATION RATE: 15 BRPM | BODY MASS INDEX: 19.87 KG/M2

## 2022-01-26 DIAGNOSIS — Z79.899 HIGH RISK MEDICATION USE: ICD-10-CM

## 2022-01-26 DIAGNOSIS — G89.3 CANCER RELATED PAIN: ICD-10-CM

## 2022-01-26 DIAGNOSIS — C15.5 MALIGNANT NEOPLASM OF LOWER THIRD OF ESOPHAGUS: Primary | ICD-10-CM

## 2022-01-26 DIAGNOSIS — C15.5 MALIGNANT NEOPLASM OF LOWER THIRD OF ESOPHAGUS: ICD-10-CM

## 2022-01-26 DIAGNOSIS — R63.4 WEIGHT LOSS: ICD-10-CM

## 2022-01-26 DIAGNOSIS — R05.9 COUGH: ICD-10-CM

## 2022-01-26 LAB
ALBUMIN SERPL-MCNC: 3.7 G/DL (ref 3.5–5.2)
ALBUMIN/GLOB SERPL: 1.1 G/DL (ref 1.1–2.4)
ALP SERPL-CCNC: 114 U/L (ref 38–116)
ALT SERPL W P-5'-P-CCNC: 17 U/L (ref 0–33)
ANION GAP SERPL CALCULATED.3IONS-SCNC: 12.6 MMOL/L (ref 5–15)
AST SERPL-CCNC: 24 U/L (ref 0–32)
B PARAPERT DNA SPEC QL NAA+PROBE: NOT DETECTED
B PERT DNA SPEC QL NAA+PROBE: NOT DETECTED
BASOPHILS # BLD AUTO: 0.02 10*3/MM3 (ref 0–0.2)
BASOPHILS NFR BLD AUTO: 0.5 % (ref 0–1.5)
BILIRUB SERPL-MCNC: 0.3 MG/DL (ref 0.2–1.2)
BUN SERPL-MCNC: 19 MG/DL (ref 6–20)
BUN/CREAT SERPL: 40.4 (ref 7.3–30)
C PNEUM DNA NPH QL NAA+NON-PROBE: NOT DETECTED
CALCIUM SPEC-SCNC: 9.2 MG/DL (ref 8.5–10.2)
CHLORIDE SERPL-SCNC: 97 MMOL/L (ref 98–107)
CO2 SERPL-SCNC: 26.4 MMOL/L (ref 22–29)
CREAT SERPL-MCNC: 0.47 MG/DL (ref 0.6–1.1)
DEPRECATED RDW RBC AUTO: 39.3 FL (ref 37–54)
EOSINOPHIL # BLD AUTO: 0 10*3/MM3 (ref 0–0.4)
EOSINOPHIL NFR BLD AUTO: 0 % (ref 0.3–6.2)
ERYTHROCYTE [DISTWIDTH] IN BLOOD BY AUTOMATED COUNT: 12.6 % (ref 12.3–15.4)
FLUAV SUBTYP SPEC NAA+PROBE: NOT DETECTED
FLUBV RNA ISLT QL NAA+PROBE: NOT DETECTED
GFR SERPL CREATININE-BSD FRML MDRD: 135 ML/MIN/1.73
GLOBULIN UR ELPH-MCNC: 3.5 GM/DL (ref 1.8–3.5)
GLUCOSE SERPL-MCNC: 158 MG/DL (ref 74–124)
HADV DNA SPEC NAA+PROBE: NOT DETECTED
HCOV 229E RNA SPEC QL NAA+PROBE: NOT DETECTED
HCOV HKU1 RNA SPEC QL NAA+PROBE: NOT DETECTED
HCOV NL63 RNA SPEC QL NAA+PROBE: NOT DETECTED
HCOV OC43 RNA SPEC QL NAA+PROBE: NOT DETECTED
HCT VFR BLD AUTO: 38 % (ref 34–46.6)
HGB BLD-MCNC: 12.7 G/DL (ref 12–15.9)
HMPV RNA NPH QL NAA+NON-PROBE: NOT DETECTED
HPIV1 RNA ISLT QL NAA+PROBE: NOT DETECTED
HPIV2 RNA SPEC QL NAA+PROBE: NOT DETECTED
HPIV3 RNA NPH QL NAA+PROBE: NOT DETECTED
HPIV4 P GENE NPH QL NAA+PROBE: NOT DETECTED
IMM GRANULOCYTES # BLD AUTO: 0.02 10*3/MM3 (ref 0–0.05)
IMM GRANULOCYTES NFR BLD AUTO: 0.5 % (ref 0–0.5)
LYMPHOCYTES # BLD AUTO: 0.41 10*3/MM3 (ref 0.7–3.1)
LYMPHOCYTES NFR BLD AUTO: 10.6 % (ref 19.6–45.3)
M PNEUMO IGG SER IA-ACNC: NOT DETECTED
MCH RBC QN AUTO: 28.9 PG (ref 26.6–33)
MCHC RBC AUTO-ENTMCNC: 33.4 G/DL (ref 31.5–35.7)
MCV RBC AUTO: 86.6 FL (ref 79–97)
MONOCYTES # BLD AUTO: 0.44 10*3/MM3 (ref 0.1–0.9)
MONOCYTES NFR BLD AUTO: 11.3 % (ref 5–12)
NEUTROPHILS NFR BLD AUTO: 2.99 10*3/MM3 (ref 1.7–7)
NEUTROPHILS NFR BLD AUTO: 77.1 % (ref 42.7–76)
NRBC BLD AUTO-RTO: 0 /100 WBC (ref 0–0.2)
PLATELET # BLD AUTO: 42 10*3/MM3 (ref 140–450)
PMV BLD AUTO: 11.3 FL (ref 6–12)
POTASSIUM SERPL-SCNC: 4.2 MMOL/L (ref 3.5–4.7)
PROT SERPL-MCNC: 7.2 G/DL (ref 6.3–8)
RBC # BLD AUTO: 4.39 10*6/MM3 (ref 3.77–5.28)
RHINOVIRUS RNA SPEC NAA+PROBE: NOT DETECTED
RSV RNA NPH QL NAA+NON-PROBE: NOT DETECTED
SARS-COV-2 RNA NPH QL NAA+NON-PROBE: DETECTED
SODIUM SERPL-SCNC: 136 MMOL/L (ref 134–145)
WBC NRBC COR # BLD: 3.88 10*3/MM3 (ref 3.4–10.8)

## 2022-01-26 PROCEDURE — 85025 COMPLETE CBC W/AUTO DIFF WBC: CPT

## 2022-01-26 PROCEDURE — 0202U NFCT DS 22 TRGT SARS-COV-2: CPT | Performed by: NURSE PRACTITIONER

## 2022-01-26 PROCEDURE — 99215 OFFICE O/P EST HI 40 MIN: CPT | Performed by: NURSE PRACTITIONER

## 2022-01-26 PROCEDURE — 80053 COMPREHEN METABOLIC PANEL: CPT

## 2022-01-26 PROCEDURE — 36591 DRAW BLOOD OFF VENOUS DEVICE: CPT

## 2022-01-26 NOTE — NURSING NOTE
No tx today per Jackie Fajardo, BRANDI. Plt count 42K, COVID swab performed. Pt de-accessed and sent home.     Lab Results   Component Value Date    WBC 3.88 01/26/2022    HGB 12.7 01/26/2022    HCT 38.0 01/26/2022    MCV 86.6 01/26/2022    PLT 42 (L) 01/26/2022

## 2022-01-26 NOTE — PROGRESS NOTES
Subjective Patient noting periodic nausea, difficulty with constipation requiring disimpaction    REASON FOR FOLLOW-UP: Distal esophageal cancer      History of Present Illness   This is a 61 y.o. female with recent diagnosis of distal esophageal cancer, initiating concurrent chemoradiation with carboplatin/Taxol on 1/5/2022. She presents to the office today with her partner. Unfortunately, the patient reports she is not feeling well today. She denies fevers. She reports having a cough that is occasionally productive. She denies other symptoms related to illness. She reports that overall she just does not feel well. Her partner reports that last week she did not feel well but was not Covid tested. The patient is not vaccinated against Covid.  Will obtain Covid testing today.    She reports experiencing some nausea. However, she is taking zofran She continues to receive continuous feeding at night via pump. She is currently getting around 12 hours of continuous feeds at night.  She is now taking very little by mouth.    Patient reports her pain to be a zero in office today. However, she does continue to have short episodes of sharp pain to her back.  Yesterday she was started on fentanyl patches.  Patient states she is unsure if this is helped improve her pain.  She is utilizing these patches along with Hycet for breakthrough pain every 6 hours.  She also reports that some of her pain is to the throat region.  She was encouraged to utilize the mouth wash provided to her by Dr. Pelletier to help alleviate irritation due to radiation.       ONCOLOGY HISTORY     The patient is a 61-year-old female who had been seen in multidisciplinary thoracic oncology conference with complaints of painful swallowing and dysphagia over the previous several months with 10 to 15 pound weight loss.  This led to EGD and colonoscopy with normal colonoscopy but EGD demonstrated tumor at the GE junction with biopsies consistent with severe  dysplasia including carcinoma in situ.  The patient states that she saw a number of physicians and attempting to have a diagnosis completed.  CT of chest demonstrated thickening of the mid esophagus with no lymphadenopathy and CT PET demonstrated uptake in the esophagus gastropathic lymph nodes it is felt that she likely had early stage carcinoma of the esophagus and that we could present to record with surgical resection.  Endoscopic ultrasound, however, was requested and we could not have this done any sooner than GI physicians available at [facility].     This was performed 12/7/2021 revealing a 5 cm esophageal mass present in the distal third esophagus without extension into the GE junction into the cardia, the mass extended into into the muscularis propria into the adventitia not penetrating through the adventitia with a single 1 cm gastrohepatic/celiac axis lymph node and FNB x1 performed-?  T2 N1 M0 distal esophageal cancer-clinical stage III, pathologic stage IIIa  In contacting the [facility] pathology department the results of this FNB are not yet available and will be by 12/9/2021.      The patient is seen with her sister and son all indicating that she has had difficulty with swallowing since late summer likely July 2021 and has been attempting to have a diagnosis made since that time.  They are all somewhat frustrated about the length of time to obtain an answer for this problem and we have spent considerable time discussing her findings thus far and how we might proceed to treat what appears to be a contained malignancy.  This has, additionally, been discussed with Dr. Stef cruz who feels that she is not immediately and operative candidate and should proceed with chemo radiation therapy initially-neoadjuvant treatment before consideration for subsequent surgery.  This has been discussed with the patient, her sister and her son again in detail 12/8/2021.  We went on to initiate pain  "control with liquid hydrocodone, referred the patient to general surgery and oncology nutrition.    The patient was seen by  who proceeded 12/15/2021 to PowerPort placement and open jejunostomy.  She was seen during her hospital stay by oncology nutrition required hospitalization up to including 12/19/2021.    The patient required hospitalization to 12/20/2021 and after discharge along with her partner's health was able to gradually improve her caloric intake and was seen back 12/27 by Dr. Hollis who felt she had improved enough to initiate chemotherapy.  The patient was seen by radiation therapy 12/21 with plans to proceed with 40 CT simulation and planning-IMRT/IGR T-4140 cGy in 23 fractions with pleased to be considered.    The patient is seen back with her partner Cristy 12/29/2021 now able to \"manage\" and we have discussed extensively concurrent chemotherapy radiation therapy using Carboplatin/Taxol weekly.    Currently the patient is scheduled to begin radiation therapy 1/3/2021, undergoing teaching 1/4/2022 and will begin concurrent chemotherapy 1/5/2022      Past Medical History:   Diagnosis Date   • A-fib (HCC)    • Boil, breast 12/13/2021    HEALING UNDER LEFT BREAST    • Disease of thyroid gland    • Esophageal cancer (HCC)    • Gastric ulcer    • GERD (gastroesophageal reflux disease)    • Hyperlipidemia    • Hypertension    • Irritable bowel    • Mitral valve prolapse     FOLLOWED BY          Past Surgical History:   Procedure Laterality Date   • CHOLECYSTECTOMY     • COLONOSCOPY  08/27/2021    [facility]   • EXTERNAL EAR SURGERY     • JEJUNOSTOMY N/A 12/15/2021    Procedure: open JEJUNOSTOMY tube placement;  Surgeon: Bhanu Hollis MD;  Location: [facility];  Service: General;  Laterality: N/A;   • KNEE SURGERY Left    • REPLACEMENT TOTAL KNEE Left    • UPPER ENDOSCOPIC ULTRASOUND W/ FNA N/A 12/7/2021    Procedure: Esophagogastroduodenoscopy with endoscopic ultrasound  " WITH STAGING AND FINE NEEDLE BIOPSY;  Surgeon: Amrit Green MD;  Location: [facility] ENDOSCOPY;  Service: Gastroenterology;  Laterality: N/A;  post op: inlet patch, esophageal mass, T2   • VENOUS ACCESS DEVICE (PORT) INSERTION Left 12/15/2021    Procedure: INSERTION OF PORTACATH;  Surgeon: Bhanu Hollis MD;  Location: [facility] MAIN OR;  Service: General;  Laterality: Left;        Current Outpatient Medications on File Prior to Visit   Medication Sig Dispense Refill   • aluminum-magnesium hydroxide 200-200 MG/5ML suspension, diphenhydrAMINE 12.5 MG/5ML liquid, Lidocaine Viscous HCl 2 % solution, nystatin 100,000 unit/mL suspension Swish and swallow 10 mL 4 (Four) Times a Day After Meals & at Bedtime. 400 mL 2   • Eliquis 5 MG tablet tablet Take 5 mg by mouth Every 12 (Twelve) Hours. LD 12/4     • fentaNYL (DURAGESIC) 12 MCG/HR Place 1 patch on the skin as directed by provider Every 72 (Seventy-Two) Hours. 10 patch 0   • HYDROcodone-acetaminophen (HYCET) 7.5-325 MG/15ML solution Take 15 mL by mouth Every 6 (Six) Hours As Needed for Moderate Pain . 236 mL 0   • lansoprazole (Prevacid SoluTab) 30 MG Tablet Delayed Release Dispersible disintegrating tablet Take 1 tablet by mouth 2 (Two) Times a Day. 30 tablet 0   • methIMAzole (TAPAZOLE) 10 MG tablet Take 5 mg by mouth Daily. Take DOS     • metoprolol tartrate (LOPRESSOR) 50 MG tablet Take 50 mg by mouth 2 (Two) Times a Day. Take DOS     • nystatin in Lidocaine Viscous HCl solution-diphenhydrAMINE liquid-aluminum-magnesium hydroxide-simethicone suspension Swish and swallow 10 mL by mouth 4 times per day after meals and at bedtime. 400 mL 2   • ondansetron ODT (Zofran ODT) 8 MG disintegrating tablet Place 1 tablet on the tongue Every 8 (Eight) Hours As Needed for Nausea or Vomiting. 30 tablet 0   • pravastatin (PRAVACHOL) 20 MG tablet Take 20 mg by mouth Daily.     • sucralfate (CARAFATE) 1 GM/10ML suspension Take 1 g by mouth 4 (Four) Times a Day.     •  "[DISCONTINUED] apixaban (ELIQUIS) 5 MG tablet tablet Take 5 mg by mouth.       No current facility-administered medications on file prior to visit.        ALLERGIES:    Allergies   Allergen Reactions   • Codeine Hives     Patient states this is when she was very young.         Social History     Socioeconomic History   • Marital status: Single   • Number of children: 1   • Years of education: High school   Tobacco Use   • Smoking status: Current Every Day Smoker     Packs/day: 1.00     Years: 40.00     Pack years: 40.00     Types: Cigarettes     Start date: 1981   • Smokeless tobacco: Never Used   • Tobacco comment: last cigarette 12/15 @ 0800   Vaping Use   • Vaping Use: Never used   Substance and Sexual Activity   • Alcohol use: Not Currently   • Drug use: Never   • Sexual activity: Defer        Family History   Problem Relation Age of Onset   • Breast cancer Mother    • Diabetes Mother    • Bipolar disorder Father    • Diabetes Father    • Hypertension Father    • Breast cancer Sister    • COPD Sister    • Diabetes Sister    • Hypertension Sister    • Alcohol abuse Brother    • Asthma Brother    • Bipolar disorder Brother    • Diabetes Brother    • Seizures Brother    • Breast cancer Maternal Grandmother    • Diabetes Maternal Grandmother    • Diabetes Maternal Grandfather    • Diabetes Paternal Grandmother    • Diabetes Paternal Grandfather    • Hypertension Son    • Kidney cancer Sister    • Hypertension Sister    • Diabetes Sister    • Malig Hyperthermia Neg Hx           Objective     Vitals:    01/26/22 0854   BP: 109/59   Pulse: 97   Resp: 15   Temp: 97.3 °F (36.3 °C)   TempSrc: Temporal   SpO2: 97%   Weight: 52.8 kg (116 lb 6.4 oz)   Height: 162.6 cm (64.02\")   PainSc: 0-No pain     Current Status 1/26/2022   ECOG score 1       Physical Exam  Constitutional:       Appearance: Normal appearance.   HENT:      Head: Normocephalic and atraumatic.      Nose: Nose normal.      Mouth/Throat:      Mouth: Mucous " membranes are moist.      Pharynx: Oropharynx is clear.   Eyes:      Extraocular Movements: Extraocular movements intact.      Conjunctiva/sclera: Conjunctivae normal.      Pupils: Pupils are equal, round, and reactive to light.   Cardiovascular:      Rate and Rhythm: Normal rate and regular rhythm.      Pulses: Normal pulses.      Heart sounds: Normal heart sounds.   Pulmonary:      Effort: Pulmonary effort is normal.      Breath sounds: Normal breath sounds.   Abdominal:      General: Bowel sounds are normal.      Palpations: Abdomen is soft. There is mass (Fullness epigastric region).      Tenderness: There is abdominal tenderness (Midepigastrium).      Comments: J-tube clean, dry, and intact   Musculoskeletal:         General: Normal range of motion.      Cervical back: Normal range of motion.   Skin:     General: Skin is warm and dry.      Findings: No rash.   Neurological:      General: No focal deficit present.      Mental Status: She is alert and oriented to person, place, and time.   Psychiatric:         Mood and Affect: Mood normal.     I have reexamined the patient and the results are consistent with the previously documented exam. BRANDI Matias       RECENT LABS:  Results from last 7 days   Lab Units 01/26/22  0808   WBC 10*3/mm3 3.88   NEUTROS ABS 10*3/mm3 2.99   HEMOGLOBIN g/dL 12.7   HEMATOCRIT % 38.0   PLATELETS 10*3/mm3 42*     Results from last 7 days   Lab Units 01/26/22  0808   SODIUM mmol/L 136   POTASSIUM mmol/L 4.2   CHLORIDE mmol/L 97*   CO2 mmol/L 26.4   BUN mg/dL 19   CREATININE mg/dL 0.47*   CALCIUM mg/dL 9.2   ALBUMIN g/dL 3.70   BILIRUBIN mg/dL 0.3   ALK PHOS U/L 114   ALT (SGPT) U/L 17   AST (SGOT) U/L 24   GLUCOSE mg/dL 158*         *Positive COVID-19 test today 1/26/2022    Labs reviewed 1/26/2022     Assessment/Plan          61-year-old female with a history of hyperthyroidism, previous gastric ulcer, GE reflux, atrial fibrillation, hyperlipidemia, hypertension irritable  bowel syndrome,          1.  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  · here has been a number of physicians involved in the patient's case and several months before she was ultimately seen by Dr. Finch at [facility].   · CT scan of the chest demonstrating thickening of the mid esophagus with no lymphadenopathy and PET/CT demonstrated uptake in the mid to distal esophagus with SUV intensely elevated at 16 with a few nonenlarged left hilar and gastrohepatic lymph nodes with mild increased FDG activity, few scattered subcentimeter pulmonary nodules bilaterally indeterminate.  · EUS was felt necessary and ultimately obtained at [facility] performed 12/7/2021 revealing a 5 cm esophageal mass present in the distal third esophagus without extension into the GE junction into the cardia, the mass extended into into the muscularis propria into the adventitia not penetrating through the adventitia with a single 1 cm gastrohepatic/celiac axis lymph node and FNB x1 performed-?   · Discussed with Dr. Finch directly who feels that she is not immediately and operative candidate and should proceed with chemo radiation therapy initially-neoadjuvant treatment before consideration for subsequent surgery. Discussed extensively concurrent chemotherapy radiation therapy using Carboplatin/Taxol weekly.  ·  who proceeded 12/15/2021 to PowerPort placement and open jejunostomy.  She was seen during her hospital stay by oncology nutrition required hospitalization up to including 12/19/2021.  · seen by radiation therapy 12/21 with plans to proceed with 40 CT simulation and planning-IMRT/IGR T-4140 cGy in 23 fractions with pleased to be  considered.   · Radiation therapy initiated 1/3/2022  · 1/5/2021 cycle 1 weekly carboplatin/Taxol.    · Patient status post week 2 on 1/12/2022  · Patient treated 1/19/2022-week 3  · 1/26/2022, hold treatment today due to platelet counts 42,000 and feeling unwell.  She reports increase cough but denies fevers, chills, or other signs of infection.  She reports some blood when blowing her nose but denies other bleeding concerns.Respiratory panel with Covid PCR obtained today in office.  Unfortunately, this panel revealed a positive Covid test.  Patient called and updated of the status.  Plan to hold treatment for 1 week and resume next week as planned.     2.  Nutrition  · Use of Jejunostomy tube. Continuous feed currently 12 hours a night, slowly working up to recommended 16 hours/day at 90 cc/h  · She is still trying to drink at least 1 boost a day and able to take pills by mouth  · 01/26/2022, She is taking very little and by mouth.She has yet to work up to the recommended 16 hour/day continuous feed. She continues to get 12 hours of continuous feed a day. She has a 3 pound weight loss today.  Continue follow-up with oncology dietitian and closely monitor patient's weight.    3.  A. fib: On chronic anticoagulation with Eliquis 5 mg twice daily.  · 01/26/2022: Positive Covid-19 diagnosis today and platelet count 42,000. Discussed case with Dr. Augustine, patient to hold Eliquis 5 mg until she returns to office in one week. We will readdress restarting anticoagulation once her counts have recovered.     4. Narcotic induced constipation  · Patient using stool softeners each morning, MiraLAX daily and milk of magnesia daily.  · No concerns with constipation today    5.  Mild chemotherapy-induced nausea/dry heaving  · Positive to Zofran.   · She continues use of disintegrating Zofran tablets.    6. Chemotherapy-induced Thrombocytopenia  · 1/26/2022, platelet count 42,000. She reports she has noticed blood when blowing her  now. However, she denies persistent nosebleeds or other signs of bleeding.    7. COVID-19 positive  · 01/26/2022      Plan:  1. Hold treatment today due to thrombocytopenia   2. Respiratory panel Covid PCR in office today-positive Covid 19 result  3. Continue feeding via J-tube as outline and continue follow up with oncology dietitian.   4. Encouraged additional fluids through J-tube to decreased oral intake  5. Discussed patient with Dr. Augustine and was advised to hold Eliquis 5 mg daily due to thrombocytopenia. We will reevaluate restarting Eliquis week at her follow-up appointment pending her platelet count.  6. Continue Hycet 7.5-325 for breakthrough pain  7. Continue use of fentanyl patches 12mcg/hr every 72 hours  8. Return in 1 week for NP visit and delayed Carboplatin/Taxol  9. Continue Zofran as needed for nausea  10. Patient to closely monitor symptoms and call our office with any new concerns    Patient continues on high risk medication requiring close monitoring    BRANDI Matias    The patient was seen by both me and Brittany PAZ today, the plan of care was discussed.  I reviewed the progress note and agree with her documentation except as edited. BRANDI Matias

## 2022-01-26 NOTE — PROGRESS NOTES
Outpatient Oncology Nutrition      Patient Name:  Maya Ribera  YOB: 1960  MRN: [id number]    Assessment Date:  1/26/2022    Comments:  Follow up. Spoke to the patient's partner Cristy yesterday on the phone twice and today in the waiting area. No treatment today due to counts. Reports the patient has been congested and has not been feeling well, not able to take anything by mouth. Pain management being addressed, starting on fentanyl patch. She had trouble getting the Pati's magic filled and once she tried it she gagged on it. Has not tried it again.   Continues TF for 12 hours continuously at night (750ml Nutren 2.0 with 250ml water added at 90cc/hr, plus 60cc water before and after, plus 120ml water q 4 hours during the day. Continue to encourage them to try to infuse at least another carton during the day while she is at home so that her nutrition needs are met especially since she is no longer taking any po.   Will continue to follow closely.     Electronically signed by:  Tracey Lozano RD, DEVIN  01/26/22 15:18 EST

## 2022-01-28 ENCOUNTER — TELEPHONE (OUTPATIENT)
Dept: ONCOLOGY | Facility: CLINIC | Age: 62
End: 2022-01-28

## 2022-01-28 DIAGNOSIS — C15.5 MALIGNANT NEOPLASM OF LOWER THIRD OF ESOPHAGUS: ICD-10-CM

## 2022-01-28 NOTE — TELEPHONE ENCOUNTER
Called pt and informed her that we will need to move her chemo from 2/3 or 2/9 (if she wants to stay on Wednesdays) as pt tested positive for COVID on 1/26 but become symptomatic on 1/23. Per Jackie Fajardo NP the earliest she could come back would be 2/3. Pt states she would like to come back on 2/3. Message sent to scheduling.     S/W Jackie RN with radiation. She states they will treat pt on 2/2 if pt gets a CBC in the afternoon (via drive-thru) prior to radiation. Pt is scheduled for this and aware of times.

## 2022-01-31 DIAGNOSIS — C15.5 MALIGNANT NEOPLASM OF LOWER THIRD OF ESOPHAGUS: Primary | ICD-10-CM

## 2022-02-01 ENCOUNTER — APPOINTMENT (OUTPATIENT)
Dept: RADIATION ONCOLOGY | Facility: HOSPITAL | Age: 62
End: 2022-02-01

## 2022-02-02 ENCOUNTER — APPOINTMENT (OUTPATIENT)
Dept: ONCOLOGY | Facility: HOSPITAL | Age: 62
End: 2022-02-02

## 2022-02-02 ENCOUNTER — TELEPHONE (OUTPATIENT)
Dept: RADIATION ONCOLOGY | Facility: HOSPITAL | Age: 62
End: 2022-02-02

## 2022-02-02 ENCOUNTER — LAB (OUTPATIENT)
Dept: LAB | Facility: HOSPITAL | Age: 62
End: 2022-02-02

## 2022-02-02 ENCOUNTER — APPOINTMENT (OUTPATIENT)
Dept: RADIATION ONCOLOGY | Facility: HOSPITAL | Age: 62
End: 2022-02-02

## 2022-02-02 DIAGNOSIS — C15.5 MALIGNANT NEOPLASM OF LOWER THIRD OF ESOPHAGUS: ICD-10-CM

## 2022-02-02 DIAGNOSIS — C15.5 MALIGNANT NEOPLASM OF LOWER THIRD OF ESOPHAGUS: Primary | ICD-10-CM

## 2022-02-02 LAB
ALBUMIN SERPL-MCNC: 3.7 G/DL (ref 3.5–5.2)
ALBUMIN/GLOB SERPL: 1 G/DL (ref 1.1–2.4)
ALP SERPL-CCNC: 117 U/L (ref 38–116)
ALT SERPL W P-5'-P-CCNC: 33 U/L (ref 0–33)
ANION GAP SERPL CALCULATED.3IONS-SCNC: 11.6 MMOL/L (ref 5–15)
AST SERPL-CCNC: 38 U/L (ref 0–32)
BASOPHILS # BLD AUTO: 0.01 10*3/MM3 (ref 0–0.2)
BASOPHILS NFR BLD AUTO: 0.4 % (ref 0–1.5)
BILIRUB SERPL-MCNC: 0.5 MG/DL (ref 0.2–1.2)
BUN SERPL-MCNC: 23 MG/DL (ref 6–20)
BUN/CREAT SERPL: 48.9 (ref 7.3–30)
CALCIUM SPEC-SCNC: 10 MG/DL (ref 8.5–10.2)
CHLORIDE SERPL-SCNC: 99 MMOL/L (ref 98–107)
CO2 SERPL-SCNC: 30.4 MMOL/L (ref 22–29)
CREAT SERPL-MCNC: 0.47 MG/DL (ref 0.6–1.1)
DEPRECATED RDW RBC AUTO: 39.4 FL (ref 37–54)
EOSINOPHIL # BLD AUTO: 0 10*3/MM3 (ref 0–0.4)
EOSINOPHIL NFR BLD AUTO: 0 % (ref 0.3–6.2)
ERYTHROCYTE [DISTWIDTH] IN BLOOD BY AUTOMATED COUNT: 12.4 % (ref 12.3–15.4)
GFR SERPL CREATININE-BSD FRML MDRD: 135 ML/MIN/1.73
GLOBULIN UR ELPH-MCNC: 3.7 GM/DL (ref 1.8–3.5)
GLUCOSE SERPL-MCNC: 125 MG/DL (ref 74–124)
HCT VFR BLD AUTO: 35.3 % (ref 34–46.6)
HGB BLD-MCNC: 11.2 G/DL (ref 12–15.9)
IMM GRANULOCYTES # BLD AUTO: 0 10*3/MM3 (ref 0–0.05)
IMM GRANULOCYTES NFR BLD AUTO: 0 % (ref 0–0.5)
LYMPHOCYTES # BLD AUTO: 1.18 10*3/MM3 (ref 0.7–3.1)
LYMPHOCYTES NFR BLD AUTO: 50 % (ref 19.6–45.3)
MCH RBC QN AUTO: 28.2 PG (ref 26.6–33)
MCHC RBC AUTO-ENTMCNC: 31.7 G/DL (ref 31.5–35.7)
MCV RBC AUTO: 88.9 FL (ref 79–97)
MONOCYTES # BLD AUTO: 0.23 10*3/MM3 (ref 0.1–0.9)
MONOCYTES NFR BLD AUTO: 9.7 % (ref 5–12)
NEUTROPHILS NFR BLD AUTO: 0.94 10*3/MM3 (ref 1.7–7)
NEUTROPHILS NFR BLD AUTO: 39.9 % (ref 42.7–76)
NRBC BLD AUTO-RTO: 0 /100 WBC (ref 0–0.2)
PLATELET # BLD AUTO: 40 10*3/MM3 (ref 140–450)
PMV BLD AUTO: 9.5 FL (ref 6–12)
POTASSIUM SERPL-SCNC: 4.3 MMOL/L (ref 3.5–4.7)
PROT SERPL-MCNC: 7.4 G/DL (ref 6.3–8)
RBC # BLD AUTO: 3.97 10*6/MM3 (ref 3.77–5.28)
SODIUM SERPL-SCNC: 141 MMOL/L (ref 134–145)
WBC NRBC COR # BLD: 2.36 10*3/MM3 (ref 3.4–10.8)

## 2022-02-02 PROCEDURE — 80053 COMPREHEN METABOLIC PANEL: CPT

## 2022-02-02 PROCEDURE — 85025 COMPLETE CBC W/AUTO DIFF WBC: CPT

## 2022-02-02 PROCEDURE — 36415 COLL VENOUS BLD VENIPUNCTURE: CPT

## 2022-02-03 ENCOUNTER — APPOINTMENT (OUTPATIENT)
Dept: ONCOLOGY | Facility: HOSPITAL | Age: 62
End: 2022-02-03

## 2022-02-03 ENCOUNTER — TELEPHONE (OUTPATIENT)
Dept: ONCOLOGY | Facility: CLINIC | Age: 62
End: 2022-02-03

## 2022-02-03 NOTE — TELEPHONE ENCOUNTER
Caller: COLLEEN    Relationship to patient: Self    Best call back number: [phone]    Chief complaint: CANC./JAILYN., DUE TO WEATHER.    Type of visit: PORT FLUSH/FOLLOW UP & INFUSION     If rescheduling, when is the original appointment: 2/3/2022      Additional notes:  PATIENT COULD NOT GET RADIATION YESTERDAY AS PLATELETS WERE DOWN, FYI, ANDRES ASKED ME TO SEND ENCOUNTER TO .

## 2022-02-07 ENCOUNTER — APPOINTMENT (OUTPATIENT)
Dept: RADIATION ONCOLOGY | Facility: HOSPITAL | Age: 62
End: 2022-02-07

## 2022-02-07 ENCOUNTER — LAB (OUTPATIENT)
Dept: LAB | Facility: HOSPITAL | Age: 62
End: 2022-02-07

## 2022-02-07 DIAGNOSIS — C15.5 MALIGNANT NEOPLASM OF LOWER THIRD OF ESOPHAGUS: ICD-10-CM

## 2022-02-07 LAB
BASOPHILS # BLD AUTO: 0.02 10*3/MM3 (ref 0–0.2)
BASOPHILS NFR BLD AUTO: 0.6 % (ref 0–1.5)
DEPRECATED RDW RBC AUTO: 38.7 FL (ref 37–54)
EOSINOPHIL # BLD AUTO: 0.02 10*3/MM3 (ref 0–0.4)
EOSINOPHIL NFR BLD AUTO: 0.6 % (ref 0.3–6.2)
ERYTHROCYTE [DISTWIDTH] IN BLOOD BY AUTOMATED COUNT: 13.1 % (ref 12.3–15.4)
HCT VFR BLD AUTO: 32.3 % (ref 34–46.6)
HGB BLD-MCNC: 10.9 G/DL (ref 12–15.9)
IMM GRANULOCYTES # BLD AUTO: 0.05 10*3/MM3 (ref 0–0.05)
IMM GRANULOCYTES NFR BLD AUTO: 1.4 % (ref 0–0.5)
LYMPHOCYTES # BLD AUTO: 1.37 10*3/MM3 (ref 0.7–3.1)
LYMPHOCYTES NFR BLD AUTO: 39 % (ref 19.6–45.3)
MCH RBC QN AUTO: 29.4 PG (ref 26.6–33)
MCHC RBC AUTO-ENTMCNC: 33.7 G/DL (ref 31.5–35.7)
MCV RBC AUTO: 87.1 FL (ref 79–97)
MONOCYTES # BLD AUTO: 0.68 10*3/MM3 (ref 0.1–0.9)
MONOCYTES NFR BLD AUTO: 19.4 % (ref 5–12)
NEUTROPHILS NFR BLD AUTO: 1.37 10*3/MM3 (ref 1.7–7)
NEUTROPHILS NFR BLD AUTO: 39 % (ref 42.7–76)
NRBC BLD AUTO-RTO: 0 /100 WBC (ref 0–0.2)
PLATELET # BLD AUTO: 118 10*3/MM3 (ref 140–450)
PMV BLD AUTO: 10.2 FL (ref 6–12)
RBC # BLD AUTO: 3.71 10*6/MM3 (ref 3.77–5.28)
WBC NRBC COR # BLD: 3.51 10*3/MM3 (ref 3.4–10.8)

## 2022-02-07 PROCEDURE — 77386 CHG INTENSITY MODULATED RADIATION TX DLVR COMPLEX: CPT | Performed by: RADIOLOGY

## 2022-02-07 PROCEDURE — 77386: CPT | Performed by: RADIOLOGY

## 2022-02-07 PROCEDURE — 36415 COLL VENOUS BLD VENIPUNCTURE: CPT

## 2022-02-07 PROCEDURE — 85025 COMPLETE CBC W/AUTO DIFF WBC: CPT

## 2022-02-07 RX ORDER — LANSOPRAZOLE 30 MG/1
TABLET, ORALLY DISINTEGRATING, DELAYED RELEASE ORAL
Qty: 60 TABLET | Refills: 1 | Status: SHIPPED | OUTPATIENT
Start: 2022-02-07 | End: 2022-04-06

## 2022-02-08 ENCOUNTER — APPOINTMENT (OUTPATIENT)
Dept: RADIATION ONCOLOGY | Facility: HOSPITAL | Age: 62
End: 2022-02-08

## 2022-02-08 ENCOUNTER — OFFICE VISIT (OUTPATIENT)
Dept: SURGERY | Facility: CLINIC | Age: 62
End: 2022-02-08

## 2022-02-08 VITALS
HEIGHT: 64 IN | BODY MASS INDEX: 19.81 KG/M2 | DIASTOLIC BLOOD PRESSURE: 72 MMHG | HEART RATE: 49 BPM | WEIGHT: 116 LBS | SYSTOLIC BLOOD PRESSURE: 112 MMHG | OXYGEN SATURATION: 98 %

## 2022-02-08 DIAGNOSIS — C15.5 MALIGNANT NEOPLASM OF LOWER THIRD OF ESOPHAGUS: Primary | ICD-10-CM

## 2022-02-08 PROCEDURE — 77386: CPT | Performed by: RADIOLOGY

## 2022-02-08 PROCEDURE — 99213 OFFICE O/P EST LOW 20 MIN: CPT | Performed by: THORACIC SURGERY (CARDIOTHORACIC VASCULAR SURGERY)

## 2022-02-08 PROCEDURE — 77386 CHG INTENSITY MODULATED RADIATION TX DLVR COMPLEX: CPT | Performed by: RADIOLOGY

## 2022-02-08 NOTE — PROGRESS NOTES
"Chief Complaint  Adenocarcinoma of the esophagus.    Subjective          Maya Ribera presents to [facility] THORACIC SURGERY  History of Present Illness     Ms. Ribera was seen in the office today with her life partner. She has adenocarcinoma of the distal esophagus and has been undergoing neoadjuvant radiation chemotherapy. Chemotherapy has been interrupted on at least 2 occasions because of thrombocytopenia and decreased white count. Patient has lost approximately 20 pounds since the initiation of treatment. She seems to be tolerating the tube feedings and has been taking some nutrition by mouth. Her partner is very concerned about the weight loss. They are here today asking about proceeding on with surgical resection.    Objective   Vital Signs:   /72 (BP Location: Right arm, Patient Position: Sitting, Cuff Size: Adult)   Pulse (!) 49   Ht 162.6 cm (64\")   Wt 52.6 kg (116 lb)   SpO2 98%   BMI 19.91 kg/m²     Physical Exam     Neck: There is no cervical or supraclavicular adenopathy    Pulmonary: Lungs are clear to auscultation with equal breath sounds bilaterally    Cardiac: Irregular rhythm. No murmur or gallop. No dependent edema.    Abdomen: Feeding tube is in place. Suture has broken. Tube is slipped out approximately 1 cm. Abdomen is soft and nontender. No masses. Good bowel sounds.    Result Review :   The following data was reviewed by: Hayder Finch III, MD on 02/08/2022:    There were no x-rays or other tests to review today. I did call Dr. Harrell and discussed the case with him. Patient will need at least 2 more cycles of chemotherapy. Radiation therapy should end sometime next week. We have decided to present this case to the multidisciplinary thoracic oncology conference later this week.         Assessment and Plan    Diagnoses and all orders for this visit:    1. Malignant neoplasm of lower third of esophagus (HCC) (Primary)      Patient is having quite a bit " of difficulty with her neoadjuvant therapy. She has lost about 20 pounds. I am not sure that she is going to be a good candidate for esophagectomy. The question is is should we just finish radiation and chemotherapy as her primary treatment and follow her. We plan to discuss this in the multidisciplinary thoracic oncology conference later this week. If all agree we will finish out her neoadjuvant therapy and then pursue endoscopy and PET scan approximately 6 weeks after the completion of her treatment.    I spent 23 minutes caring for Maya on this date of service. This time includes time spent by me in the following activities:preparing for the visit, reviewing tests, obtaining and/or reviewing a separately obtained history, performing a medically appropriate examination and/or evaluation , counseling and educating the patient/family/caregiver, ordering medications, tests, or procedures, referring and communicating with other health care professionals , documenting information in the medical record, independently interpreting results and communicating that information with the patient/family/caregiver and care coordination  Follow Up   Return for Return to the office after chemotherapy and radiation therapy have been completed..  Patient was given instructions and counseling regarding her condition or for health maintenance advice. Please see specific information pulled into the AVS if appropriate.

## 2022-02-09 ENCOUNTER — MDT ASSESSMENT (OUTPATIENT)
Dept: OTHER | Facility: HOSPITAL | Age: 62
End: 2022-02-09

## 2022-02-09 ENCOUNTER — RADIATION ONCOLOGY WEEKLY ASSESSMENT (OUTPATIENT)
Dept: RADIATION ONCOLOGY | Facility: HOSPITAL | Age: 62
End: 2022-02-09

## 2022-02-09 ENCOUNTER — APPOINTMENT (OUTPATIENT)
Dept: RADIATION ONCOLOGY | Facility: HOSPITAL | Age: 62
End: 2022-02-09

## 2022-02-09 VITALS
HEART RATE: 111 BPM | BODY MASS INDEX: 19.91 KG/M2 | OXYGEN SATURATION: 97 % | WEIGHT: 116 LBS | DIASTOLIC BLOOD PRESSURE: 66 MMHG | SYSTOLIC BLOOD PRESSURE: 108 MMHG

## 2022-02-09 DIAGNOSIS — C15.5 MALIGNANT NEOPLASM OF LOWER THIRD OF ESOPHAGUS: Primary | ICD-10-CM

## 2022-02-09 PROCEDURE — 77386: CPT | Performed by: RADIOLOGY

## 2022-02-09 PROCEDURE — FACE2FACE: Performed by: RADIOLOGY

## 2022-02-09 PROCEDURE — 77386 CHG INTENSITY MODULATED RADIATION TX DLVR COMPLEX: CPT | Performed by: RADIOLOGY

## 2022-02-09 NOTE — PROGRESS NOTES
Patient Name: Maya Ribera Date: 2022       : 1960 Physician:        MRN #: [id number] Diagnosis:   Encounter Diagnosis   Name Primary?   • Malignant neoplasm of lower third of esophagus (HCC) Yes                RADIATION ON TREATMENT VISIT NOTE - ABDOMEN    Treatment Summary    [x] Concurrent Chemo   Regimen: Chemo regimen Has been on hold, low ANC, low plts, COVID; labs tomorrow--XRT restarted after her break due to the above/covid on 2022      Treatment Site Ref. ID Energy Dose/Fx (cGy) #Fx Dose Correction (cGy) Total Dose (cGy) Start Date End Date Elapsed Days   RA Esoph 41 Rx: Esophagus 6X 180  0 4,140 1/3/2022 2/15/2022 43        0         Intent:neoadjuvant  General:           Review of Systems      [] No new complaints []     Appetite [] Nausea  [] Vomiting [] Bloating/”gassy” [] Loose stools/diarrhea  [] Bloody stool [] Jaundice []     Frequency of voiding  [x] Fatigue,  severity: 1 Relief w/ Rest  [] Pain,  severity: ----------------,  location:     Nausea regimen: NONE   Pain medication regimen: NONE   Bowel/proctitis regimen: NONE   Skin regimen: Aquaphor     Comments/Notes:  Esophagitis has improved  No further COVID issues.    KPS: 80%       Vital Signs: /66   Pulse 111   Wt 52.6 kg (116 lb)   SpO2 97%   BMI 19.91 kg/m²     Weight:   Wt Readings from Last 3 Encounters:   22 52.6 kg (116 lb)   22 52.6 kg (116 lb)   22 52.8 kg (116 lb 6.4 oz)       Medication:   Current Outpatient Medications:   •  aluminum-magnesium hydroxide 200-200 MG/5ML suspension, diphenhydrAMINE 12.5 MG/5ML liquid, Lidocaine Viscous HCl 2 % solution, nystatin 100,000 unit/mL suspension, Swish and swallow 10 mL 4 (Four) Times a Day After Meals & at Bedtime., Disp: 400 mL, Rfl: 2  •  Eliquis 5 MG tablet tablet, Take 5 mg by mouth Every 12 (Twelve) Hours. LD , Disp: , Rfl:   •  fentaNYL (DURAGESIC) 12 MCG/HR, Place 1 patch on the skin as directed by provider Every 72  (Seventy-Two) Hours., Disp: 10 patch, Rfl: 0  •  HYDROcodone-acetaminophen (HYCET) 7.5-325 MG/15ML solution, Take 15 mL by mouth Every 6 (Six) Hours As Needed for Moderate Pain ., Disp: 236 mL, Rfl: 0  •  lansoprazole (PREVACID SOLUTAB) 30 MG Tablet Delayed Release Dispersible disintegrating tablet, TAKE ONE TABLET BY MOUTH TWICE A DAY, Disp: 60 tablet, Rfl: 1  •  methIMAzole (TAPAZOLE) 10 MG tablet, Take 5 mg by mouth Daily. Take DOS, Disp: , Rfl:   •  metoprolol tartrate (LOPRESSOR) 50 MG tablet, Take 50 mg by mouth 2 (Two) Times a Day. Take DOS, Disp: , Rfl:   •  nystatin in Lidocaine Viscous HCl solution-diphenhydrAMINE liquid-aluminum-magnesium hydroxide-simethicone suspension, Swish and swallow 10 mL by mouth 4 times per day after meals and at bedtime., Disp: 400 mL, Rfl: 2  •  ondansetron ODT (Zofran ODT) 8 MG disintegrating tablet, Place 1 tablet on the tongue Every 8 (Eight) Hours As Needed for Nausea or Vomiting., Disp: 30 tablet, Rfl: 0  •  pravastatin (PRAVACHOL) 20 MG tablet, Take 20 mg by mouth Daily., Disp: , Rfl:   •  sucralfate (CARAFATE) 1 GM/10ML suspension, Take 1 g by mouth 4 (Four) Times a Day., Disp: , Rfl:        LABS (Reviewed):  Hematology WBC   Date Value Ref Range Status   02/07/2022 3.51 3.40 - 10.80 10*3/mm3 Final     RBC   Date Value Ref Range Status   02/07/2022 3.71 (L) 3.77 - 5.28 10*6/mm3 Final     Hemoglobin   Date Value Ref Range Status   02/07/2022 10.9 (L) 12.0 - 15.9 g/dL Final     Hematocrit   Date Value Ref Range Status   02/07/2022 32.3 (L) 34.0 - 46.6 % Final     Platelets   Date Value Ref Range Status   02/07/2022 118 (L) 140 - 450 10*3/mm3 Final      Chemistry   Lab Results   Component Value Date    GLUCOSE 125 (H) 02/02/2022    BUN 23 (H) 02/02/2022    CREATININE 0.47 (L) 02/02/2022    EGFRIFNONA 135 02/02/2022    BCR 48.9 (H) 02/02/2022    K 4.3 02/02/2022    CO2 30.4 (H) 02/02/2022    CALCIUM 10.0 02/02/2022    ALBUMIN 3.70 02/02/2022    AST 38 (H) 02/02/2022    ALT  33 02/02/2022         Physical Exam:         Neck:  [] Lymphadenopathy  Abdomen: [x] Soft [] Distended [x] Non-tender [x]+Bowel sounds  Lungs:  [] Clear to auscultation  CVS:  [x] Regular rate & rhythm  GI:  [x] No new complaints    [] Gastritis:  [] Diarrhea:     [] Mucositis:  [] Dysphagia:     [] Proctitis:  [] Esophagitis:     [] Vomiting:     Skin:  stGstrstastdstest:st st1st Comments/Notes:     [x] Review of labs, images, dosimetry, dose delivered, & treatment parameters.    Comments:     [x] Patient treatment setup reviewed.    Comments:     Recommendations: will coordinate surgical options/further chemo with multi-D team.  May require boost to 45-50.4 Gy; patient is reluctant for further XRT but explained the importance of this potential boost given   Her break and potential inability for surgery--will discuss as noted    [x] Continue RT  [] Change RT Plan [] Hold RT, length:        Approved Electronically By:  Ciro Pelletier MD, 2/9/2022, 15:18 EST          Confidentiality of this medical record shall be maintained except when use or disclosure is required or permitted by law, regulation or written authorization by the patient.

## 2022-02-09 NOTE — PROGRESS NOTES
MULTIDISCIPLINARY TREATMENT PLANNING CONFERENCE  DATE: 2/10/2022       SPECIALTY: Thoracic Conference    PRESENTER: Hayder Finch MD   SITE: Esophagus         Please discuss clinical/working stage, TNM, Stage Group, National Treatment Guidelines, and Prognostic Indicators.       CONFERENCE SUMMARY:  Patient was initially seen in the multidisciplinary thoracic oncology clinic in November with Dr. Harrell of the medical oncology group.  Patient had adenocarcinoma of the distal third of the esophagus.  PET scan showed gastrohepatic ligament node with hypermetabolism.  No other distant metastases.  EUS showed a 5 cm esophageal mass with some normal esophagus between the mass and the GE junction.  Patient was referred to general surgery for port placement and a feeding jejunostomy.  Patient has been undergoing neoadjuvant radiation chemotherapy.  Patient has had quite a bit of difficulty with the treatment.  In particular low platelet count and now a low white count.  Treatments have been halted on at least 2 occasions to allow for rebound in the blood counts.  To complicate matters patient is also had COVID-19 pneumonia.  She is now recovered from the COVID-19.  She presented to the office with her partner requesting that we stop the neoadjuvant therapy and proceed on with surgery.  Dr. Pelletier of radiation oncology indicates that there are only about 4 or 5 more radiation treatments to undergo.  Dr. Harrell of medical oncology believes that she only needs 1 more chemotherapy treatment.  We all agree that it is important to finish out the neoadjuvant treatment and restudy her in 4 to 6 weeks with a plan to proceed with the surgery 2 to 4 weeks after that.  Dr. Pelletier, Dr. Harrell, and myself will talk with the patient and explained the current plan.                          AJCC STAGE: Clinical T3 N1 M0 adenocarcinoma distal third of the esophagus        REFERRAL SUPPORT   Psychosocial Assessment:  [x]                Clinical  Trials:  []                Genetic Testing:  [x]                Geriatric Assessment:  []                Smoking Cessation:  []                Nutrition Assessment:  [x]                Social Work Evaluation/Barriers to Care:  [x]                Behavioral Oncology Evaluation:  []                Palliative Care:  []      EVIDENCE BASED NATIONAL TREATMENT GUIDELINES:  []                   SOCIAL HISTORY:   reports that she has been smoking cigarettes. She started smoking about 41 years ago. She has a 40.00 pack-year smoking history. She has never used smokeless tobacco. She reports previous alcohol use. She reports that she does not use drugs.                  PAST MEDICAL HISTORY:   has a past medical history of A-fib (HCC), Boil, breast (12/13/2021), Disease of thyroid gland, Esophageal cancer (HCC), Gastric ulcer, GERD (gastroesophageal reflux disease), Hyperlipidemia, Hypertension, Irritable bowel, and Mitral valve prolapse.                  PAST SURGICAL HISTORY:  @                 IMAGING:  XR Chest 1 View    Result Date: 12/16/2021  Chest port placement. No pneumothorax.  This report was finalized on 12/16/2021 1:34 PM by Dr. Dano Parmar M.D.      IR PICC W Fluoro Surgery Only    Result Date: 12/15/2021   As described.  This report was finalized on 12/15/2021 1:05 PM by Dr. Dano Parmar M.D.      NM PET/CT Skull Base to Mid Thigh    Result Date: 11/24/2021  1.  Intensely FDG avid thickening within the mid to distal esophagus representing patient's known neoplasm.. There are a few mildly enlarged left hilar and gastrohepatic nodes which demonstrate FDG uptake mildly above that of mediastinal blood pool. Unfortunately findings are indeterminate and metastatic disease cannot be excluded in these areas. 2.   A few scattered subcentimeter pulmonary nodules are present bilaterally and indeterminate. Continued close attention on follow-up with chest CT in 3 months is recommended. 3.  Other findings as  above.  This report was finalized on 11/24/2021 8:33 AM by Dr. Rory Hollingsworth M.D.                      SURGICAL PROCEDURE / PATHOLOGY:      8/27/2021: 15-RF- ([facility])                            Labs & Biomarkers:

## 2022-02-10 ENCOUNTER — APPOINTMENT (OUTPATIENT)
Dept: RADIATION ONCOLOGY | Facility: HOSPITAL | Age: 62
End: 2022-02-10

## 2022-02-10 ENCOUNTER — INFUSION (OUTPATIENT)
Dept: ONCOLOGY | Facility: HOSPITAL | Age: 62
End: 2022-02-10

## 2022-02-10 ENCOUNTER — OFFICE VISIT (OUTPATIENT)
Dept: ONCOLOGY | Facility: CLINIC | Age: 62
End: 2022-02-10

## 2022-02-10 ENCOUNTER — CLINICAL SUPPORT (OUTPATIENT)
Dept: OTHER | Facility: HOSPITAL | Age: 62
End: 2022-02-10

## 2022-02-10 VITALS
SYSTOLIC BLOOD PRESSURE: 107 MMHG | WEIGHT: 115.9 LBS | HEIGHT: 64 IN | RESPIRATION RATE: 16 BRPM | DIASTOLIC BLOOD PRESSURE: 61 MMHG | OXYGEN SATURATION: 95 % | BODY MASS INDEX: 19.79 KG/M2 | TEMPERATURE: 98.2 F | HEART RATE: 101 BPM

## 2022-02-10 VITALS — DIASTOLIC BLOOD PRESSURE: 69 MMHG | SYSTOLIC BLOOD PRESSURE: 106 MMHG | HEART RATE: 87 BPM

## 2022-02-10 DIAGNOSIS — G89.3 CANCER RELATED PAIN: ICD-10-CM

## 2022-02-10 DIAGNOSIS — I48.0 PAROXYSMAL ATRIAL FIBRILLATION: ICD-10-CM

## 2022-02-10 DIAGNOSIS — C15.5 MALIGNANT NEOPLASM OF LOWER THIRD OF ESOPHAGUS: ICD-10-CM

## 2022-02-10 DIAGNOSIS — Z45.9 ENCOUNTER FOR MANAGEMENT OF IMPLANTED DEVICE: ICD-10-CM

## 2022-02-10 DIAGNOSIS — C15.5 MALIGNANT NEOPLASM OF LOWER THIRD OF ESOPHAGUS: Primary | ICD-10-CM

## 2022-02-10 DIAGNOSIS — E44.0 MODERATE MALNUTRITION: Primary | ICD-10-CM

## 2022-02-10 DIAGNOSIS — Z79.899 HIGH RISK MEDICATION USE: ICD-10-CM

## 2022-02-10 DIAGNOSIS — Q39.9 SEVERE ESOPHAGEAL DYSPLASIA: ICD-10-CM

## 2022-02-10 LAB
ALBUMIN SERPL-MCNC: 3.4 G/DL (ref 3.5–5.2)
ALBUMIN/GLOB SERPL: 0.9 G/DL (ref 1.1–2.4)
ALP SERPL-CCNC: 117 U/L (ref 38–116)
ALT SERPL W P-5'-P-CCNC: 23 U/L (ref 0–33)
ANION GAP SERPL CALCULATED.3IONS-SCNC: 9.9 MMOL/L (ref 5–15)
AST SERPL-CCNC: 26 U/L (ref 0–32)
BASOPHILS # BLD AUTO: 0.01 10*3/MM3 (ref 0–0.2)
BASOPHILS NFR BLD AUTO: 0.3 % (ref 0–1.5)
BILIRUB SERPL-MCNC: 0.2 MG/DL (ref 0.2–1.2)
BUN SERPL-MCNC: 20 MG/DL (ref 6–20)
BUN/CREAT SERPL: 45.5 (ref 7.3–30)
CALCIUM SPEC-SCNC: 9.5 MG/DL (ref 8.5–10.2)
CHLORIDE SERPL-SCNC: 102 MMOL/L (ref 98–107)
CO2 SERPL-SCNC: 28.1 MMOL/L (ref 22–29)
CREAT SERPL-MCNC: 0.44 MG/DL (ref 0.6–1.1)
DEPRECATED RDW RBC AUTO: 41.5 FL (ref 37–54)
EOSINOPHIL # BLD AUTO: 0.03 10*3/MM3 (ref 0–0.4)
EOSINOPHIL NFR BLD AUTO: 1 % (ref 0.3–6.2)
ERYTHROCYTE [DISTWIDTH] IN BLOOD BY AUTOMATED COUNT: 14.6 % (ref 12.3–15.4)
GFR SERPL CREATININE-BSD FRML MDRD: 145 ML/MIN/1.73
GLOBULIN UR ELPH-MCNC: 3.6 GM/DL (ref 1.8–3.5)
GLUCOSE SERPL-MCNC: 116 MG/DL (ref 74–124)
HCT VFR BLD AUTO: 32.1 % (ref 34–46.6)
HGB BLD-MCNC: 10.3 G/DL (ref 12–15.9)
IMM GRANULOCYTES # BLD AUTO: 0.02 10*3/MM3 (ref 0–0.05)
IMM GRANULOCYTES NFR BLD AUTO: 0.7 % (ref 0–0.5)
LYMPHOCYTES # BLD AUTO: 0.59 10*3/MM3 (ref 0.7–3.1)
LYMPHOCYTES NFR BLD AUTO: 19.7 % (ref 19.6–45.3)
MCH RBC QN AUTO: 29.3 PG (ref 26.6–33)
MCHC RBC AUTO-ENTMCNC: 32.1 G/DL (ref 31.5–35.7)
MCV RBC AUTO: 91.2 FL (ref 79–97)
MONOCYTES # BLD AUTO: 0.62 10*3/MM3 (ref 0.1–0.9)
MONOCYTES NFR BLD AUTO: 20.7 % (ref 5–12)
NEUTROPHILS NFR BLD AUTO: 1.72 10*3/MM3 (ref 1.7–7)
NEUTROPHILS NFR BLD AUTO: 57.6 % (ref 42.7–76)
NRBC BLD AUTO-RTO: 0 /100 WBC (ref 0–0.2)
PLATELET # BLD AUTO: 228 10*3/MM3 (ref 140–450)
PMV BLD AUTO: 9.8 FL (ref 6–12)
POTASSIUM SERPL-SCNC: 4.3 MMOL/L (ref 3.5–4.7)
PROT SERPL-MCNC: 7 G/DL (ref 6.3–8)
RBC # BLD AUTO: 3.52 10*6/MM3 (ref 3.77–5.28)
SODIUM SERPL-SCNC: 140 MMOL/L (ref 134–145)
WBC NRBC COR # BLD: 2.99 10*3/MM3 (ref 3.4–10.8)

## 2022-02-10 PROCEDURE — 96413 CHEMO IV INFUSION 1 HR: CPT

## 2022-02-10 PROCEDURE — 96417 CHEMO IV INFUS EACH ADDL SEQ: CPT

## 2022-02-10 PROCEDURE — 99215 OFFICE O/P EST HI 40 MIN: CPT | Performed by: NURSE PRACTITIONER

## 2022-02-10 PROCEDURE — 96375 TX/PRO/DX INJ NEW DRUG ADDON: CPT

## 2022-02-10 PROCEDURE — 25010000002 PALONOSETRON PER 25 MCG: Performed by: NURSE PRACTITIONER

## 2022-02-10 PROCEDURE — 85025 COMPLETE CBC W/AUTO DIFF WBC: CPT

## 2022-02-10 PROCEDURE — 25010000002 PACLITAXEL PER 1 MG: Performed by: NURSE PRACTITIONER

## 2022-02-10 PROCEDURE — 77386: CPT | Performed by: RADIOLOGY

## 2022-02-10 PROCEDURE — 77014 CHG CT GUIDANCE RADIATION THERAPY FLDS PLACEMENT: CPT | Performed by: RADIOLOGY

## 2022-02-10 PROCEDURE — 80053 COMPREHEN METABOLIC PANEL: CPT

## 2022-02-10 PROCEDURE — 25010000002 CARBOPLATIN PER 50 MG: Performed by: NURSE PRACTITIONER

## 2022-02-10 PROCEDURE — 77386 CHG INTENSITY MODULATED RADIATION TX DLVR COMPLEX: CPT | Performed by: RADIOLOGY

## 2022-02-10 PROCEDURE — 25010000002 DIPHENHYDRAMINE PER 50 MG: Performed by: NURSE PRACTITIONER

## 2022-02-10 PROCEDURE — 25010000002 DEXAMETHASONE SODIUM PHOSPHATE 100 MG/10ML SOLUTION: Performed by: NURSE PRACTITIONER

## 2022-02-10 PROCEDURE — 25010000002 HEPARIN LOCK FLUSH PER 10 UNITS: Performed by: INTERNAL MEDICINE

## 2022-02-10 RX ORDER — FAMOTIDINE 10 MG/ML
20 INJECTION, SOLUTION INTRAVENOUS ONCE
Status: COMPLETED | OUTPATIENT
Start: 2022-02-10 | End: 2022-02-10

## 2022-02-10 RX ORDER — PALONOSETRON 0.05 MG/ML
0.25 INJECTION, SOLUTION INTRAVENOUS ONCE
Status: COMPLETED | OUTPATIENT
Start: 2022-02-10 | End: 2022-02-10

## 2022-02-10 RX ORDER — FAMOTIDINE 10 MG/ML
20 INJECTION, SOLUTION INTRAVENOUS ONCE
Status: CANCELLED | OUTPATIENT
Start: 2022-02-10

## 2022-02-10 RX ORDER — SODIUM CHLORIDE 0.9 % (FLUSH) 0.9 %
10 SYRINGE (ML) INJECTION AS NEEDED
Status: CANCELLED | OUTPATIENT
Start: 2022-02-10

## 2022-02-10 RX ORDER — HEPARIN SODIUM (PORCINE) LOCK FLUSH IV SOLN 100 UNIT/ML 100 UNIT/ML
500 SOLUTION INTRAVENOUS AS NEEDED
Status: CANCELLED | OUTPATIENT
Start: 2022-02-10

## 2022-02-10 RX ORDER — HEPARIN SODIUM (PORCINE) LOCK FLUSH IV SOLN 100 UNIT/ML 100 UNIT/ML
500 SOLUTION INTRAVENOUS AS NEEDED
Status: DISCONTINUED | OUTPATIENT
Start: 2022-02-10 | End: 2022-02-10 | Stop reason: HOSPADM

## 2022-02-10 RX ORDER — SODIUM CHLORIDE 9 MG/ML
250 INJECTION, SOLUTION INTRAVENOUS ONCE
Status: CANCELLED | OUTPATIENT
Start: 2022-02-10

## 2022-02-10 RX ORDER — METOPROLOL SUCCINATE 25 MG/1
25 TABLET, EXTENDED RELEASE ORAL DAILY
Qty: 30 TABLET | Refills: 2 | Status: SHIPPED | OUTPATIENT
Start: 2022-02-10 | End: 2022-03-25 | Stop reason: SDUPTHER

## 2022-02-10 RX ORDER — FAMOTIDINE 10 MG/ML
20 INJECTION, SOLUTION INTRAVENOUS AS NEEDED
Status: CANCELLED | OUTPATIENT
Start: 2022-02-10

## 2022-02-10 RX ORDER — PALONOSETRON 0.05 MG/ML
0.25 INJECTION, SOLUTION INTRAVENOUS ONCE
Status: CANCELLED | OUTPATIENT
Start: 2022-02-10

## 2022-02-10 RX ORDER — DIPHENHYDRAMINE HYDROCHLORIDE 50 MG/ML
50 INJECTION INTRAMUSCULAR; INTRAVENOUS AS NEEDED
Status: CANCELLED | OUTPATIENT
Start: 2022-02-10

## 2022-02-10 RX ORDER — SODIUM CHLORIDE 9 MG/ML
250 INJECTION, SOLUTION INTRAVENOUS ONCE
Status: COMPLETED | OUTPATIENT
Start: 2022-02-10 | End: 2022-02-10

## 2022-02-10 RX ORDER — PROCHLORPERAZINE MALEATE 10 MG
10 TABLET ORAL EVERY 6 HOURS PRN
Qty: 30 TABLET | Refills: 2 | Status: SHIPPED | OUTPATIENT
Start: 2022-02-10 | End: 2022-05-09 | Stop reason: HOSPADM

## 2022-02-10 RX ADMIN — SODIUM CHLORIDE 250 ML: 9 INJECTION, SOLUTION INTRAVENOUS at 10:07

## 2022-02-10 RX ADMIN — FAMOTIDINE 20 MG: 10 INJECTION INTRAVENOUS at 10:08

## 2022-02-10 RX ADMIN — PACLITAXEL 80 MG: 6 INJECTION, SOLUTION INTRAVENOUS at 11:12

## 2022-02-10 RX ADMIN — SODIUM CHLORIDE, PRESERVATIVE FREE 500 UNITS: 5 INJECTION INTRAVENOUS at 12:50

## 2022-02-10 RX ADMIN — DIPHENHYDRAMINE HYDROCHLORIDE 25 MG: 50 INJECTION, SOLUTION INTRAMUSCULAR; INTRAVENOUS at 10:08

## 2022-02-10 RX ADMIN — DEXAMETHASONE SODIUM PHOSPHATE 12 MG: 10 INJECTION, SOLUTION INTRAMUSCULAR; INTRAVENOUS at 10:23

## 2022-02-10 RX ADMIN — PALONOSETRON 0.25 MG: 0.05 INJECTION, SOLUTION INTRAVENOUS at 10:08

## 2022-02-10 RX ADMIN — CARBOPLATIN 270 MG: 10 INJECTION INTRAVENOUS at 12:17

## 2022-02-10 NOTE — PROGRESS NOTES
Outpatient Oncology Nutrition      Patient Name:  Maya Ribera  YOB: 1960  MRN: [id number]    Assessment Date:  2/10/2022    Comments:  Follow up in infusion. Here for cycle 4 carboplatin taxol. She has 5 radiation treatments to complete. Using JT consistently for 12 hours at night. Not eating during the day and not using JT during the day. Currently using Nutren 2.0, 750ml (3 cartons) plus 250ml water, 60 ml water before and after TF during the night, plus intermittent water flushes during the day. Provides 1500 calories, 63 grams protein, 519ml water.   Weight 115 lb. Decreased about 7 lb since beginning treatment.  Wt Readings from Last 3 Encounters:   02/10/22 52.6 kg (115 lb 14.4 oz)   02/09/22 52.6 kg (116 lb)   02/08/22 52.6 kg (116 lb)     I encouraged the patient to try to get in 1 more carton during the day while she is home especially since she is not taking anything by mouth due to discomfort.  She complains of being hooked up to the pump and not being able to move around the house. I suggested that if she is resting or watching tv to go ahead and hook up the feeding. Hopefully once she completes treatment and is able to be home more during the day she will be able to either increase the TF infusion time or she will be able to increase her po intake.   Will continue to follow closely.         Electronically signed by:  Tracey Lozano RD, DEVIN  02/10/22 14:27 EST

## 2022-02-10 NOTE — PROGRESS NOTES
Subjective     REASON FOR FOLLOW-UP: Distal esophageal cancer      History of Present Illness   This is a 61 y.o. female with recent diagnosis of distal esophageal cancer, initiating concurrent chemoradiation with carboplatin/Taxol on 1/5/2022.  Unfortunately, the patient has had frequent delays due to pancytopenia, weight loss, malnutrition and COVID-19.  She is completed only 3 weeks of chemotherapy.  She is due today for her fourth dose of carboplatin Taxol.  She has 5 radiation treatments remaining.    Her case was presented at the multidisciplinary clinic this morning with plans to complete the last week of concurrent chemoradiation followed by PET scan.  It is unclear at this time if the patient would qualify for surgery given her struggles and malnutrition.  She does have a J-tube for which her partner utilizes for nutrition.  She is still able to swallow pills by mouth.  Her weight is stable over the past few weeks.    She has been struggling with her blood pressure.  She is on metoprolol originally 50 mg twice daily for her atrial fibrillation.  She has cut this down to once daily.  She does report she has been holding her Eliquis since her platelets dropped and has not yet resumed.  Her and her partner expressed concerns regarding the timeline of PET scan and possible surgery.  They are very eager to proceed with PET scan.  We did discuss today the possibility for false positives with esophagitis symptoms.  We will plan PET scan 4 weeks after the completion of radiation which is the soonest this should be obtained.      ONCOLOGY HISTORY     The patient is a 61-year-old female who had been seen in multidisciplinary thoracic oncology conference with complaints of painful swallowing and dysphagia over the previous several months with 10 to 15 pound weight loss.  This led to EGD and colonoscopy with normal colonoscopy but EGD demonstrated tumor at the GE junction with biopsies consistent with severe  dysplasia including carcinoma in situ.  The patient states that she saw a number of physicians and attempting to have a diagnosis completed.  CT of chest demonstrated thickening of the mid esophagus with no lymphadenopathy and CT PET demonstrated uptake in the esophagus gastropathic lymph nodes it is felt that she likely had early stage carcinoma of the esophagus and that we could present to record with surgical resection.  Endoscopic ultrasound, however, was requested and we could not have this done any sooner than GI physicians available at [facility].     This was performed 12/7/2021 revealing a 5 cm esophageal mass present in the distal third esophagus without extension into the GE junction into the cardia, the mass extended into into the muscularis propria into the adventitia not penetrating through the adventitia with a single 1 cm gastrohepatic/celiac axis lymph node and FNB x1 performed-?  T2 N1 M0 distal esophageal cancer-clinical stage III, pathologic stage IIIa  In contacting the [facility] pathology department the results of this FNB are not yet available and will be by 12/9/2021.      The patient is seen with her sister and son all indicating that she has had difficulty with swallowing since late summer likely July 2021 and has been attempting to have a diagnosis made since that time.  They are all somewhat frustrated about the length of time to obtain an answer for this problem and we have spent considerable time discussing her findings thus far and how we might proceed to treat what appears to be a contained malignancy.  This has, additionally, been discussed with Dr. Stef cruz who feels that she is not immediately and operative candidate and should proceed with chemo radiation therapy initially-neoadjuvant treatment before consideration for subsequent surgery.  This has been discussed with the patient, her sister and her son again in detail 12/8/2021.  We went on to initiate pain  "control with liquid hydrocodone, referred the patient to general surgery and oncology nutrition.    The patient was seen by  who proceeded 12/15/2021 to PowerPort placement and open jejunostomy.  She was seen during her hospital stay by oncology nutrition required hospitalization up to including 12/19/2021.    The patient required hospitalization to 12/20/2021 and after discharge along with her partner's health was able to gradually improve her caloric intake and was seen back 12/27 by Dr. Hollis who felt she had improved enough to initiate chemotherapy.  The patient was seen by radiation therapy 12/21 with plans to proceed with 40 CT simulation and planning-IMRT/IGR T-4140 cGy in 23 fractions with pleased to be considered.    The patient is seen back with her partner Cristy 12/29/2021 now able to \"manage\" and we have discussed extensively concurrent chemotherapy radiation therapy using Carboplatin/Taxol weekly.    Currently the patient is scheduled to begin radiation therapy 1/3/2021, undergoing teaching 1/4/2022 and will begin concurrent chemotherapy 1/5/2022      Past Medical History:   Diagnosis Date   • A-fib (HCC)    • Boil, breast 12/13/2021    HEALING UNDER LEFT BREAST    • Disease of thyroid gland    • Esophageal cancer (HCC)    • Gastric ulcer    • GERD (gastroesophageal reflux disease)    • Hyperlipidemia    • Hypertension    • Irritable bowel    • Mitral valve prolapse     FOLLOWED BY          Past Surgical History:   Procedure Laterality Date   • CHOLECYSTECTOMY     • COLONOSCOPY  08/27/2021    [place]   • EXTERNAL EAR SURGERY     • JEJUNOSTOMY N/A 12/15/2021    Procedure: open JEJUNOSTOMY tube placement;  Surgeon: Bhanu Hollis MD;  Location: [facility];  Service: General;  Laterality: N/A;   • KNEE SURGERY Left    • REPLACEMENT TOTAL KNEE Left    • UPPER ENDOSCOPIC ULTRASOUND W/ FNA N/A 12/7/2021    Procedure: Esophagogastroduodenoscopy with endoscopic ultrasound  " WITH STAGING AND FINE NEEDLE BIOPSY;  Surgeon: Amrit Green MD;  Location: [facility] ENDOSCOPY;  Service: Gastroenterology;  Laterality: N/A;  post op: inlet patch, esophageal mass, T2   • VENOUS ACCESS DEVICE (PORT) INSERTION Left 12/15/2021    Procedure: INSERTION OF PORTACATH;  Surgeon: Bhanu Hollis MD;  Location: [facility] MAIN OR;  Service: General;  Laterality: Left;        Current Outpatient Medications on File Prior to Visit   Medication Sig Dispense Refill   • aluminum-magnesium hydroxide 200-200 MG/5ML suspension, diphenhydrAMINE 12.5 MG/5ML liquid, Lidocaine Viscous HCl 2 % solution, nystatin 100,000 unit/mL suspension Swish and swallow 10 mL 4 (Four) Times a Day After Meals & at Bedtime. 400 mL 2   • fentaNYL (DURAGESIC) 12 MCG/HR Place 1 patch on the skin as directed by provider Every 72 (Seventy-Two) Hours. 10 patch 0   • HYDROcodone-acetaminophen (HYCET) 7.5-325 MG/15ML solution Take 15 mL by mouth Every 6 (Six) Hours As Needed for Moderate Pain . 236 mL 0   • lansoprazole (PREVACID SOLUTAB) 30 MG Tablet Delayed Release Dispersible disintegrating tablet TAKE ONE TABLET BY MOUTH TWICE A DAY 60 tablet 1   • methIMAzole (TAPAZOLE) 10 MG tablet Take 5 mg by mouth Daily. Take DOS     • nystatin in Lidocaine Viscous HCl solution-diphenhydrAMINE liquid-aluminum-magnesium hydroxide-simethicone suspension Swish and swallow 10 mL by mouth 4 times per day after meals and at bedtime. 400 mL 2   • ondansetron ODT (Zofran ODT) 8 MG disintegrating tablet Place 1 tablet on the tongue Every 8 (Eight) Hours As Needed for Nausea or Vomiting. 30 tablet 0   • pravastatin (PRAVACHOL) 20 MG tablet Take 20 mg by mouth Daily.     • sucralfate (CARAFATE) 1 GM/10ML suspension Take 1 g by mouth 4 (Four) Times a Day.     • [DISCONTINUED] metoprolol tartrate (LOPRESSOR) 50 MG tablet Take 50 mg by mouth Daily. Take DOS     • Eliquis 5 MG tablet tablet Take 5 mg by mouth Every 12 (Twelve) Hours. LD 12/4       No current  "facility-administered medications on file prior to visit.        ALLERGIES:    Allergies   Allergen Reactions   • Codeine Hives     Patient states this is when she was very young.         Social History     Socioeconomic History   • Marital status: Single   • Number of children: 1   • Years of education: High school   Tobacco Use   • Smoking status: Current Every Day Smoker     Packs/day: 1.00     Years: 40.00     Pack years: 40.00     Types: Cigarettes     Start date: 1981   • Smokeless tobacco: Never Used   • Tobacco comment: 2 per day   Vaping Use   • Vaping Use: Never used   Substance and Sexual Activity   • Alcohol use: Not Currently   • Drug use: Never   • Sexual activity: Defer        Family History   Problem Relation Age of Onset   • Breast cancer Mother    • Diabetes Mother    • Bipolar disorder Father    • Diabetes Father    • Hypertension Father    • Breast cancer Sister    • COPD Sister    • Diabetes Sister    • Hypertension Sister    • Alcohol abuse Brother    • Asthma Brother    • Bipolar disorder Brother    • Diabetes Brother    • Seizures Brother    • Breast cancer Maternal Grandmother    • Diabetes Maternal Grandmother    • Diabetes Maternal Grandfather    • Diabetes Paternal Grandmother    • Diabetes Paternal Grandfather    • Hypertension Son    • Kidney cancer Sister    • Hypertension Sister    • Diabetes Sister    • Malig Hyperthermia Neg Hx           Objective     Vitals:    02/10/22 0902   BP: 107/61   Pulse: 101   Resp: 16   Temp: 98.2 °F (36.8 °C)   TempSrc: Temporal   SpO2: 95%   Weight: 52.6 kg (115 lb 14.4 oz)   Height: 162.6 cm (64.02\")   PainSc:   3   PainLoc: Abdomen  Comment: Lt side     Current Status 2/10/2022   ECOG score 0       Physical Exam  Constitutional:       Appearance: Normal appearance. She is underweight.   HENT:      Head: Normocephalic and atraumatic.      Nose: Nose normal.   Eyes:      Extraocular Movements: Extraocular movements intact.      Conjunctiva/sclera: " Conjunctivae normal.      Pupils: Pupils are equal, round, and reactive to light.   Cardiovascular:      Rate and Rhythm: Normal rate and regular rhythm.      Pulses: Normal pulses.      Heart sounds: Normal heart sounds.   Pulmonary:      Effort: Pulmonary effort is normal.      Breath sounds: Normal breath sounds.   Abdominal:      General: Bowel sounds are normal.      Palpations: Abdomen is soft. There is mass (Fullness epigastric region).      Tenderness: There is abdominal tenderness (Midepigastrium).      Comments: J-tube clean, dry, and intact   Musculoskeletal:         General: Normal range of motion.      Cervical back: Normal range of motion.   Skin:     General: Skin is warm and dry.      Findings: No rash.   Neurological:      General: No focal deficit present.      Mental Status: She is alert and oriented to person, place, and time.   Psychiatric:         Mood and Affect: Mood normal.     I have reexamined the patient and the results are consistent with the previously documented exam. Barbra Mejia, BRANDI     RECENT LABS:  Results from last 7 days   Lab Units 02/10/22  0853 02/07/22  1418   WBC 10*3/mm3 2.99* 3.51   NEUTROS ABS 10*3/mm3 1.72 1.37*   HEMOGLOBIN g/dL 10.3* 10.9*   HEMATOCRIT % 32.1* 32.3*   PLATELETS 10*3/mm3 228 118*     Results from last 7 days   Lab Units 02/10/22  0853   SODIUM mmol/L 140   POTASSIUM mmol/L 4.3   CHLORIDE mmol/L 102   CO2 mmol/L 28.1   BUN mg/dL 20   CREATININE mg/dL 0.44*   CALCIUM mg/dL 9.5   ALBUMIN g/dL 3.40*   BILIRUBIN mg/dL 0.2   ALK PHOS U/L 117*   ALT (SGPT) U/L 23   AST (SGOT) U/L 26   GLUCOSE mg/dL 116         *Positive COVID-19 test 1/26/2022     Assessment/Plan          61-year-old female with a history of hyperthyroidism, previous gastric ulcer, GE reflux, atrial fibrillation, hyperlipidemia, hypertension irritable bowel syndrome,          1.  T2 N1 M0 distal esophageal cancer-clinical stage III, pathologic stage IIIa  · 60-pack-year history of  smoking with dysphagia and odynophagia developing over several months associated 10 to 15 pound weight loss  · EGD and colonoscopy in June 2021 demonstrating severe dysplasia including carcinoma in situ.  · here has been a number of physicians involved in the patient's case and several months before she was ultimately seen by Dr. Finch at [facility].   · CT scan of the chest demonstrating thickening of the mid esophagus with no lymphadenopathy and PET/CT demonstrated uptake in the mid to distal esophagus with SUV intensely elevated at 16 with a few nonenlarged left hilar and gastrohepatic lymph nodes with mild increased FDG activity, few scattered subcentimeter pulmonary nodules bilaterally indeterminate.  · EUS was felt necessary and ultimately obtained at [facility] performed 12/7/2021 revealing a 5 cm esophageal mass present in the distal third esophagus without extension into the GE junction into the cardia, the mass extended into into the muscularis propria into the adventitia not penetrating through the adventitia with a single 1 cm gastrohepatic/celiac axis lymph node and FNB x1 performed-?   · Discussed with Dr. Finch directly who feels that she is not immediately and operative candidate and should proceed with chemo radiation therapy initially-neoadjuvant treatment before consideration for subsequent surgery. Discussed extensively concurrent chemotherapy radiation therapy using Carboplatin/Taxol weekly.  ·  who proceeded 12/15/2021 to PowerPort placement and open jejunostomy.  She was seen during her hospital stay by oncology nutrition required hospitalization up to including 12/19/2021.  · seen by radiation therapy 12/21 with plans to proceed with 40 CT simulation and planning-IMRT/IGR T-4140 cGy in 23 fractions with pleased to be considered.   · Radiation therapy initiated 1/3/2022  · 1/5/2021 cycle 1 weekly carboplatin/Taxol.    · Patient status post week 2 on  1/12/2022  · Patient treated 1/19/2022-week 3  · 1/26/2022, hold treatment today due to platelet counts 42,000 and feeling unwell.  Covid positive at that time  · Chemotherapy held 2/2/2022 due to ongoing normal cytopenia, platelets of 40,000  · Presentation at multidisciplinary clinic 2/10/2022 with plans to complete fourth dose of carboplatin Taxol followed by PET scan.  It is unclear at this time if the patient will be a surgical candidate pending her response and nutritional state post treatment  · We will proceed today with her fourth dose of carboplatin Taxol    2.  Nutrition  · Use of Jejunostomy tube. Continuous feed currently 12 hours a night, slowly working up to recommended 16 hours/day at 90 cc/h  · She is still trying to drink at least 1 boost a day and able to take pills by mouth  · She continues to take pills by mouth.  She does utilize her J-tube for 16 hours a day.  Her weight is stable over the past 3 weeks.    3.  A. fib: On chronic anticoagulation with Eliquis 5 mg twice daily.  · 01/26/2022: Positive Covid-19 diagnosis today and platelet count 42,000.  Eliquis was placed on hold  · The patient continues to hold Eliquis despite improvement in platelet count  · We discussed today resuming Eliquis 5 mg twice daily.  She understands this will be continued twice daily unless platelets drop less than 60,000.    4. Narcotic induced constipation  · She does report worsening constipation currently.  We discussed the benefit of MiraLAX in the J-tube    5.  Mild chemotherapy-induced nausea/dry heaving  · Currently utilizing Zofran every 8 hours  · We will add Compazine 10 mg every 6 hours to alternate with Zofran as needed    6. Chemotherapy-induced Thrombocytopenia  · 1/26/2022, platelet count 42,000.  Eliquis held  · Platelets improved 220,000 today, without signs or symptoms of bleeding.  Eliquis will be resumed.  · Weekly blood counts with plans to hold Eliquis if platelets drop less than 60,000    7.  COVID-19 positive  · 01/26/2022  · Sinus congestion and cough have completely resolved.  The patient is no longer symptomatic    8.  Atrial fibrillation  · Previous metoprolol dosing of 50 mg twice daily though with weight loss, the patient has become quite hypotensive.  The patient is currently taking only 50 mg once daily  · Discussion with Dr. Harrell today, we will further reduce to 25 mg XL once a day      Plan:  1. Proceed today with week 4 carboplatin Taxol.  This is the patient's final planned chemotherapy  2. Complete radiation as scheduled, the patient has 5 fractions remaining  3. Resume Eliquis 5 mg twice daily  4. Continue Zofran 8 mg every 8 hours as needed for nausea.  We will also prescribe Compazine 10 mg to alternate every 6 hours as needed for nausea  5. Discontinue Toprol 50 mg, begin metoprolol 25mg XL once daily  6. Add MiraLAX via PEG tube as needed for constipation  7. Continue feeding via J-tube as outline and continue follow up with oncology dietitian.   8. Continue Hycet 7.5-325 for breakthrough pain  9. Continue use of fentanyl patches 12mcg/hr every 72 hours  10. Return weekly x4 weeks for CBC with RN review.  If platelet count is less than 60,000, patient will be instructed to hold Eliquis.  She can resume Eliquis when platelets are greater than 60,000.  11. PET scan in 5 weeks to evaluate response to concurrent chemoradiation  12. MD follow-up with Dr. Harrell in 6 weeks to review PET scan results  13. Multidisciplinary clinic presentation today, 2/10/2022, recommendations from thoracic surgery, radiation oncology and medical oncology were discussed and reviewed with the patient and her partner.  Dr. Harrell also spoke with the patient today, they are agreeable to the plan.    Patient continues on high risk medication requiring close monitoring.    I spent 50 minutes caring for Maya on this date of service. This time includes time spent by me in the following activities: preparing for  the visit, reviewing tests, obtaining and/or reviewing a separately obtained history, performing a medically appropriate examination and/or evaluation, counseling and educating the patient/family/caregiver, ordering medications, tests, or procedures, referring and communicating with other health care professionals, documenting information in the medical record and care coordination.     Barbra Mejia, APRN  02/10/2022

## 2022-02-11 PROCEDURE — 77386: CPT | Performed by: RADIOLOGY

## 2022-02-11 PROCEDURE — 77014 CHG CT GUIDANCE RADIATION THERAPY FLDS PLACEMENT: CPT | Performed by: RADIOLOGY

## 2022-02-11 PROCEDURE — 77386 CHG INTENSITY MODULATED RADIATION TX DLVR COMPLEX: CPT | Performed by: RADIOLOGY

## 2022-02-11 PROCEDURE — 77427 RADIATION TX MANAGEMENT X5: CPT | Performed by: RADIOLOGY

## 2022-02-14 ENCOUNTER — TELEPHONE (OUTPATIENT)
Dept: ONCOLOGY | Facility: CLINIC | Age: 62
End: 2022-02-14

## 2022-02-14 ENCOUNTER — OFFICE VISIT (OUTPATIENT)
Dept: SURGERY | Facility: CLINIC | Age: 62
End: 2022-02-14

## 2022-02-14 ENCOUNTER — APPOINTMENT (OUTPATIENT)
Dept: RADIATION ONCOLOGY | Facility: HOSPITAL | Age: 62
End: 2022-02-14

## 2022-02-14 VITALS — WEIGHT: 115 LBS | BODY MASS INDEX: 19.63 KG/M2 | HEIGHT: 64 IN

## 2022-02-14 DIAGNOSIS — C15.5 MALIGNANT NEOPLASM OF LOWER THIRD OF ESOPHAGUS: Primary | ICD-10-CM

## 2022-02-14 PROCEDURE — 77014 CHG CT GUIDANCE RADIATION THERAPY FLDS PLACEMENT: CPT | Performed by: RADIOLOGY

## 2022-02-14 PROCEDURE — 77386 CHG INTENSITY MODULATED RADIATION TX DLVR COMPLEX: CPT | Performed by: RADIOLOGY

## 2022-02-14 PROCEDURE — 77386: CPT | Performed by: RADIOLOGY

## 2022-02-14 PROCEDURE — 99024 POSTOP FOLLOW-UP VISIT: CPT | Performed by: SURGERY

## 2022-02-14 NOTE — PROGRESS NOTES
Follow-up jejunostomy for adenocarcinoma of the esophagus    Subjective:  Feels weak and still not using her J-tube much more than about 12 hours a day.  Some discomfort at the site.  She has completed chemotherapy and has just a couple of radiation appointments left.    Objective:   Weight 115 pounds today, 126 at last visit  General: Chronic ill appearance  Abdomen: Soft, some tenderness at the site, mild reactive erythema, T-tube is in place, stitch secures it.  There is a small amount of drainage around it, no signs of any active infection.    Assessment and plan:  -Adenocarcinoma of the esophagus  -Has almost completed her chemoradiation  -Noted plans for PET scan in about a month  -Malnutrition, continue J-tube feeds, I encouraged patient to go up to 16 or even 18 hours a day if possible in order to try to maximize nutrition in case she undergoes esophagogastrectomy in the future.  -Routine cleansing to J-tube site    Bhanu Hollis MD  General and Endoscopic Surgery  [facility]    [address], [address]  [place], [place], [address]  P: [phone]  F: [phone]

## 2022-02-14 NOTE — TELEPHONE ENCOUNTER
"Pt called today stating that since she reduced her metoprolol to 25mg, her heart has been \"racing.\" she has become a little dizzy also. BP today reads 94/61, . Pt used to be on 2 50mg (100mg total) of metoprolol daily but reduced to 25mg last week. D/W Dr. Harrell. Per Dr. Harrell, pt needs to go back up to 50mg on her metoprolol. Informed pt's partner, Cristy of this. She v/u and said she would start her on this today. Advised her to call back if this does not help. She v/u.  "

## 2022-02-15 ENCOUNTER — CLINICAL SUPPORT (OUTPATIENT)
Dept: ONCOLOGY | Facility: HOSPITAL | Age: 62
End: 2022-02-15

## 2022-02-15 ENCOUNTER — LAB (OUTPATIENT)
Dept: LAB | Facility: HOSPITAL | Age: 62
End: 2022-02-15

## 2022-02-15 DIAGNOSIS — C15.5 MALIGNANT NEOPLASM OF LOWER THIRD OF ESOPHAGUS: Primary | ICD-10-CM

## 2022-02-15 LAB
BASOPHILS # BLD AUTO: 0.02 10*3/MM3 (ref 0–0.2)
BASOPHILS NFR BLD AUTO: 0.6 % (ref 0–1.5)
DEPRECATED RDW RBC AUTO: 41.4 FL (ref 37–54)
EOSINOPHIL # BLD AUTO: 0.02 10*3/MM3 (ref 0–0.4)
EOSINOPHIL NFR BLD AUTO: 0.6 % (ref 0.3–6.2)
ERYTHROCYTE [DISTWIDTH] IN BLOOD BY AUTOMATED COUNT: 15.5 % (ref 12.3–15.4)
HCT VFR BLD AUTO: 31.1 % (ref 34–46.6)
HGB BLD-MCNC: 10.5 G/DL (ref 12–15.9)
IMM GRANULOCYTES # BLD AUTO: 0.04 10*3/MM3 (ref 0–0.05)
IMM GRANULOCYTES NFR BLD AUTO: 1.3 % (ref 0–0.5)
LYMPHOCYTES # BLD AUTO: 0.56 10*3/MM3 (ref 0.7–3.1)
LYMPHOCYTES NFR BLD AUTO: 18.2 % (ref 19.6–45.3)
MCH RBC QN AUTO: 29.7 PG (ref 26.6–33)
MCHC RBC AUTO-ENTMCNC: 33.8 G/DL (ref 31.5–35.7)
MCV RBC AUTO: 88.1 FL (ref 79–97)
MONOCYTES # BLD AUTO: 0.46 10*3/MM3 (ref 0.1–0.9)
MONOCYTES NFR BLD AUTO: 14.9 % (ref 5–12)
NEUTROPHILS NFR BLD AUTO: 1.98 10*3/MM3 (ref 1.7–7)
NEUTROPHILS NFR BLD AUTO: 64.4 % (ref 42.7–76)
NRBC BLD AUTO-RTO: 0 /100 WBC (ref 0–0.2)
PLATELET # BLD AUTO: 341 10*3/MM3 (ref 140–450)
PMV BLD AUTO: 9.8 FL (ref 6–12)
RBC # BLD AUTO: 3.53 10*6/MM3 (ref 3.77–5.28)
WBC NRBC COR # BLD: 3.08 10*3/MM3 (ref 3.4–10.8)

## 2022-02-15 PROCEDURE — 77338 DESIGN MLC DEVICE FOR IMRT: CPT | Performed by: RADIOLOGY

## 2022-02-15 PROCEDURE — 36415 COLL VENOUS BLD VENIPUNCTURE: CPT

## 2022-02-15 PROCEDURE — 77336 RADIATION PHYSICS CONSULT: CPT | Performed by: RADIOLOGY

## 2022-02-15 PROCEDURE — 77014 CHG CT GUIDANCE RADIATION THERAPY FLDS PLACEMENT: CPT | Performed by: RADIOLOGY

## 2022-02-15 PROCEDURE — G0463 HOSPITAL OUTPT CLINIC VISIT: HCPCS

## 2022-02-15 PROCEDURE — 85025 COMPLETE CBC W/AUTO DIFF WBC: CPT

## 2022-02-15 PROCEDURE — 77386 CHG INTENSITY MODULATED RADIATION TX DLVR COMPLEX: CPT | Performed by: RADIOLOGY

## 2022-02-15 PROCEDURE — 77300 RADIATION THERAPY DOSE PLAN: CPT | Performed by: RADIOLOGY

## 2022-02-15 PROCEDURE — 77386: CPT | Performed by: RADIOLOGY

## 2022-02-15 NOTE — NURSING NOTE
"Pt here for CBC and RN review. CBC is stable for this patient at this time. Only complaint is Metoprolol XL takes a little too long to work and feels like her heart \"is beating crazy\" until the medication kicks in. I told her if it continues to be a problem to reach out to the prescribing provider. Copy of labs provided and pt v/u.    Lab Results   Component Value Date    WBC 3.08 (L) 02/15/2022    HGB 10.5 (L) 02/15/2022    HCT 31.1 (L) 02/15/2022    MCV 88.1 02/15/2022     02/15/2022     "

## 2022-02-16 ENCOUNTER — APPOINTMENT (OUTPATIENT)
Dept: RADIATION ONCOLOGY | Facility: HOSPITAL | Age: 62
End: 2022-02-16

## 2022-02-16 PROCEDURE — 77386 CHG INTENSITY MODULATED RADIATION TX DLVR COMPLEX: CPT | Performed by: RADIOLOGY

## 2022-02-16 PROCEDURE — 77386: CPT | Performed by: RADIOLOGY

## 2022-02-17 ENCOUNTER — RADIATION ONCOLOGY WEEKLY ASSESSMENT (OUTPATIENT)
Dept: RADIATION ONCOLOGY | Facility: HOSPITAL | Age: 62
End: 2022-02-17

## 2022-02-17 ENCOUNTER — APPOINTMENT (OUTPATIENT)
Dept: RADIATION ONCOLOGY | Facility: HOSPITAL | Age: 62
End: 2022-02-17

## 2022-02-17 VITALS
OXYGEN SATURATION: 100 % | BODY MASS INDEX: 19.56 KG/M2 | HEART RATE: 98 BPM | SYSTOLIC BLOOD PRESSURE: 127 MMHG | WEIGHT: 114 LBS | DIASTOLIC BLOOD PRESSURE: 54 MMHG

## 2022-02-17 DIAGNOSIS — C15.5 MALIGNANT NEOPLASM OF LOWER THIRD OF ESOPHAGUS: Primary | ICD-10-CM

## 2022-02-17 PROCEDURE — 77386 CHG INTENSITY MODULATED RADIATION TX DLVR COMPLEX: CPT | Performed by: RADIOLOGY

## 2022-02-17 PROCEDURE — 77014 CHG CT GUIDANCE RADIATION THERAPY FLDS PLACEMENT: CPT | Performed by: RADIOLOGY

## 2022-02-17 PROCEDURE — FACE2FACE: Performed by: RADIOLOGY

## 2022-02-17 PROCEDURE — 77386: CPT | Performed by: RADIOLOGY

## 2022-02-17 NOTE — PROGRESS NOTES
Patient Name: Maya Ribera Date: 2022       : 1960        MRN #: [id number] Diagnosis:   Encounter Diagnosis   Name Primary?   • Malignant neoplasm of lower third of esophagus (HCC) Yes                     RADIATION ON TREATMENT VISIT NOTE - ABDOMEN    Treatment Summary    [x] Concurrent Chemo         Treatment Site Ref. ID Energy Dose/Fx (cGy) #Fx Dose Correction (cGy) Total Dose (cGy) Start Date End Date Elapsed Days   RA Esoph 41 Rx: Esophagus 6X 180  0 4,140 1/3/2022 2/15/2022 43   EsophBST 46.8 Rx: Esophagus 6X 180 2 / 3 0 360 2022 1     Complicated course; treatment break with covid and low platelets  General:           Review of Systems      [x] No new complaints []     Appetite [] Nausea  [] Vomiting [] Bloating/”gassy” [] Loose stools/diarrhea  [] Bloody stool [] Jaundice []     Frequency of voiding  [] Fatigue,  severity: --------------  [] Pain,  severity: ----------------,  location:     Nausea regimen: 5HT3   Pain medication regimen: NONE   Bowel/proctitis regimen: NONE   Skin regimen: Aquaphor     Comments/Notes:  Corazon medellin    KPS: 80%       Vital Signs: /54   Pulse 98   Wt 51.7 kg (114 lb)   SpO2 100%   BMI 19.56 kg/m²     Weight:   Wt Readings from Last 3 Encounters:   22 51.7 kg (114 lb)   22 52.2 kg (115 lb)   02/10/22 52.6 kg (115 lb 14.4 oz)       Medication:   Current Outpatient Medications:   •  aluminum-magnesium hydroxide 200-200 MG/5ML suspension, diphenhydrAMINE 12.5 MG/5ML liquid, Lidocaine Viscous HCl 2 % solution, nystatin 100,000 unit/mL suspension, Swish and swallow 10 mL 4 (Four) Times a Day After Meals & at Bedtime., Disp: 400 mL, Rfl: 2  •  Eliquis 5 MG tablet tablet, Take 5 mg by mouth Every 12 (Twelve) Hours. LD , Disp: , Rfl:   •  fentaNYL (DURAGESIC) 12 MCG/HR, Place 1 patch on the skin as directed by provider Every 72 (Seventy-Two) Hours., Disp: 10 patch, Rfl: 0  •  HYDROcodone-acetaminophen (HYCET) 7.5-325  MG/15ML solution, Take 15 mL by mouth Every 6 (Six) Hours As Needed for Moderate Pain ., Disp: 236 mL, Rfl: 0  •  lansoprazole (PREVACID SOLUTAB) 30 MG Tablet Delayed Release Dispersible disintegrating tablet, TAKE ONE TABLET BY MOUTH TWICE A DAY, Disp: 60 tablet, Rfl: 1  •  methIMAzole (TAPAZOLE) 10 MG tablet, Take 5 mg by mouth Daily. Take DOS, Disp: , Rfl:   •  metoprolol succinate XL (Toprol XL) 25 MG 24 hr tablet, Take 1 tablet by mouth Daily. (Patient taking differently: Take 50 mg by mouth Daily.), Disp: 30 tablet, Rfl: 2  •  nystatin in Lidocaine Viscous HCl solution-diphenhydrAMINE liquid-aluminum-magnesium hydroxide-simethicone suspension, Swish and swallow 10 mL by mouth 4 times per day after meals and at bedtime., Disp: 400 mL, Rfl: 2  •  ondansetron ODT (Zofran ODT) 8 MG disintegrating tablet, Place 1 tablet on the tongue Every 8 (Eight) Hours As Needed for Nausea or Vomiting., Disp: 30 tablet, Rfl: 0  •  pravastatin (PRAVACHOL) 20 MG tablet, Take 20 mg by mouth Daily., Disp: , Rfl:   •  prochlorperazine (COMPAZINE) 10 MG tablet, Take 1 tablet by mouth Every 6 (Six) Hours As Needed for Nausea., Disp: 30 tablet, Rfl: 2  •  sucralfate (CARAFATE) 1 GM/10ML suspension, Take 1 g by mouth 4 (Four) Times a Day., Disp: , Rfl:        LABS (Reviewed):  Hematology WBC   Date Value Ref Range Status   02/15/2022 3.08 (L) 3.40 - 10.80 10*3/mm3 Final     RBC   Date Value Ref Range Status   02/15/2022 3.53 (L) 3.77 - 5.28 10*6/mm3 Final     Hemoglobin   Date Value Ref Range Status   02/15/2022 10.5 (L) 12.0 - 15.9 g/dL Final     Hematocrit   Date Value Ref Range Status   02/15/2022 31.1 (L) 34.0 - 46.6 % Final     Platelets   Date Value Ref Range Status   02/15/2022 341 140 - 450 10*3/mm3 Final      Chemistry   Lab Results   Component Value Date    GLUCOSE 116 02/10/2022    BUN 20 02/10/2022    CREATININE 0.44 (L) 02/10/2022    EGFRIFNONA 145 02/10/2022    BCR 45.5 (H) 02/10/2022    K 4.3 02/10/2022    CO2 28.1  02/10/2022    CALCIUM 9.5 02/10/2022    ALBUMIN 3.40 (L) 02/10/2022    AST 26 02/10/2022    ALT 23 02/10/2022         Physical Exam:         Neck:  [] Lymphadenopathy  Abdomen: [x] Soft [x] Distended [x] Non-tender []+Bowel sounds  Lungs:  [x] Clear to auscultation  CVS:  [x] Regular rate & rhythm  GI:  [] No new complaints    [] Gastritis:  [] Diarrhea:     [] Mucositis:  [] Dysphagia:     [] Proctitis:  [] Esophagitis:     [] Vomiting:     Skin:  ndGndrndanddndend:nd nd2nd Comments/Notes:     [x] Review of labs, images, dosimetry, dose delivered, & treatment parameters.    Comments:     [x] Patient treatment setup reviewed.    Comments:     Recommendations:will finish tomorrow; has been discussed at multi-D tumor board; restaging and consideration of esophagectomy per Dr. Finch.  Agreed to 3 rather than 5 fraction boost to the tumor given the delay with the initial Cross trial regimen dosing.    [] Continue RT  [] Change RT Plan [] Hold RT, length:        Approved Electronically By:  Ciro Pelletier MD, 2/17/2022, 13:26 EST          Confidentiality of this medical record shall be maintained except when use or disclosure is required or permitted by law, regulation or written authorization by the patient.

## 2022-02-18 ENCOUNTER — APPOINTMENT (OUTPATIENT)
Dept: RADIATION ONCOLOGY | Facility: HOSPITAL | Age: 62
End: 2022-02-18

## 2022-02-18 ENCOUNTER — DOCUMENTATION (OUTPATIENT)
Dept: RADIATION ONCOLOGY | Facility: HOSPITAL | Age: 62
End: 2022-02-18

## 2022-02-18 PROCEDURE — 77386 CHG INTENSITY MODULATED RADIATION TX DLVR COMPLEX: CPT | Performed by: RADIOLOGY

## 2022-02-18 PROCEDURE — 77014 CHG CT GUIDANCE RADIATION THERAPY FLDS PLACEMENT: CPT | Performed by: RADIOLOGY

## 2022-02-18 PROCEDURE — 77386: CPT | Performed by: RADIOLOGY

## 2022-02-21 DIAGNOSIS — C15.5 MALIGNANT NEOPLASM OF LOWER THIRD OF ESOPHAGUS: ICD-10-CM

## 2022-02-21 RX ORDER — FENTANYL 12 UG/H
1 PATCH TRANSDERMAL
Qty: 10 PATCH | Refills: 0 | Status: SHIPPED | OUTPATIENT
Start: 2022-02-21 | End: 2022-05-09 | Stop reason: HOSPADM

## 2022-02-21 RX ORDER — ONDANSETRON 8 MG/1
8 TABLET, ORALLY DISINTEGRATING ORAL EVERY 8 HOURS PRN
Qty: 30 TABLET | Refills: 0 | Status: SHIPPED | OUTPATIENT
Start: 2022-02-21 | End: 2022-03-24 | Stop reason: SDUPTHER

## 2022-02-21 NOTE — PROGRESS NOTES
RADIATION THERAPY TREATMENT SUMMARY  Patient: Maya Ribera  YOB: 1960  Diagnosis:  Malignant neoplasm of lower third of esophagus (HCC)  Intent:Neoadjuvant chemoradiation before planned surgical resection      Maya Ribera has recently completed the course of radiation therapy prescribed for the above-mentioned diagnosis.  Below, please find the specifics of the course of treatment delivered:    Radiation Details:    Tx Start Date  1/3/2022   Tx End date  2/18/2022   Intent   Curative   Total Treatments 26  Total Dose  4680 cGy    Prescription Name ESOPHAGUS  Site   Esophagus  Primary/Boost  Primary  Dose Per Fraction 180 cGy/Frac  Number of Fractions 23  Prescribe To  Volume PTV  4140 cGy  180 cGy/Frac  Mode    Photon  Technique  VMAT  Frequency  Once Daily  Energy   6X       Prescription Name Esophagus Boost  Site   Esophagus  Primary/Boost  Boost  Dose Per Fraction 180 cGy/Frac  Number of Fractions 3  Prescribe To  Volume PTV_esophagusBoost  540 cGy  180 cGy/Frac  Mode    Photon  Technique  VMAT  Frequency  Once Daily  Energy   6X    KPS: 70  Med changes as per EMR.    Tolerance and Toxicities: she tolerated the treatments well , requiring 1 treatment break 1/26 to 2/4 due to low labs--ANC/Plts and COVID infection. she completed the treatments in 46 elapsed days. She had grade I-II esophagitis.    Her case was discussed at tumor board and plan was to add a boost for 5 fractions but patient was willing only for the 3 fraction boost completed.    Follow-up Plans:  I have asked the patient to return to see me in approximately 4 weeks.  I have also made a referral to our Survivorship Clinic.

## 2022-02-21 NOTE — TELEPHONE ENCOUNTER
From: Maya Ribera  To: Office of Iggy Harrell MD  Sent: 2/19/2022 3:24 PM EST  Subject: Medication Renewal Request    Refills have been requested for the following medications:     ondansetron ODT (Zofran ODT) 8 MG disintegrating tablet [Iggy Harrell MD]    Preferred pharmacy: [facility] [phone] [place] [phone] [facility]  Delivery method: Pickup      Medication renewals requested in this message routed separately:     fentaNYL (DURAGESIC) 12 MCG/HR [Gary Garcia MD]

## 2022-02-23 ENCOUNTER — CLINICAL SUPPORT (OUTPATIENT)
Dept: ONCOLOGY | Facility: HOSPITAL | Age: 62
End: 2022-02-23

## 2022-02-23 ENCOUNTER — LAB (OUTPATIENT)
Dept: LAB | Facility: HOSPITAL | Age: 62
End: 2022-02-23

## 2022-02-23 ENCOUNTER — OFFICE VISIT (OUTPATIENT)
Dept: SURGERY | Facility: CLINIC | Age: 62
End: 2022-02-23

## 2022-02-23 VITALS — HEIGHT: 64 IN | BODY MASS INDEX: 19.63 KG/M2 | WEIGHT: 115 LBS

## 2022-02-23 VITALS — RESPIRATION RATE: 16 BRPM | SYSTOLIC BLOOD PRESSURE: 111 MMHG | DIASTOLIC BLOOD PRESSURE: 72 MMHG | HEART RATE: 101 BPM

## 2022-02-23 DIAGNOSIS — K94.23 MALFUNCTION OF GASTROSTOMY TUBE: Primary | ICD-10-CM

## 2022-02-23 DIAGNOSIS — C15.5 MALIGNANT NEOPLASM OF LOWER THIRD OF ESOPHAGUS: Primary | ICD-10-CM

## 2022-02-23 LAB
BASOPHILS # BLD AUTO: 0.02 10*3/MM3 (ref 0–0.2)
BASOPHILS NFR BLD AUTO: 0.3 % (ref 0–1.5)
DEPRECATED RDW RBC AUTO: 54.9 FL (ref 37–54)
EOSINOPHIL # BLD AUTO: 0 10*3/MM3 (ref 0–0.4)
EOSINOPHIL NFR BLD AUTO: 0 % (ref 0.3–6.2)
ERYTHROCYTE [DISTWIDTH] IN BLOOD BY AUTOMATED COUNT: 18.7 % (ref 12.3–15.4)
HCT VFR BLD AUTO: 31.9 % (ref 34–46.6)
HGB BLD-MCNC: 10.5 G/DL (ref 12–15.9)
IMM GRANULOCYTES # BLD AUTO: 0.04 10*3/MM3 (ref 0–0.05)
IMM GRANULOCYTES NFR BLD AUTO: 0.7 % (ref 0–0.5)
LYMPHOCYTES # BLD AUTO: 0.45 10*3/MM3 (ref 0.7–3.1)
LYMPHOCYTES NFR BLD AUTO: 7.3 % (ref 19.6–45.3)
MCH RBC QN AUTO: 30.1 PG (ref 26.6–33)
MCHC RBC AUTO-ENTMCNC: 32.9 G/DL (ref 31.5–35.7)
MCV RBC AUTO: 91.4 FL (ref 79–97)
MONOCYTES # BLD AUTO: 1.61 10*3/MM3 (ref 0.1–0.9)
MONOCYTES NFR BLD AUTO: 26.3 % (ref 5–12)
NEUTROPHILS NFR BLD AUTO: 4.01 10*3/MM3 (ref 1.7–7)
NEUTROPHILS NFR BLD AUTO: 65.4 % (ref 42.7–76)
NRBC BLD AUTO-RTO: 0 /100 WBC (ref 0–0.2)
PLATELET # BLD AUTO: 192 10*3/MM3 (ref 140–450)
PMV BLD AUTO: 10.2 FL (ref 6–12)
RBC # BLD AUTO: 3.49 10*6/MM3 (ref 3.77–5.28)
WBC NRBC COR # BLD: 6.13 10*3/MM3 (ref 3.4–10.8)

## 2022-02-23 PROCEDURE — 36415 COLL VENOUS BLD VENIPUNCTURE: CPT

## 2022-02-23 PROCEDURE — 85025 COMPLETE CBC W/AUTO DIFF WBC: CPT

## 2022-02-23 PROCEDURE — G0463 HOSPITAL OUTPT CLINIC VISIT: HCPCS

## 2022-02-23 PROCEDURE — 99024 POSTOP FOLLOW-UP VISIT: CPT | Performed by: SURGERY

## 2022-02-23 NOTE — PROGRESS NOTES
Pt here for RN review. Pt reports doing well after completing chemo and radiation. No significant changes in counts. Pt states sometimes she feels like her heart is racing and her BP is low since switching to metoprolol. VS stable today. Pt denies CP, palpitations, and SOA today. Pt has appt with cardiology on 3/14. Encouraged pt to record her VS when she is feeling like her heart is racing or BP is low. Pt also reports that her feeding tube is clogged and she was unable to flush her feeding tube last night and this morning. D/w Tracey who seen pt and called Dr. Ruiz's office. Per Tracey the pt should f/u with her surgeon. Provided pt copy of labs and schedule. Pt headed to Dr. Ruiz's office.    Lab Results   Component Value Date    WBC 6.13 02/23/2022    HGB 10.5 (L) 02/23/2022    HCT 31.9 (L) 02/23/2022    MCV 91.4 02/23/2022     02/23/2022

## 2022-02-23 NOTE — PROGRESS NOTES
Chief complaint: Clogged jejunostomy tube    Patient had jejunostomy tube placed in December for esophageal cancer.  Tube is been functioning well, but last night patient had some difficulty flushing it, same this morning.  On my exam, the tube looks good and the site looks good.  Initially I did have some difficulty flushing it as well, but with some pressure I was able to flush water without issue.  I was able to suck back a small amount of enteric contents and then flushed the tube again with sterile water.  She may follow-up as needed.    Bhanu Hollis MD  General and Endoscopic Surgery  [facility]    [address], [address]  [place], [place], [address]  P: [phone]  F: [phone]

## 2022-02-25 ENCOUNTER — TELEPHONE (OUTPATIENT)
Dept: SURGERY | Facility: CLINIC | Age: 62
End: 2022-02-25

## 2022-02-25 DIAGNOSIS — C15.5 MALIGNANT NEOPLASM OF LOWER THIRD OF ESOPHAGUS: Primary | ICD-10-CM

## 2022-02-25 NOTE — TELEPHONE ENCOUNTER
Caller: MELISSA BURLESON    Relationship to Patient: SELF    Phone Number: [phone]    What form or medical record are you requesting: PATH REPORT    Who is requesting this form or medical record from you: [facility] [employer] ([employer])    How would you like to receive the form or medical records (pick-up, mail, fax): PICK-UP    Timeframe paperwork needed: ASAP    Additional notes: [employer] [facility] [employer] SAYS THEY NEED A PATHOLOGY REPORT TO GO WITH THE PORT AND G TUBE PLACEMENT DR ESPARZA DID SO THAT THEY WILL COVER IT, OR A LETTER THAT NO PATHOLOGY WAS NOT DONE.

## 2022-03-02 ENCOUNTER — LAB (OUTPATIENT)
Dept: LAB | Facility: HOSPITAL | Age: 62
End: 2022-03-02

## 2022-03-02 ENCOUNTER — CLINICAL SUPPORT (OUTPATIENT)
Dept: ONCOLOGY | Facility: HOSPITAL | Age: 62
End: 2022-03-02

## 2022-03-02 DIAGNOSIS — C15.5 MALIGNANT NEOPLASM OF LOWER THIRD OF ESOPHAGUS: Primary | ICD-10-CM

## 2022-03-02 LAB
BASOPHILS # BLD AUTO: 0.04 10*3/MM3 (ref 0–0.2)
BASOPHILS NFR BLD AUTO: 0.5 % (ref 0–1.5)
DEPRECATED RDW RBC AUTO: 65.3 FL (ref 37–54)
EOSINOPHIL # BLD AUTO: 0.01 10*3/MM3 (ref 0–0.4)
EOSINOPHIL NFR BLD AUTO: 0.1 % (ref 0.3–6.2)
ERYTHROCYTE [DISTWIDTH] IN BLOOD BY AUTOMATED COUNT: 20.2 % (ref 12.3–15.4)
HCT VFR BLD AUTO: 33.2 % (ref 34–46.6)
HGB BLD-MCNC: 11 G/DL (ref 12–15.9)
IMM GRANULOCYTES # BLD AUTO: 0.04 10*3/MM3 (ref 0–0.05)
IMM GRANULOCYTES NFR BLD AUTO: 0.5 % (ref 0–0.5)
LYMPHOCYTES # BLD AUTO: 0.74 10*3/MM3 (ref 0.7–3.1)
LYMPHOCYTES NFR BLD AUTO: 9.7 % (ref 19.6–45.3)
MCH RBC QN AUTO: 30.8 PG (ref 26.6–33)
MCHC RBC AUTO-ENTMCNC: 33.1 G/DL (ref 31.5–35.7)
MCV RBC AUTO: 93 FL (ref 79–97)
MONOCYTES # BLD AUTO: 1.23 10*3/MM3 (ref 0.1–0.9)
MONOCYTES NFR BLD AUTO: 16.1 % (ref 5–12)
NEUTROPHILS NFR BLD AUTO: 5.58 10*3/MM3 (ref 1.7–7)
NEUTROPHILS NFR BLD AUTO: 73.1 % (ref 42.7–76)
NRBC BLD AUTO-RTO: 0 /100 WBC (ref 0–0.2)
PLATELET # BLD AUTO: 135 10*3/MM3 (ref 140–450)
PMV BLD AUTO: 9.6 FL (ref 6–12)
RBC # BLD AUTO: 3.57 10*6/MM3 (ref 3.77–5.28)
WBC NRBC COR # BLD: 7.64 10*3/MM3 (ref 3.4–10.8)

## 2022-03-02 PROCEDURE — G0463 HOSPITAL OUTPT CLINIC VISIT: HCPCS

## 2022-03-02 PROCEDURE — 85025 COMPLETE CBC W/AUTO DIFF WBC: CPT

## 2022-03-02 PROCEDURE — 36415 COLL VENOUS BLD VENIPUNCTURE: CPT

## 2022-03-02 NOTE — PROGRESS NOTES
Pt here for RN review. Pt reports feeling better this week other than dealing with occasional afib. Pt had feeding tube unclogged by Dr. Perea office last week and reports trying to eat a little food by mouth along with tube feedings. No significant changes in counts. Copy of labs provided. Reviewed schedule. Pt v/u.    Lab Results   Component Value Date    WBC 7.64 03/02/2022    HGB 11.0 (L) 03/02/2022    HCT 33.2 (L) 03/02/2022    MCV 93.0 03/02/2022     (L) 03/02/2022

## 2022-03-03 DIAGNOSIS — C15.5 MALIGNANT NEOPLASM OF LOWER THIRD OF ESOPHAGUS: ICD-10-CM

## 2022-03-08 ENCOUNTER — TELEPHONE (OUTPATIENT)
Dept: OTHER | Facility: HOSPITAL | Age: 62
End: 2022-03-08

## 2022-03-08 NOTE — TELEPHONE ENCOUNTER
[facility] MULTIDISCIPLINARY CLINIC  SURVIVORSHIP SERVICES CARE COORDINATION NOTE  Survivorship Treatment Summary Referral Scheduling  PHONE      Call placed to patient RE: open referral to survivorship treatment summary visit.    Spoke with  patient would like information mailed regarding survivorship.  Will call if wishes to schedule in the future.     Introduced myself and reviewed purpose and goals of survivorship treatment summary visit as well as what to expect..    Patient mailed name and contact information for BRANDI Baumann DNP.    Patient encouraged to call the office at any point for additional information, resources or support.

## 2022-03-10 ENCOUNTER — LAB (OUTPATIENT)
Dept: LAB | Facility: HOSPITAL | Age: 62
End: 2022-03-10

## 2022-03-10 ENCOUNTER — CLINICAL SUPPORT (OUTPATIENT)
Dept: ONCOLOGY | Facility: HOSPITAL | Age: 62
End: 2022-03-10

## 2022-03-10 VITALS — WEIGHT: 116.4 LBS | BODY MASS INDEX: 19.97 KG/M2

## 2022-03-10 DIAGNOSIS — C15.5 MALIGNANT NEOPLASM OF LOWER THIRD OF ESOPHAGUS: Primary | ICD-10-CM

## 2022-03-10 LAB
BASOPHILS # BLD AUTO: 0.03 10*3/MM3 (ref 0–0.2)
BASOPHILS NFR BLD AUTO: 0.4 % (ref 0–1.5)
DEPRECATED RDW RBC AUTO: 67.8 FL (ref 37–54)
EOSINOPHIL # BLD AUTO: 0.16 10*3/MM3 (ref 0–0.4)
EOSINOPHIL NFR BLD AUTO: 2.1 % (ref 0.3–6.2)
ERYTHROCYTE [DISTWIDTH] IN BLOOD BY AUTOMATED COUNT: 21.3 % (ref 12.3–15.4)
HCT VFR BLD AUTO: 32.2 % (ref 34–46.6)
HGB BLD-MCNC: 10.8 G/DL (ref 12–15.9)
IMM GRANULOCYTES # BLD AUTO: 0.05 10*3/MM3 (ref 0–0.05)
IMM GRANULOCYTES NFR BLD AUTO: 0.7 % (ref 0–0.5)
LYMPHOCYTES # BLD AUTO: 0.84 10*3/MM3 (ref 0.7–3.1)
LYMPHOCYTES NFR BLD AUTO: 11 % (ref 19.6–45.3)
MCH RBC QN AUTO: 31.2 PG (ref 26.6–33)
MCHC RBC AUTO-ENTMCNC: 33.5 G/DL (ref 31.5–35.7)
MCV RBC AUTO: 93.1 FL (ref 79–97)
MONOCYTES # BLD AUTO: 0.85 10*3/MM3 (ref 0.1–0.9)
MONOCYTES NFR BLD AUTO: 11.1 % (ref 5–12)
NEUTROPHILS NFR BLD AUTO: 5.72 10*3/MM3 (ref 1.7–7)
NEUTROPHILS NFR BLD AUTO: 74.7 % (ref 42.7–76)
NRBC BLD AUTO-RTO: 0 /100 WBC (ref 0–0.2)
PLATELET # BLD AUTO: 94 10*3/MM3 (ref 140–450)
PMV BLD AUTO: 9.7 FL (ref 6–12)
RBC # BLD AUTO: 3.46 10*6/MM3 (ref 3.77–5.28)
WBC NRBC COR # BLD: 7.65 10*3/MM3 (ref 3.4–10.8)

## 2022-03-10 PROCEDURE — G0463 HOSPITAL OUTPT CLINIC VISIT: HCPCS

## 2022-03-10 PROCEDURE — 85025 COMPLETE CBC W/AUTO DIFF WBC: CPT

## 2022-03-10 PROCEDURE — 36415 COLL VENOUS BLD VENIPUNCTURE: CPT

## 2022-03-10 NOTE — PROGRESS NOTES
Pt here for RN review. Pt reports doing well and voices no concerns. Pt states she has been trying to eat more by mouth. Plts and hgb dropped since last visit. Pt denies issues with bleeding. Copy of labs provided and reviewed schedule. Pt v/u.    Lab Results   Component Value Date    WBC 7.65 03/10/2022    HGB 10.8 (L) 03/10/2022    HCT 32.2 (L) 03/10/2022    MCV 93.1 03/10/2022    PLT 94 (L) 03/10/2022

## 2022-03-17 ENCOUNTER — HOSPITAL ENCOUNTER (OUTPATIENT)
Dept: PET IMAGING | Facility: HOSPITAL | Age: 62
Discharge: HOME | End: 2022-03-17

## 2022-03-17 ENCOUNTER — OFFICE VISIT (OUTPATIENT)
Dept: RADIATION ONCOLOGY | Facility: HOSPITAL | Age: 62
End: 2022-03-17

## 2022-03-17 ENCOUNTER — LAB (OUTPATIENT)
Dept: LAB | Facility: HOSPITAL | Age: 62
End: 2022-03-17

## 2022-03-17 ENCOUNTER — CLINICAL SUPPORT (OUTPATIENT)
Dept: ONCOLOGY | Facility: HOSPITAL | Age: 62
End: 2022-03-17

## 2022-03-17 VITALS
SYSTOLIC BLOOD PRESSURE: 114 MMHG | BODY MASS INDEX: 20.14 KG/M2 | OXYGEN SATURATION: 99 % | WEIGHT: 117.4 LBS | HEART RATE: 85 BPM | DIASTOLIC BLOOD PRESSURE: 63 MMHG

## 2022-03-17 VITALS — WEIGHT: 117 LBS | BODY MASS INDEX: 20.07 KG/M2

## 2022-03-17 DIAGNOSIS — C15.5 MALIGNANT NEOPLASM OF LOWER THIRD OF ESOPHAGUS: Primary | ICD-10-CM

## 2022-03-17 DIAGNOSIS — C15.5 MALIGNANT NEOPLASM OF LOWER THIRD OF ESOPHAGUS: ICD-10-CM

## 2022-03-17 LAB
BASOPHILS # BLD AUTO: 0.01 10*3/MM3 (ref 0–0.2)
BASOPHILS NFR BLD AUTO: 0.1 % (ref 0–1.5)
DEPRECATED RDW RBC AUTO: 75.6 FL (ref 37–54)
EOSINOPHIL # BLD AUTO: 0.37 10*3/MM3 (ref 0–0.4)
EOSINOPHIL NFR BLD AUTO: 4.9 % (ref 0.3–6.2)
ERYTHROCYTE [DISTWIDTH] IN BLOOD BY AUTOMATED COUNT: 22.5 % (ref 12.3–15.4)
GLUCOSE BLDC GLUCOMTR-MCNC: 101 MG/DL (ref 70–130)
HCT VFR BLD AUTO: 32.6 % (ref 34–46.6)
HGB BLD-MCNC: 10.6 G/DL (ref 12–15.9)
IMM GRANULOCYTES # BLD AUTO: 0.08 10*3/MM3 (ref 0–0.05)
IMM GRANULOCYTES NFR BLD AUTO: 1.1 % (ref 0–0.5)
LYMPHOCYTES # BLD AUTO: 0.96 10*3/MM3 (ref 0.7–3.1)
LYMPHOCYTES NFR BLD AUTO: 12.6 % (ref 19.6–45.3)
MCH RBC QN AUTO: 31.6 PG (ref 26.6–33)
MCHC RBC AUTO-ENTMCNC: 32.5 G/DL (ref 31.5–35.7)
MCV RBC AUTO: 97.3 FL (ref 79–97)
MONOCYTES # BLD AUTO: 0.42 10*3/MM3 (ref 0.1–0.9)
MONOCYTES NFR BLD AUTO: 5.5 % (ref 5–12)
NEUTROPHILS NFR BLD AUTO: 5.76 10*3/MM3 (ref 1.7–7)
NEUTROPHILS NFR BLD AUTO: 75.8 % (ref 42.7–76)
NRBC BLD AUTO-RTO: 0 /100 WBC (ref 0–0.2)
PLATELET # BLD AUTO: 178 10*3/MM3 (ref 140–450)
PMV BLD AUTO: 9.9 FL (ref 6–12)
RBC # BLD AUTO: 3.35 10*6/MM3 (ref 3.77–5.28)
WBC NRBC COR # BLD: 7.6 10*3/MM3 (ref 3.4–10.8)

## 2022-03-17 PROCEDURE — 82962 GLUCOSE BLOOD TEST: CPT

## 2022-03-17 PROCEDURE — 99212-NC PR NO CHARGE CBC OFFICE OUTPATIENT VISIT 10 MINUTES: Performed by: RADIOLOGY

## 2022-03-17 PROCEDURE — 36415 COLL VENOUS BLD VENIPUNCTURE: CPT

## 2022-03-17 PROCEDURE — A9552 F18 FDG: HCPCS | Performed by: NURSE PRACTITIONER

## 2022-03-17 PROCEDURE — 78815 PET IMAGE W/CT SKULL-THIGH: CPT

## 2022-03-17 PROCEDURE — 85025 COMPLETE CBC W/AUTO DIFF WBC: CPT

## 2022-03-17 PROCEDURE — G0463 HOSPITAL OUTPT CLINIC VISIT: HCPCS

## 2022-03-17 PROCEDURE — 0 FLUDEOXYGLUCOSE F18 SOLUTION: Performed by: NURSE PRACTITIONER

## 2022-03-17 RX ADMIN — FLUDEOXYGLUCOSE F18 1 DOSE: 300 INJECTION INTRAVENOUS at 06:54

## 2022-03-17 NOTE — NURSING NOTE
Lab Results   Component Value Date    WBC 7.60 03/17/2022    HGB 10.6 (L) 03/17/2022    HCT 32.6 (L) 03/17/2022    MCV 97.3 (H) 03/17/2022     03/17/2022     Pt is here for lab with RN review.  CBC reviewed with pt, counts are stable for this pt at this time. Pt has no complaints. She states that she saw cardiologist and has been cleared for surgery.  Copy of labs given to pt and f/u appt reviewed. Pt is instructed to call the office with any concerns or new symptoms prior to next visit. Pt vu

## 2022-03-17 NOTE — PROGRESS NOTES
FOLLOW-UP NOTE    Name: Maya Ribera  YOB: 1960  MRN #: [id number]  Date of Service: 3/17/2022    DIAGNOSIS: Adenocarcinoma of the lower third of the esophagus    REASON FOR VISIT: 1 month f/u    RADIATION TREATMENT COURSE: Neoadjuvant chemoradiation    HISTORY OF PRESENT ILLNESS: The patient is a 61 y.o. year old female who completed her neoadjuvant chemoradation course, complicated by COVID break, some expected esophagitis and weight loss for her T3N1M0 adenocarcinoma of the esophagus.  She did have several interruptions with her chemotherapy course related to pancytopenia but completed the neoadjuvant combined course on 2/14/2022.    She was 135 lbs prior to chemoradiation and now weighs 117, but her weight on 2/22/22 was at 116 so she has done very good over the most recent interval.  She has been tolerating tube feeds.    She had pet/ct earlier today which I was able to review with her and discuss the great response:  Significant interval decrease in the degree of FDG avid thickened  within the mid to distal esophagus which remains mildly thickened.  2.  Interval decrease in size and FDG uptake of the previously seen  hypermetabolic gastrohepatic and left hilar nodes which no longer longer  demonstrate FDG uptake significantly above that of mediastinal blood  pool.  3.  New subcentimeter nodule in the right apex appears to represent  endobronchial filling defect which is indeterminate. Additionally, there  is a new area of tree-in-bud nodularity in the medial aspect the left  upper lobe which is likely reactive. And continued close attention on  follow-up with chest CT in 3 months is recommended to ensure stability  of these findings.  4.  Other findings as above.      She is scheduled to see Dr. Finch in 2 weeks and remains motivated for definitive surgical management.        The following portions of the patient's history were reviewed and updated as appropriate: allergies, current  medications, past family history, past medical history, past social history, past surgical history and problem list. Reviewed with the patient and remain unchanged.    PAST MEDICAL HISTORY:  she  has a past medical history of A-fib (HCC), Boil, breast (12/13/2021), Disease of thyroid gland, Esophageal cancer (HCC), Gastric ulcer, GERD (gastroesophageal reflux disease), Hyperlipidemia, Hypertension, Irritable bowel, and Mitral valve prolapse.  MEDICATIONS:   Current Outpatient Medications:   •  fentaNYL (DURAGESIC) 12 MCG/HR, Place 1 patch on the skin as directed by provider Every 72 (Seventy-Two) Hours., Disp: 10 patch, Rfl: 0  •  HYDROcodone-acetaminophen (HYCET) 7.5-325 MG/15ML solution, Take 15 mL by mouth Every 6 (Six) Hours As Needed for Moderate Pain ., Disp: 236 mL, Rfl: 0  •  aluminum-magnesium hydroxide 200-200 MG/5ML suspension, diphenhydrAMINE 12.5 MG/5ML liquid, Lidocaine Viscous HCl 2 % solution, nystatin 100,000 unit/mL suspension, Swish and swallow 10 mL 4 (Four) Times a Day After Meals & at Bedtime., Disp: 400 mL, Rfl: 2  •  Eliquis 5 MG tablet tablet, Take 5 mg by mouth Every 12 (Twelve) Hours. LD 12/4, Disp: , Rfl:   •  lansoprazole (PREVACID SOLUTAB) 30 MG Tablet Delayed Release Dispersible disintegrating tablet, TAKE ONE TABLET BY MOUTH TWICE A DAY, Disp: 60 tablet, Rfl: 1  •  methIMAzole (TAPAZOLE) 10 MG tablet, Take 5 mg by mouth Daily. Take DOS, Disp: , Rfl:   •  metoprolol succinate XL (Toprol XL) 25 MG 24 hr tablet, Take 1 tablet by mouth Daily. (Patient taking differently: Take 50 mg by mouth Daily.), Disp: 30 tablet, Rfl: 2  •  nystatin in Lidocaine Viscous HCl solution-diphenhydrAMINE liquid-aluminum-magnesium hydroxide-simethicone suspension, Swish and swallow 10 mL by mouth 4 times per day after meals and at bedtime., Disp: 400 mL, Rfl: 2  •  ondansetron ODT (Zofran ODT) 8 MG disintegrating tablet, Place 1 tablet on the tongue Every 8 (Eight) Hours As Needed for Nausea or Vomiting.,  Disp: 30 tablet, Rfl: 0  •  pravastatin (PRAVACHOL) 20 MG tablet, Take 20 mg by mouth Daily., Disp: , Rfl:   •  prochlorperazine (COMPAZINE) 10 MG tablet, Take 1 tablet by mouth Every 6 (Six) Hours As Needed for Nausea., Disp: 30 tablet, Rfl: 2  •  sucralfate (CARAFATE) 1 GM/10ML suspension, Take 1 g by mouth 4 (Four) Times a Day., Disp: , Rfl:   No current facility-administered medications for this visit.  ALLERGIES:   Allergies   Allergen Reactions   • Codeine Hives     Patient states this is when she was very young.      PAST SURGICAL HISTORY: she has a past surgical history that includes Cholecystectomy; External ear surgery; Knee surgery (Left); Replacement total knee (Left); Upper endoscopic ultrasound w/ FNA (N/A, 12/7/2021); Colonoscopy (08/27/2021); Venous Access Device (Port) (Left, 12/15/2021); and Jejunostomy (N/A, 12/15/2021).  PREVIOUS RADIOTHERAPY OR CHEMOTHERAPY: yes  FAMILY HISTORY: her family history includes Alcohol abuse in her brother; Asthma in her brother; Bipolar disorder in her brother and father; Breast cancer in her maternal grandmother, mother, and sister; COPD in her sister; Diabetes in her brother, father, maternal grandfather, maternal grandmother, mother, paternal grandfather, paternal grandmother, sister, and sister; Hypertension in her father, sister, sister, and son; Kidney cancer in her sister; Seizures in her brother.  SOCIAL HISTORY: she  reports that she has been smoking cigarettes. She started smoking about 41 years ago. She has a 40.00 pack-year smoking history. She has never used smokeless tobacco. She reports previous alcohol use. She reports that she does not use drugs.  PAIN AND PAIN MANAGEMENT: no pain.  Vitals:    03/17/22 1110   BP: 114/63   Pulse: 85   SpO2: 99%   Weight: 53.3 kg (117 lb 6.4 oz)   PainSc: 0-No pain     Weight on 2/22/2022 was 116 lbs.    NUTRITIONAL STATUS:    no issues  KPS: 80:  Normal activity with effort; some signs or symptoms        Review of  Systems: improving as noted above          Objective     Vitals:  Vitals:    03/17/22 1110   BP: 114/63   Pulse: 85   SpO2: 99%   Weight: 53.3 kg (117 lb 6.4 oz)   PainSc: 0-No pain       PHYSICAL EXAM:  GENERAL: in no apparent distress, sitting comfortably in room.    HEENT: normocephalic, atraumatic. Pupils are equal, round, reactive to light. Sclera anicteric. Conjunctiva not injected. Oropharynx without erythema, ulcerations or thrush.   NECK: Supple with no masses.  LYMPHATIC: no cervical, supraclavicular or axillary adenopathy appreciated bilaterally.   CARDIOVASCULAR: S1 & S2 detected; no murmurs, rubs or gallops.  CHEST: clear to auscultation bilaterally; no wheezes, crackles or rubs. Work of breathing normal.  ABDOMEN: bowel sounds present. Abdomen is soft, nontender, nondistended.   MUSCULOSKELETAL: no tenderness to palpation along the spine or scapulae. Normal range of motion.  EXTREMITIES: no clubbing, cyanosis, edema.  SKIN: no erythema, rashes, ulcerations noted.   NEUROLOGIC: cranial nerves II-XII grossly intact bilaterally. No focal neurologic deficits.  PSYCHIATRIC:  alert, aware, and appropriate.      PERTINENT IMAGING/PATHOLOGY/LABS (Medical Decision Making):     COORDINATION OF CARE: A copy of this note is sent to the referring provider.    PATHOLOGY (Reviewed):     IMAGING (Reviewed): interval pet with good response.    LABS (Reviewed):  Hematology WBC   Date Value Ref Range Status   03/17/2022 7.60 3.40 - 10.80 10*3/mm3 Final     RBC   Date Value Ref Range Status   03/17/2022 3.35 (L) 3.77 - 5.28 10*6/mm3 Final     Hemoglobin   Date Value Ref Range Status   03/17/2022 10.6 (L) 12.0 - 15.9 g/dL Final     Hematocrit   Date Value Ref Range Status   03/17/2022 32.6 (L) 34.0 - 46.6 % Final     Platelets   Date Value Ref Range Status   03/17/2022 178 140 - 450 10*3/mm3 Final      Chemistry   Lab Results   Component Value Date    GLUCOSE 116 02/10/2022    BUN 20 02/10/2022    CREATININE 0.44 (L)  02/10/2022    EGFRIFNONA 145 02/10/2022    BCR 45.5 (H) 02/10/2022    K 4.3 02/10/2022    CO2 28.1 02/10/2022    CALCIUM 9.5 02/10/2022    ALBUMIN 3.40 (L) 02/10/2022    AST 26 02/10/2022    ALT 23 02/10/2022         Assessment/Plan     ASSESSMENT AND PLAN:  -Recovering well post-neoadjuvant chemoradation course, complicated by COVID break, some expected esophagitis and weight loss for her T3N1M0 adenocarcinoma of the esophagus.  She did have several interruptions with her chemotherapy course related to pancytopenia but completed the neoadjuvant combined course on 2/14/2022.    She was 135 lbs prior to chemoradiation and now weighs 117, but her weight on 2/22/22 was at 116 so she has done very good over the most recent interval.  She has been tolerating tube feeds.    -pet/ct earlier today: Significant interval decrease in the degree of FDG avid thickened  within the mid to distal esophagus which remains mildly thickened.  2.  Interval decrease in size and FDG uptake of the previously seen  hypermetabolic gastrohepatic and left hilar nodes which no longer longer  demonstrate FDG uptake significantly above that of mediastinal blood  pool.  3.  New subcentimeter nodule in the right apex appears to represent  endobronchial filling defect which is indeterminate. Additionally, there  is a new area of tree-in-bud nodularity in the medial aspect the left  upper lobe which is likely reactive. And continued close attention on  follow-up with chest CT in 3 months is recommended to ensure stability  of these findings.  4.  Other findings as above.      This assessment comes from my review of the imaging, pathology, physician notes and other pertinent information as mentioned.    DISPOSITION: FU with surgery and then return visit with med/onc to review path and discuss role for any further adjuvant systemic therapy options.            CC: MD Iggy Garcia MD John A Cox, MD  3/17/2022  12:00 PM EDT

## 2022-03-18 ENCOUNTER — TELEPHONE (OUTPATIENT)
Dept: ONCOLOGY | Facility: CLINIC | Age: 62
End: 2022-03-18

## 2022-03-18 ENCOUNTER — TELEMEDICINE - AUDIO (OUTPATIENT)
Dept: NUTRITION | Facility: HOSPITAL | Age: 62
End: 2022-03-18

## 2022-03-18 NOTE — TELEPHONE ENCOUNTER
Returned call to patient, who has me on speaker phone.  She is reporting that she is spitting up mucous every 10-20 minutes.  She feels like this is vomit.  She took on ODT Zofran at 2 pm and she feels like she has decreased the amount of vomiting she has been having.  She has a J-tube.  So to keep her hydrated they will administer 60 ml of water into her J-tube every 1-2 hours, she will keep taking the Zofran ODT as ordered and if worsens she will go to the emergency room.  She v/u.

## 2022-03-18 NOTE — PROGRESS NOTES
Outpatient Oncology Nutrition      Patient Name:  Maya Ribera  YOB: 1960  MRN: [id number]    Assessment Date:  3/18/2022    Comments:  Patient called this morning complaining of vomiting since her PET yesterday. States she feels the tumor in her esophagus now and whenever she drinks ice water she has regurgitation, spitting up clear mucous. Complains of pain, burning feeling in her throat. She cannot keep meds down except for she is using the carafate. Recommended she take the ODT zofran and try to drink something thicker than water such as a smoothie or Boost milkshake so that she gets something on her stomach and can tolerate pain meds. Recommended she wait for instructions after speaking to the nurse practitioner.   She continues to use JT for tube feeding at night- Nutren 2.0 3 cartons (1500 cals, 63 grams protein, 519ml water).   Will follow.       Electronically signed by:  Tracey Lozano RD, DEVIN  03/18/22 14:20 EDT

## 2022-03-22 NOTE — PROGRESS NOTES
Subjective Patient noting periodic nausea, difficulty with constipation requiring disimpaction    REASON FOR FOLLOW-UP: Distal esophageal cancer      History of Present Illness    This is a 61 y.o. female with recent diagnosis of distal esophageal cancer, initiating concurrent chemoradiation with carboplatin/Taxol on 1/5/2022.  She returns today for week 3  of therapy.  She had some dry heaving over the weekend responsive to Zofran each time.  She has had difficulty with constipation mainly related to narcotics.  She is taking stool softener each morning and occasional MiraLAX and feels that this is helping.  Unfortunately she did have to manually disimpact at least on 1 occasion.  She now continues with additional milk of magnesia.  Her energy remains fair.    She is receiving continuous feeding at night via a pump.  She is slowly working up to recommended 90 cc/hr to be given over 16 hours.  Of course this timing is difficult if she has appointments or other things taking her away from her home.  At this point she states she is getting about 8 hours consistently feeding running at that rate having come back as result of the diarrhea.  She is still getting and 1 boost during the day and able to take her pills by mouth at this time.  She has been followed by Tracey Lozano, oncology dietitian.    Her pain is currently well controlled.  She denies other concerns at this time.    The patient is next seen 1/19/2022 having received approximately half the course of scheduled radiation therapy documented 1/18/2022.    ONCOLOGY HISTORY     The patient is a 61-year-old female who had been seen in multidisciplinary thoracic oncology conference with complaints of painful swallowing and dysphagia over the previous several months with 10 to 15 pound weight loss.  This led to EGD and colonoscopy with normal colonoscopy but EGD demonstrated tumor at the GE junction with biopsies consistent with severe dysplasia including  carcinoma in situ.  The patient states that she saw a number of physicians and attempting to have a diagnosis completed.  CT of chest demonstrated thickening of the mid esophagus with no lymphadenopathy and CT PET demonstrated uptake in the esophagus gastropathic lymph nodes it is felt that she likely had early stage carcinoma of the esophagus and that we could present to record with surgical resection.  Endoscopic ultrasound, however, was requested and we could not have this done any sooner than GI physicians available at [facility].     This was performed 12/7/2021 revealing a 5 cm esophageal mass present in the distal third esophagus without extension into the GE junction into the cardia, the mass extended into into the muscularis propria into the adventitia not penetrating through the adventitia with a single 1 cm gastrohepatic/celiac axis lymph node and FNB x1 performed-?  T2 N1 M0 distal esophageal cancer-clinical stage III, pathologic stage IIIa  In contacting the [facility] pathology department the results of this FNB are not yet available and will be by 12/9/2021.      The patient is seen with her sister and son all indicating that she has had difficulty with swallowing since late summer likely July 2021 and has been attempting to have a diagnosis made since that time.  They are all somewhat frustrated about the length of time to obtain an answer for this problem and we have spent considerable time discussing her findings thus far and how we might proceed to treat what appears to be a contained malignancy.  This has, additionally, been discussed with Dr. Stef cruz who feels that she is not immediately and operative candidate and should proceed with chemo radiation therapy initially-neoadjuvant treatment before consideration for subsequent surgery.  This has been discussed with the patient, her sister and her son again in detail 12/8/2021.  We went on to initiate pain control with liquid  "hydrocodone, referred the patient to general surgery and oncology nutrition.    The patient was seen by  who proceeded 12/15/2021 to PowerPort placement and open jejunostomy.  She was seen during her hospital stay by oncology nutrition required hospitalization up to including 12/19/2021.    The patient required hospitalization to 12/20/2021 and after discharge along with her partner's health was able to gradually improve her caloric intake and was seen back 12/27 by Dr. Hollis who felt she had improved enough to initiate chemotherapy.  The patient was seen by radiation therapy 12/21 with plans to proceed with 40 CT simulation and planning-IMRT/IGR T-4140 cGy in 23 fractions with pleased to be considered.    The patient is seen back with her partner Cristy 12/29/2021 now able to \"manage\" and we have discussed extensively concurrent chemotherapy radiation therapy using Carboplatin/Taxol weekly.    Currently the patient is scheduled to begin radiation therapy 1/3/2021, undergoing teaching 1/4/2022 and will begin concurrent chemotherapy 1/5/2022      Past Medical History:   Diagnosis Date   • A-fib (HCC)    • Boil, breast 12/13/2021    HEALING UNDER LEFT BREAST    • Disease of thyroid gland    • Esophageal cancer (HCC)    • Gastric ulcer    • GERD (gastroesophageal reflux disease)    • Hyperlipidemia    • Hypertension    • Irritable bowel    • Mitral valve prolapse     FOLLOWED BY          Past Surgical History:   Procedure Laterality Date   • CHOLECYSTECTOMY     • COLONOSCOPY  08/27/2021    [facility]   • EXTERNAL EAR SURGERY     • JEJUNOSTOMY N/A 12/15/2021    Procedure: open JEJUNOSTOMY tube placement;  Surgeon: Bhanu Hollis MD;  Location: [facility];  Service: General;  Laterality: N/A;   • KNEE SURGERY Left    • REPLACEMENT TOTAL KNEE Left    • UPPER ENDOSCOPIC ULTRASOUND W/ FNA N/A 12/7/2021    Procedure: Esophagogastroduodenoscopy with endoscopic ultrasound  WITH STAGING AND " FINE NEEDLE BIOPSY;  Surgeon: Amrit Green MD;  Location:  [facility];  Service: Gastroenterology;  Laterality: N/A;  post op: inlet patch, esophageal mass, T2   • VENOUS ACCESS DEVICE (PORT) INSERTION Left 12/15/2021    Procedure: INSERTION OF PORTACATH;  Surgeon: Bhanu Hollis MD;  [facility];  Service: General;  Laterality: Left;        Current Outpatient Medications on File Prior to Visit   Medication Sig Dispense Refill   • aluminum-magnesium hydroxide 200-200 MG/5ML suspension, diphenhydrAMINE 12.5 MG/5ML liquid, Lidocaine Viscous HCl 2 % solution, nystatin 100,000 unit/mL suspension Swish and swallow 10 mL 4 (Four) Times a Day After Meals & at Bedtime. 400 mL 2   • Eliquis 5 MG tablet tablet Take 5 mg by mouth Every 12 (Twelve) Hours. LD 12/4     • fentaNYL (DURAGESIC) 12 MCG/HR Place 1 patch on the skin as directed by provider Every 72 (Seventy-Two) Hours. 10 patch 0   • HYDROcodone-acetaminophen (HYCET) 7.5-325 MG/15ML solution Take 15 mL by mouth Every 6 (Six) Hours As Needed for Moderate Pain . 236 mL 0   • lansoprazole (PREVACID SOLUTAB) 30 MG Tablet Delayed Release Dispersible disintegrating tablet TAKE ONE TABLET BY MOUTH TWICE A DAY 60 tablet 1   • methIMAzole (TAPAZOLE) 10 MG tablet Take 5 mg by mouth Daily. Take DOS     • metoprolol succinate XL (Toprol XL) 25 MG 24 hr tablet Take 1 tablet by mouth Daily. (Patient taking differently: Take 50 mg by mouth Daily.) 30 tablet 2   • nystatin in Lidocaine Viscous HCl solution-diphenhydrAMINE liquid-aluminum-magnesium hydroxide-simethicone suspension Swish and swallow 10 mL by mouth 4 times per day after meals and at bedtime. 400 mL 2   • ondansetron ODT (Zofran ODT) 8 MG disintegrating tablet Place 1 tablet on the tongue Every 8 (Eight) Hours As Needed for Nausea or Vomiting. 30 tablet 0   • pravastatin (PRAVACHOL) 20 MG tablet Take 20 mg by mouth Daily.     • prochlorperazine (COMPAZINE) 10 MG tablet Take 1 tablet by mouth  Every 6 (Six) Hours As Needed for Nausea. 30 tablet 2   • sucralfate (CARAFATE) 1 GM/10ML suspension Take 1 g by mouth 4 (Four) Times a Day.       No current facility-administered medications on file prior to visit.        ALLERGIES:    Allergies   Allergen Reactions   • Codeine Hives     Patient states this is when she was very young.         Social History     Socioeconomic History   • Marital status: Single   • Number of children: 1   • Years of education: High school   Tobacco Use   • Smoking status: Current Every Day Smoker     Packs/day: 1.00     Years: 40.00     Pack years: 40.00     Types: Cigarettes     Start date: 1981   • Smokeless tobacco: Never Used   • Tobacco comment: 2 per day   Vaping Use   • Vaping Use: Never used   Substance and Sexual Activity   • Alcohol use: Not Currently   • Drug use: Never   • Sexual activity: Defer        Family History   Problem Relation Age of Onset   • Breast cancer Mother    • Diabetes Mother    • Bipolar disorder Father    • Diabetes Father    • Hypertension Father    • Breast cancer Sister    • COPD Sister    • Diabetes Sister    • Hypertension Sister    • Alcohol abuse Brother    • Asthma Brother    • Bipolar disorder Brother    • Diabetes Brother    • Seizures Brother    • Breast cancer Maternal Grandmother    • Diabetes Maternal Grandmother    • Diabetes Maternal Grandfather    • Diabetes Paternal Grandmother    • Diabetes Paternal Grandfather    • Hypertension Son    • Kidney cancer Sister    • Hypertension Sister    • Diabetes Sister    • Malig Hyperthermia Neg Hx           Objective     There were no vitals filed for this visit.  Current Status 2/10/2022   ECOG score 0       Physical Exam  Constitutional:       Appearance: Normal appearance.   HENT:      Head: Normocephalic and atraumatic.      Nose: Nose normal.      Mouth/Throat:      Mouth: Mucous membranes are moist.      Pharynx: Oropharynx is clear.   Eyes:      Extraocular Movements: Extraocular movements  intact.      Conjunctiva/sclera: Conjunctivae normal.      Pupils: Pupils are equal, round, and reactive to light.   Cardiovascular:      Rate and Rhythm: Normal rate and regular rhythm.      Pulses: Normal pulses.      Heart sounds: Normal heart sounds.   Pulmonary:      Effort: Pulmonary effort is normal.      Breath sounds: Normal breath sounds.   Abdominal:      General: Bowel sounds are normal.      Palpations: Abdomen is soft. There is mass (Fullness epigastric region).      Tenderness: There is abdominal tenderness (Midepigastrium).      Comments: J-tube clean, dry, and intact   Musculoskeletal:         General: Normal range of motion.      Cervical back: Normal range of motion.   Skin:     General: Skin is warm and dry.      Findings: No rash.   Neurological:      General: No focal deficit present.      Mental Status: She is alert and oriented to person, place, and time.   Psychiatric:         Mood and Affect: Mood normal.        I have reexamined the patient and the results are consistent with the previously documented exam except as updtaed. Iggy Harrell MD       RECENT LABS:  Results from last 7 days   Lab Units 03/17/22  0840   WBC 10*3/mm3 7.60   NEUTROS ABS 10*3/mm3 5.76   HEMOGLOBIN g/dL 10.6*   HEMATOCRIT % 32.6*   PLATELETS 10*3/mm3 178                 *Labs reviewed 01/05/2022     Assessment/Plan          61-year-old female with a history of hyperthyroidism, previous gastric ulcer, GE reflux, atrial fibrillation, hyperlipidemia, hypertension irritable bowel syndrome,          1.  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  · here has been a number of physicians involved in the patient's case and several months before she was  ultimately seen by Dr. Finch at [facility].   · CT scan of the chest demonstrating thickening of the mid esophagus with no lymphadenopathy and PET/CT demonstrated uptake in the mid to distal esophagus with SUV intensely elevated at 16 with a few nonenlarged left hilar and gastrohepatic lymph nodes with mild increased FDG activity, few scattered subcentimeter pulmonary nodules bilaterally indeterminate.  · EUS was felt necessary and ultimately obtained at [facility] performed 12/7/2021 revealing a 5 cm esophageal mass present in the distal third esophagus without extension into the GE junction into the cardia, the mass extended into into the muscularis propria into the adventitia not penetrating through the adventitia with a single 1 cm gastrohepatic/celiac axis lymph node and FNB x1 performed-?   · Discussed with Dr. Finch directly who feels that she is not immediately and operative candidate and should proceed with chemo radiation therapy initially-neoadjuvant treatment before consideration for subsequent surgery. Discussed extensively concurrent chemotherapy radiation therapy using Carboplatin/Taxol weekly.  ·  who proceeded 12/15/2021 to PowerPort placement and open jejunostomy.  She was seen during her hospital stay by oncology nutrition required hospitalization up to including 12/19/2021.  · seen by radiation therapy 12/21 with plans to proceed with 40 CT simulation and planning-IMRT/IGR T-4140 cGy in 23 fractions with pleased to be considered.   · Radiation therapy initiated 1/3/2022  · 1/5/2021 cycle 1 weekly carboplatin/Taxol.    · Patient status post week 2 on 1/12/2022  · Patient treated 1/19/2022-week 3    2.  Nutrition  · Use of Jejunostomy tube. Continuous feed currently 12 hours a night, slowly working up to recommended 16 hours/day at 90 cc/h  · She is still trying to drink at least 1 boost a day and able to take pills by mouth.  · Continue to monitor weight  closely.    3.  A. fib: On chronic anticoagulation with Eliquis 5 mg twice daily.    4. Narcotic induced constipation  · Patient using stool softeners each morning, MiraLAX daily and milk of magnesia daily.    5.  Mild chemotherapy-induced nausea/dry heaving  · Positive to Zofran.  Continue as needed.      Plan:  1. Proceed with week 3 Carboplain/Taxol with concurrent radiation  2. Continue Hycet 7.5-325 for pain-E scribed to pharmacy, refill  3. Continue feedings via J-tube as outlined and continue follow-up with oncology dietitian.  4. Continue Zofran as needed for nausea.  5. Continue bowel regimen to control constipation.  6. Follow up in one week with NP in week for Carboplatin/Taxol  7. Patient to call our office or return for new or worsening symptoms    This patient continues on high risk medication requiring close monitoring     Iggy Harrell MD

## 2022-03-23 ENCOUNTER — OFFICE VISIT (OUTPATIENT)
Dept: ONCOLOGY | Facility: CLINIC | Age: 62
End: 2022-03-23

## 2022-03-23 ENCOUNTER — INFUSION (OUTPATIENT)
Dept: ONCOLOGY | Facility: HOSPITAL | Age: 62
End: 2022-03-23

## 2022-03-23 VITALS
DIASTOLIC BLOOD PRESSURE: 50 MMHG | WEIGHT: 115 LBS | SYSTOLIC BLOOD PRESSURE: 91 MMHG | TEMPERATURE: 97.1 F | BODY MASS INDEX: 19.63 KG/M2 | HEIGHT: 64 IN | RESPIRATION RATE: 16 BRPM | HEART RATE: 83 BPM | OXYGEN SATURATION: 99 %

## 2022-03-23 DIAGNOSIS — Z79.899 HIGH RISK MEDICATION USE: ICD-10-CM

## 2022-03-23 DIAGNOSIS — C15.5 MALIGNANT NEOPLASM OF LOWER THIRD OF ESOPHAGUS: ICD-10-CM

## 2022-03-23 DIAGNOSIS — C15.5 MALIGNANT NEOPLASM OF LOWER THIRD OF ESOPHAGUS: Primary | ICD-10-CM

## 2022-03-23 DIAGNOSIS — Z45.9 ENCOUNTER FOR MANAGEMENT OF IMPLANTED DEVICE: Primary | ICD-10-CM

## 2022-03-23 DIAGNOSIS — Q39.9 SEVERE ESOPHAGEAL DYSPLASIA: ICD-10-CM

## 2022-03-23 DIAGNOSIS — G89.3 CANCER RELATED PAIN: ICD-10-CM

## 2022-03-23 LAB
ALBUMIN SERPL-MCNC: 3.9 G/DL (ref 3.5–5.2)
ALBUMIN/GLOB SERPL: 1.2 G/DL (ref 1.1–2.4)
ALP SERPL-CCNC: 117 U/L (ref 38–116)
ALT SERPL W P-5'-P-CCNC: 9 U/L (ref 0–33)
ANION GAP SERPL CALCULATED.3IONS-SCNC: 8.6 MMOL/L (ref 5–15)
AST SERPL-CCNC: 16 U/L (ref 0–32)
BASOPHILS # BLD AUTO: 0.02 10*3/MM3 (ref 0–0.2)
BASOPHILS NFR BLD AUTO: 0.5 % (ref 0–1.5)
BILIRUB SERPL-MCNC: 0.3 MG/DL (ref 0.2–1.2)
BUN SERPL-MCNC: 23 MG/DL (ref 6–20)
BUN/CREAT SERPL: 46.9 (ref 7.3–30)
CALCIUM SPEC-SCNC: 9.7 MG/DL (ref 8.5–10.2)
CHLORIDE SERPL-SCNC: 103 MMOL/L (ref 98–107)
CO2 SERPL-SCNC: 29.4 MMOL/L (ref 22–29)
CREAT SERPL-MCNC: 0.49 MG/DL (ref 0.6–1.1)
EGFRCR SERPLBLD CKD-EPI 2021: 107.4 ML/MIN/1.73
EOSINOPHIL # BLD AUTO: 0.31 10*3/MM3 (ref 0–0.4)
EOSINOPHIL NFR BLD AUTO: 7 % (ref 0.3–6.2)
ERYTHROCYTE [DISTWIDTH] IN BLOOD BY AUTOMATED COUNT: ABNORMAL %
GLOBULIN UR ELPH-MCNC: 3.3 GM/DL (ref 1.8–3.5)
GLUCOSE SERPL-MCNC: 115 MG/DL (ref 74–124)
HCT VFR BLD AUTO: 33.8 % (ref 34–46.6)
HGB BLD-MCNC: 10.9 G/DL (ref 12–15.9)
IMM GRANULOCYTES # BLD AUTO: 0.01 10*3/MM3 (ref 0–0.05)
IMM GRANULOCYTES NFR BLD AUTO: 0.2 % (ref 0–0.5)
LYMPHOCYTES # BLD AUTO: 0.59 10*3/MM3 (ref 0.7–3.1)
LYMPHOCYTES NFR BLD AUTO: 13.3 % (ref 19.6–45.3)
MCH RBC QN AUTO: 32.2 PG (ref 26.6–33)
MCHC RBC AUTO-ENTMCNC: 32.2 G/DL (ref 31.5–35.7)
MCV RBC AUTO: 99.7 FL (ref 79–97)
MONOCYTES # BLD AUTO: 0.46 10*3/MM3 (ref 0.1–0.9)
MONOCYTES NFR BLD AUTO: 10.4 % (ref 5–12)
NEUTROPHILS NFR BLD AUTO: 3.05 10*3/MM3 (ref 1.7–7)
NEUTROPHILS NFR BLD AUTO: 68.6 % (ref 42.7–76)
NRBC BLD AUTO-RTO: 0 /100 WBC (ref 0–0.2)
PLATELET # BLD AUTO: 294 10*3/MM3 (ref 140–450)
PMV BLD AUTO: 9 FL (ref 6–12)
POTASSIUM SERPL-SCNC: 4.1 MMOL/L (ref 3.5–4.7)
PROT SERPL-MCNC: 7.2 G/DL (ref 6.3–8)
RBC # BLD AUTO: 3.39 10*6/MM3 (ref 3.77–5.28)
SODIUM SERPL-SCNC: 141 MMOL/L (ref 134–145)
WBC NRBC COR # BLD: 4.44 10*3/MM3 (ref 3.4–10.8)

## 2022-03-23 PROCEDURE — 85025 COMPLETE CBC W/AUTO DIFF WBC: CPT

## 2022-03-23 PROCEDURE — 25010000002 HEPARIN LOCK FLUSH PER 10 UNITS: Performed by: INTERNAL MEDICINE

## 2022-03-23 PROCEDURE — 36591 DRAW BLOOD OFF VENOUS DEVICE: CPT

## 2022-03-23 PROCEDURE — 80053 COMPREHEN METABOLIC PANEL: CPT

## 2022-03-23 PROCEDURE — 99214 OFFICE O/P EST MOD 30 MIN: CPT | Performed by: INTERNAL MEDICINE

## 2022-03-23 RX ORDER — SODIUM CHLORIDE 0.9 % (FLUSH) 0.9 %
10 SYRINGE (ML) INJECTION AS NEEDED
Status: DISCONTINUED | OUTPATIENT
Start: 2022-03-23 | End: 2022-03-23 | Stop reason: HOSPADM

## 2022-03-23 RX ORDER — HEPARIN SODIUM (PORCINE) LOCK FLUSH IV SOLN 100 UNIT/ML 100 UNIT/ML
500 SOLUTION INTRAVENOUS AS NEEDED
Status: CANCELLED | OUTPATIENT
Start: 2022-03-23

## 2022-03-23 RX ORDER — SODIUM CHLORIDE 0.9 % (FLUSH) 0.9 %
10 SYRINGE (ML) INJECTION AS NEEDED
Status: CANCELLED | OUTPATIENT
Start: 2022-03-23

## 2022-03-23 RX ORDER — HEPARIN SODIUM (PORCINE) LOCK FLUSH IV SOLN 100 UNIT/ML 100 UNIT/ML
500 SOLUTION INTRAVENOUS AS NEEDED
Status: DISCONTINUED | OUTPATIENT
Start: 2022-03-23 | End: 2022-03-23 | Stop reason: HOSPADM

## 2022-03-23 RX ADMIN — Medication 500 UNITS: at 08:08

## 2022-03-23 RX ADMIN — Medication 10 ML: at 08:08

## 2022-03-23 NOTE — PROGRESS NOTES
Subjective Patient with recent nausea, now improved,?  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 female with recent diagnosis of distal esophageal cancer, initiating concurrent chemoradiation with carboplatin/Taxol on 1/5/2022.  Unfortunately, the patient has had frequent delays due to pancytopenia, weight loss, malnutrition and COVID-19.  She is completed only 3 weeks of chemotherapy.  She is due today for her fourth dose of carboplatin Taxol.  She has 5 radiation treatments remaining.    Her case was presented at the multidisciplinary clinic this morning with plans to complete the last week of concurrent chemoradiation followed by PET scan.  It is unclear at this time if the patient would qualify for surgery given her struggles and malnutrition.  She does have a J-tube for which her partner utilizes for nutrition.  She is still able to swallow pills by mouth.  Her weight is stable over the past few weeks.    She has been struggling with her blood pressure.  She is on metoprolol originally 50 mg twice daily for her atrial fibrillation.  She has cut this down to once daily.  She does report she has been holding her Eliquis since her platelets dropped and has not yet resumed.  Her and her partner expressed concerns regarding the timeline of PET scan and possible surgery.  They are very eager to proceed with PET scan.  We did discuss today the possibility for false positives with esophagitis symptoms.  We will plan PET scan 4 weeks after the completion of radiation which is the soonest this should be obtained.    A PET/CT was obtained 3/17/2022 demonstrating significant decrease in FDG avid thickened mid to distal esophagus, decrease in activity in gastrohepatic and left hilar nodes no longer demonstrating that above blood pool, new subcentimeter nodule right apex thought to be  an endobronchial filling defect it was indeterminant with a new area of tree-in-bud nodularity medial aspect left upper lobe thought to be reactive.    The patient is seen with her partner 3/23/2022.  She recently had nausea and vomiting for several days thought to be?  Food poisoning which, fortunately, is now resolving.  We have reviewed her scans and she could well be a candidate for surgery which we will asked Dr. Finch to review her for next available.      ONCOLOGY HISTORY     The patient is a 61-year-old female who had been seen in multidisciplinary thoracic oncology conference with complaints of painful swallowing and dysphagia over the previous several months with 10 to 15 pound weight loss.  This led to EGD and colonoscopy with normal colonoscopy but EGD demonstrated tumor at the GE junction with biopsies consistent with severe dysplasia including carcinoma in situ.  The patient states that she saw a number of physicians and attempting to have a diagnosis completed.  CT of chest demonstrated thickening of the mid esophagus with no lymphadenopathy and CT PET demonstrated uptake in the esophagus gastropathic lymph nodes it is felt that she likely had early stage carcinoma of the esophagus and that we could present to record with surgical resection.  Endoscopic ultrasound, however, was requested and we could not have this done any sooner than GI physicians available at [facility].     This was performed 12/7/2021 revealing a 5 cm esophageal mass present in the distal third esophagus without extension into the GE junction into the cardia, the mass extended into into the muscularis propria into the adventitia not penetrating through the adventitia with a single 1 cm gastrohepatic/celiac axis lymph node and FNB x1 performed-?  T2 N1 M0 distal esophageal cancer-clinical stage III, pathologic stage IIIa  In contacting the [facility] pathology department the results of this FNB are not yet available  "and will be by 12/9/2021.      The patient is seen with her sister and son all indicating that she has had difficulty with swallowing since late summer likely July 2021 and has been attempting to have a diagnosis made since that time.  They are all somewhat frustrated about the length of time to obtain an answer for this problem and we have spent considerable time discussing her findings thus far and how we might proceed to treat what appears to be a contained malignancy.  This has, additionally, been discussed with Dr. Stef cruz who feels that she is not immediately and operative candidate and should proceed with chemo radiation therapy initially-neoadjuvant treatment before consideration for subsequent surgery.  This has been discussed with the patient, her sister and her son again in detail 12/8/2021.  We went on to initiate pain control with liquid hydrocodone, referred the patient to general surgery and oncology nutrition.    The patient was seen by  who proceeded 12/15/2021 to PowerPort placement and open jejunostomy.  She was seen during her hospital stay by oncology nutrition required hospitalization up to including 12/19/2021.    The patient required hospitalization to 12/20/2021 and after discharge along with her partner's health was able to gradually improve her caloric intake and was seen back 12/27 by Dr. Hollis who felt she had improved enough to initiate chemotherapy.  The patient was seen by radiation therapy 12/21 with plans to proceed with 40 CT simulation and planning-IMRT/IGR T-4140 cGy in 23 fractions with pleased to be considered.    The patient is seen back with her partner Cristy 12/29/2021 now able to \"manage\" and we have discussed extensively concurrent chemotherapy radiation therapy using Carboplatin/Taxol weekly.    Currently the patient is scheduled to begin radiation therapy 1/3/2021, undergoing teaching 1/4/2022 and will begin concurrent chemotherapy 1/5/2022    The " patient had difficulty tolerating this treatment but was eventually able to complete it though modified for toxicity-1/3/2022-2/18/2022 4680 with 26 total treatments, carboplatinum/Taxol weekly 1/5, 1/12, 1/19 and delayed until 2/10/2022.    A PET/CT was obtained 3/17/2022 demonstrating significant decrease in FDG avid thickened mid to distal esophagus, decrease in activity in gastrohepatic and left hilar nodes no longer demonstrating that above blood pool, new subcentimeter nodule right apex thought to be an endobronchial filling defect it was indeterminant with a new area of tree-in-bud nodularity medial aspect left upper lobe thought to be reactive.    Patient had been presented at thoracic conference and upon completion of therapy and repeat PET/CT was to be reevaluated for surgery.  Patient seen in office 3/23/2022 referred back to thoracic surgery.  Past Medical History:   Diagnosis Date   • A-fib (HCC)    • Boil, breast 12/13/2021    HEALING UNDER LEFT BREAST    • Disease of thyroid gland    • Esophageal cancer (HCC)    • Gastric ulcer    • GERD (gastroesophageal reflux disease)    • Hyperlipidemia    • Hypertension    • Irritable bowel    • Mitral valve prolapse     FOLLOWED BY          Past Surgical History:   Procedure Laterality Date   • CHOLECYSTECTOMY     • COLONOSCOPY  08/27/2021    [place]   • EXTERNAL EAR SURGERY     • JEJUNOSTOMY N/A 12/15/2021    Procedure: open JEJUNOSTOMY tube placement;  Surgeon: Bhanu Hollis MD;  Location: [facility];  Service: General;  Laterality: N/A;   • KNEE SURGERY Left    • REPLACEMENT TOTAL KNEE Left    • UPPER ENDOSCOPIC ULTRASOUND W/ FNA N/A 12/7/2021    Procedure: Esophagogastroduodenoscopy with endoscopic ultrasound  WITH STAGING AND FINE NEEDLE BIOPSY;  Surgeon: Amrit Green MD;  Location: [facility] ENDOSCOPY;  Service: Gastroenterology;  Laterality: N/A;  post op: inlet patch, esophageal mass, T2   • VENOUS ACCESS DEVICE (PORT)  INSERTION Left 12/15/2021    Procedure: INSERTION OF PORTACATH;  Surgeon: Bhanu Hollis MD;  Location: [facility] MAIN OR;  Service: General;  Laterality: Left;        Current Outpatient Medications on File Prior to Visit   Medication Sig Dispense Refill   • aluminum-magnesium hydroxide 200-200 MG/5ML suspension, diphenhydrAMINE 12.5 MG/5ML liquid, Lidocaine Viscous HCl 2 % solution, nystatin 100,000 unit/mL suspension Swish and swallow 10 mL 4 (Four) Times a Day After Meals & at Bedtime. 400 mL 2   • Eliquis 5 MG tablet tablet Take 5 mg by mouth Every 12 (Twelve) Hours. LD 12/4     • fentaNYL (DURAGESIC) 12 MCG/HR Place 1 patch on the skin as directed by provider Every 72 (Seventy-Two) Hours. 10 patch 0   • HYDROcodone-acetaminophen (HYCET) 7.5-325 MG/15ML solution Take 15 mL by mouth Every 6 (Six) Hours As Needed for Moderate Pain . 236 mL 0   • lansoprazole (PREVACID SOLUTAB) 30 MG Tablet Delayed Release Dispersible disintegrating tablet TAKE ONE TABLET BY MOUTH TWICE A DAY 60 tablet 1   • methIMAzole (TAPAZOLE) 10 MG tablet Take 5 mg by mouth Daily. Take DOS     • metoprolol succinate XL (Toprol XL) 25 MG 24 hr tablet Take 1 tablet by mouth Daily. (Patient taking differently: Take 50 mg by mouth Daily.) 30 tablet 2   • nystatin in Lidocaine Viscous HCl solution-diphenhydrAMINE liquid-aluminum-magnesium hydroxide-simethicone suspension Swish and swallow 10 mL by mouth 4 times per day after meals and at bedtime. 400 mL 2   • ondansetron ODT (Zofran ODT) 8 MG disintegrating tablet Place 1 tablet on the tongue Every 8 (Eight) Hours As Needed for Nausea or Vomiting. 30 tablet 0   • pravastatin (PRAVACHOL) 20 MG tablet Take 20 mg by mouth Daily.     • prochlorperazine (COMPAZINE) 10 MG tablet Take 1 tablet by mouth Every 6 (Six) Hours As Needed for Nausea. 30 tablet 2   • sucralfate (CARAFATE) 1 GM/10ML suspension Take 1 g by mouth 4 (Four) Times a Day.       No current facility-administered medications on file prior  to visit.        ALLERGIES:    Allergies   Allergen Reactions   • Codeine Hives     Patient states this is when she was very young.         Social History     Socioeconomic History   • Marital status: Single   • Number of children: 1   • Years of education: High school   Tobacco Use   • Smoking status: Current Every Day Smoker     Packs/day: 1.00     Years: 40.00     Pack years: 40.00     Types: Cigarettes     Start date: 1981   • Smokeless tobacco: Never Used   • Tobacco comment: 2 per day   Vaping Use   • Vaping Use: Never used   Substance and Sexual Activity   • Alcohol use: Not Currently   • Drug use: Never   • Sexual activity: Defer        Family History   Problem Relation Age of Onset   • Breast cancer Mother    • Diabetes Mother    • Bipolar disorder Father    • Diabetes Father    • Hypertension Father    • Breast cancer Sister    • COPD Sister    • Diabetes Sister    • Hypertension Sister    • Alcohol abuse Brother    • Asthma Brother    • Bipolar disorder Brother    • Diabetes Brother    • Seizures Brother    • Breast cancer Maternal Grandmother    • Diabetes Maternal Grandmother    • Diabetes Maternal Grandfather    • Diabetes Paternal Grandmother    • Diabetes Paternal Grandfather    • Hypertension Son    • Kidney cancer Sister    • Hypertension Sister    • Diabetes Sister    • Malig Hyperthermia Neg Hx           Objective     There were no vitals filed for this visit.  Current Status 2/10/2022   ECOG score 0       Physical Exam  Constitutional:       Appearance: Normal appearance. She is underweight.   HENT:      Head: Normocephalic and atraumatic.      Nose: Nose normal.   Eyes:      Extraocular Movements: Extraocular movements intact.      Conjunctiva/sclera: Conjunctivae normal.      Pupils: Pupils are equal, round, and reactive to light.   Cardiovascular:      Rate and Rhythm: Normal rate and regular rhythm.      Pulses: Normal pulses.      Heart sounds: Normal heart sounds.   Pulmonary:      Effort:  Pulmonary effort is normal.      Breath sounds: Normal breath sounds.   Abdominal:      General: Bowel sounds are normal.      Palpations: Abdomen is soft. There is no mass.      Tenderness: There is abdominal tenderness (Midepigastrium).      Comments: J-tube clean, dry,?  Location   Musculoskeletal:         General: Normal range of motion.      Cervical back: Normal range of motion.   Skin:     General: Skin is warm and dry.      Findings: No rash.   Neurological:      General: No focal deficit present.      Mental Status: She is alert and oriented to person, place, and time.   Psychiatric:         Mood and Affect: Mood normal.     I have reexamined the patient and the results are consistent with the previously documented exam. Iggy Harrell MD     RECENT LABS:  Results from last 7 days   Lab Units 03/17/22  0840   WBC 10*3/mm3 7.60   NEUTROS ABS 10*3/mm3 5.76   HEMOGLOBIN g/dL 10.6*   HEMATOCRIT % 32.6*   PLATELETS 10*3/mm3 178                Assessment/Plan          61-year-old female with a history of hyperthyroidism, previous gastric ulcer, GE reflux, atrial fibrillation, hyperlipidemia, hypertension irritable bowel syndrome,          1.  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  · here has been a number of physicians involved in the patient's case and several months before she was ultimately seen by Dr. Finch at [facility].   · CT scan of the chest demonstrating thickening of the mid esophagus with no lymphadenopathy and PET/CT demonstrated uptake in the mid to distal esophagus with SUV intensely elevated at 16 with a few nonenlarged left hilar and gastrohepatic lymph nodes with mild increased FDG activity, few scattered  subcentimeter pulmonary nodules bilaterally indeterminate.  · EUS was felt necessary and ultimately obtained at [facility] performed 12/7/2021 revealing a 5 cm esophageal mass present in the distal third esophagus without extension into the GE junction into the cardia, the mass extended into into the muscularis propria into the adventitia not penetrating through the adventitia with a single 1 cm gastrohepatic/celiac axis lymph node and FNB x1 performed-?   · Discussed with Dr. Stef cruz who feels that she is not immediately and operative candidate and should proceed with chemo radiation therapy initially-neoadjuvant treatment before consideration for subsequent surgery. Discussed extensively concurrent chemotherapy radiation therapy using Carboplatin/Taxol weekly.  ·  who proceeded 12/15/2021 to PowerPort placement and open jejunostomy.  She was seen during her hospital stay by oncology nutrition required hospitalization up to including 12/19/2021.  · seen by radiation therapy 12/21 with plans to proceed with 40 CT simulation and planning-IMRT/IGR T-4140 cGy in 23 fractions with pleased to be considered.   · Radiation therapy initiated 1/3/2022  · 1/5/2021 cycle 1 weekly carboplatin/Taxol.    · Patient status post week 2 on 1/12/2022  · Patient treated 1/19/2022-week 3  · 1/26/2022, hold treatment today due to platelet counts 42,000 and feeling unwell.  Covid positive at that time  · Chemotherapy held 2/2/2022 due to ongoing normal cytopenia, platelets of 40,000  · Presentation at multidisciplinary clinic 2/10/2022 with plans to complete fourth dose of carboplatin Taxol followed by PET scan.  It is unclear at this time if the patient will be a surgical candidate pending her response and nutritional state post treatment  · We will proceed today with her fourth dose of carboplatin Taxol  · Upon completion of chemotherapy patient reevaluated with PET/CT 3/17/2022 with significant response for  esophageal lesion, gastrohepatic left hilar lymphadenopathy no longer FDG avid, indeterminate subcentimeter nodule right apex thought to be endobronchial filling defect, tree-in-bud nodularity felt to be reactive.  · Patient seen in office 3/23/2022 referred back to thoracic surgery.  Discussed potential adjuvant nivolumab therapy post surgery.    2.  Nutrition  · Use of Jejunostomy tube. Continuous feed currently 12 hours a night, slowly working up to recommended 16 hours/day at 90 cc/h  · She is still trying to drink at least 1 boost a day and able to take pills by mouth  · She continues to take pills by mouth.  She does utilize her J-tube for 16 hours a day.  Her weight is stable over the past 3 weeks.  · Patient seen 3/23/2022 with her J-tube no longer anchored properly, thoracic surgery request for evaluation    3.  A. fib: On chronic anticoagulation with Eliquis 5 mg twice daily.  · 01/26/2022: Positive Covid-19 diagnosis today and platelet count 42,000.  Eliquis was placed on hold  · The patient continues to hold Eliquis despite improvement in platelet count  · We discussed resuming Eliquis 5 mg.  She understands this will be continued twice daily unless platelets drop less than 60,000.    4. Narcotic induced constipation  · She does report worsening constipation currently.  We discussed the benefit of MiraLAX in the J-tube    5.  Mild chemotherapy-induced nausea/dry heaving  · Currently utilizing Zofran every 8 hours  · We will add Compazine 10 mg every 6 hours to alternate with Zofran as needed    6. Chemotherapy-induced Thrombocytopenia  · Resolved      7. COVID-19 positive  · 01/26/2022  · Sinus congestion and cough have completely resolved.  The patient is no longer symptomatic    8.  Atrial fibrillation  · Previous metoprolol dosing of 50 mg twice daily though with weight loss, the patient has become quite hypotensive.    · Toprol moved to 25 mg XL once a day      Plan:  *Referral to thoracic  surgery  .-Next available    *For 6-week follow-up MD to consider adjuvant nivolumab    *Primary care referral-[facility]    * to see for disability concerns        Iggy Harrell MD  03/23/2022

## 2022-03-24 ENCOUNTER — TELEPHONE (OUTPATIENT)
Dept: ONCOLOGY | Facility: CLINIC | Age: 62
End: 2022-03-24

## 2022-03-24 DIAGNOSIS — C15.5 MALIGNANT NEOPLASM OF LOWER THIRD OF ESOPHAGUS: ICD-10-CM

## 2022-03-24 RX ORDER — ONDANSETRON 8 MG/1
8 TABLET, ORALLY DISINTEGRATING ORAL EVERY 8 HOURS PRN
Qty: 30 TABLET | Refills: 0 | Status: SHIPPED | OUTPATIENT
Start: 2022-03-24 | End: 2022-06-08

## 2022-03-24 NOTE — TELEPHONE ENCOUNTER
Oncology Social Work  Referral Follow Up    OSW received referral from Dr. Harrell for pt, due to concerns about disability/longterm.     OSW called pt. Pt worked for the [employer] previously, but stopped working in 2016 due to some physical concerns, and has been collecting a longterm from them, however, that will end when pt turns 62. Pt would like help enrolling in benefits with SSD.    OSW provided pt with explanation of the Compassionate Allowances Program, including number and a script for calling to request her interview. Pt v.u. OSW will see pt following her appt with Dr. Finch, to  additional information that will aid with her upcoming interview for SSD.    Pt shares that she is also hoping to find a new PCP, ideally one within the [facility] system, who is located in her area, [place] [facility].  OSW will collaborate with Angelica Fletcher RN, and other coworkers as needed to help pt with this.    Pt denied any additional needs at this time. OSW will remain available for future needs and follow up.     ROSAS MorrisW  Oncology Social Worker

## 2022-03-25 RX ORDER — METOPROLOL SUCCINATE 25 MG/1
25 TABLET, EXTENDED RELEASE ORAL DAILY
Qty: 30 TABLET | Refills: 2 | Status: SHIPPED | OUTPATIENT
Start: 2022-03-25 | End: 2022-04-04 | Stop reason: SDUPTHER

## 2022-03-31 ENCOUNTER — CLINICAL SUPPORT (OUTPATIENT)
Dept: ONCOLOGY | Facility: CLINIC | Age: 62
End: 2022-03-31

## 2022-03-31 ENCOUNTER — OFFICE VISIT (OUTPATIENT)
Dept: SURGERY | Facility: CLINIC | Age: 62
End: 2022-03-31

## 2022-03-31 ENCOUNTER — PREP FOR SURGERY (OUTPATIENT)
Dept: OTHER | Facility: HOSPITAL | Age: 62
End: 2022-03-31

## 2022-03-31 VITALS
BODY MASS INDEX: 19.63 KG/M2 | DIASTOLIC BLOOD PRESSURE: 62 MMHG | HEART RATE: 78 BPM | HEIGHT: 64 IN | WEIGHT: 115 LBS | SYSTOLIC BLOOD PRESSURE: 118 MMHG | OXYGEN SATURATION: 98 %

## 2022-03-31 DIAGNOSIS — C15.5 MALIGNANT NEOPLASM OF LOWER THIRD OF ESOPHAGUS: Primary | ICD-10-CM

## 2022-03-31 DIAGNOSIS — C15.9 ADENOCARCINOMA OF ESOPHAGUS: Primary | ICD-10-CM

## 2022-03-31 PROCEDURE — 99214 OFFICE O/P EST MOD 30 MIN: CPT | Performed by: THORACIC SURGERY (CARDIOTHORACIC VASCULAR SURGERY)

## 2022-03-31 RX ORDER — SODIUM CHLORIDE 0.9 % (FLUSH) 0.9 %
3 SYRINGE (ML) INJECTION EVERY 12 HOURS SCHEDULED
Status: CANCELLED | OUTPATIENT
Start: 2022-04-29

## 2022-03-31 RX ORDER — SODIUM CHLORIDE 0.9 % (FLUSH) 0.9 %
3-10 SYRINGE (ML) INJECTION AS NEEDED
Status: CANCELLED | OUTPATIENT
Start: 2022-04-29

## 2022-03-31 RX ORDER — HEPARIN SODIUM 5000 [USP'U]/ML
5000 INJECTION, SOLUTION INTRAVENOUS; SUBCUTANEOUS ONCE
Status: CANCELLED | OUTPATIENT
Start: 2022-04-29 | End: 2022-03-31

## 2022-03-31 RX ORDER — ACETAMINOPHEN 500 MG
1000 TABLET ORAL ONCE
Status: CANCELLED | OUTPATIENT
Start: 2022-04-29 | End: 2022-03-31

## 2022-03-31 RX ORDER — CELECOXIB 200 MG/1
200 CAPSULE ORAL ONCE
Status: CANCELLED | OUTPATIENT
Start: 2022-04-29 | End: 2022-03-31

## 2022-03-31 RX ORDER — GABAPENTIN 300 MG/1
600 CAPSULE ORAL ONCE
Status: CANCELLED | OUTPATIENT
Start: 2022-04-29 | End: 2022-03-31

## 2022-03-31 RX ORDER — CEFAZOLIN SODIUM 2 G/100ML
2 INJECTION, SOLUTION INTRAVENOUS ONCE
Status: CANCELLED | OUTPATIENT
Start: 2022-04-29 | End: 2022-03-31

## 2022-03-31 NOTE — PROGRESS NOTES
"Chief Complaint  Adenocarcinoma of the lower third of the esophagus    Subjective          Maya Ribera presents to [facility] THORACIC SURGERY  History of Present Illness     Ms. Ribera was seen in our office today for follow-up of adenocarcinoma of the distal third of the esophagus.  In December 2021 she was diagnosed with a T3 N1 M0 adenocarcinoma of the esophagus.  Dr. Ruiz of general surgery placed a port and a feeding jejunostomy.  She was started on neoadjuvant radiation chemotherapy.  Chemotherapy had to be interrupted several times because of pancytopenia.  She completed her radiation and chemotherapy February 14.  She has had a subsequent CT PET scan and has been referred back here for consideration for surgical resection.    Prior to her diagnosis she weighed 135 pounds.  Today she weighs 117 pounds.  She has been tolerating the tube feedings.  She did have a setback last week with some gastroenteritis but is gained back 2 pounds following that episode.  She is able to take liquids with minimal difficulty.  She has not tried to take any solid food.  She has no nausea or vomiting.  She has had no hematemesis.    Objective   Vital Signs:   /62 (BP Location: Right arm, Patient Position: Sitting, Cuff Size: Adult)   Pulse 78   Ht 162.6 cm (64.02\")   Wt 52.2 kg (115 lb)   SpO2 98%   BMI 19.73 kg/m²     BMI is within normal parameters. No follow-up required.      Physical Exam     General: Thin somewhat cachectic.  Normal gait.  Able to get up out of the chair on her own and onto the exam table without assistance.    Neck: No masses and no adenopathy    Chest: Lungs are clear to auscultation with equal breath sounds bilaterally    Cardiac: Regular rate and rhythm.  No murmurs or gallops.  No dependent edema.    Abdomen: Soft and nontender.  Feeding jejunostomy tube is in place.  Some maceration of the skin around the tube site.  Sutures have pulled through the skin.  New " suture was placed today without difficulty.    Result Review :   The following data was reviewed by: Hayder Finch III, MD on 03/31/2022:    CT PET scan performed March 17, 2022 was independently reviewed.  There has been decrease in size of the long segment of intense metabolic uptake in the distal esophagus.  The degree of uptake has also decreased from previously 16 now down to 2.8.  There has been decrease in size of gastrohepatic ligament lymph nodes and left hilar lymph nodes.  These lymph nodes are no longer hypermetabolic.  No evidence for distal metastases.  An area of hypermetabolism in the apex of the right lung appears to be inflammatory in nature.         Assessment and Plan    Diagnoses and all orders for this visit:    1. Malignant neoplasm of lower third of esophagus (HCC) (Primary)  -     Case Request      It appears that this lady has had a good result from her neoadjuvant therapy.  I had a long discussion with her and her partner about robot-assisted total esophagectomy.  I have explained the procedure as well as the risks and benefits.  I have answered all of their questions to their satisfaction.  The patient has requested that we proceed with surgery.  We will continue tube feedings up until the time of surgery to help support her nutrition.  Hope to schedule surgery to be done within the next 2 weeks.    Case request and preoperative orders have been placed in [facility].  Patient will be scheduled for robot-assisted total esophagectomy.  I will keep you informed of her progress.    I spent 31 minutes caring for Maya on this date of service. This time includes time spent by me in the following activities:preparing for the visit, reviewing tests, performing a medically appropriate examination and/or evaluation , counseling and educating the patient/family/caregiver, ordering medications, tests, or procedures, referring and communicating with other health care professionals , documenting  information in the medical record, independently interpreting results and communicating that information with the patient/family/caregiver and care coordination  Follow Up   Return for Return to the office following surgery.  Patient was given instructions and counseling regarding her condition or for health maintenance advice. Please see specific information pulled into the AVS if appropriate.

## 2022-03-31 NOTE — PROGRESS NOTES
Oncology Social Work  Case Management    OSW met with pt and her partner, Cristy. Pt reports that she was already able to complete her SSD interview by phone and has given them permission to contact all of her providers. OSW aided pt in completing a consent for release of info for [employer], and submitted this to be scanned. Pt reports some concerns regarding paying her mounting medical bills with [facility]. OSW provided her with a financial assistance application, and instructions for completion. OSW encouraged her to reach out to the financial assistance dept to discuss her eligibility. Pt v.u.  Pt and her partner denied any additional needs at this time. OSW will remain available for future needs and follow up.     ROSAS MorrisW  Oncology Social Worker

## 2022-04-04 RX ORDER — METOPROLOL SUCCINATE 25 MG/1
50 TABLET, EXTENDED RELEASE ORAL DAILY
Qty: 60 TABLET | Refills: 2 | Status: SHIPPED | OUTPATIENT
Start: 2022-04-04 | End: 2022-05-09 | Stop reason: HOSPADM

## 2022-04-06 RX ORDER — LANSOPRAZOLE 30 MG/1
TABLET, ORALLY DISINTEGRATING, DELAYED RELEASE ORAL
Qty: 60 TABLET | Refills: 1 | Status: ON HOLD | OUTPATIENT
Start: 2022-04-06 | End: 2022-05-09 | Stop reason: SDUPTHER

## 2022-04-18 ENCOUNTER — PRE-ADMISSION TESTING (OUTPATIENT)
Dept: PREADMISSION TESTING | Facility: HOSPITAL | Age: 62
End: 2022-04-18

## 2022-04-18 VITALS
BODY MASS INDEX: 20.14 KG/M2 | TEMPERATURE: 97.9 F | HEIGHT: 64 IN | SYSTOLIC BLOOD PRESSURE: 125 MMHG | DIASTOLIC BLOOD PRESSURE: 75 MMHG | OXYGEN SATURATION: 96 % | RESPIRATION RATE: 18 BRPM | WEIGHT: 118 LBS | HEART RATE: 76 BPM

## 2022-04-18 DIAGNOSIS — C15.5 MALIGNANT NEOPLASM OF LOWER THIRD OF ESOPHAGUS: ICD-10-CM

## 2022-04-18 LAB
ABO GROUP BLD: NORMAL
ANION GAP SERPL CALCULATED.3IONS-SCNC: 10 MMOL/L (ref 5–15)
BASOPHILS # BLD AUTO: 0.03 10*3/MM3 (ref 0–0.2)
BASOPHILS NFR BLD AUTO: 0.4 % (ref 0–1.5)
BLD GP AB SCN SERPL QL: NEGATIVE
BUN SERPL-MCNC: 12 MG/DL (ref 8–23)
BUN/CREAT SERPL: 22.6 (ref 7–25)
CALCIUM SPEC-SCNC: 9.4 MG/DL (ref 8.6–10.5)
CHLORIDE SERPL-SCNC: 103 MMOL/L (ref 98–107)
CO2 SERPL-SCNC: 27 MMOL/L (ref 22–29)
CREAT SERPL-MCNC: 0.53 MG/DL (ref 0.57–1)
DEPRECATED RDW RBC AUTO: 58.4 FL (ref 37–54)
EGFRCR SERPLBLD CKD-EPI 2021: 105.4 ML/MIN/1.73
EOSINOPHIL # BLD AUTO: 0.16 10*3/MM3 (ref 0–0.4)
EOSINOPHIL NFR BLD AUTO: 2 % (ref 0.3–6.2)
ERYTHROCYTE [DISTWIDTH] IN BLOOD BY AUTOMATED COUNT: 15.5 % (ref 12.3–15.4)
GLUCOSE SERPL-MCNC: 85 MG/DL (ref 65–99)
HCT VFR BLD AUTO: 41.7 % (ref 34–46.6)
HGB BLD-MCNC: 13.4 G/DL (ref 12–15.9)
IMM GRANULOCYTES # BLD AUTO: 0.01 10*3/MM3 (ref 0–0.05)
IMM GRANULOCYTES NFR BLD AUTO: 0.1 % (ref 0–0.5)
INR PPP: 1.21 (ref 0.9–1.1)
LYMPHOCYTES # BLD AUTO: 1.08 10*3/MM3 (ref 0.7–3.1)
LYMPHOCYTES NFR BLD AUTO: 13.4 % (ref 19.6–45.3)
MCH RBC QN AUTO: 32.5 PG (ref 26.6–33)
MCHC RBC AUTO-ENTMCNC: 32.1 G/DL (ref 31.5–35.7)
MCV RBC AUTO: 101.2 FL (ref 79–97)
MONOCYTES # BLD AUTO: 0.82 10*3/MM3 (ref 0.1–0.9)
MONOCYTES NFR BLD AUTO: 10.2 % (ref 5–12)
NEUTROPHILS NFR BLD AUTO: 5.97 10*3/MM3 (ref 1.7–7)
NEUTROPHILS NFR BLD AUTO: 73.9 % (ref 42.7–76)
NRBC BLD AUTO-RTO: 0 /100 WBC (ref 0–0.2)
PLATELET # BLD AUTO: 287 10*3/MM3 (ref 140–450)
PMV BLD AUTO: 9.8 FL (ref 6–12)
POTASSIUM SERPL-SCNC: 4 MMOL/L (ref 3.5–5.2)
PROTHROMBIN TIME: 15.3 SECONDS (ref 11.7–14.2)
RBC # BLD AUTO: 4.12 10*6/MM3 (ref 3.77–5.28)
RH BLD: POSITIVE
SODIUM SERPL-SCNC: 140 MMOL/L (ref 136–145)
T&S EXPIRATION DATE: NORMAL
WBC NRBC COR # BLD: 8.07 10*3/MM3 (ref 3.4–10.8)

## 2022-04-18 PROCEDURE — 86850 RBC ANTIBODY SCREEN: CPT

## 2022-04-18 PROCEDURE — 86901 BLOOD TYPING SEROLOGIC RH(D): CPT

## 2022-04-18 PROCEDURE — 85610 PROTHROMBIN TIME: CPT

## 2022-04-18 PROCEDURE — 80048 BASIC METABOLIC PNL TOTAL CA: CPT

## 2022-04-18 PROCEDURE — 85025 COMPLETE CBC W/AUTO DIFF WBC: CPT

## 2022-04-18 PROCEDURE — 36415 COLL VENOUS BLD VENIPUNCTURE: CPT

## 2022-04-18 PROCEDURE — 86900 BLOOD TYPING SEROLOGIC ABO: CPT

## 2022-04-18 RX ORDER — CHLORHEXIDINE GLUCONATE 500 MG/1
CLOTH TOPICAL TAKE AS DIRECTED
Status: ON HOLD | COMMUNITY
End: 2022-04-29

## 2022-04-18 NOTE — DISCHARGE INSTRUCTIONS
Take the following medications the morning of surgery: METOPROLOL AND LANSOPRAZOLE    ARRIVE  AM ON 4/29/22      If you are on prescription narcotic pain medication to control your pain you may also take that medication the morning of surgery.    General Instructions:    Follow your surgeons instructions regarding when to stop solid foods and when to stop liquids.   Verify with your surgeon if you are to complete a bowel prep and when to do so.  Patients who avoid smoking, chewing tobacco and alcohol for 4 weeks prior to surgery have a reduced risk of post-operative complications.  Quit smoking as many days before surgery as you can.  Do not smoke, use chewing tobacco or drink alcohol the day of surgery.   If applicable bring your C-PAP/ BI-PAP machine.  Bring any papers given to you in the doctor’s office.  Wear clean comfortable clothes.  Do not wear contact lenses, false eyelashes or make-up.  Bring a case for your glasses.   Bring crutches or walker if applicable.  Remove all piercings.  Leave jewelry and any other valuables at home.  Hair extensions with metal clips must be removed prior to surgery.  The Pre-Admission Testing nurse will instruct you to bring medications if unable to obtain an accurate list in Pre-Admission Testing.        If you were given a blood bank ID arm band remember to bring it with you the day of surgery.    Preventing a Surgical Site Infection:  For 2 to 3 days before surgery, avoid shaving with a razor because the razor can irritate skin and make it easier to develop an infection.    Any areas of open skin can increase the risk of a post-operative wound infection by allowing bacteria to enter and travel throughout the body.  Notify your surgeon if you have any skin wounds / rashes even if it is not near the expected surgical site.  The area will need assessed to determine if surgery should be delayed until it is healed.  The night prior to surgery shower using a fresh bar of  anti-bacterial soap (such as Dial) and clean washcloth.  Sleep in a clean bed with clean clothing.  Do not allow pets to sleep with you.  Shower on the morning of surgery using a fresh bar of anti-bacterial soap (such as Dial) and clean washcloth.  Dry with a clean towel and dress in clean clothing.  Ask your surgeon if you will be receiving antibiotics prior to surgery.  Make sure you, your family, and all healthcare providers clean their hands with soap and water or an alcohol based hand  before caring for you or your wound.    Day of surgery:  Your arrival time is approximately two hours before your scheduled surgery time.  Upon arrival, a Pre-op nurse and Anesthesiologist will review your health history, obtain vital signs, and answer questions you may have.  The only belongings needed at this time will be a list of your home medications and if applicable your C-PAP/BI-PAP machine.  A Pre-op nurse will start an IV and you may receive medication in preparation for surgery, including something to help you relax.     Please be aware that surgery does come with discomfort.  We want to make every effort to control your discomfort so please discuss any uncontrolled symptoms with your nurse.   Your doctor will most likely have prescribed pain medications.      If you are going home after surgery you will receive individualized written care instructions before being discharged.  A responsible adult must drive you to and from the hospital on the day of your surgery and stay with you for 24 hours.  Discharge prescriptions can be filled by the hospital pharmacy during regular pharmacy hours.  If you are having surgery late in the day/evening your prescription may be e-prescribed to your pharmacy.  Please verify your pharmacy hours or chose a 24 hour pharmacy to avoid not having access to your prescription because your pharmacy has closed for the day.    If you are staying overnight following surgery, you will be  transported to your hospital room following the recovery period.  [facility] has all private rooms.    If you have any questions please call Pre-Admission Testing at [phone].  Deductibles and co-payments are collected on the day of service. Please be prepared to pay the required co-pay, deductible or deposit on the day of service as defined by your plan.    Patient Education for Self-Quarantine Process    Following your COVID testing, we strongly recommend that you wear a mask when you are with other people and practice social distancing.   Limit your activities to only required outings.  Wash your hands with soap and water frequently for at least 20 seconds.   Avoid touching your eyes, nose and mouth with unwashed hands.  Do not share anything - utensils, drinking glasses, food from the same bowl.   Sanitize household surfaces daily. Include all high touch areas (door handles, light switches, phones, countertops, etc.)    Call your surgeon immediately if you experience any of the following symptoms:  Sore Throat  Shortness of Breath or difficulty breathing  Cough  Chills  Body soreness or muscle pain  Headache  Fever  New loss of taste or smell  Do not arrive for your surgery ill.  Your procedure will need to be rescheduled to another time.  You will need to call your physician before the day of surgery to avoid any unnecessary exposure to hospital staff as well as other patients.     CHLORHEXIDINE CLOTH INSTRUCTIONS  The morning of surgery follow these instructions using the Chlorhexidine cloths you've been given.  These steps reduce bacteria on the body.  Do not use the cloths near your eyes, ears mouth, genitalia or on open wounds.  Throw the cloths away after use but do not try to flush them down a toilet.      Open and remove one cloth at a time from the package.    Leave the cloth unfolded and begin the bathing.  Massage the skin with the cloths using gentle pressure to remove bacteria.   Do not scrub harshly.   Follow the steps below with one 2% CHG cloth per area (6 total cloths).  One cloth for neck, shoulders and chest.  One cloth for both arms, hands, fingers and underarms (do underarms last).  One cloth for the abdomen followed by groin.  One cloth for right leg and foot including between the toes.  One cloth for left leg and foot including between the toes.  The last cloth is to be used for the back of the neck, back and buttocks.    Allow the CHG to air dry 3 minutes on the skin which will give it time to work and decrease the chance of irritation.  The skin may feel sticky until it is dry.  Do not rinse with water or any other liquid or you will lose the beneficial effects of the CHG.  If mild skin irritation occurs, do rinse the skin to remove the CHG.  Report this to the nurse at time of admission.  Do not apply lotions, creams, ointments, deodorants or perfumes after using the clothes. Dress in clean clothes before coming to the hospital.

## 2022-04-20 ENCOUNTER — INFUSION (OUTPATIENT)
Dept: ONCOLOGY | Facility: HOSPITAL | Age: 62
End: 2022-04-20

## 2022-04-20 ENCOUNTER — OFFICE VISIT (OUTPATIENT)
Dept: ONCOLOGY | Facility: CLINIC | Age: 62
End: 2022-04-20

## 2022-04-20 VITALS
BODY MASS INDEX: 20.08 KG/M2 | OXYGEN SATURATION: 96 % | SYSTOLIC BLOOD PRESSURE: 111 MMHG | HEART RATE: 93 BPM | TEMPERATURE: 97.5 F | WEIGHT: 117.6 LBS | RESPIRATION RATE: 16 BRPM | HEIGHT: 64 IN | DIASTOLIC BLOOD PRESSURE: 61 MMHG

## 2022-04-20 DIAGNOSIS — Z45.9 ENCOUNTER FOR MANAGEMENT OF IMPLANTED DEVICE: Primary | ICD-10-CM

## 2022-04-20 DIAGNOSIS — C15.5 MALIGNANT NEOPLASM OF LOWER THIRD OF ESOPHAGUS: ICD-10-CM

## 2022-04-20 DIAGNOSIS — C15.5 MALIGNANT NEOPLASM OF LOWER THIRD OF ESOPHAGUS: Primary | ICD-10-CM

## 2022-04-20 LAB
BASOPHILS # BLD AUTO: 0.07 10*3/MM3 (ref 0–0.2)
BASOPHILS NFR BLD AUTO: 0.6 % (ref 0–1.5)
DEPRECATED RDW RBC AUTO: 59.9 FL (ref 37–54)
EOSINOPHIL # BLD AUTO: 0.23 10*3/MM3 (ref 0–0.4)
EOSINOPHIL NFR BLD AUTO: 2.1 % (ref 0.3–6.2)
ERYTHROCYTE [DISTWIDTH] IN BLOOD BY AUTOMATED COUNT: 15.8 % (ref 12.3–15.4)
HCT VFR BLD AUTO: 39.9 % (ref 34–46.6)
HGB BLD-MCNC: 12.9 G/DL (ref 12–15.9)
IMM GRANULOCYTES # BLD AUTO: 0.07 10*3/MM3 (ref 0–0.05)
IMM GRANULOCYTES NFR BLD AUTO: 0.6 % (ref 0–0.5)
LYMPHOCYTES # BLD AUTO: 1.34 10*3/MM3 (ref 0.7–3.1)
LYMPHOCYTES NFR BLD AUTO: 12.3 % (ref 19.6–45.3)
MCH RBC QN AUTO: 32.7 PG (ref 26.6–33)
MCHC RBC AUTO-ENTMCNC: 32.3 G/DL (ref 31.5–35.7)
MCV RBC AUTO: 101 FL (ref 79–97)
MONOCYTES # BLD AUTO: 1 10*3/MM3 (ref 0.1–0.9)
MONOCYTES NFR BLD AUTO: 9.2 % (ref 5–12)
NEUTROPHILS NFR BLD AUTO: 75.2 % (ref 42.7–76)
NEUTROPHILS NFR BLD AUTO: 8.21 10*3/MM3 (ref 1.7–7)
NRBC BLD AUTO-RTO: 0 /100 WBC (ref 0–0.2)
PLATELET # BLD AUTO: 284 10*3/MM3 (ref 140–450)
PMV BLD AUTO: 9.2 FL (ref 6–12)
RBC # BLD AUTO: 3.95 10*6/MM3 (ref 3.77–5.28)
WBC NRBC COR # BLD: 10.92 10*3/MM3 (ref 3.4–10.8)

## 2022-04-20 PROCEDURE — 36591 DRAW BLOOD OFF VENOUS DEVICE: CPT

## 2022-04-20 PROCEDURE — 85025 COMPLETE CBC W/AUTO DIFF WBC: CPT

## 2022-04-20 PROCEDURE — 25010000002 HEPARIN LOCK FLUSH PER 10 UNITS: Performed by: INTERNAL MEDICINE

## 2022-04-20 PROCEDURE — 99213 OFFICE O/P EST LOW 20 MIN: CPT | Performed by: INTERNAL MEDICINE

## 2022-04-20 RX ORDER — HEPARIN SODIUM (PORCINE) LOCK FLUSH IV SOLN 100 UNIT/ML 100 UNIT/ML
500 SOLUTION INTRAVENOUS AS NEEDED
Status: DISCONTINUED | OUTPATIENT
Start: 2022-04-20 | End: 2022-04-20 | Stop reason: HOSPADM

## 2022-04-20 RX ORDER — SODIUM CHLORIDE 0.9 % (FLUSH) 0.9 %
10 SYRINGE (ML) INJECTION AS NEEDED
Status: CANCELLED | OUTPATIENT
Start: 2022-04-20

## 2022-04-20 RX ORDER — HEPARIN SODIUM (PORCINE) LOCK FLUSH IV SOLN 100 UNIT/ML 100 UNIT/ML
500 SOLUTION INTRAVENOUS AS NEEDED
Status: CANCELLED | OUTPATIENT
Start: 2022-04-20

## 2022-04-20 RX ORDER — SODIUM CHLORIDE 0.9 % (FLUSH) 0.9 %
10 SYRINGE (ML) INJECTION AS NEEDED
Status: DISCONTINUED | OUTPATIENT
Start: 2022-04-20 | End: 2022-04-20 | Stop reason: HOSPADM

## 2022-04-20 RX ADMIN — Medication 10 ML: at 14:22

## 2022-04-20 RX ADMIN — Medication 500 UNITS: at 14:22

## 2022-04-20 NOTE — PROGRESS NOTES
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 female with recent diagnosis of distal esophageal cancer, initiating concurrent chemoradiation with carboplatin/Taxol on 1/5/2022.  Unfortunately, the patient has had frequent delays due to pancytopenia, weight loss, malnutrition and COVID-19.  She is completed only 3 weeks of chemotherapy.  She is due today for her fourth dose of carboplatin Taxol.  She has 5 radiation treatments remaining.    Her case was presented at the multidisciplinary clinic this morning with plans to complete the last week of concurrent chemoradiation followed by PET scan.  It is unclear at this time if the patient would qualify for surgery given her struggles and malnutrition.  She does have a J-tube for which her partner utilizes for nutrition.  She is still able to swallow pills by mouth.  Her weight is stable over the past few weeks.    She has been struggling with her blood pressure.  She is on metoprolol originally 50 mg twice daily for her atrial fibrillation.  She has cut this down to once daily.  She does report she has been holding her Eliquis since her platelets dropped and has not yet resumed.  Her and her partner expressed concerns regarding the timeline of PET scan and possible surgery.  They are very eager to proceed with PET scan.  We did discuss today the possibility for false positives with esophagitis symptoms.  We will plan PET scan 4 weeks after the completion of radiation which is the soonest this should be obtained.    A PET/CT was obtained 3/17/2022 demonstrating significant decrease in FDG avid thickened mid to distal esophagus, decrease in activity in gastrohepatic and left hilar nodes no longer demonstrating that above blood pool, new subcentimeter nodule right apex thought to  be an endobronchial filling defect it was indeterminant with a new area of tree-in-bud nodularity medial aspect left upper lobe thought to be reactive.    The patient is seen with her partner 3/23/2022.  She recently had nausea and vomiting for several days thought to be?  Food poisoning which, fortunately, is now resolving.  We have reviewed her scans and she could well be a candidate for surgery which we will asked Dr. Finch to review her for next available.    The patient is next reviewed 4/20/2022.  She is anxious for surgery 4/29/2022 but ready to proceed.      ONCOLOGY HISTORY     The patient is a 61-year-old female who had been seen in multidisciplinary thoracic oncology conference with complaints of painful swallowing and dysphagia over the previous several months with 10 to 15 pound weight loss.  This led to EGD and colonoscopy with normal colonoscopy but EGD demonstrated tumor at the GE junction with biopsies consistent with severe dysplasia including carcinoma in situ.  The patient states that she saw a number of physicians and attempting to have a diagnosis completed.  CT of chest demonstrated thickening of the mid esophagus with no lymphadenopathy and CT PET demonstrated uptake in the esophagus gastropathic lymph nodes it is felt that she likely had early stage carcinoma of the esophagus and that we could present to record with surgical resection.  Endoscopic ultrasound, however, was requested and we could not have this done any sooner than GI physicians available at [facility].     This was performed 12/7/2021 revealing a 5 cm esophageal mass present in the distal third esophagus without extension into the GE junction into the cardia, the mass extended into into the muscularis propria into the adventitia not penetrating through the adventitia with a single 1 cm gastrohepatic/celiac axis lymph node and FNB x1 performed-?  T2 N1 M0 distal esophageal cancer-clinical stage III, pathologic stage  "IIIa  In contacting the [facility] pathology department the results of this FNB are not yet available and will be by 12/9/2021.      The patient is seen with her sister and son all indicating that she has had difficulty with swallowing since late summer likely July 2021 and has been attempting to have a diagnosis made since that time.  They are all somewhat frustrated about the length of time to obtain an answer for this problem and we have spent considerable time discussing her findings thus far and how we might proceed to treat what appears to be a contained malignancy.  This has, additionally, been discussed with Dr. Stef cruz who feels that she is not immediately and operative candidate and should proceed with chemo radiation therapy initially-neoadjuvant treatment before consideration for subsequent surgery.  This has been discussed with the patient, her sister and her son again in detail 12/8/2021.  We went on to initiate pain control with liquid hydrocodone, referred the patient to general surgery and oncology nutrition.    The patient was seen by  who proceeded 12/15/2021 to PowerPort placement and open jejunostomy.  She was seen during her hospital stay by oncology nutrition required hospitalization up to including 12/19/2021.    The patient required hospitalization to 12/20/2021 and after discharge along with her partner's health was able to gradually improve her caloric intake and was seen back 12/27 by Dr. Hollis who felt she had improved enough to initiate chemotherapy.  The patient was seen by radiation therapy 12/21 with plans to proceed with 40 CT simulation and planning-IMRT/IGR T-4140 cGy in 23 fractions with pleased to be considered.    The patient is seen back with her partner Cristy 12/29/2021 now able to \"manage\" and we have discussed extensively concurrent chemotherapy radiation therapy using Carboplatin/Taxol weekly.    Currently the patient is scheduled to begin " radiation therapy 1/3/2021, undergoing teaching 1/4/2022 and will begin concurrent chemotherapy 1/5/2022    The patient had difficulty tolerating this treatment but was eventually able to complete it though modified for toxicity-1/3/2022-2/18/2022 4680 with 26 total treatments, carboplatinum/Taxol weekly 1/5, 1/12, 1/19 and delayed until 2/10/2022.    A PET/CT was obtained 3/17/2022 demonstrating significant decrease in FDG avid thickened mid to distal esophagus, decrease in activity in gastrohepatic and left hilar nodes no longer demonstrating that above blood pool, new subcentimeter nodule right apex thought to be an endobronchial filling defect it was indeterminant with a new area of tree-in-bud nodularity medial aspect left upper lobe thought to be reactive.    Patient had been presented at thoracic conference and upon completion of therapy and repeat PET/CT was to be reevaluated for surgery.  Patient seen in office 3/23/2022 referred back to thoracic surgery.  This was scheduled 4/29/2022  Past Medical History:   Diagnosis Date   • A-fib (HCC)    • Boil, breast 12/13/2021    HEALING UNDER LEFT BREAST    • Disease of thyroid gland    • Esophageal cancer (HCC)    • Gastric ulcer    • GERD (gastroesophageal reflux disease)    • Hyperlipidemia    • Hypertension    • Irritable bowel    • Mitral valve prolapse     FOLLOWED BY     • Trouble swallowing    • Uses feeding tube         Past Surgical History:   Procedure Laterality Date   • CHOLECYSTECTOMY     • COLONOSCOPY  08/27/2021    [facility]   • EXTERNAL EAR SURGERY     • JEJUNOSTOMY N/A 12/15/2021    Procedure: open JEJUNOSTOMY tube placement;  Surgeon: Bhanu Hollis MD;  Location: [facility];  Service: General;  Laterality: N/A;   • KNEE SURGERY Left    • REPLACEMENT TOTAL KNEE Left    • UPPER ENDOSCOPIC ULTRASOUND W/ FNA N/A 12/7/2021    Procedure: Esophagogastroduodenoscopy with endoscopic ultrasound  WITH STAGING AND FINE NEEDLE BIOPSY;   Surgeon: Amrit Green MD;  Location:  [facility];  Service: Gastroenterology;  Laterality: N/A;  post op: inlet patch, esophageal mass, T2   • VENOUS ACCESS DEVICE (PORT) INSERTION Left 12/15/2021    Procedure: INSERTION OF PORTACATH;  Surgeon: Bhanu Hollis MD;  Location:  [facility];  Service: General;  Laterality: Left;        Current Outpatient Medications on File Prior to Visit   Medication Sig Dispense Refill   • aluminum-magnesium hydroxide 200-200 MG/5ML suspension, diphenhydrAMINE 12.5 MG/5ML liquid, Lidocaine Viscous HCl 2 % solution, nystatin 100,000 unit/mL suspension Swish and swallow 10 mL 4 (Four) Times a Day After Meals & at Bedtime. 400 mL 2   • Chlorhexidine Gluconate Cloth 2 % pads Apply  topically Take As Directed.     • Eliquis 5 MG tablet tablet Take 5 mg by mouth Every 12 (Twelve) Hours. LD 12/4  PT STATED TO HOLD 72 HOURS PRIOR TO SURGERY     • fentaNYL (DURAGESIC) 12 MCG/HR Place 1 patch on the skin as directed by provider Every 72 (Seventy-Two) Hours. 10 patch 0   • HYDROcodone-acetaminophen (HYCET) 7.5-325 MG/15ML solution Take 15 mL by mouth Every 6 (Six) Hours As Needed for Moderate Pain . 236 mL 0   • lansoprazole (PREVACID SOLUTAB) 30 MG Tablet Delayed Release Dispersible disintegrating tablet TAKE ONE TABLET BY MOUTH TWICE A DAY (Patient taking differently: Take 30 mg by mouth 2 (Two) Times a Day.) 60 tablet 1   • methIMAzole (TAPAZOLE) 10 MG tablet Take 10 mg by mouth Daily. Take DOS     • metoprolol succinate XL (Toprol XL) 25 MG 24 hr tablet Take 2 tablets by mouth Daily. 60 tablet 2   • nystatin in Lidocaine Viscous HCl solution-diphenhydrAMINE liquid-aluminum-magnesium hydroxide-simethicone suspension Swish and swallow 10 mL by mouth 4 times per day after meals and at bedtime. 400 mL 2   • ondansetron ODT (Zofran ODT) 8 MG disintegrating tablet Place 1 tablet on the tongue Every 8 (Eight) Hours As Needed for Nausea or Vomiting. 30 tablet 0   • pravastatin  (PRAVACHOL) 20 MG tablet Take 20 mg by mouth Daily.     • prochlorperazine (COMPAZINE) 10 MG tablet Take 1 tablet by mouth Every 6 (Six) Hours As Needed for Nausea. 30 tablet 2   • sucralfate (CARAFATE) 1 GM/10ML suspension Take 1 g by mouth 4 (Four) Times a Day.       Current Facility-Administered Medications on File Prior to Visit   Medication Dose Route Frequency Provider Last Rate Last Admin   • [DISCONTINUED] heparin injection 500 Units  500 Units Intravenous PRN Iggy Harrell MD   500 Units at 04/20/22 1422   • [DISCONTINUED] sodium chloride 0.9 % flush 10 mL  10 mL Intravenous PRN Iggy Harrell MD   10 mL at 04/20/22 1422        ALLERGIES:    Allergies   Allergen Reactions   • Codeine Hives     Patient states this is when she was very young.         Social History     Socioeconomic History   • Marital status: Single   • Number of children: 1   • Years of education: High school   Tobacco Use   • Smoking status: Current Every Day Smoker     Packs/day: 1.00     Years: 40.00     Pack years: 40.00     Types: Cigarettes     Start date: 1981   • Smokeless tobacco: Never Used   • Tobacco comment: 2 per day   Vaping Use   • Vaping Use: Never used   Substance and Sexual Activity   • Alcohol use: Not Currently   • Drug use: Never   • Sexual activity: Defer        Family History   Problem Relation Age of Onset   • Breast cancer Mother    • Diabetes Mother    • Bipolar disorder Father    • Diabetes Father    • Hypertension Father    • Breast cancer Sister    • COPD Sister    • Diabetes Sister    • Hypertension Sister    • Alcohol abuse Brother    • Asthma Brother    • Bipolar disorder Brother    • Diabetes Brother    • Seizures Brother    • Breast cancer Maternal Grandmother    • Diabetes Maternal Grandmother    • Diabetes Maternal Grandfather    • Diabetes Paternal Grandmother    • Diabetes Paternal Grandfather    • Hypertension Son    • Kidney cancer Sister    • Hypertension Sister    • Diabetes Sister    •  "Malig Hyperthermia Neg Hx           Objective     Vitals:    04/20/22 1439   BP: 111/61   Pulse: 93   Resp: 16   Temp: 97.5 °F (36.4 °C)   TempSrc: Temporal   SpO2: 96%   Weight: 53.3 kg (117 lb 9.6 oz)   Height: 162.6 cm (64.02\")   PainSc:   4     Current Status 3/23/2022   ECOG score 0       Physical Exam  Constitutional:       Appearance: Normal appearance. She is underweight.   HENT:      Head: Normocephalic and atraumatic.      Nose: Nose normal.   Eyes:      Extraocular Movements: Extraocular movements intact.      Conjunctiva/sclera: Conjunctivae normal.      Pupils: Pupils are equal, round, and reactive to light.   Cardiovascular:      Rate and Rhythm: Normal rate and regular rhythm.      Pulses: Normal pulses.      Heart sounds: Normal heart sounds.   Pulmonary:      Effort: Pulmonary effort is normal.      Breath sounds: Normal breath sounds.   Abdominal:      General: Bowel sounds are normal.      Palpations: Abdomen is soft. There is no mass.      Tenderness: There is abdominal tenderness (Midepigastrium).      Comments: J-tube clean, dry,?  Location   Musculoskeletal:         General: Normal range of motion.      Cervical back: Normal range of motion.   Skin:     General: Skin is warm and dry.      Findings: No rash.   Neurological:      General: No focal deficit present.      Mental Status: She is alert and oriented to person, place, and time.   Psychiatric:         Mood and Affect: Mood normal.     I have reexamined the patient and the results are consistent with the previously documented exam. Iggy Harrell MD     RECENT LABS:  Results from last 7 days   Lab Units 04/20/22  1421 04/18/22  1457   WBC 10*3/mm3 10.92* 8.07   NEUTROS ABS 10*3/mm3 8.21* 5.97   HEMOGLOBIN g/dL 12.9 13.4   HEMATOCRIT % 39.9 41.7   PLATELETS 10*3/mm3 284 287     Results from last 7 days   Lab Units 04/18/22  1457   SODIUM mmol/L 140   POTASSIUM mmol/L 4.0   CHLORIDE mmol/L 103   CO2 mmol/L 27.0   BUN mg/dL 12 "   CREATININE mg/dL 0.53*   CALCIUM mg/dL 9.4   GLUCOSE mg/dL 85     Results from last 7 days   Lab Units 04/18/22  1457   INR  1.21*        Assessment/Plan          61-year-old female with a history of hyperthyroidism, previous gastric ulcer, GE reflux, atrial fibrillation, hyperlipidemia, hypertension irritable bowel syndrome,          1.  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  · here has been a number of physicians involved in the patient's case and several months before she was ultimately seen by Dr. Finch at [facility].   · CT scan of the chest demonstrating thickening of the mid esophagus with no lymphadenopathy and PET/CT demonstrated uptake in the mid to distal esophagus with SUV intensely elevated at 16 with a few nonenlarged left hilar and gastrohepatic lymph nodes with mild increased FDG activity, few scattered subcentimeter pulmonary nodules bilaterally indeterminate.  · EUS was felt necessary and ultimately obtained at [facility] performed 12/7/2021 revealing a 5 cm esophageal mass present in the distal third esophagus without extension into the GE junction into the cardia, the mass extended into into the muscularis propria into the adventitia not penetrating through the adventitia with a single 1 cm gastrohepatic/celiac axis lymph node and FNB x1 performed-?   · Discussed with Dr. Finch directly who feels that she is not immediately and operative candidate and should proceed with chemo radiation therapy initially-neoadjuvant treatment before consideration for subsequent surgery. Discussed extensively concurrent chemotherapy radiation therapy using Carboplatin/Taxol weekly.  ·  who proceeded 12/15/2021  to PowerPort placement and open jejunostomy.  She was seen during her hospital stay by oncology nutrition required hospitalization up to including 12/19/2021.  · seen by radiation therapy 12/21 with plans to proceed with 40 CT simulation and planning-IMRT/IGR T-4140 cGy in 23 fractions with pleased to be considered.   · Radiation therapy initiated 1/3/2022  · 1/5/2021 cycle 1 weekly carboplatin/Taxol.    · Patient status post week 2 on 1/12/2022  · Patient treated 1/19/2022-week 3  · 1/26/2022, hold treatment today due to platelet counts 42,000 and feeling unwell.  Covid positive at that time  · Chemotherapy held 2/2/2022 due to ongoing normal cytopenia, platelets of 40,000  · Presentation at multidisciplinary clinic 2/10/2022 with plans to complete fourth dose of carboplatin Taxol followed by PET scan.  It is unclear at this time if the patient will be a surgical candidate pending her response and nutritional state post treatment  · We will proceed today with her fourth dose of carboplatin Taxol  · Upon completion of chemotherapy patient reevaluated with PET/CT 3/17/2022 with significant response for esophageal lesion, gastrohepatic left hilar lymphadenopathy no longer FDG avid, indeterminate subcentimeter nodule right apex thought to be endobronchial filling defect, tree-in-bud nodularity felt to be reactive.  · Patient seen in office 3/23/2022 referred back to thoracic surgery.  Discussed potential adjuvant nivolumab therapy post surgery.  · Patient seen 4/20/2022 with surgery planned 4/29/2022.    2.  Nutrition  · Use of Jejunostomy tube. Continuous feed currently 12 hours a night, slowly working up to recommended 16 hours/day at 90 cc/h  · She is still trying to drink at least 1 boost a day and able to take pills by mouth  · She continues to take pills by mouth.  She does utilize her J-tube for 16 hours a day.  Her weight is stable over the past 3 weeks.  · Patient seen 3/23/2022 with her J-tube no longer  anchored properly, thoracic surgery request for evaluation    3.  A. fib: On chronic anticoagulation with Eliquis 5 mg twice daily.  · 01/26/2022: Positive Covid-19 diagnosis today and platelet count 42,000.  Eliquis was placed on hold  · The patient continues to hold Eliquis despite improvement in platelet count  · We discussed resuming Eliquis 5 mg.  She understands this will be continued twice daily unless platelets drop less than 60,000.    4. Narcotic induced constipation  · She does report worsening constipation currently.  We discussed the benefit of MiraLAX in the J-tube    5.  Mild chemotherapy-induced nausea/dry heaving  · Currently utilizing Zofran every 8 hours  · We will add Compazine 10 mg every 6 hours to alternate with Zofran as needed    6. Chemotherapy-induced Thrombocytopenia  · Resolved      7. COVID-19 positive  · 01/26/2022  · Sinus congestion and cough have completely resolved.  The patient is no longer symptomatic    8.  Atrial fibrillation  · Previous metoprolol dosing of 50 mg twice daily though with weight loss, the patient has become quite hypotensive.    · Toprol moved to 25 mg XL once a day      Plan:  *Again surgery scheduled April/29/22    *MD follow-up 5 to 6 weeks or perioperatively.    Iggy Harrell MD  04/20/2022

## 2022-04-21 ENCOUNTER — TELEPHONE (OUTPATIENT)
Dept: ONCOLOGY | Facility: CLINIC | Age: 62
End: 2022-04-21

## 2022-04-21 RX ORDER — LORAZEPAM 2 MG/ML
1 CONCENTRATE ORAL EVERY 8 HOURS PRN
Qty: 30 ML | Refills: 0 | Status: SHIPPED | OUTPATIENT
Start: 2022-04-21 | End: 2022-06-08

## 2022-04-21 NOTE — TELEPHONE ENCOUNTER
Returned call to patient to let her know that we are sending in liquid Ativan for her to place through her J-tube.  If she has not improved by morning, she will need to call office back.  She v/u.

## 2022-04-21 NOTE — TELEPHONE ENCOUNTER
Returned call to patient who is reporting continued nausea and vomiting. She said this was going on yesterday, but is getting worse.  She cannot keep water or her meds down.  She is using the ODT Zofran, but that is not helping her nausea.  She still does her J-tube feedings at night.  She is scheduled for surgery on 4/29/2022.  She wants to know if there is anything else that can be done for her till then.  Please advise.

## 2022-04-21 NOTE — TELEPHONE ENCOUNTER
The patient says she is still vomiting and cannot keep anything down including her medication and water

## 2022-04-26 DIAGNOSIS — C15.5 MALIGNANT NEOPLASM OF LOWER THIRD OF ESOPHAGUS: Primary | ICD-10-CM

## 2022-04-27 ENCOUNTER — LAB (OUTPATIENT)
Dept: LAB | Facility: HOSPITAL | Age: 62
End: 2022-04-27

## 2022-04-27 DIAGNOSIS — C15.5 MALIGNANT NEOPLASM OF LOWER THIRD OF ESOPHAGUS: ICD-10-CM

## 2022-04-27 LAB — SARS-COV-2 ORF1AB RESP QL NAA+PROBE: NOT DETECTED

## 2022-04-27 PROCEDURE — C9803 HOPD COVID-19 SPEC COLLECT: HCPCS

## 2022-04-27 PROCEDURE — U0004 COV-19 TEST NON-CDC HGH THRU: HCPCS

## 2022-04-28 ENCOUNTER — ANESTHESIA EVENT (OUTPATIENT)
Dept: PERIOP | Facility: HOSPITAL | Age: 62
End: 2022-04-28

## 2022-04-29 ENCOUNTER — ANESTHESIA (OUTPATIENT)
Dept: PERIOP | Facility: HOSPITAL | Age: 62
End: 2022-04-29

## 2022-04-29 ENCOUNTER — APPOINTMENT (OUTPATIENT)
Dept: GENERAL RADIOLOGY | Facility: HOSPITAL | Age: 62
End: 2022-04-29

## 2022-04-29 ENCOUNTER — HOSPITAL ENCOUNTER (INPATIENT)
Facility: HOSPITAL | Age: 62
LOS: 10 days | Discharge: HOME HEALTH | End: 2022-05-09
Attending: THORACIC SURGERY (CARDIOTHORACIC VASCULAR SURGERY) | Admitting: THORACIC SURGERY (CARDIOTHORACIC VASCULAR SURGERY)

## 2022-04-29 DIAGNOSIS — C15.5 MALIGNANT NEOPLASM OF LOWER THIRD OF ESOPHAGUS: Primary | ICD-10-CM

## 2022-04-29 LAB
GLUCOSE BLDC GLUCOMTR-MCNC: 170 MG/DL (ref 70–130)
QT INTERVAL: 424 MS

## 2022-04-29 PROCEDURE — 25010000002 PROPOFOL 10 MG/ML EMULSION: Performed by: NURSE ANESTHETIST, CERTIFIED REGISTERED

## 2022-04-29 PROCEDURE — 44015 INSERT NEEDLE CATH BOWEL: CPT | Performed by: THORACIC SURGERY (CARDIOTHORACIC VASCULAR SURGERY)

## 2022-04-29 PROCEDURE — 0DT54ZZ RESECTION OF ESOPHAGUS, PERCUTANEOUS ENDOSCOPIC APPROACH: ICD-10-PCS | Performed by: THORACIC SURGERY (CARDIOTHORACIC VASCULAR SURGERY)

## 2022-04-29 PROCEDURE — 25010000002 ROPIVACAINE PER 1 MG: Performed by: ANESTHESIOLOGY

## 2022-04-29 PROCEDURE — 25010000002 MAGNESIUM SULFATE PER 500 MG OF MAGNESIUM: Performed by: NURSE ANESTHETIST, CERTIFIED REGISTERED

## 2022-04-29 PROCEDURE — 25010000002 MIDAZOLAM PER 1 MG: Performed by: ANESTHESIOLOGY

## 2022-04-29 PROCEDURE — 25010000002 CEFAZOLIN IN DEXTROSE 2-4 GM/100ML-% SOLUTION: Performed by: THORACIC SURGERY (CARDIOTHORACIC VASCULAR SURGERY)

## 2022-04-29 PROCEDURE — 25010000002 FENTANYL CITRATE (PF) 50 MCG/ML SOLUTION: Performed by: ANESTHESIOLOGY

## 2022-04-29 PROCEDURE — 88309 TISSUE EXAM BY PATHOLOGIST: CPT | Performed by: THORACIC SURGERY (CARDIOTHORACIC VASCULAR SURGERY)

## 2022-04-29 PROCEDURE — 07B74ZX EXCISION OF THORAX LYMPHATIC, PERCUTANEOUS ENDOSCOPIC APPROACH, DIAGNOSTIC: ICD-10-PCS | Performed by: THORACIC SURGERY (CARDIOTHORACIC VASCULAR SURGERY)

## 2022-04-29 PROCEDURE — 88305 TISSUE EXAM BY PATHOLOGIST: CPT | Performed by: THORACIC SURGERY (CARDIOTHORACIC VASCULAR SURGERY)

## 2022-04-29 PROCEDURE — P9041 ALBUMIN (HUMAN),5%, 50ML: HCPCS | Performed by: NURSE ANESTHETIST, CERTIFIED REGISTERED

## 2022-04-29 PROCEDURE — 0BNK4ZZ RELEASE RIGHT LUNG, PERCUTANEOUS ENDOSCOPIC APPROACH: ICD-10-PCS | Performed by: THORACIC SURGERY (CARDIOTHORACIC VASCULAR SURGERY)

## 2022-04-29 PROCEDURE — 88342 IMHCHEM/IMCYTCHM 1ST ANTB: CPT | Performed by: THORACIC SURGERY (CARDIOTHORACIC VASCULAR SURGERY)

## 2022-04-29 PROCEDURE — 0D2DXUZ CHANGE FEEDING DEVICE IN LOWER INTESTINAL TRACT, EXTERNAL APPROACH: ICD-10-PCS | Performed by: THORACIC SURGERY (CARDIOTHORACIC VASCULAR SURGERY)

## 2022-04-29 PROCEDURE — 3E0G76Z INTRODUCTION OF NUTRITIONAL SUBSTANCE INTO UPPER GI, VIA NATURAL OR ARTIFICIAL OPENING: ICD-10-PCS | Performed by: THORACIC SURGERY (CARDIOTHORACIC VASCULAR SURGERY)

## 2022-04-29 PROCEDURE — 25010000002 DEXAMETHASONE PER 1 MG: Performed by: NURSE ANESTHETIST, CERTIFIED REGISTERED

## 2022-04-29 PROCEDURE — 82962 GLUCOSE BLOOD TEST: CPT

## 2022-04-29 PROCEDURE — 8E0W4CZ ROBOTIC ASSISTED PROCEDURE OF TRUNK REGION, PERCUTANEOUS ENDOSCOPIC APPROACH: ICD-10-PCS | Performed by: THORACIC SURGERY (CARDIOTHORACIC VASCULAR SURGERY)

## 2022-04-29 PROCEDURE — 25010000002 ONDANSETRON PER 1 MG: Performed by: NURSE ANESTHETIST, CERTIFIED REGISTERED

## 2022-04-29 PROCEDURE — 25010000002 SUCCINYLCHOLINE PER 20 MG: Performed by: NURSE ANESTHETIST, CERTIFIED REGISTERED

## 2022-04-29 PROCEDURE — 3E0T3BZ INTRODUCTION OF ANESTHETIC AGENT INTO PERIPHERAL NERVES AND PLEXI, PERCUTANEOUS APPROACH: ICD-10-PCS | Performed by: THORACIC SURGERY (CARDIOTHORACIC VASCULAR SURGERY)

## 2022-04-29 PROCEDURE — 88313 SPECIAL STAINS GROUP 2: CPT | Performed by: THORACIC SURGERY (CARDIOTHORACIC VASCULAR SURGERY)

## 2022-04-29 PROCEDURE — 25010000002 KETOROLAC TROMETHAMINE PER 15 MG: Performed by: THORACIC SURGERY (CARDIOTHORACIC VASCULAR SURGERY)

## 2022-04-29 PROCEDURE — 32652 THORACOSCOPY REM TOTL CORTEX: CPT | Performed by: THORACIC SURGERY (CARDIOTHORACIC VASCULAR SURGERY)

## 2022-04-29 PROCEDURE — 25010000002 HYDROMORPHONE PER 4 MG: Performed by: NURSE ANESTHETIST, CERTIFIED REGISTERED

## 2022-04-29 PROCEDURE — 25010000002 ALBUMIN HUMAN 5% PER 50 ML: Performed by: NURSE ANESTHETIST, CERTIFIED REGISTERED

## 2022-04-29 PROCEDURE — 25010000002 NEOSTIGMINE 5 MG/10ML SOLUTION: Performed by: NURSE ANESTHETIST, CERTIFIED REGISTERED

## 2022-04-29 PROCEDURE — 0 BUPIVACAINE LIPOSOME 1.3 % SUSPENSION: Performed by: ANESTHESIOLOGY

## 2022-04-29 PROCEDURE — 0BJ08ZZ INSPECTION OF TRACHEOBRONCHIAL TREE, VIA NATURAL OR ARTIFICIAL OPENING ENDOSCOPIC: ICD-10-PCS | Performed by: THORACIC SURGERY (CARDIOTHORACIC VASCULAR SURGERY)

## 2022-04-29 PROCEDURE — C9290 INJ, BUPIVACAINE LIPOSOME: HCPCS | Performed by: ANESTHESIOLOGY

## 2022-04-29 PROCEDURE — C1729 CATH, DRAINAGE: HCPCS | Performed by: THORACIC SURGERY (CARDIOTHORACIC VASCULAR SURGERY)

## 2022-04-29 PROCEDURE — 25010000002 FENTANYL CITRATE (PF) 50 MCG/ML SOLUTION: Performed by: NURSE ANESTHETIST, CERTIFIED REGISTERED

## 2022-04-29 PROCEDURE — 25010000002 HYDROMORPHONE 1 MG/ML SOLUTION: Performed by: THORACIC SURGERY (CARDIOTHORACIC VASCULAR SURGERY)

## 2022-04-29 PROCEDURE — 76942 ECHO GUIDE FOR BIOPSY: CPT | Performed by: THORACIC SURGERY (CARDIOTHORACIC VASCULAR SURGERY)

## 2022-04-29 PROCEDURE — 25010000002 ONABOTULINUMTOXINA 100 UNITS RECONSTITUTED SOLUTION: Performed by: THORACIC SURGERY (CARDIOTHORACIC VASCULAR SURGERY)

## 2022-04-29 PROCEDURE — 93005 ELECTROCARDIOGRAM TRACING: CPT | Performed by: THORACIC SURGERY (CARDIOTHORACIC VASCULAR SURGERY)

## 2022-04-29 PROCEDURE — 25010000002 HEPARIN (PORCINE) PER 1000 UNITS: Performed by: THORACIC SURGERY (CARDIOTHORACIC VASCULAR SURGERY)

## 2022-04-29 DEVICE — ABSORBABLE HEMOSTAT (OXIDIZED REGENERATED CELLULOSE, U.S.P.)
Type: IMPLANTABLE DEVICE | Site: ESOPHAGUS | Status: FUNCTIONAL
Brand: SURGICEL

## 2022-04-29 DEVICE — STAPLER 30 RELOAD WHITE
Type: IMPLANTABLE DEVICE | Site: ESOPHAGUS | Status: FUNCTIONAL
Brand: ENDOWRIST

## 2022-04-29 DEVICE — ARTICULATION RELOAD WITH TRI-STAPLE TECHNOLOGY
Type: IMPLANTABLE DEVICE | Site: ESOPHAGUS | Status: FUNCTIONAL
Brand: ENDO GIA

## 2022-04-29 DEVICE — STAPLER 30 RELOAD
Type: IMPLANTABLE DEVICE | Site: ESOPHAGUS | Status: FUNCTIONAL
Brand: ENDOWRIST

## 2022-04-29 RX ORDER — CELECOXIB 200 MG/1
200 CAPSULE ORAL ONCE
Status: DISCONTINUED | OUTPATIENT
Start: 2022-04-29 | End: 2022-04-29 | Stop reason: HOSPADM

## 2022-04-29 RX ORDER — KETAMINE HYDROCHLORIDE 10 MG/ML
INJECTION INTRAMUSCULAR; INTRAVENOUS AS NEEDED
Status: DISCONTINUED | OUTPATIENT
Start: 2022-04-29 | End: 2022-04-29 | Stop reason: SURG

## 2022-04-29 RX ORDER — NITROGLYCERIN 0.4 MG/1
0.4 TABLET SUBLINGUAL
Status: DISCONTINUED | OUTPATIENT
Start: 2022-04-29 | End: 2022-05-09 | Stop reason: HOSPADM

## 2022-04-29 RX ORDER — SODIUM CHLORIDE, SODIUM LACTATE, POTASSIUM CHLORIDE, CALCIUM CHLORIDE 600; 310; 30; 20 MG/100ML; MG/100ML; MG/100ML; MG/100ML
9 INJECTION, SOLUTION INTRAVENOUS CONTINUOUS
Status: DISCONTINUED | OUTPATIENT
Start: 2022-04-29 | End: 2022-05-04

## 2022-04-29 RX ORDER — METHIMAZOLE 10 MG/1
10 TABLET ORAL DAILY
Status: DISCONTINUED | OUTPATIENT
Start: 2022-04-29 | End: 2022-04-30

## 2022-04-29 RX ORDER — DIPHENHYDRAMINE HYDROCHLORIDE 50 MG/ML
25 INJECTION INTRAMUSCULAR; INTRAVENOUS EVERY 4 HOURS PRN
Status: DISCONTINUED | OUTPATIENT
Start: 2022-04-29 | End: 2022-04-29

## 2022-04-29 RX ORDER — FENTANYL CITRATE 50 UG/ML
INJECTION, SOLUTION INTRAMUSCULAR; INTRAVENOUS AS NEEDED
Status: DISCONTINUED | OUTPATIENT
Start: 2022-04-29 | End: 2022-04-29 | Stop reason: SURG

## 2022-04-29 RX ORDER — HYDROMORPHONE HCL 110MG/55ML
PATIENT CONTROLLED ANALGESIA SYRINGE INTRAVENOUS AS NEEDED
Status: DISCONTINUED | OUTPATIENT
Start: 2022-04-29 | End: 2022-04-29 | Stop reason: SURG

## 2022-04-29 RX ORDER — BUPIVACAINE HYDROCHLORIDE 2.5 MG/ML
INJECTION, SOLUTION EPIDURAL; INFILTRATION; INTRACAUDAL AS NEEDED
Status: DISCONTINUED | OUTPATIENT
Start: 2022-04-29 | End: 2022-04-29 | Stop reason: HOSPADM

## 2022-04-29 RX ORDER — BUPIVACAINE HYDROCHLORIDE 2.5 MG/ML
INJECTION, SOLUTION EPIDURAL; INFILTRATION; INTRACAUDAL
Status: COMPLETED | OUTPATIENT
Start: 2022-04-29 | End: 2022-04-29

## 2022-04-29 RX ORDER — ONDANSETRON 4 MG/1
4 TABLET, FILM COATED ORAL EVERY 6 HOURS PRN
Status: DISCONTINUED | OUTPATIENT
Start: 2022-04-29 | End: 2022-05-09 | Stop reason: HOSPADM

## 2022-04-29 RX ORDER — ACETAMINOPHEN 500 MG
1000 TABLET ORAL 3 TIMES DAILY
Status: DISCONTINUED | OUTPATIENT
Start: 2022-04-29 | End: 2022-05-02

## 2022-04-29 RX ORDER — GLYCOPYRROLATE 0.2 MG/ML
INJECTION INTRAMUSCULAR; INTRAVENOUS AS NEEDED
Status: DISCONTINUED | OUTPATIENT
Start: 2022-04-29 | End: 2022-04-29 | Stop reason: SURG

## 2022-04-29 RX ORDER — FENTANYL CITRATE 50 UG/ML
INJECTION, SOLUTION INTRAMUSCULAR; INTRAVENOUS
Status: COMPLETED | OUTPATIENT
Start: 2022-04-29 | End: 2022-04-29

## 2022-04-29 RX ORDER — SODIUM CHLORIDE 0.9 % (FLUSH) 0.9 %
3-10 SYRINGE (ML) INJECTION AS NEEDED
Status: DISCONTINUED | OUTPATIENT
Start: 2022-04-29 | End: 2022-04-29 | Stop reason: HOSPADM

## 2022-04-29 RX ORDER — VECURONIUM BROMIDE 1 MG/ML
INJECTION, POWDER, LYOPHILIZED, FOR SOLUTION INTRAVENOUS AS NEEDED
Status: DISCONTINUED | OUTPATIENT
Start: 2022-04-29 | End: 2022-04-29 | Stop reason: SURG

## 2022-04-29 RX ORDER — DIPHENHYDRAMINE HCL 25 MG
25 CAPSULE ORAL EVERY 4 HOURS PRN
Status: DISCONTINUED | OUTPATIENT
Start: 2022-04-29 | End: 2022-04-29

## 2022-04-29 RX ORDER — LIDOCAINE HYDROCHLORIDE 10 MG/ML
0.5 INJECTION, SOLUTION EPIDURAL; INFILTRATION; INTRACAUDAL; PERINEURAL ONCE AS NEEDED
Status: DISCONTINUED | OUTPATIENT
Start: 2022-04-29 | End: 2022-04-29 | Stop reason: HOSPADM

## 2022-04-29 RX ORDER — ALBUMIN, HUMAN INJ 5% 5 %
SOLUTION INTRAVENOUS CONTINUOUS PRN
Status: DISCONTINUED | OUTPATIENT
Start: 2022-04-29 | End: 2022-04-29 | Stop reason: SURG

## 2022-04-29 RX ORDER — NALOXONE HCL 0.4 MG/ML
0.4 VIAL (ML) INJECTION
Status: DISCONTINUED | OUTPATIENT
Start: 2022-04-29 | End: 2022-04-29

## 2022-04-29 RX ORDER — MIDAZOLAM HYDROCHLORIDE 1 MG/ML
INJECTION INTRAMUSCULAR; INTRAVENOUS
Status: COMPLETED | OUTPATIENT
Start: 2022-04-29 | End: 2022-04-29

## 2022-04-29 RX ORDER — FAMOTIDINE 10 MG/ML
20 INJECTION, SOLUTION INTRAVENOUS ONCE
Status: COMPLETED | OUTPATIENT
Start: 2022-04-29 | End: 2022-04-29

## 2022-04-29 RX ORDER — ONDANSETRON 2 MG/ML
4 INJECTION INTRAMUSCULAR; INTRAVENOUS EVERY 6 HOURS PRN
Status: DISCONTINUED | OUTPATIENT
Start: 2022-04-29 | End: 2022-05-09 | Stop reason: HOSPADM

## 2022-04-29 RX ORDER — ENOXAPARIN SODIUM 100 MG/ML
40 INJECTION SUBCUTANEOUS DAILY
Status: DISCONTINUED | OUTPATIENT
Start: 2022-04-30 | End: 2022-05-09 | Stop reason: HOSPADM

## 2022-04-29 RX ORDER — MIDAZOLAM HYDROCHLORIDE 1 MG/ML
1 INJECTION INTRAMUSCULAR; INTRAVENOUS
Status: DISCONTINUED | OUTPATIENT
Start: 2022-04-29 | End: 2022-04-29 | Stop reason: HOSPADM

## 2022-04-29 RX ORDER — LORAZEPAM 2 MG/ML
1 CONCENTRATE ORAL EVERY 8 HOURS PRN
Status: DISCONTINUED | OUTPATIENT
Start: 2022-04-29 | End: 2022-05-09 | Stop reason: HOSPADM

## 2022-04-29 RX ORDER — ACETAMINOPHEN 500 MG
1000 TABLET ORAL ONCE
Status: DISCONTINUED | OUTPATIENT
Start: 2022-04-29 | End: 2022-04-29 | Stop reason: HOSPADM

## 2022-04-29 RX ORDER — GABAPENTIN 300 MG/1
300 CAPSULE ORAL 3 TIMES DAILY
Status: DISCONTINUED | OUTPATIENT
Start: 2022-04-29 | End: 2022-05-01

## 2022-04-29 RX ORDER — MAGNESIUM HYDROXIDE 1200 MG/15ML
LIQUID ORAL AS NEEDED
Status: DISCONTINUED | OUTPATIENT
Start: 2022-04-29 | End: 2022-04-29 | Stop reason: HOSPADM

## 2022-04-29 RX ORDER — METOCLOPRAMIDE HYDROCHLORIDE 5 MG/ML
10 INJECTION INTRAMUSCULAR; INTRAVENOUS ONCE AS NEEDED
Status: DISCONTINUED | OUTPATIENT
Start: 2022-04-29 | End: 2022-04-29

## 2022-04-29 RX ORDER — LIDOCAINE HYDROCHLORIDE 20 MG/ML
INJECTION, SOLUTION INFILTRATION; PERINEURAL AS NEEDED
Status: DISCONTINUED | OUTPATIENT
Start: 2022-04-29 | End: 2022-04-29 | Stop reason: SURG

## 2022-04-29 RX ORDER — CEFAZOLIN SODIUM 2 G/100ML
2 INJECTION, SOLUTION INTRAVENOUS ONCE
Status: COMPLETED | OUTPATIENT
Start: 2022-04-29 | End: 2022-04-29

## 2022-04-29 RX ORDER — CEFAZOLIN SODIUM 2 G/100ML
2 INJECTION, SOLUTION INTRAVENOUS EVERY 8 HOURS
Status: COMPLETED | OUTPATIENT
Start: 2022-04-29 | End: 2022-04-30

## 2022-04-29 RX ORDER — SODIUM CHLORIDE 0.9 % (FLUSH) 0.9 %
3 SYRINGE (ML) INJECTION EVERY 12 HOURS SCHEDULED
Status: DISCONTINUED | OUTPATIENT
Start: 2022-04-29 | End: 2022-04-29 | Stop reason: HOSPADM

## 2022-04-29 RX ORDER — SODIUM CHLORIDE 0.9 % (FLUSH) 0.9 %
10 SYRINGE (ML) INJECTION EVERY 12 HOURS SCHEDULED
Status: DISCONTINUED | OUTPATIENT
Start: 2022-04-29 | End: 2022-05-09 | Stop reason: HOSPADM

## 2022-04-29 RX ORDER — METOPROLOL SUCCINATE 50 MG/1
50 TABLET, EXTENDED RELEASE ORAL DAILY
Status: DISCONTINUED | OUTPATIENT
Start: 2022-04-29 | End: 2022-04-30

## 2022-04-29 RX ORDER — MAGNESIUM SULFATE HEPTAHYDRATE 500 MG/ML
INJECTION, SOLUTION INTRAMUSCULAR; INTRAVENOUS AS NEEDED
Status: DISCONTINUED | OUTPATIENT
Start: 2022-04-29 | End: 2022-04-29 | Stop reason: SURG

## 2022-04-29 RX ORDER — PROPOFOL 10 MG/ML
VIAL (ML) INTRAVENOUS AS NEEDED
Status: DISCONTINUED | OUTPATIENT
Start: 2022-04-29 | End: 2022-04-29 | Stop reason: SURG

## 2022-04-29 RX ORDER — FENTANYL CITRATE 50 UG/ML
50 INJECTION, SOLUTION INTRAMUSCULAR; INTRAVENOUS
Status: DISCONTINUED | OUTPATIENT
Start: 2022-04-29 | End: 2022-04-29 | Stop reason: HOSPADM

## 2022-04-29 RX ORDER — SODIUM CHLORIDE 9 MG/ML
50 INJECTION, SOLUTION INTRAVENOUS CONTINUOUS
Status: DISCONTINUED | OUTPATIENT
Start: 2022-04-29 | End: 2022-05-04

## 2022-04-29 RX ORDER — ONDANSETRON 2 MG/ML
INJECTION INTRAMUSCULAR; INTRAVENOUS AS NEEDED
Status: DISCONTINUED | OUTPATIENT
Start: 2022-04-29 | End: 2022-04-29 | Stop reason: SURG

## 2022-04-29 RX ORDER — DEXAMETHASONE SODIUM PHOSPHATE 10 MG/ML
INJECTION INTRAMUSCULAR; INTRAVENOUS AS NEEDED
Status: DISCONTINUED | OUTPATIENT
Start: 2022-04-29 | End: 2022-04-29 | Stop reason: SURG

## 2022-04-29 RX ORDER — PRAVASTATIN SODIUM 20 MG
20 TABLET ORAL DAILY
Status: DISCONTINUED | OUTPATIENT
Start: 2022-04-29 | End: 2022-04-30

## 2022-04-29 RX ORDER — OXYCODONE HYDROCHLORIDE 5 MG/1
5 TABLET ORAL ONCE AS NEEDED
Status: DISCONTINUED | OUTPATIENT
Start: 2022-04-29 | End: 2022-04-29

## 2022-04-29 RX ORDER — SODIUM CHLORIDE, SODIUM LACTATE, POTASSIUM CHLORIDE, CALCIUM CHLORIDE 600; 310; 30; 20 MG/100ML; MG/100ML; MG/100ML; MG/100ML
INJECTION, SOLUTION INTRAVENOUS CONTINUOUS PRN
Status: DISCONTINUED | OUTPATIENT
Start: 2022-04-29 | End: 2022-04-29 | Stop reason: SURG

## 2022-04-29 RX ORDER — ONDANSETRON 2 MG/ML
4 INJECTION INTRAMUSCULAR; INTRAVENOUS ONCE AS NEEDED
Status: DISCONTINUED | OUTPATIENT
Start: 2022-04-29 | End: 2022-04-29

## 2022-04-29 RX ORDER — NEOSTIGMINE METHYLSULFATE 0.5 MG/ML
INJECTION, SOLUTION INTRAVENOUS AS NEEDED
Status: DISCONTINUED | OUTPATIENT
Start: 2022-04-29 | End: 2022-04-29 | Stop reason: SURG

## 2022-04-29 RX ORDER — KETOROLAC TROMETHAMINE 30 MG/ML
15 INJECTION, SOLUTION INTRAMUSCULAR; INTRAVENOUS EVERY 6 HOURS PRN
Status: DISPENSED | OUTPATIENT
Start: 2022-04-29 | End: 2022-05-04

## 2022-04-29 RX ORDER — PHENYLEPHRINE HCL IN 0.9% NACL 0.5 MG/5ML
.5-3 SYRINGE (ML) INTRAVENOUS
Status: DISCONTINUED | OUTPATIENT
Start: 2022-04-29 | End: 2022-05-02

## 2022-04-29 RX ORDER — SODIUM CHLORIDE 0.9 % (FLUSH) 0.9 %
10 SYRINGE (ML) INJECTION AS NEEDED
Status: DISCONTINUED | OUTPATIENT
Start: 2022-04-29 | End: 2022-05-09 | Stop reason: HOSPADM

## 2022-04-29 RX ORDER — HEPARIN SODIUM 5000 [USP'U]/ML
5000 INJECTION, SOLUTION INTRAVENOUS; SUBCUTANEOUS ONCE
Status: COMPLETED | OUTPATIENT
Start: 2022-04-29 | End: 2022-04-29

## 2022-04-29 RX ORDER — SUCCINYLCHOLINE CHLORIDE 20 MG/ML
INJECTION INTRAMUSCULAR; INTRAVENOUS AS NEEDED
Status: DISCONTINUED | OUTPATIENT
Start: 2022-04-29 | End: 2022-04-29 | Stop reason: SURG

## 2022-04-29 RX ORDER — GABAPENTIN 300 MG/1
600 CAPSULE ORAL ONCE
Status: DISCONTINUED | OUTPATIENT
Start: 2022-04-29 | End: 2022-04-29 | Stop reason: HOSPADM

## 2022-04-29 RX ORDER — OXYCODONE HYDROCHLORIDE 5 MG/1
5 TABLET ORAL EVERY 4 HOURS PRN
Status: DISCONTINUED | OUTPATIENT
Start: 2022-04-29 | End: 2022-04-30

## 2022-04-29 RX ORDER — HYDROMORPHONE HYDROCHLORIDE 1 MG/ML
0.25 INJECTION, SOLUTION INTRAMUSCULAR; INTRAVENOUS; SUBCUTANEOUS
Status: DISCONTINUED | OUTPATIENT
Start: 2022-04-29 | End: 2022-04-29

## 2022-04-29 RX ORDER — ROPIVACAINE HYDROCHLORIDE 2 MG/ML
INJECTION, SOLUTION EPIDURAL; INFILTRATION; PERINEURAL
Status: COMPLETED | OUTPATIENT
Start: 2022-04-29 | End: 2022-04-29

## 2022-04-29 RX ADMIN — MIDAZOLAM 1 MG: 1 INJECTION INTRAMUSCULAR; INTRAVENOUS at 06:45

## 2022-04-29 RX ADMIN — ALBUMIN HUMAN: 0.05 INJECTION, SOLUTION INTRAVENOUS at 12:07

## 2022-04-29 RX ADMIN — SODIUM CHLORIDE, POTASSIUM CHLORIDE, SODIUM LACTATE AND CALCIUM CHLORIDE 9 ML/HR: 600; 310; 30; 20 INJECTION, SOLUTION INTRAVENOUS at 06:43

## 2022-04-29 RX ADMIN — ONDANSETRON 4 MG: 2 INJECTION INTRAMUSCULAR; INTRAVENOUS at 12:47

## 2022-04-29 RX ADMIN — FAMOTIDINE 20 MG: 10 INJECTION INTRAVENOUS at 06:43

## 2022-04-29 RX ADMIN — SUCCINYLCHOLINE CHLORIDE 100 MG: 20 INJECTION, SOLUTION INTRAMUSCULAR; INTRAVENOUS; PARENTERAL at 07:40

## 2022-04-29 RX ADMIN — GLYCOPYRROLATE 0.4 MG: 0.2 INJECTION INTRAMUSCULAR; INTRAVENOUS at 12:47

## 2022-04-29 RX ADMIN — KETAMINE HYDROCHLORIDE 10 MG: 10 INJECTION INTRAMUSCULAR; INTRAVENOUS at 11:30

## 2022-04-29 RX ADMIN — CEFAZOLIN SODIUM 2 G: 2 INJECTION, SOLUTION INTRAVENOUS at 07:22

## 2022-04-29 RX ADMIN — VECURONIUM BROMIDE 2 MG: 10 INJECTION, POWDER, LYOPHILIZED, FOR SOLUTION INTRAVENOUS at 11:17

## 2022-04-29 RX ADMIN — SODIUM CHLORIDE, POTASSIUM CHLORIDE, SODIUM LACTATE AND CALCIUM CHLORIDE: 600; 310; 30; 20 INJECTION, SOLUTION INTRAVENOUS at 10:28

## 2022-04-29 RX ADMIN — HYDROMORPHONE HYDROCHLORIDE 0.5 MG: 2 INJECTION, SOLUTION INTRAMUSCULAR; INTRAVENOUS; SUBCUTANEOUS at 12:51

## 2022-04-29 RX ADMIN — HEPARIN SODIUM 5000 UNITS: 5000 INJECTION INTRAVENOUS; SUBCUTANEOUS at 07:21

## 2022-04-29 RX ADMIN — Medication 10 ML: at 20:14

## 2022-04-29 RX ADMIN — VECURONIUM BROMIDE 2 MG: 10 INJECTION, POWDER, LYOPHILIZED, FOR SOLUTION INTRAVENOUS at 07:39

## 2022-04-29 RX ADMIN — FENTANYL CITRATE 25 MCG: 0.05 INJECTION, SOLUTION INTRAMUSCULAR; INTRAVENOUS at 07:58

## 2022-04-29 RX ADMIN — SODIUM CHLORIDE 75 ML/HR: 9 INJECTION, SOLUTION INTRAVENOUS at 16:19

## 2022-04-29 RX ADMIN — BUPIVACAINE HYDROCHLORIDE 20 ML: 2.5 INJECTION, SOLUTION EPIDURAL; INFILTRATION; INTRACAUDAL; PERINEURAL at 06:49

## 2022-04-29 RX ADMIN — HYDROMORPHONE HYDROCHLORIDE 1 MG: 1 INJECTION, SOLUTION INTRAMUSCULAR; INTRAVENOUS; SUBCUTANEOUS at 23:04

## 2022-04-29 RX ADMIN — KETAMINE HYDROCHLORIDE 20 MG: 10 INJECTION INTRAMUSCULAR; INTRAVENOUS at 08:25

## 2022-04-29 RX ADMIN — VECURONIUM BROMIDE 2 MG: 10 INJECTION, POWDER, LYOPHILIZED, FOR SOLUTION INTRAVENOUS at 10:17

## 2022-04-29 RX ADMIN — LIDOCAINE HYDROCHLORIDE 60 MG: 20 INJECTION, SOLUTION INFILTRATION; PERINEURAL at 07:39

## 2022-04-29 RX ADMIN — MAGNESIUM SULFATE HEPTAHYDRATE 2 G: 500 INJECTION, SOLUTION INTRAMUSCULAR; INTRAVENOUS at 08:03

## 2022-04-29 RX ADMIN — KETAMINE HYDROCHLORIDE 10 MG: 10 INJECTION INTRAMUSCULAR; INTRAVENOUS at 10:35

## 2022-04-29 RX ADMIN — FENTANYL CITRATE 25 MCG: 0.05 INJECTION, SOLUTION INTRAMUSCULAR; INTRAVENOUS at 06:46

## 2022-04-29 RX ADMIN — NEOSTIGMINE METHYLSULFATE 2.5 MG: 0.5 INJECTION INTRAVENOUS at 12:47

## 2022-04-29 RX ADMIN — KETAMINE HYDROCHLORIDE 10 MG: 10 INJECTION INTRAMUSCULAR; INTRAVENOUS at 09:26

## 2022-04-29 RX ADMIN — PROPOFOL 120 MG: 10 INJECTION, EMULSION INTRAVENOUS at 07:39

## 2022-04-29 RX ADMIN — CEFAZOLIN SODIUM 2 G: 2 INJECTION, SOLUTION INTRAVENOUS at 11:20

## 2022-04-29 RX ADMIN — BUPIVACAINE 10 ML: 13.3 INJECTION, SUSPENSION, LIPOSOMAL INFILTRATION at 06:49

## 2022-04-29 RX ADMIN — ROPIVACAINE HYDROCHLORIDE 40 ML: 2 INJECTION, SOLUTION EPIDURAL; INFILTRATION at 07:59

## 2022-04-29 RX ADMIN — HYDROMORPHONE HYDROCHLORIDE 0.5 MG: 2 INJECTION, SOLUTION INTRAMUSCULAR; INTRAVENOUS; SUBCUTANEOUS at 13:14

## 2022-04-29 RX ADMIN — SODIUM CHLORIDE, POTASSIUM CHLORIDE, SODIUM LACTATE AND CALCIUM CHLORIDE: 600; 310; 30; 20 INJECTION, SOLUTION INTRAVENOUS at 12:18

## 2022-04-29 RX ADMIN — PROPOFOL 30 MG: 10 INJECTION, EMULSION INTRAVENOUS at 07:44

## 2022-04-29 RX ADMIN — KETAMINE HYDROCHLORIDE 10 MG: 10 INJECTION INTRAMUSCULAR; INTRAVENOUS at 12:23

## 2022-04-29 RX ADMIN — ALBUMIN HUMAN: 0.05 INJECTION, SOLUTION INTRAVENOUS at 09:34

## 2022-04-29 RX ADMIN — CEFAZOLIN SODIUM 2 G: 2 INJECTION, SOLUTION INTRAVENOUS at 19:11

## 2022-04-29 RX ADMIN — HYDROMORPHONE HYDROCHLORIDE 1 MG: 1 INJECTION, SOLUTION INTRAMUSCULAR; INTRAVENOUS; SUBCUTANEOUS at 17:23

## 2022-04-29 RX ADMIN — FENTANYL CITRATE 75 MCG: 0.05 INJECTION, SOLUTION INTRAMUSCULAR; INTRAVENOUS at 12:45

## 2022-04-29 RX ADMIN — DEXAMETHASONE SODIUM PHOSPHATE 8 MG: 10 INJECTION INTRAMUSCULAR; INTRAVENOUS at 07:55

## 2022-04-29 RX ADMIN — VECURONIUM BROMIDE 4 MG: 10 INJECTION, POWDER, LYOPHILIZED, FOR SOLUTION INTRAVENOUS at 07:55

## 2022-04-29 RX ADMIN — SODIUM CHLORIDE, POTASSIUM CHLORIDE, SODIUM LACTATE AND CALCIUM CHLORIDE: 600; 310; 30; 20 INJECTION, SOLUTION INTRAVENOUS at 07:28

## 2022-04-29 RX ADMIN — KETOROLAC TROMETHAMINE 15 MG: 30 INJECTION, SOLUTION INTRAMUSCULAR at 14:09

## 2022-04-29 NOTE — ANESTHESIA PREPROCEDURE EVALUATION
Anesthesia Evaluation     Patient summary reviewed   NPO Solid Status: > 8 hours  NPO Liquid Status: > 2 hours           Airway   Mallampati: I  TM distance: >3 FB  Neck ROM: full  Dental      Pulmonary     breath sounds clear to auscultation  (+) a smoker Current Abstained day of surgery,   (-) shortness of breath  Cardiovascular   Exercise tolerance: good (4-7 METS)    Rhythm: regular  Rate: normal    (+) hypertension, valvular problems/murmurs MVP, dysrhythmias Atrial Fib, Paroxysmal Atrial Fib, hyperlipidemia,   (-) angina, RICHARDSON      Neuro/Psych  GI/Hepatic/Renal/Endo    (+)  hiatal hernia, GERD, PUD,  thyroid problem     Musculoskeletal     Abdominal    Substance History      OB/GYN          Other   arthritis,    history of cancer                    Anesthesia Plan    ASA 3     general and [place]   (I have explained the risks of anesthesia to the patient and/or patient representative including but not limited to dental damage, corneal abrasion, nerve injury, MI, stroke, and death. All patient questions were asked and answered. Anesthetic plan discussed with patient and team as indicated including risks, benefits, and alternatives. Patient expressed understanding of plan and agreed to proceed.    Nerve block(s) requested by surgeon for post-op pain control. Risks, benefits, and alternatives discussed with patient or patient representative who agrees to proceed with plan.   )  intravenous induction     Anesthetic plan, all risks, benefits, and alternatives have been provided, discussed and informed consent has been obtained with: patient.        CODE STATUS:

## 2022-04-29 NOTE — ANESTHESIA POSTPROCEDURE EVALUATION
"Patient: Maya Ribera    Procedure Summary     Date: 04/29/22 Room / Location: [facility] OR  /  [facility]    Anesthesia Start: 0728 Anesthesia Stop: 1320    Procedure: BRONCHOSCOPY, RIGHT THORACOSCOPY WITH DAVINCI ROBOT WITH TOTAL ESOPHAGECTOMY, INTERCOSTAL NERVE BLOCK, JEJUNOSTOMY TUBE REPLACEMENT (Right Esophagus) Diagnosis:       Malignant neoplasm of lower third of esophagus (HCC)      (Malignant neoplasm of lower third of esophagus (HCC) [C15.5])    Surgeons: Hayder Finch III, MD Provider: Nathan Stroud MD    Anesthesia Type: general, Birdie ASA Status: 3          Anesthesia Type: general, [place]    Vitals  Vitals Value Taken Time   /65 04/29/22 1416   Temp 36.7 °C (98.1 °F) 04/29/22 1316   Pulse 69 04/29/22 1422   Resp 14 04/29/22 1400   SpO2 97 % 04/29/22 1422   Vitals shown include unvalidated device data.        Post Anesthesia Care and Evaluation    Patient location during evaluation: bedside  Patient participation: complete - patient participated  Level of consciousness: awake and alert  Pain management: adequate  Airway patency: patent  Anesthetic complications: No anesthetic complications    Cardiovascular status: acceptable  Respiratory status: acceptable  Hydration status: acceptable    Comments: /60   Pulse 72   Temp 36.7 °C (98.1 °F) (Oral)   Resp 14   Ht 162.6 cm (64\")   Wt 53.3 kg (117 lb 8.1 oz)   SpO2 96%   BMI 20.17 kg/m²           "

## 2022-04-29 NOTE — ANESTHESIA PROCEDURE NOTES
Peripheral Block      Patient reassessed immediately prior to procedure    Patient location during procedure: OR  Start time: 4/29/2022 7:56 AM  Stop time: 4/29/2022 7:59 AM  Reason for block: at surgeon's request, post-op pain management and B TAPs per Dr. Finch request  Performed by  Anesthesiologist: Nathan Stroud MD  Preanesthetic Checklist  Completed: patient identified, IV checked, site marked, risks and benefits discussed, surgical consent, monitors and equipment checked, pre-op evaluation and timeout performed  Prep:  Pt Position: supine  Sterile barriers:cap, gloves, mask, sterile barriers and washed/disinfected hands  Prep: ChloraPrep  Patient monitoring: blood pressure monitoring, continuous pulse oximetry and EKG  Procedure    Sedation: yes  Performed under: local infiltration  Guidance:ultrasound guided  Images:still images obtained    Laterality:Bilateral  Block Type:TAP  Injection Technique:single-shot  Needle Type:echogenic and short-bevel  Needle Gauge:21 G  Resistance on Injection: none    Medications Used: ropivacaine (NAROPIN) 0.2 % injection, 40 mL  Med administered at 4/29/2022 7:59 AM      Medications  Comment:20ccs ropivacaine 0.2% per side.     Post Assessment  Injection Assessment: negative aspiration for heme, no paresthesia on injection and incremental injection  Patient Tolerance:comfortable throughout block  Complications:no  Additional Notes  Ultrasound guidance was used for target identification, needle guidance, appropriate placement confirmation, and medication injection. Good spread was seen between the internal oblique and transversus abdominis muscle layers bilaterally. The needle tip was visualized throughout the procedure and stayed above the transversus abdominus muscle throughout.

## 2022-04-29 NOTE — ANESTHESIA PROCEDURE NOTES
Airway  Urgency: elective    Date/Time: 4/29/2022 7:41 AM  Airway not difficult    General Information and Staff    Patient location during procedure: OR  Anesthesiologist: Nathan Stroud MD  CRNA/CAA: Fantasma Maddox CRNA    Indications and Patient Condition  Indications for airway management: airway protection    Preoxygenated: yes  MILS maintained throughout  Mask difficulty assessment: 0 - not attempted    Final Airway Details  Final airway type: endotracheal airway      Successful airway: EBT - double lumen left  Cuffed: yes   Successful intubation technique: direct laryngoscopy and RSI  Facilitating devices/methods: intubating stylet, cricoid pressure and anterior pressure/BURP  Endotracheal tube insertion site: oral  Blade: Holt  Blade size: 2  EBT DL size (fr): 37  Cormack-Lehane Classification: grade I - full view of glottis  Placement verified by: chest auscultation, bronchoscopy, capnometry and single lung ventilation   Measured from: lips  ETT/EBT  to lips (cm): 31  Number of attempts at approach: 1  Assessment: lips, teeth, and gum same as pre-op and atraumatic intubation

## 2022-04-29 NOTE — ANESTHESIA PROCEDURE NOTES
Arterial Line    Pre-sedation assessment completed: 4/29/2022 6:45 AM    Patient reassessed immediately prior to procedure    Patient location during procedure: holding area  Start time: 4/29/2022 6:55 AM  Stop Time:4/29/2022 6:56 AM       Line placed for hemodynamic monitoring.  Performed By   Anesthesiologist: Nathan Stroud MD  Preanesthetic Checklist  Completed: patient identified, IV checked, site marked, risks and benefits discussed, surgical consent, monitors and equipment checked, pre-op evaluation and timeout performed  Arterial Line Prep   Sterile Tech: cap, gloves, mask and sterile barriers  Prep: ChloraPrep  Patient monitoring: blood pressure monitoring, continuous pulse oximetry and EKG  Arterial Line Procedure   Laterality:left  Location:  radial artery  Catheter size: 20 G   Guidance: ultrasound guided  PROCEDURE NOTE/ULTRASOUND INTERPRETATION.  Using ultrasound guidance the potential vascular sites for insertion of the catheter were visualized to determine the patency of the vessel to be used for vascular access.  After selecting the appropriate site for insertion, the needle was visualized under ultrasound being inserted into the radial artery, followed by ultrasound confirmation of wire and catheter placement. There were no abnormalities seen on ultrasound; an image was taken; and the patient tolerated the procedure with no complications.   Number of attempts: 1  Successful placement: yes  Post Assessment   Dressing Type: occlusive dressing applied, secured with tape and wrist guard applied.   Complications no  Circ/Move/Sens Assessment: unchanged.   Patient Tolerance: patient tolerated the procedure well with no apparent complications  Additional Notes  Ultrasound used to view appropriate anatomy, for needle guidance into artery, and for confirmation of appropriate placement. A picture was taken and is available on the chart.

## 2022-04-29 NOTE — ANESTHESIA PROCEDURE NOTES
Peripheral Block    Pre-sedation assessment completed: 4/29/2022 6:45 AM    Patient reassessed immediately prior to procedure    Patient location during procedure: holding area  Start time: 4/29/2022 6:46 AM  Stop time: 4/29/2022 6:49 AM  Reason for block: at surgeon's request and post-op pain management  Performed by  Anesthesiologist: Nathan Stroud MD  Preanesthetic Checklist  Completed: patient identified, IV checked, site marked, risks and benefits discussed, surgical consent, monitors and equipment checked, pre-op evaluation and timeout performed  Prep:  Pt Position: sitting  Sterile barriers:cap, gloves, mask, sterile barriers and washed/disinfected hands  Prep: ChloraPrep  Patient monitoring: blood pressure monitoring, continuous pulse oximetry and EKG  Procedure    Sedation: yes  Performed under: local infiltration  Guidance:ultrasound guided  Images:still images not obtained    Laterality:right  Block Type:erector spinae block  Injection Technique:single-shot  Needle Type:echogenic and short-bevel  Needle Gauge:21 G  Resistance on Injection: none    Medications Used: bupivacaine liposome (EXPAREL) 1.3 % injection, 10 mL  bupivacaine PF (MARCAINE) 0.25 % injection, 20 mL  Med administered at 4/29/2022 6:49 AM      Post Assessment  Injection Assessment: negative aspiration for heme, no paresthesia on injection and incremental injection  Patient Tolerance:comfortable throughout block  Complications:no  Additional Notes  Ultrasound guidance used for location of target, insertion of needle, injection of medication, and placement confirmation.

## 2022-04-30 ENCOUNTER — APPOINTMENT (OUTPATIENT)
Dept: GENERAL RADIOLOGY | Facility: HOSPITAL | Age: 62
End: 2022-04-30

## 2022-04-30 LAB
ANION GAP SERPL CALCULATED.3IONS-SCNC: 10 MMOL/L (ref 5–15)
BASOPHILS # BLD AUTO: 0.03 10*3/MM3 (ref 0–0.2)
BASOPHILS NFR BLD AUTO: 0.2 % (ref 0–1.5)
BUN SERPL-MCNC: 20 MG/DL (ref 8–23)
BUN/CREAT SERPL: 33.3 (ref 7–25)
CALCIUM SPEC-SCNC: 8.2 MG/DL (ref 8.6–10.5)
CHLORIDE SERPL-SCNC: 107 MMOL/L (ref 98–107)
CO2 SERPL-SCNC: 24 MMOL/L (ref 22–29)
CREAT SERPL-MCNC: 0.6 MG/DL (ref 0.57–1)
DEPRECATED RDW RBC AUTO: 50 FL (ref 37–54)
EGFRCR SERPLBLD CKD-EPI 2021: 102.3 ML/MIN/1.73
EOSINOPHIL # BLD AUTO: 0 10*3/MM3 (ref 0–0.4)
EOSINOPHIL NFR BLD AUTO: 0 % (ref 0.3–6.2)
ERYTHROCYTE [DISTWIDTH] IN BLOOD BY AUTOMATED COUNT: 13.4 % (ref 12.3–15.4)
GLUCOSE SERPL-MCNC: 121 MG/DL (ref 65–99)
HCT VFR BLD AUTO: 36.2 % (ref 34–46.6)
HGB BLD-MCNC: 11.8 G/DL (ref 12–15.9)
IMM GRANULOCYTES # BLD AUTO: 0.09 10*3/MM3 (ref 0–0.05)
IMM GRANULOCYTES NFR BLD AUTO: 0.5 % (ref 0–0.5)
LYMPHOCYTES # BLD AUTO: 0.91 10*3/MM3 (ref 0.7–3.1)
LYMPHOCYTES NFR BLD AUTO: 5.1 % (ref 19.6–45.3)
MCH RBC QN AUTO: 32.9 PG (ref 26.6–33)
MCHC RBC AUTO-ENTMCNC: 32.6 G/DL (ref 31.5–35.7)
MCV RBC AUTO: 100.8 FL (ref 79–97)
MONOCYTES # BLD AUTO: 1.09 10*3/MM3 (ref 0.1–0.9)
MONOCYTES NFR BLD AUTO: 6.1 % (ref 5–12)
NEUTROPHILS NFR BLD AUTO: 15.72 10*3/MM3 (ref 1.7–7)
NEUTROPHILS NFR BLD AUTO: 88.1 % (ref 42.7–76)
NRBC BLD AUTO-RTO: 0 /100 WBC (ref 0–0.2)
PLATELET # BLD AUTO: 201 10*3/MM3 (ref 140–450)
PMV BLD AUTO: 10 FL (ref 6–12)
POTASSIUM SERPL-SCNC: 4.3 MMOL/L (ref 3.5–5.2)
RBC # BLD AUTO: 3.59 10*6/MM3 (ref 3.77–5.28)
SODIUM SERPL-SCNC: 141 MMOL/L (ref 136–145)
WBC NRBC COR # BLD: 17.84 10*3/MM3 (ref 3.4–10.8)

## 2022-04-30 PROCEDURE — 25010000002 HYDROMORPHONE 1 MG/ML SOLUTION: Performed by: THORACIC SURGERY (CARDIOTHORACIC VASCULAR SURGERY)

## 2022-04-30 PROCEDURE — 85025 COMPLETE CBC W/AUTO DIFF WBC: CPT | Performed by: THORACIC SURGERY (CARDIOTHORACIC VASCULAR SURGERY)

## 2022-04-30 PROCEDURE — 99024 POSTOP FOLLOW-UP VISIT: CPT | Performed by: THORACIC SURGERY (CARDIOTHORACIC VASCULAR SURGERY)

## 2022-04-30 PROCEDURE — 25010000002 CEFAZOLIN IN DEXTROSE 2-4 GM/100ML-% SOLUTION: Performed by: THORACIC SURGERY (CARDIOTHORACIC VASCULAR SURGERY)

## 2022-04-30 PROCEDURE — 80048 BASIC METABOLIC PNL TOTAL CA: CPT | Performed by: THORACIC SURGERY (CARDIOTHORACIC VASCULAR SURGERY)

## 2022-04-30 PROCEDURE — 97162 PT EVAL MOD COMPLEX 30 MIN: CPT

## 2022-04-30 PROCEDURE — 97110 THERAPEUTIC EXERCISES: CPT

## 2022-04-30 PROCEDURE — 25010000002 KETOROLAC TROMETHAMINE PER 15 MG: Performed by: THORACIC SURGERY (CARDIOTHORACIC VASCULAR SURGERY)

## 2022-04-30 PROCEDURE — 71045 X-RAY EXAM CHEST 1 VIEW: CPT

## 2022-04-30 PROCEDURE — 25010000002 ENOXAPARIN PER 10 MG: Performed by: THORACIC SURGERY (CARDIOTHORACIC VASCULAR SURGERY)

## 2022-04-30 RX ORDER — METHIMAZOLE 10 MG/1
10 TABLET ORAL DAILY
Status: DISCONTINUED | OUTPATIENT
Start: 2022-04-30 | End: 2022-05-09 | Stop reason: HOSPADM

## 2022-04-30 RX ORDER — PRAVASTATIN SODIUM 20 MG
20 TABLET ORAL DAILY
Status: DISCONTINUED | OUTPATIENT
Start: 2022-04-30 | End: 2022-05-09 | Stop reason: HOSPADM

## 2022-04-30 RX ORDER — OXYCODONE HYDROCHLORIDE 5 MG/1
5 TABLET ORAL EVERY 4 HOURS PRN
Status: DISCONTINUED | OUTPATIENT
Start: 2022-04-30 | End: 2022-05-01

## 2022-04-30 RX ADMIN — KETOROLAC TROMETHAMINE 15 MG: 30 INJECTION, SOLUTION INTRAMUSCULAR at 08:27

## 2022-04-30 RX ADMIN — ACETAMINOPHEN 1000 MG: 500 TABLET ORAL at 09:21

## 2022-04-30 RX ADMIN — Medication 10 ML: at 20:07

## 2022-04-30 RX ADMIN — KETOROLAC TROMETHAMINE 15 MG: 30 INJECTION, SOLUTION INTRAMUSCULAR at 02:22

## 2022-04-30 RX ADMIN — HYDROMORPHONE HYDROCHLORIDE 1 MG: 1 INJECTION, SOLUTION INTRAMUSCULAR; INTRAVENOUS; SUBCUTANEOUS at 22:46

## 2022-04-30 RX ADMIN — Medication 10 ML: at 09:28

## 2022-04-30 RX ADMIN — ACETAMINOPHEN 1000 MG: 500 TABLET ORAL at 20:07

## 2022-04-30 RX ADMIN — METOPROLOL TARTRATE 25 MG: 25 TABLET, FILM COATED ORAL at 09:28

## 2022-04-30 RX ADMIN — HYDROMORPHONE HYDROCHLORIDE 1 MG: 1 INJECTION, SOLUTION INTRAMUSCULAR; INTRAVENOUS; SUBCUTANEOUS at 06:10

## 2022-04-30 RX ADMIN — ENOXAPARIN SODIUM 40 MG: 100 INJECTION SUBCUTANEOUS at 17:15

## 2022-04-30 RX ADMIN — GABAPENTIN 300 MG: 300 CAPSULE ORAL at 09:21

## 2022-04-30 RX ADMIN — OXYCODONE 5 MG: 5 TABLET ORAL at 20:07

## 2022-04-30 RX ADMIN — PRAVASTATIN SODIUM 20 MG: 20 TABLET ORAL at 09:21

## 2022-04-30 RX ADMIN — GABAPENTIN 300 MG: 300 CAPSULE ORAL at 20:07

## 2022-04-30 RX ADMIN — ACETAMINOPHEN 1000 MG: 500 TABLET ORAL at 16:58

## 2022-04-30 RX ADMIN — METHIMAZOLE 10 MG: 10 TABLET ORAL at 09:21

## 2022-04-30 RX ADMIN — HYDROMORPHONE HYDROCHLORIDE 1 MG: 1 INJECTION, SOLUTION INTRAMUSCULAR; INTRAVENOUS; SUBCUTANEOUS at 14:51

## 2022-04-30 RX ADMIN — OXYCODONE 5 MG: 5 TABLET ORAL at 09:20

## 2022-04-30 RX ADMIN — GABAPENTIN 300 MG: 300 CAPSULE ORAL at 16:58

## 2022-04-30 RX ADMIN — CEFAZOLIN SODIUM 2 G: 2 INJECTION, SOLUTION INTRAVENOUS at 02:22

## 2022-05-01 ENCOUNTER — APPOINTMENT (OUTPATIENT)
Dept: GENERAL RADIOLOGY | Facility: HOSPITAL | Age: 62
End: 2022-05-01

## 2022-05-01 LAB
ANION GAP SERPL CALCULATED.3IONS-SCNC: 11 MMOL/L (ref 5–15)
BUN SERPL-MCNC: 18 MG/DL (ref 8–23)
BUN/CREAT SERPL: 47.4 (ref 7–25)
CALCIUM SPEC-SCNC: 8.3 MG/DL (ref 8.6–10.5)
CHLORIDE SERPL-SCNC: 107 MMOL/L (ref 98–107)
CO2 SERPL-SCNC: 23 MMOL/L (ref 22–29)
CREAT SERPL-MCNC: 0.38 MG/DL (ref 0.57–1)
DEPRECATED RDW RBC AUTO: 46.3 FL (ref 37–54)
EGFRCR SERPLBLD CKD-EPI 2021: 114.2 ML/MIN/1.73
ERYTHROCYTE [DISTWIDTH] IN BLOOD BY AUTOMATED COUNT: 13 % (ref 12.3–15.4)
GLUCOSE SERPL-MCNC: 91 MG/DL (ref 65–99)
HCT VFR BLD AUTO: 32.4 % (ref 34–46.6)
HGB BLD-MCNC: 10.7 G/DL (ref 12–15.9)
MCH RBC QN AUTO: 32.4 PG (ref 26.6–33)
MCHC RBC AUTO-ENTMCNC: 33 G/DL (ref 31.5–35.7)
MCV RBC AUTO: 98.2 FL (ref 79–97)
PLATELET # BLD AUTO: 177 10*3/MM3 (ref 140–450)
PMV BLD AUTO: 10.9 FL (ref 6–12)
POTASSIUM SERPL-SCNC: 4.2 MMOL/L (ref 3.5–5.2)
RBC # BLD AUTO: 3.3 10*6/MM3 (ref 3.77–5.28)
SODIUM SERPL-SCNC: 141 MMOL/L (ref 136–145)
T4 FREE SERPL-MCNC: 1.36 NG/DL (ref 0.93–1.7)
WBC NRBC COR # BLD: 12.87 10*3/MM3 (ref 3.4–10.8)

## 2022-05-01 PROCEDURE — 25010000002 HYDROMORPHONE 1 MG/ML SOLUTION: Performed by: INTERNAL MEDICINE

## 2022-05-01 PROCEDURE — 85027 COMPLETE CBC AUTOMATED: CPT | Performed by: THORACIC SURGERY (CARDIOTHORACIC VASCULAR SURGERY)

## 2022-05-01 PROCEDURE — 97110 THERAPEUTIC EXERCISES: CPT

## 2022-05-01 PROCEDURE — 80048 BASIC METABOLIC PNL TOTAL CA: CPT | Performed by: THORACIC SURGERY (CARDIOTHORACIC VASCULAR SURGERY)

## 2022-05-01 PROCEDURE — 25010000002 ENOXAPARIN PER 10 MG: Performed by: THORACIC SURGERY (CARDIOTHORACIC VASCULAR SURGERY)

## 2022-05-01 PROCEDURE — 25010000002 DIGOXIN PER 500 MCG: Performed by: INTERNAL MEDICINE

## 2022-05-01 PROCEDURE — 25010000002 KETOROLAC TROMETHAMINE PER 15 MG: Performed by: THORACIC SURGERY (CARDIOTHORACIC VASCULAR SURGERY)

## 2022-05-01 PROCEDURE — 84439 ASSAY OF FREE THYROXINE: CPT | Performed by: INTERNAL MEDICINE

## 2022-05-01 PROCEDURE — 25010000002 HYDROMORPHONE 1 MG/ML SOLUTION: Performed by: THORACIC SURGERY (CARDIOTHORACIC VASCULAR SURGERY)

## 2022-05-01 PROCEDURE — 71045 X-RAY EXAM CHEST 1 VIEW: CPT

## 2022-05-01 RX ORDER — OXYCODONE HCL 5 MG/5 ML
5 SOLUTION, ORAL ORAL EVERY 4 HOURS PRN
Status: DISCONTINUED | OUTPATIENT
Start: 2022-05-01 | End: 2022-05-09 | Stop reason: HOSPADM

## 2022-05-01 RX ORDER — DIGOXIN 0.25 MG/ML
250 INJECTION INTRAMUSCULAR; INTRAVENOUS ONCE
Status: COMPLETED | OUTPATIENT
Start: 2022-05-01 | End: 2022-05-01

## 2022-05-01 RX ORDER — GABAPENTIN 250 MG/5ML
300 SOLUTION ORAL 3 TIMES DAILY
Status: DISCONTINUED | OUTPATIENT
Start: 2022-05-01 | End: 2022-05-09 | Stop reason: HOSPADM

## 2022-05-01 RX ADMIN — HYDROMORPHONE HYDROCHLORIDE 1 MG: 1 INJECTION, SOLUTION INTRAMUSCULAR; INTRAVENOUS; SUBCUTANEOUS at 20:35

## 2022-05-01 RX ADMIN — GABAPENTIN 300 MG: 250 SOLUTION ORAL at 16:51

## 2022-05-01 RX ADMIN — ACETAMINOPHEN 1000 MG: 500 TABLET ORAL at 16:51

## 2022-05-01 RX ADMIN — HYDROMORPHONE HYDROCHLORIDE 1 MG: 1 INJECTION, SOLUTION INTRAMUSCULAR; INTRAVENOUS; SUBCUTANEOUS at 08:28

## 2022-05-01 RX ADMIN — HYDROMORPHONE HYDROCHLORIDE 1 MG: 1 INJECTION, SOLUTION INTRAMUSCULAR; INTRAVENOUS; SUBCUTANEOUS at 23:18

## 2022-05-01 RX ADMIN — Medication 10 ML: at 20:29

## 2022-05-01 RX ADMIN — PRAVASTATIN SODIUM 20 MG: 20 TABLET ORAL at 08:28

## 2022-05-01 RX ADMIN — OXYCODONE 5 MG: 5 TABLET ORAL at 01:11

## 2022-05-01 RX ADMIN — SODIUM CHLORIDE 500 ML: 9 INJECTION, SOLUTION INTRAVENOUS at 21:01

## 2022-05-01 RX ADMIN — ACETAMINOPHEN 1000 MG: 500 TABLET ORAL at 08:28

## 2022-05-01 RX ADMIN — HYDROMORPHONE HYDROCHLORIDE 1 MG: 1 INJECTION, SOLUTION INTRAMUSCULAR; INTRAVENOUS; SUBCUTANEOUS at 02:55

## 2022-05-01 RX ADMIN — ACETAMINOPHEN 1000 MG: 500 TABLET ORAL at 20:21

## 2022-05-01 RX ADMIN — DIGOXIN 250 MCG: 0.25 INJECTION INTRAMUSCULAR; INTRAVENOUS at 09:12

## 2022-05-01 RX ADMIN — METHIMAZOLE 10 MG: 10 TABLET ORAL at 08:28

## 2022-05-01 RX ADMIN — METOPROLOL TARTRATE 12.5 MG: 25 TABLET ORAL at 20:59

## 2022-05-01 RX ADMIN — GABAPENTIN 300 MG: 250 SOLUTION ORAL at 20:21

## 2022-05-01 RX ADMIN — METOPROLOL TARTRATE 12.5 MG: 25 TABLET ORAL at 11:05

## 2022-05-01 RX ADMIN — Medication 10 ML: at 08:29

## 2022-05-01 RX ADMIN — ENOXAPARIN SODIUM 40 MG: 100 INJECTION SUBCUTANEOUS at 08:28

## 2022-05-01 RX ADMIN — SODIUM CHLORIDE 75 ML/HR: 9 INJECTION, SOLUTION INTRAVENOUS at 14:42

## 2022-05-01 RX ADMIN — Medication 10 ML: at 20:30

## 2022-05-01 RX ADMIN — KETOROLAC TROMETHAMINE 15 MG: 30 INJECTION, SOLUTION INTRAMUSCULAR at 13:49

## 2022-05-01 RX ADMIN — DIGOXIN 500 MCG: 0.25 INJECTION INTRAMUSCULAR; INTRAVENOUS at 20:58

## 2022-05-01 RX ADMIN — GABAPENTIN 300 MG: 300 CAPSULE ORAL at 08:28

## 2022-05-01 RX ADMIN — METOPROLOL TARTRATE 12.5 MG: 25 TABLET ORAL at 20:21

## 2022-05-01 RX ADMIN — METOPROLOL TARTRATE 5 MG: 1 INJECTION, SOLUTION INTRAVENOUS at 19:37

## 2022-05-01 NOTE — PROGRESS NOTES
[facility]  [phone]  Dr. Marquise Antony    Subjective:  LOS: 2    Chief Complaint:  Post esophagectomy    Still with pain. Went into afib this morning briefly. Denies new soa.    Objective   Vital Signs past 24hrs    Temp range: Temp (24hrs), Av °F (36.7 °C), Min:98 °F (36.7 °C), Max:98 °F (36.7 °C)    BP range: BP: ()/(39-69) 131/56  Pulse range: Heart Rate:  [] 107  Resp rate range:      Device (Oxygen Therapy): nasal cannulaFlow (L/min):  [2] 2  Oxygen range:SpO2:  [89 %-98 %] 90 %      53.3 kg (117 lb 8.1 oz); Body mass index is 20.16 kg/m².    Intake/Output Summary (Last 24 hours) at 2022 1020  Last data filed at 2022 0801  Gross per 24 hour   Intake 2076 ml   Output 790 ml   Net 1286 ml       Physical Exam  Eyes:      Pupils: Pupils are equal, round, and reactive to light.   Cardiovascular:      Rate and Rhythm: Normal rate and regular rhythm.      Heart sounds: No murmur heard.  Pulmonary:      Breath sounds: Decreased breath sounds and rhonchi (few scattered) present.      Comments: Right chest tube  Abdominal:      General: Bowel sounds are normal.      Palpations: Abdomen is soft. There is no mass.      Tenderness: There is no abdominal tenderness.   Musculoskeletal:         General: No swelling.   Neurological:      Mental Status: She is alert.       Results Review:    I have reviewed the laboratory and imaging data since the last note by [facility] physician.  My annotations are noted in assessment and plan.    Medication Review:  I have reviewed the current MAR.  My annotations are noted in assessment and plan.    lactated ringers, 9 mL/hr, Last Rate: 9 mL/hr (22 6464)  phenylephrine, 0.5-3 mcg/kg/min  sodium chloride, 75 mL/hr, Last Rate: 75 mL/hr (22 1619)      Plan   PCCM Problems  Status post esophagectomy, 2022  T3 N1 M0 adenocarcinoma of the esophagus status post neoadjuvant chemo RT  Hyperthyroidism  Current cigarette smoker  COPD  A. fib  on Eliquis      Plan of Treatment    Post esophagectomy - CxR noted. Still right ptx. Having pain. Increased dilaudid to q2hrs.    Brief afib, better after Dig. Decreased metoprolol to 12.5 mg bid due to lower bp.    Hyperthyroidism on Tapazole.    Now on duonebs to help airway clearance. Advised to use IS.    Needs to quit smoking.    Spoke to partner.    Marquise Antony MD  05/01/22  10:20 EDT    While in the room and during my examination of the patient I wore gloves, gown, mask, eye protection and followed enhanced droplet/contact isolation protocol and precautions.  I washed my hands before and after this patient encounter.    Part of this note may be an electronic transcription/translation of spoken language to printed text using the Dragon Dictation System.

## 2022-05-01 NOTE — PLAN OF CARE
Goal Outcome Evaluation:  Plan of Care Reviewed With: patient        Progress: improving  Outcome Evaluation: pt progressed today as she presented already in chair and performed STS transfers and 2 steps in room. First she performed seated exercises to improve LE strength. She then stood with CGA and took two steps fwd/bwd/R/L. No unsteadiness noted as she only required CGA/min Ax1 to maintain posture. She then performed 5x STS from chair with CGA. Pt will continue to benefit from skilled PT to address remaining functional mobility deficits and to reach functional goals.     Patient was not wearing a face mask during this therapy encounter. Therapist used appropriate personal protective equipment including mask and gloves.  Mask used was standard procedure mask. Appropriate PPE was worn during the entire therapy session. Hand hygiene was completed before and after therapy session. Patient is not in enhanced droplet precautions.

## 2022-05-01 NOTE — THERAPY TREATMENT NOTE
Patient Name: Maya Ribera  : 1960    MRN: [id number]                              Today's Date: 2022       Admit Date: 2022    Visit Dx:     ICD-10-CM ICD-9-CM   1. Malignant neoplasm of lower third of esophagus (HCC)  C15.5 150.5     Patient Active Problem List   Diagnosis   • Esophageal cancer (HCC)   • Moderate malnutrition (CMS/HCC)   • Acute low back pain   • Acute sinusitis   • Alkaline phosphatase raised   • Asymptomatic varicose veins of unspecified lower extremity   • Eczema   • Encounter for screening for malignant neoplasm of cervix   • Screening for malignant neoplasm of colon   • Epigastric discomfort   • Arthritis   • Esophagitis   • Gastric ulcer   • Female cystocele   • Flat foot(734)   • Hiatal hernia   • Hyperlipidemia   • Hypertension   • Hyperthyroidism   • Irritable bowel syndrome   • Mitral regurgitation   • Paroxysmal atrial fibrillation (HCC)   • Severe esophageal dysplasia   • Weight loss   • Mitral valve prolapse   • Encounter for management of implanted device   • Adenocarcinoma of esophagus (HCC)     Past Medical History:   Diagnosis Date   • A-fib (HCC)    • Boil, breast 2021    HEALING UNDER LEFT BREAST    • Disease of thyroid gland    • Esophageal cancer (HCC)    • Gastric ulcer    • GERD (gastroesophageal reflux disease)    • Hyperlipidemia    • Hypertension    • Irritable bowel    • Mitral valve prolapse     FOLLOWED BY     • Trouble swallowing    • Uses feeding tube      Past Surgical History:   Procedure Laterality Date   • CHOLECYSTECTOMY     • COLONOSCOPY  2021    [facility]   • EXTERNAL EAR SURGERY     • JEJUNOSTOMY N/A 12/15/2021    Procedure: open JEJUNOSTOMY tube placement;  Surgeon: Bhanu Hollis MD;  Location: [facility];  Service: General;  Laterality: N/A;   • KNEE SURGERY Left    • REPLACEMENT TOTAL KNEE Left    • UPPER ENDOSCOPIC ULTRASOUND W/ FNA N/A 2021    Procedure: Esophagogastroduodenoscopy with  endoscopic ultrasound  WITH STAGING AND FINE NEEDLE BIOPSY;  Surgeon: Amrit Green MD;  Location: [facility] ENDOSCOPY;  Service: Gastroenterology;  Laterality: N/A;  post op: inlet patch, esophageal mass, T2   • VENOUS ACCESS DEVICE (PORT) INSERTION Left 12/15/2021    Procedure: INSERTION OF PORTACATH;  Surgeon: Bhanu Hollis MD;  Location: [facility] MAIN OR;  Service: General;  Laterality: Left;      General Information     Row Name 05/01/22 1416          Physical Therapy Time and Intention    Document Type therapy note (daily note)  -     Mode of Treatment physical therapy  -     Row Name 05/01/22 1416          General Information    Patient Profile Reviewed yes  -     Existing Precautions/Restrictions oxygen therapy device and L/min  -     Row Name 05/01/22 1416          Cognition    Orientation Status (Cognition) oriented x 3  -     Row Name 05/01/22 1416          Safety Issues, Functional Mobility    Impairments Affecting Function (Mobility) coordination;endurance/activity tolerance;shortness of breath;pain;postural/trunk control  -           User Key  (r) = Recorded By, (t) = Taken By, (c) = Cosigned By    Initials Name Provider Type     Adarsh Solorzano PT Physical Therapist               Mobility     Row Name 05/01/22 1417          Bed Mobility    Comment, (Bed Mobility) not tested; up in chair  -     Row Name 05/01/22 1417          Sit-Stand Transfer    Sit-Stand [place]on (Transfers) minimum assist (75% patient effort);1 person assist;contact guard;verbal cues;nonverbal cues (demo/gesture)  -     Comment, (Sit-Stand Transfer) no AD  -     Row Name 05/01/22 1417          Gait/Stairs (Locomotion)    [place]on Level (Gait) verbal cues;nonverbal cues (demo/gesture);contact guard;minimum assist (75% patient effort)  -     Distance in Feet (Gait) 2 steps fwd/bwd/side-to-side  -           User Key  (r) = Recorded By, (t) = Taken By, (c) = Cosigned By    Initials Name Provider Type     CH Adarsh Solorzano, PT Physical Therapist               Obj/Interventions     Row Name 05/01/22 1418          Motor Skills    Therapeutic Exercise other (see comments)  APs, LAQs, marches, 10x ea  -           User Key  (r) = Recorded By, (t) = Taken By, (c) = Cosigned By    Initials Name Provider Type    CH Adarsh Solorzano, PT Physical Therapist               Goals/Plan    No documentation.                Clinical Impression     Row Name 05/01/22 1418          Pain Scale: FACES Pre/Post-Treatment    Pain: FACES Scale, Pretreatment 6-->hurts even more  -     Posttreatment Pain Rating 8-->hurts whole lot  -     Pain Location - chest  -     Row Name 05/01/22 1418          Plan of Care Review    Plan of Care Reviewed With patient  -CH     Progress improving  -     Outcome Evaluation pt progressed today as she presented already in chair and performed STS transfers and 2 steps in room. First she performed seated exercises to improve LE strength. She then stood with CGA and took two steps fwd/bwd/R/L. No unsteadiness noted as she only required CGA/min Ax1 to maintain posture. She then performed 5x STS from chair with CGA.  -     Row Name 05/01/22 1418          Positioning and Restraints    Pre-Treatment Position sitting in chair/recliner  -     Post Treatment Position chair  -CH     In Chair notified nsg;sitting;call light within reach;encouraged to call for assist;with family/caregiver  -           User Key  (r) = Recorded By, (t) = Taken By, (c) = Cosigned By    Initials Name Provider Type    CH Adarsh Solorzano, PT Physical Therapist               Outcome Measures     Row Name 05/01/22 1420          How much help from another person do you currently need...    Turning from your back to your side while in flat bed without using bedrails? 2  -CH     Moving from lying on back to sitting on the side of a flat bed without bedrails? 2  -CH     Moving to and from a bed to a chair (including a wheelchair)? 2   -CH     Standing up from a chair using your arms (e.g., wheelchair, bedside chair)? 3  -CH     Climbing 3-5 steps with a railing? 1  -CH     To walk in hospital room? 1  -CH     AM-PAC 6 Clicks Score (PT) 11  -CH     Highest level of mobility 4 --> Transferred to chair/commode  -     Row Name 05/01/22 1420          Functional Assessment    Outcome Measure Options AM-PAC 6 Clicks Basic Mobility (PT)  -           User Key  (r) = Recorded By, (t) = Taken By, (c) = Cosigned By    Initials Name Provider Type     Adarsh Solorzano, PT Physical Therapist                             Physical Therapy Education                 Title: PT OT SLP Therapies (Done)     Topic: Physical Therapy (Done)     Point: Mobility training (Done)     Learning Progress Summary           Patient Acceptance, E, VU by  at 5/1/2022 1421    Acceptance, E, VU,NR by  at 4/30/2022 1430   Significant Other Acceptance, E, VU,NR by  at 4/30/2022 1430                   Point: Home exercise program (Done)     Learning Progress Summary           Patient Acceptance, E, VU by  at 5/1/2022 1421    Acceptance, E, VU,NR by  at 4/30/2022 1430   Significant Other Acceptance, E, VU,NR by  at 4/30/2022 1430                   Point: Body mechanics (Done)     Learning Progress Summary           Patient Acceptance, E, VU by  at 5/1/2022 1421    Acceptance, E, VU,NR by  at 4/30/2022 1430   Significant Other Acceptance, E, VU,NR by  at 4/30/2022 1430                   Point: Precautions (Done)     Learning Progress Summary           Patient Acceptance, E, VU by  at 5/1/2022 1421    Acceptance, E, VU,NR by  at 4/30/2022 1430   Significant Other Acceptance, E, VU,NR by  at 4/30/2022 1430                               User Key     Initials Effective Dates Name Provider Type [facility] 06/16/21 -  Adarsh Solorzano, PT Physical Therapist PT              PT Recommendation and Plan  Planned Therapy Interventions (PT): balance training, bed  mobility training, gait training, transfer training, stair training, strengthening  Plan of Care Reviewed With: patient  Progress: improving  Outcome Evaluation: pt progressed today as she presented already in chair and performed STS transfers and 2 steps in room. First she performed seated exercises to improve LE strength. She then stood with CGA and took two steps fwd/bwd/R/L. No unsteadiness noted as she only required CGA/min Ax1 to maintain posture. She then performed 5x STS from chair with CGA.     Time Calculation:    PT Charges     Row Name 05/01/22 1421             Time Calculation    Start Time 1357  -      Stop Time 1409  -      Time Calculation (min) 12 min  -      PT Received On 05/01/22  -      PT - Next Appointment 05/01/22  -      PT Goal Re-Cert Due Date 05/07/22  -            User Key  (r) = Recorded By, (t) = Taken By, (c) = Cosigned By    Initials Name Provider Type    CH Adarsh Solorzano, PT Physical Therapist              Therapy Charges for Today     Code Description Service Date Service Provider Modifiers Qty    74949041391 HC PT EVAL MOD COMPLEXITY 2 4/30/2022 Adarsh Solorzano, PT GP 1    97922174740 HC PT THER PROC EA 15 MIN 4/30/2022 Adarsh Solorzano, PT GP 2    89138567016 HC PT THER SUPP EA 15 MIN 4/30/2022 Adarsh Solorzano, PT GP 1    61580004237 HC PT THER PROC EA 15 MIN 5/1/2022 Adarsh Solorzano, PT GP 1          PT G-Codes  Outcome Measure Options: AM-PAC 6 Clicks Basic Mobility (PT)  AM-PAC 6 Clicks Score (PT): 11    Adarsh Solorzano, PT  5/1/2022

## 2022-05-02 ENCOUNTER — APPOINTMENT (OUTPATIENT)
Dept: GENERAL RADIOLOGY | Facility: HOSPITAL | Age: 62
End: 2022-05-02

## 2022-05-02 PROBLEM — R74.8 ALKALINE PHOSPHATASE RAISED: Status: RESOLVED | Noted: 2017-10-05 | Resolved: 2022-05-02

## 2022-05-02 PROBLEM — E44.0 MODERATE MALNUTRITION: Status: RESOLVED | Noted: 2021-12-18 | Resolved: 2022-05-02

## 2022-05-02 LAB
ANION GAP SERPL CALCULATED.3IONS-SCNC: 10.6 MMOL/L (ref 5–15)
BASOPHILS # BLD AUTO: 0.03 10*3/MM3 (ref 0–0.2)
BASOPHILS NFR BLD AUTO: 0.2 % (ref 0–1.5)
BUN SERPL-MCNC: 12 MG/DL (ref 8–23)
BUN/CREAT SERPL: 26.7 (ref 7–25)
CALCIUM SPEC-SCNC: 8.1 MG/DL (ref 8.6–10.5)
CHLORIDE SERPL-SCNC: 112 MMOL/L (ref 98–107)
CO2 SERPL-SCNC: 16.4 MMOL/L (ref 22–29)
CREAT SERPL-MCNC: 0.45 MG/DL (ref 0.57–1)
DEPRECATED RDW RBC AUTO: 45.8 FL (ref 37–54)
EGFRCR SERPLBLD CKD-EPI 2021: 109.6 ML/MIN/1.73
EOSINOPHIL # BLD AUTO: 0.17 10*3/MM3 (ref 0–0.4)
EOSINOPHIL NFR BLD AUTO: 1.2 % (ref 0.3–6.2)
ERYTHROCYTE [DISTWIDTH] IN BLOOD BY AUTOMATED COUNT: 12.7 % (ref 12.3–15.4)
GLUCOSE SERPL-MCNC: 90 MG/DL (ref 65–99)
HCT VFR BLD AUTO: 29.6 % (ref 34–46.6)
HGB BLD-MCNC: 9.6 G/DL (ref 12–15.9)
IMM GRANULOCYTES # BLD AUTO: 0.06 10*3/MM3 (ref 0–0.05)
IMM GRANULOCYTES NFR BLD AUTO: 0.4 % (ref 0–0.5)
LYMPHOCYTES # BLD AUTO: 0.88 10*3/MM3 (ref 0.7–3.1)
LYMPHOCYTES NFR BLD AUTO: 6.3 % (ref 19.6–45.3)
MCH RBC QN AUTO: 32.3 PG (ref 26.6–33)
MCHC RBC AUTO-ENTMCNC: 32.4 G/DL (ref 31.5–35.7)
MCV RBC AUTO: 99.7 FL (ref 79–97)
MONOCYTES # BLD AUTO: 0.74 10*3/MM3 (ref 0.1–0.9)
MONOCYTES NFR BLD AUTO: 5.3 % (ref 5–12)
NEUTROPHILS NFR BLD AUTO: 11.99 10*3/MM3 (ref 1.7–7)
NEUTROPHILS NFR BLD AUTO: 86.6 % (ref 42.7–76)
NRBC BLD AUTO-RTO: 0 /100 WBC (ref 0–0.2)
PLATELET # BLD AUTO: 184 10*3/MM3 (ref 140–450)
PMV BLD AUTO: 10.2 FL (ref 6–12)
POTASSIUM SERPL-SCNC: 4.8 MMOL/L (ref 3.5–5.2)
QT INTERVAL: 364 MS
RBC # BLD AUTO: 2.97 10*6/MM3 (ref 3.77–5.28)
SODIUM SERPL-SCNC: 139 MMOL/L (ref 136–145)
TSH SERPL DL<=0.05 MIU/L-ACNC: 0.02 UIU/ML (ref 0.27–4.2)
WBC NRBC COR # BLD: 13.87 10*3/MM3 (ref 3.4–10.8)

## 2022-05-02 PROCEDURE — 85025 COMPLETE CBC W/AUTO DIFF WBC: CPT | Performed by: THORACIC SURGERY (CARDIOTHORACIC VASCULAR SURGERY)

## 2022-05-02 PROCEDURE — 25010000002 ENOXAPARIN PER 10 MG: Performed by: THORACIC SURGERY (CARDIOTHORACIC VASCULAR SURGERY)

## 2022-05-02 PROCEDURE — 93005 ELECTROCARDIOGRAM TRACING: CPT | Performed by: INTERNAL MEDICINE

## 2022-05-02 PROCEDURE — 99024 POSTOP FOLLOW-UP VISIT: CPT | Performed by: NURSE PRACTITIONER

## 2022-05-02 PROCEDURE — 84443 ASSAY THYROID STIM HORMONE: CPT | Performed by: INTERNAL MEDICINE

## 2022-05-02 PROCEDURE — 99222 1ST HOSP IP/OBS MODERATE 55: CPT | Performed by: INTERNAL MEDICINE

## 2022-05-02 PROCEDURE — 25010000002 HYDROMORPHONE 1 MG/ML SOLUTION: Performed by: INTERNAL MEDICINE

## 2022-05-02 PROCEDURE — 93010 ELECTROCARDIOGRAM REPORT: CPT | Performed by: INTERNAL MEDICINE

## 2022-05-02 PROCEDURE — 80048 BASIC METABOLIC PNL TOTAL CA: CPT | Performed by: THORACIC SURGERY (CARDIOTHORACIC VASCULAR SURGERY)

## 2022-05-02 PROCEDURE — 71045 X-RAY EXAM CHEST 1 VIEW: CPT

## 2022-05-02 PROCEDURE — 25010000002 KETOROLAC TROMETHAMINE PER 15 MG: Performed by: THORACIC SURGERY (CARDIOTHORACIC VASCULAR SURGERY)

## 2022-05-02 RX ORDER — ACETAMINOPHEN 160 MG/5ML
1000 SOLUTION ORAL 3 TIMES DAILY
Status: DISCONTINUED | OUTPATIENT
Start: 2022-05-02 | End: 2022-05-09 | Stop reason: HOSPADM

## 2022-05-02 RX ADMIN — HYDROMORPHONE HYDROCHLORIDE 1 MG: 1 INJECTION, SOLUTION INTRAMUSCULAR; INTRAVENOUS; SUBCUTANEOUS at 06:47

## 2022-05-02 RX ADMIN — GABAPENTIN 300 MG: 250 SOLUTION ORAL at 20:01

## 2022-05-02 RX ADMIN — ENOXAPARIN SODIUM 40 MG: 100 INJECTION SUBCUTANEOUS at 08:27

## 2022-05-02 RX ADMIN — METOPROLOL TARTRATE 12.5 MG: 25 TABLET ORAL at 20:00

## 2022-05-02 RX ADMIN — ACETAMINOPHEN 1000 MG: 500 TABLET ORAL at 08:28

## 2022-05-02 RX ADMIN — METHIMAZOLE 10 MG: 10 TABLET ORAL at 08:27

## 2022-05-02 RX ADMIN — ACETAMINOPHEN 1000 MG: 325 SUSPENSION ORAL at 16:19

## 2022-05-02 RX ADMIN — PRAVASTATIN SODIUM 20 MG: 20 TABLET ORAL at 08:27

## 2022-05-02 RX ADMIN — ACETAMINOPHEN 1000 MG: 325 SUSPENSION ORAL at 20:01

## 2022-05-02 RX ADMIN — METOPROLOL TARTRATE 12.5 MG: 25 TABLET ORAL at 08:27

## 2022-05-02 RX ADMIN — KETOROLAC TROMETHAMINE 15 MG: 30 INJECTION, SOLUTION INTRAMUSCULAR at 14:00

## 2022-05-02 RX ADMIN — GABAPENTIN 300 MG: 250 SOLUTION ORAL at 16:19

## 2022-05-02 RX ADMIN — Medication 10 ML: at 08:28

## 2022-05-02 RX ADMIN — Medication 10 ML: at 20:02

## 2022-05-02 RX ADMIN — GABAPENTIN 300 MG: 250 SOLUTION ORAL at 08:27

## 2022-05-02 RX ADMIN — HYDROMORPHONE HYDROCHLORIDE 1 MG: 1 INJECTION, SOLUTION INTRAMUSCULAR; INTRAVENOUS; SUBCUTANEOUS at 19:56

## 2022-05-02 NOTE — PROGRESS NOTES
[facility]     Mar/chart reviewed  Follow up esophagetomy,  Some arrhythmia overnight  No chest pain, other than expected.     Vital Sign Min/Max for last 24 hours  Temp  Min: 97.9 °F (36.6 °C)  Max: 98.6 °F (37 °C)   BP  Min: 63/44  Max: 128/57   Pulse  Min: 79  Max: 199   Resp  Min: 18  Max: 22   SpO2  Min: 77 %  Max: 100 %   Flow (L/min)  Min: 2  Max: 2   Weight  Min: 52.8 kg (116 lb 6.5 oz)  Max: 52.8 kg (116 lb 6.5 oz)     Appears ill, axox3,   perrl, eomi, normal sclera  mmm, no jvd, trachea midline, neck supple,  chest decreased bilaterally, +faint crackles, +faint on the right wheezes,   rrr,   soft, nt, nd +bs,  no c/c/ trace edema  Skin warm, dry no rashes    Labs: 5/2: reviewed:  Wbc 13.8  hgb 9.6  ptls 184  Glucose 90  Bun 12  Cr 0.45  Bicarb 16.4    CXR: 5/2: to my read increasing bibasilar infiltrates and right apex density    A/P:  1. S/p esophagectomy -- continue supportive care  2. AHRF -- actually on less oxygen than I would expect at this point, continue supportive care  3. T3N1 adenocarcinoma of the esophagus s/p neoadjuvent chemo/xrt  4. Hyperthyroidism -- continue replacement  5. Copd -- bronchodilators, pulmonary toilet  6. Afib on eliquis at baseline, no prophlactic lovenox now  7. Tobacco abuse  8. Anemia    Patient is new to me today

## 2022-05-02 NOTE — PROGRESS NOTES
Adult Nutrition  Assessment/PES    Patient Name:  Maya Ribera  YOB: 1960  MRN: [id number]  Admit Date:  4/29/2022    Assessment Date:  5/2/2022    Comments:  Nutrition follow up note. Pt tolerating Peptide Impact at 10ml/hr. Water flushes 62xda7rd. IVF's at 75ml/hr. When MD ok's, TF goal rate is at 50ml/hr. Discussed care in rounds. Will continue to follow clinical course.      Reason for Assessment     Row Name 05/02/22 1322          Reason for Assessment    Reason For Assessment follow-up protocol;TF/PN                Nutrition/Diet History     Row Name 05/02/22 1322          Nutrition/Diet History    Typical Intake (Food/Fluid/EN/PN) tolerating TF's at 10ml/hr                  Labs/Tests/Procedures/Meds     Row Name 05/02/22 1323          Labs/Procedures/Meds    Lab Results Reviewed reviewed     Lab Results Comments wbc, H/H            Diagnostic Tests/Procedures    Diagnostic Test/Procedure Reviewed reviewed            Medications    Pertinent Medications Reviewed reviewed     Pertinent Medications Comments lovenox, IV at 75                Physical Findings     Row Name 05/02/22 1324          Physical Findings    Overall Physical Appearance R chest inc, abd incisions, L neck inc; 2L O2     Enteral Access Devices jejunostomy tube                  Nutrition Prescription Ordered     Row Name 05/02/22 1325          Nutrition Prescription PO    Current PO Diet NPO            Nutrition Prescription EN    Enteral Route Jejunostomy     Product Impact Peptide 1.5 (Pivot 1.5)     TF Delivery Method Continuous     Continuous TF Goal Rate (mL/hr) 50 mL/hr     Continuous TF Current Rate (mL/hr) 10 mL/hr     Water flush (mL)  30 mL     Water Flush Frequency Every 4 hours                Evaluation of Received Nutrient/Fluid Intake     Row Name 05/02/22 1325          Calories Evaluation    Enteral Calories (kcal) 360            Protein Evaluation    Enteral Protein (g) 22            Intake Assessment     Energy/Calorie Requirement Assessment not meeting needs     Protein Requirement Assessment not meeting needs            Fluid Intake Evaluation    Enteral Fluid (mL) 184     Other Fluid (mL) 180  water flushes, IVF's at 75ml/hr as well                     Problem/Interventions:           Intervention Goal     Row Name 05/02/22 1327          Intervention Goal    General Maintain nutrition;Nutrition support treatment;Improved nutrition related lab(s);Reduce/improve symptoms;Meet nutritional needs for age/condition;Disease management/therapy     TF/PN Adjust TF/PN;Tolerate TF at goal     Weight Maintain weight                Nutrition Intervention     Row Name 05/02/22 1327          Nutrition Intervention    RD/Tech Action Follow Tx progress;Care plan reviewd                Nutrition Prescription     Row Name 05/02/22 1328          Nutrition Prescription EN    Enteral Prescription Continue same protocol                Education/Evaluation     Row Name 05/02/22 1328          Education    Education Will Instruct as appropriate            Monitor/Evaluation    Monitor Per protocol;I&O;Pertinent labs;TF delivery/tolerance;Weight;Skin status                 Electronically signed by:  Vanessa Guzman RD  05/02/22 13:28 EDT

## 2022-05-02 NOTE — CONSULTS
Date of Hospital Visit: 22  Encounter Provider: Jasiel Nj MD  Place of Service: [facility] CARDIOLOGY  Patient Name: Maya Ribera  :1960  Referral Provider: Hayder Finch III, *    Chief complaint esophageal cancer     History of Present Illness Maya Ribera is a 61 year old woman who follows with [facility]. She has a history of PAF (apixaban and metoprolol). She has esophageal cancer and has completed chemotherapy and radiation. She underwent esophagectomy on . She had an episode of atrial fibrillation yesterday, received IV digoxin, and has remained in NSR.    She is receiving medicines via J-tube. She is on VTE ppx with enoxaparin but no full dose anticoagulation. She is very tired and has some pain but has no cardiac complaints.     ECHO 21    The ejection fraction biplane was calculated at 54%. The left ventricular chamber size, wall thickness, and systolic function are within normal limits. There are no regional wall motion abnormalities observed. Mildly thickened mitral valve leaflets with mild Bileaflet prolapse.       There is no mitral stenosis .There is mild mitral regurgitation. Right ventricular systolic pressure of 30 mmHg. Mild tricuspid regurgitation. The aortic root is normal in size. The proximal ascending aorta measures 3.7 cm. Trace aortic regurgitation.     Past Medical History:   Diagnosis Date   • Boil, breast 2021    HEALING UNDER LEFT BREAST    • Cervical cancer (HCC)    • Dysphagia    • Esophageal cancer (HCC)    • Esophagitis 2021   • Gastric ulcer    • GERD (gastroesophageal reflux disease)    • Hyperlipidemia    • Hypertension    • Hyperthyroidism    • Irritable bowel    • Mitral valve prolapse     FOLLOWED BY     • PAF (paroxysmal atrial fibrillation) (HCC)    • Uses feeding tube        Past Surgical History:   Procedure Laterality Date   • CHOLECYSTECTOMY     • COLONOSCOPY   08/27/2021    [facility]   • ESOPHAGOGASTRECTOMY Right 4/29/2022    Procedure: BRONCHOSCOPY, RIGHT THORACOSCOPY WITH [employer] ROB[employer] WITH TOTAL ESOPHAGECTOMY, INTERCOSTAL NERVE BLOCK, JEJUNOSTOMY TUBE REPLACEMENT;  Surgeon: Hayder Finch III, MD;  Location: [facility] OR;  Service: Robotics - DaVinci;  Laterality: Right;   • EXTERNAL EAR SURGERY     • JEJUNOSTOMY N/A 12/15/2021    Procedure: open JEJUNOSTOMY tube placement;  Surgeon: Bhanu Hollis MD;  Location: [facility] OR;  Service: General;  Laterality: N/A;   • KNEE SURGERY Left    • REPLACEMENT TOTAL KNEE Left    • UPPER ENDOSCOPIC ULTRASOUND W/ FNA N/A 12/7/2021    Procedure: Esophagogastroduodenoscopy with endoscopic ultrasound  WITH STAGING AND FINE NEEDLE BIOPSY;  Surgeon: Amrit Green MD;  Location: [facility] ENDOSCOPY;  Service: Gastroenterology;  Laterality: N/A;  post op: inlet patch, esophageal mass, T2   • VENOUS ACCESS DEVICE (PORT) INSERTION Left 12/15/2021    Procedure: INSERTION OF PORTACATH;  Surgeon: Bhanu Hollis MD;  Location: [facility] OR;  Service: General;  Laterality: Left;       Prior to Admission medications    Medication Sig Start Date End Date Taking? Authorizing Provider   Eliquis 5 MG tablet tablet Take 5 mg by mouth Every 12 (Twelve) Hours. LD 12/4  PT STATED TO HOLD 72 HOURS PRIOR TO SURGERY 10/20/21  Yes Jaden Gaviria MD   HYDROcodone-acetaminophen (HYCET) 7.5-325 MG/15ML solution Take 15 mL by mouth Every 6 (Six) Hours As Needed for Moderate Pain . 3/24/22  Yes Iggy Harrell MD   lansoprazole (PREVACID SOLUTAB) 30 MG Tablet Delayed Release Dispersible disintegrating tablet TAKE ONE TABLET BY MOUTH TWICE A DAY  Patient taking differently: Take 30 mg by mouth 2 (Two) Times a Day. 4/6/22  Yes Iggy Harrell MD   methIMAzole (TAPAZOLE) 10 MG tablet Take 10 mg by mouth Daily. Take DOS 11/1/21  Yes Jaden Gaviria MD   metoprolol succinate XL (Toprol XL) 25 MG 24 hr tablet Take 2 tablets  by mouth Daily. 4/4/22  Yes Barbra Mejia APRN   ondansetron ODT (Zofran ODT) 8 MG disintegrating tablet Place 1 tablet on the tongue Every 8 (Eight) Hours As Needed for Nausea or Vomiting. 3/24/22  Yes Iggy Harrell MD   pravastatin (PRAVACHOL) 20 MG tablet Take 20 mg by mouth Daily. 7/9/21  Yes Jaden Gaviria MD   sucralfate (CARAFATE) 1 GM/10ML suspension Take 1 g by mouth 4 (Four) Times a Day.   Yes ProviderJaden MD   aluminum-magnesium hydroxide 200-200 MG/5ML suspension, diphenhydrAMINE 12.5 MG/5ML liquid, Lidocaine Viscous HCl 2 % solution, nystatin 100,000 unit/mL suspension Swish and swallow 10 mL 4 (Four) Times a Day After Meals & at Bedtime. 1/24/22   Ciro Pelletier MD   fentaNYL (DURAGESIC) 12 MCG/HR Place 1 patch on the skin as directed by provider Every 72 (Seventy-Two) Hours. 2/21/22   Iggy Harrell MD   LORazepam (LORazepam Intensol) 2 MG/ML concentrated solution Take 0.5 mL by mouth Every 8 (Eight) Hours As Needed for Anxiety. Or nausea 4/21/22   Iggy Harrell MD   nystatin in Lidocaine Viscous HCl solution-diphenhydrAMINE liquid-aluminum-magnesium hydroxide-simethicone suspension Swish and swallow 10 mL by mouth 4 times per day after meals and at bedtime. 1/25/22   Ciro Pelletier MD   prochlorperazine (COMPAZINE) 10 MG tablet Take 1 tablet by mouth Every 6 (Six) Hours As Needed for Nausea. 2/10/22   Barbra Mejia APRN       Social History     Socioeconomic History   • Marital status: Single   • Number of children: 1   • Years of education: High school   Tobacco Use   • Smoking status: Current Every Day Smoker     Packs/day: 1.00     Years: 40.00     Pack years: 40.00     Types: Cigarettes     Start date: 1981   • Smokeless tobacco: Never Used   • Tobacco comment: 2 per day   Vaping Use   • Vaping Use: Never used   Substance and Sexual Activity   • Alcohol use: Not Currently   • Drug use: Never   • Sexual activity: Defer       Family History  "  Problem Relation Age of Onset   • Breast cancer Mother    • Diabetes Mother    • Bipolar disorder Father    • Diabetes Father    • Hypertension Father    • Breast cancer Sister    • COPD Sister    • Diabetes Sister    • Hypertension Sister    • Alcohol abuse Brother    • Asthma Brother    • Bipolar disorder Brother    • Diabetes Brother    • Seizures Brother    • Breast cancer Maternal Grandmother    • Diabetes Maternal Grandmother    • Diabetes Maternal Grandfather    • Diabetes Paternal Grandmother    • Diabetes Paternal Grandfather    • Hypertension Son    • Kidney cancer Sister    • Hypertension Sister    • Diabetes Sister    • Malig Hyperthermia Neg Hx        Review of Systems   Constitutional: Positive for fatigue and unexpected weight change.   Respiratory:        Post-op pain   Neurological: Positive for weakness.   All other systems reviewed and are negative.       Objective:     Vitals:    05/02/22 0851 05/02/22 0901 05/02/22 1000 05/02/22 1030   BP:  104/57 90/47 96/51   BP Location:  Right arm Right arm    Patient Position:  Lying Lying    Pulse: 85 85 80 85   Resp:  20 18    Temp:  98.2 °F (36.8 °C)     TempSrc:  Oral     SpO2: 96% 97% 98% 98%   Weight:       Height:         Body mass index is 19.97 kg/m².  Flowsheet Rows    Flowsheet Row First Filed Value   Admission Height 162.6 cm (64\") Documented at 04/29/2022 0604   Admission Weight 53.3 kg (117 lb 8.1 oz) Documented at 04/29/2022 0604          Physical Exam  Constitutional:       General: She is not in acute distress.     Comments: Underweight, sitting in a chair   HENT:      Head: Normocephalic.      Nose: Nose normal.      Mouth/Throat:      Pharynx: Oropharynx is clear.   Eyes:      Conjunctiva/sclera: Conjunctivae normal.   Cardiovascular:      Rate and Rhythm: Normal rate and regular rhythm.      Pulses: Normal pulses.      Heart sounds: Normal heart sounds.   Pulmonary:      Breath sounds: Examination of the right-middle field reveals " decreased breath sounds. Examination of the right-lower field reveals decreased breath sounds. Decreased breath sounds present.   Abdominal:      Palpations: Abdomen is soft.      Tenderness: There is abdominal tenderness (near tube insertion).   Musculoskeletal:         General: No swelling.   Skin:     Coloration: Skin is pale.   Neurological:      General: No focal deficit present.   Psychiatric:         Mood and Affect: Mood normal.                 Lab Review:                Results from last 7 days   Lab Units 05/01/22  0352   SODIUM mmol/L 141   POTASSIUM mmol/L 4.2   CHLORIDE mmol/L 107   CO2 mmol/L 23.0   BUN mg/dL 18   CREATININE mg/dL 0.38*   GLUCOSE mg/dL 91   CALCIUM mg/dL 8.3*         Results from last 7 days   Lab Units 05/01/22  0352   WBC 10*3/mm3 12.87*   HEMOGLOBIN g/dL 10.7*   HEMATOCRIT % 32.4*   PLATELETS 10*3/mm3 177                                                   I personally viewed and interpreted the patient's EKG/Telemetry data    Assessment/Plan:     1. Esophageal cancer POD#3 VATS, decortication right lung, robot-assisted total esophagectomy, J-tube    2. Known diagnosis of paroxysmal atrial fibrillation    3. Hyperthyroidism     *Continue metoprolol tartrate (was on succinate at home, but has a J-tube now)  *Continue methimazole, consider increasing to 10mg BID (TSH low, but free T4 WNL)  *Hold full dose AC until okay with Ani Finch and Pradip  *Watch rhythm on tele, no cardiac testing needed right now

## 2022-05-02 NOTE — PROGRESS NOTES
Discharge Planning Assessment  [facility]     Patient Name: Maya Ribera  MRN: [id number]  Today's Date: 5/2/2022    Admit Date: 4/29/2022     Discharge Needs Assessment     Row Name 05/02/22 1319       Living Environment    People in Home significant other    Name(s) of People in Home Cristy    Current Living Arrangements home    Potentially Unsafe Housing Conditions unable to assess    Primary Care Provided by self    Family Caregiver if Needed significant other    Quality of Family Relationships supportive    Able to Return to Prior Arrangements yes       Resource/Environmental Concerns    Resource/Environmental Concerns none    Transportation Concerns none       Transition Planning    Patient/Family Anticipates Transition to home with family    Transportation Anticipated family or friend will provide       Discharge Needs Assessment    Equipment Currently Used at Home none    Concerns to be Addressed discharge planning    Anticipated Changes Related to Illness none    Equipment Needed After Discharge none    Provided Post Acute Provider List? Yes    Provided Post Acute Provider Quality & Resource List? N/A               Discharge Plan     Row Name 05/02/22 1320       Plan    Plan Home with no needs    Plan Comments CCP spoke with patient at bedside.   CCP role explained and discharge planning discussed.  Face sheet verified.  Pt’s emergency contact is her S.O. Cristy, [phone].  Pt does not have a PCP.  Pt. lives in one [place] house with her S.O.  Pt uses no DME to ambulate.   She is independent with ADL’s.  She has no history of Home Health.  Pt obtains her medications from [facility]  pharmacy.  She has not been to rehab.  Plan is Home  Pt denies needs     CCP following.              Continued Care and Services - Admitted Since 4/29/2022    Coordination has not been started for this encounter.          Demographic Summary    No documentation.                Functional Status    No  documentation.                Psychosocial    No documentation.                Abuse/Neglect    No documentation.                Legal    No documentation.                Substance Abuse    No documentation.                Patient Forms    No documentation.                   Genevieve Hendricks RN

## 2022-05-02 NOTE — PROGRESS NOTES
"  POST-OPERATIVE NOTE     Chief Complaint: Esophageal cancer, postoperative care  S/P: Right VAT with total decortication of the right lung with robot-assisted total esophagectomy, intercostal nerve block and jejunostomy tube insertion  POD # 3    Subjective:  Symptoms:  Stable.  She reports weakness.    Diet:  NPO.  No nausea or vomiting.    Activity level: Impaired due to weakness.    Pain:  She complains of pain that is mild.        Objective:  General Appearance:  Uncomfortable, ill-appearing and in no acute distress.    Vital signs: (most recent): Blood pressure 123/51, pulse 93, temperature 98.2 °F (36.8 °C), temperature source Oral, resp. rate 18, height 162.6 cm (64.02\"), weight 52.8 kg (116 lb 6.5 oz), SpO2 98 %.  Vital signs are normal.  No fever.    Output: Producing urine.    Lungs:  Normal effort and normal respiratory rate.  There are decreased breath sounds.    Heart: Normal rate.  Regular rhythm.    Chest: Chest wall tenderness present.    Abdomen: Abdomen is soft.  There is no abdominal tenderness.     Pulses: Distal pulses are intact.    Neurological: Patient is alert and oriented to person, place and time.    Skin:  Warm and dry.              Chest tube:   Site: Right, Clean, Dry, Intact and Securement device intact  Suction: -20 cm  Air Leak: negative  24 Hour Total: 300cc    NGT: 35cc    Results Review:     I reviewed the patient's new clinical results.  I reviewed the patient's new imaging results and agree with the interpretation.  I reviewed the patient's other test results and agree with the interpretation  Discussed with patient, spouse at bedside, RN and Dr. Finch.    Assessment/Plan     Patient is POD #3, s/p right VAT with right lung decortication and robot-assisted total esophagectomy by Dr. Joan Moseley and Dr. Hayder Finch.  We will advance tube feeds to 20 cc/h today but no further.  Continue to encourage good pulmonary hygiene and frequent ambulation.  Chest tube placed to " inderjit.  Recheck x-ray in AM.  Patient has had atrial fibrillation with RVR overnight and early this morning.  Cardiology consulted.  Appreciate their assistance.  Keep in ICU another day.  Check labs in AM.    BRANDI Ivy  [facility]  05/02/22  10:12 EDT    Patient was seen and assessed while wearing personal protective equipment including facemask, protective eyewear and gloves.  Hand hygiene performed prior to entering the room and upon exiting with doffing of gloves.

## 2022-05-02 NOTE — PLAN OF CARE
Patient is able to use call light to request assistance and is aware of safety measures in place to prevent her from harm.     Pt is aware of her need for proper positioning while receiving enteral nutrition and is compliant.

## 2022-05-03 ENCOUNTER — APPOINTMENT (OUTPATIENT)
Dept: CT IMAGING | Facility: HOSPITAL | Age: 62
End: 2022-05-03

## 2022-05-03 ENCOUNTER — APPOINTMENT (OUTPATIENT)
Dept: GENERAL RADIOLOGY | Facility: HOSPITAL | Age: 62
End: 2022-05-03

## 2022-05-03 LAB
ALBUMIN SERPL-MCNC: 2.6 G/DL (ref 3.5–5.2)
ALBUMIN/GLOB SERPL: 1.2 G/DL
ALP SERPL-CCNC: 84 U/L (ref 39–117)
ALT SERPL W P-5'-P-CCNC: 12 U/L (ref 1–33)
ANION GAP SERPL CALCULATED.3IONS-SCNC: 8 MMOL/L (ref 5–15)
AST SERPL-CCNC: 15 U/L (ref 1–32)
BILIRUB SERPL-MCNC: 0.3 MG/DL (ref 0–1.2)
BUN SERPL-MCNC: 12 MG/DL (ref 8–23)
BUN/CREAT SERPL: 36.4 (ref 7–25)
CALCIUM SPEC-SCNC: 8.2 MG/DL (ref 8.6–10.5)
CHLORIDE SERPL-SCNC: 113 MMOL/L (ref 98–107)
CO2 SERPL-SCNC: 23 MMOL/L (ref 22–29)
CREAT SERPL-MCNC: 0.33 MG/DL (ref 0.57–1)
DEPRECATED RDW RBC AUTO: 46.8 FL (ref 37–54)
EGFRCR SERPLBLD CKD-EPI 2021: 118.1 ML/MIN/1.73
ERYTHROCYTE [DISTWIDTH] IN BLOOD BY AUTOMATED COUNT: 12.6 % (ref 12.3–15.4)
GLOBULIN UR ELPH-MCNC: 2.2 GM/DL
GLUCOSE SERPL-MCNC: 130 MG/DL (ref 65–99)
HCT VFR BLD AUTO: 25.5 % (ref 34–46.6)
HCT VFR BLD AUTO: 26.7 % (ref 34–46.6)
HGB BLD-MCNC: 8.6 G/DL (ref 12–15.9)
HGB BLD-MCNC: 8.6 G/DL (ref 12–15.9)
LAB AP CASE REPORT: NORMAL
LAB AP CLINICAL INFORMATION: NORMAL
LAB AP DIAGNOSIS COMMENT: NORMAL
MAGNESIUM SERPL-MCNC: 1.6 MG/DL (ref 1.6–2.4)
MCH RBC QN AUTO: 32.7 PG (ref 26.6–33)
MCHC RBC AUTO-ENTMCNC: 32.2 G/DL (ref 31.5–35.7)
MCV RBC AUTO: 101.5 FL (ref 79–97)
PATH REPORT.FINAL DX SPEC: NORMAL
PATH REPORT.GROSS SPEC: NORMAL
PHOSPHATE SERPL-MCNC: 2 MG/DL (ref 2.5–4.5)
PLATELET # BLD AUTO: 162 10*3/MM3 (ref 140–450)
PMV BLD AUTO: 10.3 FL (ref 6–12)
POTASSIUM SERPL-SCNC: 3.5 MMOL/L (ref 3.5–5.2)
PROT SERPL-MCNC: 4.8 G/DL (ref 6–8.5)
RBC # BLD AUTO: 2.63 10*6/MM3 (ref 3.77–5.28)
SODIUM SERPL-SCNC: 144 MMOL/L (ref 136–145)
WBC NRBC COR # BLD: 10.31 10*3/MM3 (ref 3.4–10.8)

## 2022-05-03 PROCEDURE — 25010000002 HYDROMORPHONE 1 MG/ML SOLUTION: Performed by: INTERNAL MEDICINE

## 2022-05-03 PROCEDURE — 71250 CT THORAX DX C-: CPT

## 2022-05-03 PROCEDURE — 85027 COMPLETE CBC AUTOMATED: CPT | Performed by: NURSE PRACTITIONER

## 2022-05-03 PROCEDURE — 25010000002 ENOXAPARIN PER 10 MG: Performed by: THORACIC SURGERY (CARDIOTHORACIC VASCULAR SURGERY)

## 2022-05-03 PROCEDURE — 99232 SBSQ HOSP IP/OBS MODERATE 35: CPT | Performed by: INTERNAL MEDICINE

## 2022-05-03 PROCEDURE — 97116 GAIT TRAINING THERAPY: CPT

## 2022-05-03 PROCEDURE — 80053 COMPREHEN METABOLIC PANEL: CPT | Performed by: THORACIC SURGERY (CARDIOTHORACIC VASCULAR SURGERY)

## 2022-05-03 PROCEDURE — 84100 ASSAY OF PHOSPHORUS: CPT | Performed by: NURSE PRACTITIONER

## 2022-05-03 PROCEDURE — 97530 THERAPEUTIC ACTIVITIES: CPT

## 2022-05-03 PROCEDURE — 85018 HEMOGLOBIN: CPT | Performed by: NURSE PRACTITIONER

## 2022-05-03 PROCEDURE — 99024 POSTOP FOLLOW-UP VISIT: CPT | Performed by: NURSE PRACTITIONER

## 2022-05-03 PROCEDURE — 83735 ASSAY OF MAGNESIUM: CPT | Performed by: NURSE PRACTITIONER

## 2022-05-03 PROCEDURE — 85014 HEMATOCRIT: CPT | Performed by: NURSE PRACTITIONER

## 2022-05-03 PROCEDURE — 25010000002 KETOROLAC TROMETHAMINE PER 15 MG: Performed by: THORACIC SURGERY (CARDIOTHORACIC VASCULAR SURGERY)

## 2022-05-03 PROCEDURE — 71045 X-RAY EXAM CHEST 1 VIEW: CPT

## 2022-05-03 RX ADMIN — HYDROMORPHONE HYDROCHLORIDE 1 MG: 1 INJECTION, SOLUTION INTRAMUSCULAR; INTRAVENOUS; SUBCUTANEOUS at 06:20

## 2022-05-03 RX ADMIN — ACETAMINOPHEN 1000 MG: 325 SUSPENSION ORAL at 20:47

## 2022-05-03 RX ADMIN — Medication 10 ML: at 08:24

## 2022-05-03 RX ADMIN — SODIUM CHLORIDE 50 ML/HR: 9 INJECTION, SOLUTION INTRAVENOUS at 16:23

## 2022-05-03 RX ADMIN — HYDROMORPHONE HYDROCHLORIDE 1 MG: 1 INJECTION, SOLUTION INTRAMUSCULAR; INTRAVENOUS; SUBCUTANEOUS at 11:54

## 2022-05-03 RX ADMIN — Medication 10 ML: at 20:47

## 2022-05-03 RX ADMIN — ACETAMINOPHEN 1000 MG: 325 SUSPENSION ORAL at 16:23

## 2022-05-03 RX ADMIN — HYDROMORPHONE HYDROCHLORIDE 1 MG: 1 INJECTION, SOLUTION INTRAMUSCULAR; INTRAVENOUS; SUBCUTANEOUS at 09:14

## 2022-05-03 RX ADMIN — HYDROMORPHONE HYDROCHLORIDE 1 MG: 1 INJECTION, SOLUTION INTRAMUSCULAR; INTRAVENOUS; SUBCUTANEOUS at 19:44

## 2022-05-03 RX ADMIN — POTASSIUM PHOSPHATE, MONOBASIC AND POTASSIUM PHOSPHATE, DIBASIC 15 MMOL: 224; 236 INJECTION, SOLUTION, CONCENTRATE INTRAVENOUS at 12:44

## 2022-05-03 RX ADMIN — METHIMAZOLE 10 MG: 10 TABLET ORAL at 09:44

## 2022-05-03 RX ADMIN — ACETAMINOPHEN 1000 MG: 325 SUSPENSION ORAL at 08:23

## 2022-05-03 RX ADMIN — METOPROLOL TARTRATE 12.5 MG: 25 TABLET ORAL at 20:47

## 2022-05-03 RX ADMIN — METOPROLOL TARTRATE 12.5 MG: 25 TABLET ORAL at 08:24

## 2022-05-03 RX ADMIN — HYDROMORPHONE HYDROCHLORIDE 1 MG: 1 INJECTION, SOLUTION INTRAMUSCULAR; INTRAVENOUS; SUBCUTANEOUS at 02:11

## 2022-05-03 RX ADMIN — ENOXAPARIN SODIUM 40 MG: 100 INJECTION SUBCUTANEOUS at 08:23

## 2022-05-03 RX ADMIN — KETOROLAC TROMETHAMINE 15 MG: 30 INJECTION, SOLUTION INTRAMUSCULAR at 16:23

## 2022-05-03 RX ADMIN — PRAVASTATIN SODIUM 20 MG: 20 TABLET ORAL at 08:23

## 2022-05-03 RX ADMIN — GABAPENTIN 300 MG: 250 SOLUTION ORAL at 16:23

## 2022-05-03 RX ADMIN — GABAPENTIN 300 MG: 250 SOLUTION ORAL at 08:23

## 2022-05-03 RX ADMIN — GABAPENTIN 300 MG: 250 SOLUTION ORAL at 20:47

## 2022-05-03 NOTE — PROGRESS NOTES
[facility]      Mar/chart reviewed  Follow up esophagetomy,  No chest pain, other than expected, some cough    Vital Sign Min/Max for last 24 hours  Temp  Min: 97.6 °F (36.4 °C)  Max: 98.2 °F (36.8 °C)   BP  Min: 85/46  Max: 127/63   Pulse  Min: 64  Max: 101   Resp  Min: 14  Max: 20   SpO2  Min: 77 %  Max: 100 %   Flow (L/min)  Min: 2  Max: 2   No data recorded   2055/1135    Appears ill, axox3,   perrl, eomi, normal sclera  mmm, no jvd, trachea midline, neck supple,  chest decreased bilaterally, +faint crackles, no  wheezes,   rrr,   soft, nt, nd +bs,  no c/c/ trace edema  Skin warm, dry no rashes    Labs: 5/3: reviewed:  Glucose 130  Bun 12  Cr 0.33  Na 144  Bicarb 23  Total protein 4.8  Albumin 2.6  Wbc 10.3  hgb 8.6  plts 162    CXR: 5/3: increasing right upper lobe fluid collection?    A/P:  1. S/p esophagectomy -- continue supportive care  2. AHRF -- actually on less oxygen than I would expect at this point, continue supportive care  3. T3N1 adenocarcinoma of the esophagus s/p neoadjuvent chemo/xrt  4. Hyperthyroidism -- continue replacement  5. Copd -- bronchodilators, pulmonary toilet  6. Afib on eliquis at baseline, no prophlactic lovenox now  7. Tobacco abuse  8. Anemia

## 2022-05-03 NOTE — THERAPY TREATMENT NOTE
Patient Name: Maya Ribera  : 1960    MRN: [id number]                              Today's Date: 5/3/2022       Admit Date: 2022    Visit Dx:     ICD-10-CM ICD-9-CM   1. Malignant neoplasm of lower third of esophagus (HCC)  C15.5 150.5     Patient Active Problem List   Diagnosis   • Esophageal cancer (HCC)   • Asymptomatic varicose veins of unspecified lower extremity   • Eczema   • Encounter for screening for malignant neoplasm of cervix   • Screening for malignant neoplasm of colon   • Arthritis   • Esophagitis   • Gastric ulcer   • Female cystocele   • Flat foot(734)   • Hiatal hernia   • Hyperlipidemia   • Hypertension   • Hyperthyroidism   • Irritable bowel syndrome   • Mitral regurgitation   • Paroxysmal atrial fibrillation (HCC)   • Severe esophageal dysplasia   • Mitral valve prolapse   • Encounter for management of implanted device     Past Medical History:   Diagnosis Date   • Boil, breast 2021    HEALING UNDER LEFT BREAST    • Cervical cancer (HCC)    • Dysphagia    • Esophageal cancer (HCC)    • Esophagitis 2021   • Gastric ulcer    • GERD (gastroesophageal reflux disease)    • Hyperlipidemia    • Hypertension    • Hyperthyroidism    • Irritable bowel    • Mitral valve prolapse     FOLLOWED BY     • PAF (paroxysmal atrial fibrillation) (HCC)    • Uses feeding tube      Past Surgical History:   Procedure Laterality Date   • CHOLECYSTECTOMY     • COLONOSCOPY  2021    [facility]   • ESOPHAGOGASTRECTOMY Right 2022    Procedure: BRONCHOSCOPY, RIGHT THORACOSCOPY WITH [employer] ROBOT WITH TOTAL ESOPHAGECTOMY, INTERCOSTAL NERVE BLOCK, JEJUNOSTOMY TUBE REPLACEMENT;  Surgeon: Hayder Finch III, MD;  Location: [facility] OR;  Service: Robotics - [employer];  Laterality: Right;   • EXTERNAL EAR SURGERY     • JEJUNOSTOMY N/A 12/15/2021    Procedure: open JEJUNOSTOMY tube placement;  Surgeon: Bhanu Hollis MD;  Location: [facility] OR;  Service: General;   Laterality: N/A;   • KNEE SURGERY Left    • REPLACEMENT TOTAL KNEE Left    • UPPER ENDOSCOPIC ULTRASOUND W/ FNA N/A 12/7/2021    Procedure: Esophagogastroduodenoscopy with endoscopic ultrasound  WITH STAGING AND FINE NEEDLE BIOPSY;  Surgeon: Amrit Green MD;  Location: [facility] ENDOSCOPY;  Service: Gastroenterology;  Laterality: N/A;  post op: inlet patch, esophageal mass, T2   • VENOUS ACCESS DEVICE (PORT) INSERTION Left 12/15/2021    Procedure: INSERTION OF PORTACATH;  Surgeon: Bhanu Hollis MD;  Location: [facility] MAIN OR;  Service: General;  Laterality: Left;      General Information     Row Name 05/03/22 1138          Physical Therapy Time and Intention    Document Type therapy note (daily note) (P)   -CLAUDIO     Mode of Treatment physical therapy;individual therapy (P)   -CLAUDIO     Row Name 05/03/22 1138          General Information    Patient Profile Reviewed yes (P)   -CLAUDIO     Existing Precautions/Restrictions fall (P)   -KA     Barriers to Rehab medically complex (P)   -KA     Row Name 05/03/22 1138          Cognition    Orientation Status (Cognition) oriented x 3 (P)   -KA     Row Name 05/03/22 1138          Safety Issues, Functional Mobility    Safety Issues Affecting Function (Mobility) awareness of need for assistance;insight into deficits/self-awareness;problem-solving;sequencing abilities (P)   -CLAUDIO     Impairments Affecting Function (Mobility) coordination;endurance/activity tolerance;shortness of breath;pain;postural/trunk control;balance (P)   -KA           User Key  (r) = Recorded By, (t) = Taken By, (c) = Cosigned By    Initials Name Provider Type    Taylor Pereira, PT Student PT Student               Mobility     Row Name 05/03/22 1139          Bed Mobility    Bed Mobility supine-sit;sit-supine (P)   -CLAUDIO     Supine-Sit [place] (Bed Mobility) not tested (P)   -CLAUDIO     Sit-Supine [place] (Bed Mobility) not tested (P)   -CLAUDIO     Comment, (Bed Mobility) UI (P)   -CLAUDIO     Row Name 05/03/22 1135           Bed-Chair Transfer    Bed-Chair [place] (Transfers) not tested (P)   -KA     Comment, (Bed-Chair Transfer) [facility] before and after PT session. (P)   -KA     Row Name 05/03/22 1139          Sit-Stand Transfer    Sit-Stand Bee (Transfers) 1 person assist;contact guard;verbal cues;nonverbal cues (demo/gesture) (P)   -KA     Comment, (Sit-Stand Transfer) no AD (P)   -KA     Row Name 05/03/22 1139          Gait/Stairs (Locomotion)    Bee Level (Gait) verbal cues;nonverbal cues (demo/gesture);minimum assist (75% patient effort);moderate assist (50% patient effort) (P)   -KA     Assistive Device (Gait) other (see comments) (P)   No AD  -KA     Distance in Feet (Gait) 50 (P)   -KA     Deviations/Abnormal Patterns (Gait) festinating/shuffling;bilateral deviations;base of support, wide;janine decreased;gait speed decreased;steppage;weight shifting decreased (P)   -KA     Bilateral Gait Deviations forward flexed posture;heel strike decreased (P)   -KA     Comment, (Gait/Stairs) Pt ambulated 50 ft no AD with a slow shuffling gait. (P)   -KA           User Key  (r) = Recorded By, (t) = Taken By, (c) = Cosigned By    Initials Name Provider Type    Taylor Pereira, PT Student PT Student               Obj/Interventions     Row Name 05/03/22 1142          Motor Skills    Therapeutic Exercise other (see comments) (P)   10 reps APs, LAQs, seated marches  -CLAUDIO           User Key  (r) = Recorded By, (t) = Taken By, (c) = Cosigned By    Initials Name Provider Type    Taylor Pereira, PT Student PT Student               Goals/Plan    No documentation.                Clinical Impression     Row Name 05/03/22 1143          Pain    Pretreatment Pain Rating 2/10 (P)   -KA     Posttreatment Pain Rating 2/10 (P)   -KA     Pain Location - chest (P)   -KA     Pre/Posttreatment Pain Comment Pain around chest tube insertion (P)   -KA     Pain Intervention(s) Repositioned;Ambulation/increased activity (P)   -KA     Row  Name 05/03/22 1143          Plan of Care Review    Plan of Care Reviewed With patient (P)   -KA     Outcome Evaluation Pt demonstrated improved gait distance and mobility. Pt performed 10 reps of APs, LAQs, and seated marches sitting UIC. Pt demonstrated sit to stand CGA. Pt ambulated 50 ft Min to Mod A with increased assistance at the end secondary to fatigue. Pt received VCs for proper breathing techniques and educated on therapeutic exercises to do while sitting or in bed. Pt reported mild dizziness and vitals checked after ambulation. BP at the start of PT read 98/55 and 122/55 after gait. O2 sats of RA at 90% at the beginning of session, 88% after therapeutic exercise, and 88% after ambulation. PT will continue to follow to address strength, functional mobility, and gait. (P)   -KA     Row Name 05/03/22 1143          Vital Signs    Pre Systolic BP Rehab 95 (P)   -KA     Pre Treatment Diastolic BP 55 (P)   -KA     Post Systolic BP Rehab 122 (P)   -KA     Post Treatment Diastolic BP 55 (P)   -KA     Pre SpO2 (%) 90 (P)   -KA     O2 Delivery Pre Treatment room air (P)   -KA     Intra SpO2 (%) 88 (P)   -KA     O2 Delivery Intra Treatment room air (P)   -KA     Post SpO2 (%) 88 (P)   -KA     O2 Delivery Post Treatment room air (P)   -KA     Pre Patient Position Sitting (P)   -KA     Intra Patient Position Standing (P)   -KA     Post Patient Position Sitting (P)   -KA     Recovery Time 1 standing rest break for about 5-10 seconds (P)   -KA     Rest Breaks  1 (P)   -KA     Row Name 05/03/22 1143          Positioning and Restraints    Pre-Treatment Position sitting in chair/recliner (P)   -KA     Post Treatment Position chair (P)   -KA     In Chair notified nsg;sitting;call light within reach;encouraged to call for assist (P)   -KA           User Key  (r) = Recorded By, (t) = Taken By, (c) = Cosigned By    Initials Name Provider Type    Taylor Pereira, PT Student PT Student               Outcome Measures     Row Name  05/03/22 1148          How much help from another person do you currently need...    Turning from your back to your side while in flat bed without using bedrails? 2 (P)   -KA     Moving from lying on back to sitting on the side of a flat bed without bedrails? 2 (P)   -KA     Moving to and from a bed to a chair (including a wheelchair)? 3 (P)   -KA     Standing up from a chair using your arms (e.g., wheelchair, bedside chair)? 3 (P)   -KA     Climbing 3-5 steps with a railing? 2 (P)   -KA     To walk in hospital room? 3 (P)   -KA     AM-PAC 6 Clicks Score (PT) 15 (P)   -KA     Highest level of mobility 4 --> Transferred to chair/commode (P)   -KA           User Key  (r) = Recorded By, (t) = Taken By, (c) = Cosigned By    Initials Name Provider Type    Taylor Pereira, PT Student PT Student                             Physical Therapy Education                 Title: PT OT SLP Therapies (Done)     Topic: Physical Therapy (Done)     Point: Mobility training (Done)     Learning Progress Summary           Patient Acceptance, E,TB,D, VU,DU,NR by  at 5/3/2022 1150    Acceptance, E, VU by  at 5/1/2022 1421    Acceptance, E, VU,NR by  at 4/30/2022 1430   Significant Other Acceptance, E, VU,NR by  at 4/30/2022 1430                   Point: Home exercise program (Done)     Learning Progress Summary           Patient Acceptance, E,TB,D, VU,DU,NR by  at 5/3/2022 1150    Acceptance, E, VU by  at 5/1/2022 1421    Acceptance, E, VU,NR by  at 4/30/2022 1430   Significant Other Acceptance, E, VU,NR by  at 4/30/2022 1430                   Point: Body mechanics (Done)     Learning Progress Summary           Patient Acceptance, E,TB,D, VU,DU,NR by  at 5/3/2022 1150    Acceptance, E, VU by  at 5/1/2022 1421    Acceptance, E, VU,NR by  at 4/30/2022 1430   Significant Other Acceptance, E, VU,NR by  at 4/30/2022 1430                   Point: Precautions (Done)     Learning Progress Summary           Patient  Acceptance, E,TB,D, VU,DU,NR by  at 5/3/2022 1150    Acceptance, E, VU by  at 5/1/2022 1421    Acceptance, E, VU,NR by  at 4/30/2022 1430   Significant Other Acceptance, E, VU,NR by  at 4/30/2022 1430                               User Key     Initials Effective Dates Name Provider Type Discipline     06/16/21 -  Adarsh Solorzano, PT Physical Therapist PT     03/11/22 -  Taylor Arellano, PT Student PT Student PT              PT Recommendation and Plan     Plan of Care Reviewed With: (P) patient  Outcome Evaluation: (P) Pt demonstrated improved gait distance and mobility. Pt performed 10 reps of APs, LAQs, and seated marches sitting UIC. Pt demonstrated sit to stand CGA. Pt ambulated 50 ft Min to Mod A with increased assistance at the end secondary to fatigue. Pt received VCs for proper breathing techniques and educated on therapeutic exercises to do while sitting or in bed. Pt reported mild dizziness and vitals checked after ambulation. BP at the start of PT read 98/55 and 122/55 after gait. O2 sats of RA at 90% at the beginning of session, 88% after therapeutic exercise, and 88% after ambulation. PT will continue to follow to address strength, functional mobility, and gait.     Time Calculation:    PT Charges     Row Name 05/03/22 1150             Time Calculation    Start Time 1111 (P)   -      Stop Time 1134 (P)   -      Time Calculation (min) 23 min (P)   -      PT Received On 05/03/22 (P)   -      PT - Next Appointment 05/04/22 (P)   -              Time Calculation- PT    Total Timed Code Minutes- PT 23 minute(s) (P)   -              Timed Charges    33405 - Gait Training Minutes  10 (P)   -      33796 - PT Therapeutic Activity Minutes 13 (P)   -              Total Minutes    Timed Charges Total Minutes 23 (P)   -KA       Total Minutes 23 (P)   -            User Key  (r) = Recorded By, (t) = Taken By, (c) = Cosigned By    Initials Name Provider Type    Taylor Pereira, PT Student PT  Student              Therapy Charges for Today     Code Description Service Date Service Provider Modifiers Qty    [id number]  GAIT TRAINING EA 15 MIN 5/3/2022 Taylor Arellano, PT Student GP 1    [id number]  PT THERAPEUTIC ACT EA 15 MIN 5/3/2022 Taylor Arellano, PT Student GP 1          PT G-Codes  Outcome Measure Options: AM-PAC 6 Clicks Basic Mobility (PT)  AM-PAC 6 Clicks Score (PT): (P) 15    Taylor Arellano, PT Student  5/3/2022

## 2022-05-03 NOTE — PROGRESS NOTES
"   LOS: 4 days   Patient Care Team:  Provider, No Known as PCP - General Finch, Hayder EVANS III, MD as Referring Physician (Thoracic Surgery)  Iggy Harrell MD as Consulting Physician (Hematology and Oncology)  Ciro Pelletier MD as Consulting Physician (Radiation Oncology)  Artie Sánchez MD as Consulting Physician (Cardiology)    Chief Complaint: following for PAF     Interval History: No further AF, had a couple of nonsustained runs of AT and had one NSVT last evening. Doing well.       Objective   Vital Signs  Temp:  [97.6 °F (36.4 °C)-98.2 °F (36.8 °C)] 97.6 °F (36.4 °C)  Heart Rate:  [] 79  Resp:  [14-20] 14  BP: ()/(44-74) 95/58    Intake/Output Summary (Last 24 hours) at 5/3/2022 1224  Last data filed at 5/3/2022 0830  Gross per 24 hour   Intake 2142 ml   Output 1000 ml   Net 1142 ml       Last Weight and Admission Weight        05/02/22  0517   Weight: 52.4 kg (115 lb 8.3 oz)     Flowsheet Rows    Flowsheet Row First Filed Value   Admission Height 162.6 cm (64\") Documented at 04/29/2022 0604   Admission Weight 53.3 kg (117 lb 8.1 oz) Documented at 04/29/2022 0604                  Physical Exam  Constitutional:       General: She is not in acute distress.     Comments: Very thin, chronically ill appearing   HENT:      Head: Normocephalic.      Nose: Nose normal.      Mouth/Throat:      Pharynx: Oropharynx is clear.   Eyes:      Conjunctiva/sclera: Conjunctivae normal.   Cardiovascular:      Rate and Rhythm: Normal rate and regular rhythm.      Heart sounds: Normal heart sounds.   Pulmonary:      Comments: Very coarse VS right base, clear on left  Abdominal:      Palpations: Abdomen is soft.      Tenderness: There is no abdominal tenderness.   Musculoskeletal:         General: No swelling.   Skin:     Coloration: Skin is pale.   Neurological:      General: No focal deficit present.      Mental Status: She is alert.   Psychiatric:         Mood and Affect: Mood normal.         Results Review:  "     Results from last 7 days   Lab Units 05/03/22  0418 05/02/22  0417 05/01/22  0352   SODIUM mmol/L 144 139 141   POTASSIUM mmol/L 3.5 4.8 4.2   CHLORIDE mmol/L 113* 112* 107   CO2 mmol/L 23.0 16.4* 23.0   BUN mg/dL 12 12 18   CREATININE mg/dL 0.33* 0.45* 0.38*   GLUCOSE mg/dL 130* 90 91   CALCIUM mg/dL 8.2* 8.1* 8.3*         Results from last 7 days   Lab Units 05/03/22  0418 05/02/22  1311 05/01/22  0352   WBC 10*3/mm3 10.31 13.87* 12.87*   HEMOGLOBIN g/dL 8.6* 9.6* 10.7*   HEMATOCRIT % 26.7* 29.6* 32.4*   PLATELETS 10*3/mm3 162 184 177             Results from last 7 days   Lab Units 05/03/22  0418   MAGNESIUM mg/dL 1.6           I reviewed the patient's new clinical results.  I personally viewed and interpreted the patient's EKG/Telemetry data        Medication Review:   acetaminophen, 1,000 mg, Per J Tube, TID  enoxaparin, 40 mg, Subcutaneous, Daily  Gabapentin, 300 mg, Per J Tube, TID  methIMAzole, 10 mg, Per J Tube, Daily  metoprolol tartrate, 12.5 mg, Per J Tube, Q12H  potassium phosphate, 15 mmol, Intravenous, Once  pravastatin, 20 mg, Per J Tube, Daily  sodium chloride, 10 mL, Intravenous, Q12H      lactated ringers, 9 mL/hr, Last Rate: 9 mL/hr (04/29/22 0728)  sodium chloride, 50 mL/hr, Last Rate: 50 mL/hr (05/03/22 1116)      Assessment/Plan     1. PAF -- known diagnosis, previously on metoprolol succinate, now on metoprolol tartrate due to J-tube status. Cannot increase dose due to low BP.  Resume apixaban when okay with Ani Finch/Pradip.    2. Hyperthyroidism -- low TSH but normal fT4. Does methimazole need to be increased?    3. Esophageal cancer    4. COPD    Jasiel Nj MD  05/03/22  12:24 EDT

## 2022-05-03 NOTE — PLAN OF CARE
Goal Outcome Evaluation:  Plan of Care Reviewed With: (P) patient           Outcome Evaluation: (P) Pt demonstrated improved gait distance and mobility. Pt performed 10 reps of APs, LAQs, and seated marches sitting UIC. Pt demonstrated sit to stand CGA. Pt ambulated 50 ft Min to Mod A with increased assistance at the end secondary to fatigue. Pt received VCs for proper breathing techniques and educated on therapeutic exercises to do while sitting or in bed. Pt reported mild dizziness and vitals checked after ambulation. BP at the start of PT read 98/55 and 122/55 after gait. O2 sats of RA at 90% at the beginning of session, 88% after therapeutic exercise, and 88% after ambulation. PT will continue to follow to address strength, functional mobility, and gait.

## 2022-05-03 NOTE — PROGRESS NOTES
"  POST-OPERATIVE NOTE     Chief Complaint: Esophageal cancer, postoperative care  S/P: Right VAT with total decortication of the right lung with robot-assisted total esophagectomy, intercostal nerve block and jejunostomy tube insertion  POD # 4    Subjective:  Symptoms:  Improved.  She reports weakness.    Diet:  NPO.  No nausea or vomiting.    Activity level: Impaired due to weakness.    Pain:  She complains of pain that is mild.        Objective:  General Appearance:  Uncomfortable, ill-appearing and in no acute distress.    Vital signs: (most recent): Blood pressure 127/63, pulse 91, temperature 97.6 °F (36.4 °C), temperature source Oral, resp. rate 14, height 162.6 cm (64.02\"), weight 52.4 kg (115 lb 8.3 oz), SpO2 (!) 89 %.  Vital signs are normal.  No fever.    Output: Producing urine and no stool output.    Lungs:  Normal effort and normal respiratory rate.  There are decreased breath sounds.    Heart: Normal rate.  Regular rhythm.    Chest: Chest wall tenderness present.    Abdomen: Abdomen is soft.  Hypoactive bowel sounds.   There is no abdominal tenderness.     Pulses: Distal pulses are intact.    Neurological: Patient is alert and oriented to person, place and time.    Skin:  Warm and dry.              Chest tube:   Site: Right, Clean, Dry, Intact and Securement device intact  Suction: waterseal  Air Leak: negative  24 Hour Total: 210cc    NGT: 25cc    Results Review:     I reviewed the patient's new clinical results.  I reviewed the patient's new imaging results and agree with the interpretation.  I reviewed the patient's other test results and agree with the interpretation  Discussed with patient, spouse at bedside, RN and Dr. Finch.    Assessment/Plan      I reviewed this morning's chest x-ray which demonstrates a small right basilar pleural separation measuring approximately 2 cm.  There is an area of fluid at the right apex with some air-fluid levels.  This is new compared to imaging from " 5/1/22.    Patient is POD #4, s/p right VAT with right lung decortication and robot-assisted total esophagectomy by Dr. Joan Moseley and Dr. Hayder Finch.  We will advance tube feeds to 30 cc/h today and titrate to goal.  We will go ahead and get CT of the chest to further evaluate the fluid collection in the right apex.  Continue to encourage good pulmonary hygiene and frequent ambulation.  Patient is currently in sinus rhythm.  Appreciate assistance from cardiology.  Keep in ICU  another day.  Daily labs and chest x-rays.      BRANDI Ivy  [facility]  05/03/22  10:51 EDT    Patient was seen and assessed while wearing personal protective equipment including facemask, protective eyewear and gloves.  Hand hygiene performed prior to entering the room and upon exiting with doffing of gloves.

## 2022-05-04 ENCOUNTER — APPOINTMENT (OUTPATIENT)
Dept: CT IMAGING | Facility: HOSPITAL | Age: 62
End: 2022-05-04

## 2022-05-04 ENCOUNTER — APPOINTMENT (OUTPATIENT)
Dept: GENERAL RADIOLOGY | Facility: HOSPITAL | Age: 62
End: 2022-05-04

## 2022-05-04 LAB
ALBUMIN SERPL-MCNC: 2.5 G/DL (ref 3.5–5.2)
ALBUMIN/GLOB SERPL: 1 G/DL
ALP SERPL-CCNC: 91 U/L (ref 39–117)
ALT SERPL W P-5'-P-CCNC: 11 U/L (ref 1–33)
ANION GAP SERPL CALCULATED.3IONS-SCNC: 7 MMOL/L (ref 5–15)
AST SERPL-CCNC: 18 U/L (ref 1–32)
BILIRUB SERPL-MCNC: 0.3 MG/DL (ref 0–1.2)
BUN SERPL-MCNC: 15 MG/DL (ref 8–23)
BUN/CREAT SERPL: 46.9 (ref 7–25)
CALCIUM SPEC-SCNC: 8.5 MG/DL (ref 8.6–10.5)
CHLORIDE SERPL-SCNC: 113 MMOL/L (ref 98–107)
CO2 SERPL-SCNC: 26 MMOL/L (ref 22–29)
CREAT SERPL-MCNC: 0.32 MG/DL (ref 0.57–1)
DEPRECATED RDW RBC AUTO: 47.1 FL (ref 37–54)
EGFRCR SERPLBLD CKD-EPI 2021: 119 ML/MIN/1.73
ERYTHROCYTE [DISTWIDTH] IN BLOOD BY AUTOMATED COUNT: 12.7 % (ref 12.3–15.4)
GLOBULIN UR ELPH-MCNC: 2.5 GM/DL
GLUCOSE SERPL-MCNC: 130 MG/DL (ref 65–99)
HCT VFR BLD AUTO: 25.9 % (ref 34–46.6)
HGB BLD-MCNC: 8.2 G/DL (ref 12–15.9)
MCH RBC QN AUTO: 31.8 PG (ref 26.6–33)
MCHC RBC AUTO-ENTMCNC: 31.7 G/DL (ref 31.5–35.7)
MCV RBC AUTO: 100.4 FL (ref 79–97)
PHOSPHATE SERPL-MCNC: 3 MG/DL (ref 2.5–4.5)
PLATELET # BLD AUTO: 189 10*3/MM3 (ref 140–450)
PMV BLD AUTO: 9.9 FL (ref 6–12)
POTASSIUM SERPL-SCNC: 3.6 MMOL/L (ref 3.5–5.2)
PROT SERPL-MCNC: 5 G/DL (ref 6–8.5)
RBC # BLD AUTO: 2.58 10*6/MM3 (ref 3.77–5.28)
SODIUM SERPL-SCNC: 146 MMOL/L (ref 136–145)
WBC NRBC COR # BLD: 10.01 10*3/MM3 (ref 3.4–10.8)

## 2022-05-04 PROCEDURE — 75989 ABSCESS DRAINAGE UNDER X-RAY: CPT

## 2022-05-04 PROCEDURE — 0 LIDOCAINE 1 % SOLUTION: Performed by: RADIOLOGY

## 2022-05-04 PROCEDURE — 25010000002 HYDROMORPHONE PER 4 MG: Performed by: RADIOLOGY

## 2022-05-04 PROCEDURE — 84100 ASSAY OF PHOSPHORUS: CPT | Performed by: NURSE PRACTITIONER

## 2022-05-04 PROCEDURE — 25010000002 ENOXAPARIN PER 10 MG: Performed by: THORACIC SURGERY (CARDIOTHORACIC VASCULAR SURGERY)

## 2022-05-04 PROCEDURE — 80053 COMPREHEN METABOLIC PANEL: CPT | Performed by: THORACIC SURGERY (CARDIOTHORACIC VASCULAR SURGERY)

## 2022-05-04 PROCEDURE — 25010000002 HYDROMORPHONE 1 MG/ML SOLUTION: Performed by: NURSE PRACTITIONER

## 2022-05-04 PROCEDURE — 0W9930Z DRAINAGE OF RIGHT PLEURAL CAVITY WITH DRAINAGE DEVICE, PERCUTANEOUS APPROACH: ICD-10-PCS | Performed by: RADIOLOGY

## 2022-05-04 PROCEDURE — 99024 POSTOP FOLLOW-UP VISIT: CPT | Performed by: NURSE PRACTITIONER

## 2022-05-04 PROCEDURE — 85027 COMPLETE CBC AUTOMATED: CPT | Performed by: NURSE PRACTITIONER

## 2022-05-04 PROCEDURE — 25010000002 KETOROLAC TROMETHAMINE PER 15 MG: Performed by: THORACIC SURGERY (CARDIOTHORACIC VASCULAR SURGERY)

## 2022-05-04 PROCEDURE — C1729 CATH, DRAINAGE: HCPCS

## 2022-05-04 PROCEDURE — 71045 X-RAY EXAM CHEST 1 VIEW: CPT

## 2022-05-04 RX ORDER — LIDOCAINE HYDROCHLORIDE 10 MG/ML
20 INJECTION, SOLUTION INFILTRATION; PERINEURAL ONCE
Status: COMPLETED | OUTPATIENT
Start: 2022-05-04 | End: 2022-05-04

## 2022-05-04 RX ORDER — HYDROMORPHONE HYDROCHLORIDE 1 MG/ML
0.5 INJECTION, SOLUTION INTRAMUSCULAR; INTRAVENOUS; SUBCUTANEOUS ONCE
Status: COMPLETED | OUTPATIENT
Start: 2022-05-04 | End: 2022-05-04

## 2022-05-04 RX ADMIN — METHIMAZOLE 10 MG: 10 TABLET ORAL at 08:03

## 2022-05-04 RX ADMIN — LORAZEPAM 1 MG: 2 CONCENTRATE ORAL at 03:40

## 2022-05-04 RX ADMIN — METOPROLOL TARTRATE 12.5 MG: 25 TABLET ORAL at 20:43

## 2022-05-04 RX ADMIN — HYDROMORPHONE HYDROCHLORIDE 1 MG: 1 INJECTION, SOLUTION INTRAMUSCULAR; INTRAVENOUS; SUBCUTANEOUS at 22:29

## 2022-05-04 RX ADMIN — KETOROLAC TROMETHAMINE 15 MG: 30 INJECTION, SOLUTION INTRAMUSCULAR at 02:02

## 2022-05-04 RX ADMIN — ACETAMINOPHEN 1000 MG: 325 SUSPENSION ORAL at 20:43

## 2022-05-04 RX ADMIN — GABAPENTIN 300 MG: 250 SOLUTION ORAL at 08:02

## 2022-05-04 RX ADMIN — HYDROMORPHONE HYDROCHLORIDE 1 MG: 1 INJECTION, SOLUTION INTRAMUSCULAR; INTRAVENOUS; SUBCUTANEOUS at 11:48

## 2022-05-04 RX ADMIN — Medication 10 ML: at 20:44

## 2022-05-04 RX ADMIN — ENOXAPARIN SODIUM 40 MG: 100 INJECTION SUBCUTANEOUS at 08:03

## 2022-05-04 RX ADMIN — HYDROMORPHONE HYDROCHLORIDE 1 MG: 1 INJECTION, SOLUTION INTRAMUSCULAR; INTRAVENOUS; SUBCUTANEOUS at 15:09

## 2022-05-04 RX ADMIN — OXYCODONE HYDROCHLORIDE 5 MG: 5 SOLUTION ORAL at 19:31

## 2022-05-04 RX ADMIN — LIDOCAINE HYDROCHLORIDE 20 ML: 10 INJECTION, SOLUTION INFILTRATION; PERINEURAL at 15:38

## 2022-05-04 RX ADMIN — KETOROLAC TROMETHAMINE 15 MG: 30 INJECTION, SOLUTION INTRAMUSCULAR at 08:00

## 2022-05-04 RX ADMIN — PRAVASTATIN SODIUM 20 MG: 20 TABLET ORAL at 08:03

## 2022-05-04 RX ADMIN — Medication 10 ML: at 08:03

## 2022-05-04 RX ADMIN — GABAPENTIN 300 MG: 250 SOLUTION ORAL at 20:43

## 2022-05-04 RX ADMIN — ACETAMINOPHEN 1000 MG: 325 SUSPENSION ORAL at 08:02

## 2022-05-04 RX ADMIN — METOPROLOL TARTRATE 12.5 MG: 25 TABLET ORAL at 08:03

## 2022-05-04 RX ADMIN — HYDROMORPHONE HYDROCHLORIDE 0.5 MG: 1 INJECTION, SOLUTION INTRAMUSCULAR; INTRAVENOUS; SUBCUTANEOUS at 15:44

## 2022-05-04 NOTE — PROGRESS NOTES
"  POST-OPERATIVE NOTE     Chief Complaint: Esophageal cancer, postoperative care  S/P: Right VAT with total decortication of the right lung with robot-assisted total esophagectomy, intercostal nerve block and jejunostomy tube insertion  POD # 5    Subjective:  Symptoms:  Improved.  She reports shortness of breath and weakness.    Diet:  NPO.  No nausea or vomiting.    Activity level: Impaired due to weakness.    Pain:  She complains of pain that is mild.        Objective:  General Appearance:  Uncomfortable, ill-appearing and in no acute distress.    Vital signs: (most recent): Blood pressure 119/61, pulse 79, temperature 97.9 °F (36.6 °C), temperature source Axillary, resp. rate 22, height 162.6 cm (64.02\"), weight 53.1 kg (117 lb 1 oz), SpO2 96 %.  Vital signs are normal.  No fever.    Output: Producing urine and minimal stool output.    Lungs:  Normal effort and normal respiratory rate.  There are decreased breath sounds.    Heart: Normal rate.  Regular rhythm.    Chest: Chest wall tenderness present.    Abdomen: Abdomen is soft.  Bowel sounds are normal.   There is no abdominal tenderness.     Pulses: Distal pulses are intact.    Neurological: Patient is alert and oriented to person, place and time.    Skin:  Warm and dry.              Chest tube:   Site: Right, Clean, Dry, Intact and Securement device intact  Suction: waterseal  Air Leak: negative  24 Hour Total: 150cc    NGT: 15cc    Results Review:     I reviewed the patient's new clinical results.  I reviewed the patient's new imaging results and agree with the interpretation.  I reviewed the patient's other test results and agree with the interpretation  Discussed with patient, spouse at bedside, RN and Dr. Finch.    Assessment/Plan        Patient is POD #5, s/p right VAT with right lung decortication and robot-assisted total esophagectomy by Dr. Joan Moseley and Dr. Hayder Finch.  She is tolerating tube feeds and had a bowel movement this morning.  CT of " the chest demonstrates what appears to be an apical hematoma on the right.  Discussed with Dr. Angeles who will place a CT-guided chest tube into this hematoma for evacuation today.  Leave new chest tube to -20 cm suction and we will leave surgical chest tube on waterseal.  Patient is currently in sinus rhythm.  Appreciate assistance from cardiology.  Transfer to 5 E. once bed is available.      BRANDI Ivy  [facility]  05/04/22  14:04 EDT    Patient was seen and assessed while wearing personal protective equipment including facemask, protective eyewear and gloves.  Hand hygiene performed prior to entering the room and upon exiting with doffing of gloves.

## 2022-05-04 NOTE — PLAN OF CARE
Pt uses her call light to request assistance as needed.  Pt is compliant with safety measures in place to help keep her safe.

## 2022-05-04 NOTE — PROGRESS NOTES
Adult Nutrition  Assessment/PES    Patient Name:  Maya Ribera  YOB: 1960  MRN: [id number]  Admit Date:  4/29/2022    Assessment Date:  5/4/2022    Comments:  Follow up note. Pt has been tolerating TF's with Impact Peptide. Rate increased today to 50ml/hr and water [id number]. BM+ 5/4 small soft. Note pt to have  CT-guided chest tube into hematoma on the right for evacuation today. TF's at goal at 50ml/hr. When appropriate, water flush 25ml/hr as tolerated. Will continue to follow clinical course.      Reason for Assessment     Row Name 05/04/22 1451 05/04/22 1437       Reason for Assessment    Reason For Assessment follow-up protocol;TF/PN follow-up protocol;TF/PN               Nutrition/Diet History     Row Name 05/04/22 1440          Nutrition/Diet History    Typical Intake (Food/Fluid/EN/PN) Tolerating TF's at 40cc/hr advancing now to 50ml/hr                  Labs/Tests/Procedures/Meds     Row Name 05/04/22 1441          Labs/Procedures/Meds    Lab Results Reviewed reviewed     Lab Results Comments Na 146, glu 130, alb 2.5            Diagnostic Tests/Procedures    Diagnostic Test/Procedure Reviewed reviewed            Medications    Pertinent Medications Reviewed reviewed     Pertinent Medications Comments lovenox,                Physical Findings     Row Name 05/04/22 1442          Physical Findings    Overall Physical Appearance R chest inc, abd incisions, L neck inc; 2L O2     Enteral Access Devices jejunostomy tube                  Nutrition Prescription Ordered     Row Name 05/04/22 1455          Nutrition Prescription PO    Current PO Diet NPO            Nutrition Prescription EN    Enteral Route Jejunostomy     Product Impact Peptide 1.5 (Pivot 1.5)     TF Delivery Method Continuous     Continuous TF Goal Rate (mL/hr) 50 mL/hr     Continuous TF Current Rate (mL/hr) 50 mL/hr     Water flush (mL)  30 mL     Water Flush Frequency Every 4 hours                Evaluation of Received  Nutrient/Fluid Intake     Row Name 05/04/22 1455          Calories Evaluation    Enteral Calories (kcal) 1800            Protein Evaluation    Total Protein Target (g) 112            Intake Assessment    Energy/Calorie Requirement Assessment meeting needs     Protein Requirement Assessment meeting needs     Tolerance tolerating            Fluid Intake Evaluation    Enteral Fluid (mL) 924     Other Fluid (mL) 180            EN Evaluation    TF Tolerance --  BM+     HOB Greater than or equal to 30 degress                     Problem/Interventions:           Intervention Goal     Row Name 05/04/22 1456          Intervention Goal    General Maintain nutrition;Nutrition support treatment;Improved nutrition related lab(s);Reduce/improve symptoms;Meet nutritional needs for age/condition;Disease management/therapy     TF/PN Tolerate TF at goal     Weight Maintain weight                Nutrition Intervention     Row Name 05/04/22 1501          Nutrition Intervention    RD/Tech Action Follow Tx progress;Care plan reviewd                Nutrition Prescription     Row Name 05/04/22 1501          Nutrition Prescription EN    Enteral Prescription Continue same protocol     Water flush (mL)  25 mL     Water Flush Frequency Per hour                Education/Evaluation     Row Name 05/04/22 1501          Education    Education Will Instruct as appropriate            Monitor/Evaluation    Monitor Per protocol;I&O;Pertinent labs;TF delivery/tolerance;Weight;Skin status                 Electronically signed by:  Vanessa Guzman RD  05/04/22 15:02 EDT

## 2022-05-04 NOTE — SIGNIFICANT NOTE
05/04/22 1536   OTHER   Discipline physical therapist   Rehab Time/Intention   Session Not Performed other (see comments)  (pt off floor to get CT guided chest tube placement, PT will follow up tomorrow)   Recommendation   PT - Next Appointment 05/05/22      Denies SI/HI  Denies hallucinations  Up in wheelchair  Out on the unit and social with peers  Affect is bright and smiling  Will continue to monitor

## 2022-05-05 ENCOUNTER — APPOINTMENT (OUTPATIENT)
Dept: GENERAL RADIOLOGY | Facility: HOSPITAL | Age: 62
End: 2022-05-05

## 2022-05-05 LAB
ALBUMIN SERPL-MCNC: 2.6 G/DL (ref 3.5–5.2)
ALBUMIN/GLOB SERPL: 1 G/DL
ALP SERPL-CCNC: 101 U/L (ref 39–117)
ALT SERPL W P-5'-P-CCNC: 10 U/L (ref 1–33)
AMYLASE FLD-CCNC: 13 U/L
ANION GAP SERPL CALCULATED.3IONS-SCNC: 7 MMOL/L (ref 5–15)
AST SERPL-CCNC: 17 U/L (ref 1–32)
BILIRUB SERPL-MCNC: 0.2 MG/DL (ref 0–1.2)
BUN SERPL-MCNC: 14 MG/DL (ref 8–23)
BUN/CREAT SERPL: 48.3 (ref 7–25)
CALCIUM SPEC-SCNC: 8.5 MG/DL (ref 8.6–10.5)
CHLORIDE SERPL-SCNC: 109 MMOL/L (ref 98–107)
CO2 SERPL-SCNC: 27 MMOL/L (ref 22–29)
CREAT SERPL-MCNC: 0.29 MG/DL (ref 0.57–1)
EGFRCR SERPLBLD CKD-EPI 2021: 121.9 ML/MIN/1.73
GLOBULIN UR ELPH-MCNC: 2.6 GM/DL
GLUCOSE SERPL-MCNC: 136 MG/DL (ref 65–99)
POTASSIUM SERPL-SCNC: 3.8 MMOL/L (ref 3.5–5.2)
PROT SERPL-MCNC: 5.2 G/DL (ref 6–8.5)
SODIUM SERPL-SCNC: 143 MMOL/L (ref 136–145)

## 2022-05-05 PROCEDURE — 25010000002 HYDROMORPHONE 1 MG/ML SOLUTION: Performed by: NURSE PRACTITIONER

## 2022-05-05 PROCEDURE — 3E0L3GC INTRODUCTION OF OTHER THERAPEUTIC SUBSTANCE INTO PLEURAL CAVITY, PERCUTANEOUS APPROACH: ICD-10-PCS | Performed by: NURSE PRACTITIONER

## 2022-05-05 PROCEDURE — 32561 LYSE CHEST FIBRIN INIT DAY: CPT | Performed by: NURSE PRACTITIONER

## 2022-05-05 PROCEDURE — 25010000002 ALTEPLASE PER 1 MG: Performed by: NURSE PRACTITIONER

## 2022-05-05 PROCEDURE — 99024 POSTOP FOLLOW-UP VISIT: CPT | Performed by: NURSE PRACTITIONER

## 2022-05-05 PROCEDURE — 3E1L38Z IRRIGATION OF PLEURAL CAVITY USING IRRIGATING SUBSTANCE, PERCUTANEOUS APPROACH: ICD-10-PCS | Performed by: NURSE PRACTITIONER

## 2022-05-05 PROCEDURE — 97116 GAIT TRAINING THERAPY: CPT

## 2022-05-05 PROCEDURE — 82150 ASSAY OF AMYLASE: CPT | Performed by: NURSE PRACTITIONER

## 2022-05-05 PROCEDURE — 71045 X-RAY EXAM CHEST 1 VIEW: CPT

## 2022-05-05 PROCEDURE — 80053 COMPREHEN METABOLIC PANEL: CPT | Performed by: NURSE PRACTITIONER

## 2022-05-05 PROCEDURE — 25010000002 ENOXAPARIN PER 10 MG: Performed by: NURSE PRACTITIONER

## 2022-05-05 RX ADMIN — ACETAMINOPHEN 1000 MG: 325 SUSPENSION ORAL at 09:19

## 2022-05-05 RX ADMIN — OXYCODONE HYDROCHLORIDE 5 MG: 5 SOLUTION ORAL at 13:08

## 2022-05-05 RX ADMIN — OXYCODONE HYDROCHLORIDE 5 MG: 5 SOLUTION ORAL at 02:59

## 2022-05-05 RX ADMIN — METOPROLOL TARTRATE 12.5 MG: 25 TABLET ORAL at 09:19

## 2022-05-05 RX ADMIN — Medication 10 ML: at 15:29

## 2022-05-05 RX ADMIN — GABAPENTIN 300 MG: 250 SOLUTION ORAL at 09:20

## 2022-05-05 RX ADMIN — HYDROMORPHONE HYDROCHLORIDE 1 MG: 1 INJECTION, SOLUTION INTRAMUSCULAR; INTRAVENOUS; SUBCUTANEOUS at 09:25

## 2022-05-05 RX ADMIN — ACETAMINOPHEN 1000 MG: 325 SUSPENSION ORAL at 17:23

## 2022-05-05 RX ADMIN — WATER: 1 INJECTION INTRAMUSCULAR; INTRAVENOUS; SUBCUTANEOUS at 11:36

## 2022-05-05 RX ADMIN — ACETAMINOPHEN 1000 MG: 325 SUSPENSION ORAL at 21:15

## 2022-05-05 RX ADMIN — OXYCODONE HYDROCHLORIDE 5 MG: 5 SOLUTION ORAL at 19:22

## 2022-05-05 RX ADMIN — METHIMAZOLE 10 MG: 10 TABLET ORAL at 09:20

## 2022-05-05 RX ADMIN — GABAPENTIN 300 MG: 250 SOLUTION ORAL at 21:15

## 2022-05-05 RX ADMIN — HYDROMORPHONE HYDROCHLORIDE 1 MG: 1 INJECTION, SOLUTION INTRAMUSCULAR; INTRAVENOUS; SUBCUTANEOUS at 15:29

## 2022-05-05 RX ADMIN — PRAVASTATIN SODIUM 20 MG: 20 TABLET ORAL at 09:19

## 2022-05-05 RX ADMIN — OXYCODONE HYDROCHLORIDE 5 MG: 5 SOLUTION ORAL at 07:36

## 2022-05-05 RX ADMIN — GABAPENTIN 300 MG: 250 SOLUTION ORAL at 17:23

## 2022-05-05 RX ADMIN — ENOXAPARIN SODIUM 40 MG: 100 INJECTION SUBCUTANEOUS at 09:20

## 2022-05-05 RX ADMIN — HYDROMORPHONE HYDROCHLORIDE 1 MG: 1 INJECTION, SOLUTION INTRAMUSCULAR; INTRAVENOUS; SUBCUTANEOUS at 05:08

## 2022-05-05 RX ADMIN — Medication 10 ML: at 09:20

## 2022-05-05 RX ADMIN — HYDROMORPHONE HYDROCHLORIDE 1 MG: 1 INJECTION, SOLUTION INTRAMUSCULAR; INTRAVENOUS; SUBCUTANEOUS at 21:18

## 2022-05-05 RX ADMIN — METOPROLOL TARTRATE 12.5 MG: 25 TABLET ORAL at 21:15

## 2022-05-05 RX ADMIN — Medication 10 ML: at 21:15

## 2022-05-05 NOTE — PLAN OF CARE
Goal Outcome Evaluation:  Plan of Care Reviewed With: patient        Progress: improving  Outcome Evaluation: Pt tolerated treatment with no complaints. pt is CGA with transfers. Pt was able to increase ambulation distance, ambulating 300ft with platform walker and CGA. No unsteadiness or LOB noted.    ..Patient was wearing a face mask during this therapy encounter. Therapist used appropriate personal protective equipment including eye protection, mask, and gloves.  Mask used was standard procedure mask. Appropriate PPE was worn during the entire therapy session. Hand hygiene was completed before and after therapy session. Patient is not in enhanced droplet precautions.

## 2022-05-05 NOTE — PLAN OF CARE
Goal Outcome Evaluation:  VSS. Pain controlled with oxycodone elixir. Surgical CT is to water seal and pigtail CT is to -20 suction. Flushing pigtail CT every 8 hours. Tolerating TF. Oral care provided. Good UOP, using the bsc. Pulmonary hygiene encouraged. Will continue to monitor.

## 2022-05-05 NOTE — DISCHARGE PLACEMENT REQUEST
"Maya Ribera (61 y.o. Female)             Date of Birth   1960    Social Security Number       Address   [address] [place] [address]    Home Phone   [phone]    MRN   [id number]       Sabianism   None    Marital Status   Single                            Admission Date   4/29/22    Admission Type   Elective    Admitting Provider   Hayder Finch III, MD    Attending Provider   Hayder Finch III, MD    Department, Room/Bed   [address], E554/1       Discharge Date       Discharge Disposition       Discharge Destination                               Attending Provider: Hayder Finch III, MD    Allergies: Codeine    Isolation: None   Infection: None   Code Status: CPR   Advance Care Planning Activity    Ht: 162.6 cm (64.02\")   Wt: 53.1 kg (117 lb 1 oz)    Admission Cmt: None   Principal Problem: Esophageal cancer (HCC) [C15.9]                 Active Insurance as of 4/29/2022     Primary Coverage     Payor Plan Insurance Group Employer/Plan Group    [place] [id number]     Payor Plan Address Payor Plan Phone Number Payor Plan Fax Number Effective Dates    [address]   1/2/2010 - None Entered    [place] [id number]       Subscriber Name Subscriber Birth Date Member ID       MAYA RIBERA 1960 [id number]                 Emergency Contacts      (Rel.) Home Phone Work Phone Mobile Phone    magan paz (Significant Other) [phone] -- [phone]    Bacilio,Jason (Son) [phone] -- [phone]    BACILIO,BE (Sister) -- -- [phone]              "

## 2022-05-05 NOTE — PLAN OF CARE
Goal Outcome Evaluation:  Plan of Care Reviewed With: patient, spouse      Progress: improving  Outcome Evaluation: VSS, weaned to RA, pt c/o intermittent throat, abdominal, & flank pain managed by PRN dilaudid x 2 & roxicodone x 2, worked c/ PT, ambulated in cespedes x 2 so far this shift, up in chair, remains on TF, Chest tubes x 2, slept some during shift, significant other at bedside

## 2022-05-05 NOTE — CASE MANAGEMENT/SOCIAL WORK
Continued Stay Note  [facility]     Patient Name: Maya Ribera  MRN: [id number]  Today's Date: 5/5/2022    Admit Date: 4/29/2022     Discharge Plan     Row Name 05/05/22 1618       Plan    Plan Home with HH and infusion    Patient/Family in Agreement with Plan yes    Plan Comments Met with pt at bedside and discussed need for HH and infusion company for tube feeds.  Pt requests that CCP call SO/Cristy about this.  Call placed to Cristy [phone] who states pt used [facility] and  infusion in past and would like to use again.  Referral placed in Epic and call placed to  infusion.  No further needs identified at this time.  ISABELLE Ruiz RN               Discharge Codes    No documentation.               Expected Discharge Date and Time     Expected Discharge Date Expected Discharge Time    May 5, 2022             Mell Ruiz, RN

## 2022-05-05 NOTE — CONSULTS
Adult Nutrition  Assessment/PES    Patient Name:  Maya Ribera  YOB: 1960  MRN: [id number]  Admit Date:  4/29/2022    Assessment Date:  5/5/2022    Comments:  Nutrition f/u for TF's/consult to transition to cyclic TF's:  Tolerating TF's at goal rate of Impact Peptide @ 50ml/hr w/ 30ml free H2O q 4hrs, via J-tube.    Pt received 500ml TF's over past 24hrs:   750 kcals, 47g Protein, 420 ml H2O + 240 ml free H2O    Meds/labs reviewed, noted IVF's d/c'd.    Recommend:  1. Change TF's to cyclic nocturnal feeds in am (6:00pm-6:00am) over 12 hrs:   Impact Peptide 1.5 @ 100ml/hr x 12 hours   Increase free H2O flushes to 25ml q hr.    Will follow per protocol.         Reason for Assessment     Row Name 05/05/22 1522          Reason for Assessment    Reason For Assessment follow-up protocol;TF/PN;nurse/nurse practitioner consult  consulted to change pt to cyclic TF's                Nutrition/Diet History     Row Name 05/05/22 1530          Nutrition/Diet History    Typical Intake (Food/Fluid/EN/PN) Tolerating TF's at goal rate of Impact Peptide @ 50ml/hr                  Labs/Tests/Procedures/Meds     Row Name 05/05/22 1530          Labs/Procedures/Meds    Lab Results Reviewed reviewed     Lab Results Comments Na 143, Cl 109, Gluc 136, Crea 0.29, Alb 2.60            Diagnostic Tests/Procedures    Diagnostic Test/Procedure Reviewed reviewed            Medications    Pertinent Medications Reviewed reviewed     Pertinent Medications Comments lovenox, IVF's d/c'd                Physical Findings     Row Name 05/05/22 1532          Physical Findings    Overall Physical Appearance R chest inc, abd incisions, L neck inc     Enteral Access Devices jejunostomy tube                  Nutrition Prescription Ordered     Row Name 05/05/22 1533          Nutrition Prescription PO    Current PO Diet NPO            Nutrition Prescription EN    Enteral Route Jejunostomy     Product Impact Peptide 1.5 (Pivot 1.5)     TF  Delivery Method Continuous     Continuous TF Goal Rate (mL/hr) 50 mL/hr     Continuous TF Current Rate (mL/hr) 50 mL/hr     Water flush (mL)  30 mL     Water Flush Frequency Every 4 hours                Evaluation of Received Nutrient/Fluid Intake     Row Name 05/05/22 1533          Calories Evaluation    Total Calorie Target (kcal) 1800     Enteral Calories (kcal) 750            Protein Evaluation    Total Protein Target (g) 112     Enteral Protein (g) 47            Intake Assessment    Energy/Calorie Requirement Assessment not meeting needs     Protein Requirement Assessment not meeting needs     Tolerance tolerating            Fluid Intake Evaluation    Total Fluid Target (mL) 1600     Enteral Fluid (mL) 420     Other Fluid (mL) 240  free water flushes                     Problem/Interventions:           Intervention Goal     Row Name 05/05/22 1538          Intervention Goal    General Maintain nutrition;Disease management/therapy;Nutrition support treatment;Improved nutrition related lab(s);Meet nutritional needs for age/condition     TF/PN Tolerate TF at goal;Adjust TF/PN     Weight Maintain weight                Nutrition Intervention     Row Name 05/05/22 1538          Nutrition Intervention    RD/Tech Action Care plan reviewd;Follow Tx progress;Recommend/ordered     Recommended/Ordered EN                Nutrition Prescription     Row Name 05/05/22 1539          Nutrition Prescription EN    Enteral Route Jejunostomy     Product Impact Peptide 1.5 [place]     TF Delivery Method Cyclic     Cyclic TF Goal Volume (mL) 1200 mL     Cyclic TF Goal Rate (mL/hr) 100 mL/hr     Cyclic TF Cycle Over (hrs)  12 hours     Water flush (mL)  25 mL     Water Flush Frequency Per hour     New EN Prescription Ordered? Yes            EN to Supply    Kcal/Day 1800 Kcal/Day     Protein (gm/day) 113 gm/day     Meet Estimated Kcal Need (%) 100 %     Meet Estimated Protein Need (%) 100 %     TF Free H2O (mL) 1008 mL     Total Free H2O  (mL/day) 1600 mL/day                Education/Evaluation     Row Name 05/05/22 1543          Education    Education Will Instruct as appropriate            Monitor/Evaluation    Monitor Per protocol;Weight;I&O;Skin status;Symptoms;Pertinent labs;TF delivery/tolerance                 Electronically signed by:  Lissy Roy RD  05/05/22 15:44 EDT

## 2022-05-05 NOTE — PLAN OF CARE
Goal Outcome Evaluation:      VSS, A&O, NSR, 1.5 LPM via NC, patient education to shift positions frequently to prevent PI, patient impulsive when moving/ambulating and patient educated to wait for assistance d/t J tube and 2 chest tubes. Patient feeds restarted upon arrival to unit at 40 ml/hour and flush at 30 ml/hour. Patient feeds increased to 50 ml/hr (goal) and flush to 40 ml/hr (goal is 50). Patient and partner educated about not using very much water on oral swabs and using a thin layer of moisturizing gel without swallowing it. Patient port line care performed and all tubing changed, caps placed on line. NG tube removed prior to arriving on unit, last BM earlier this shift while in CCU

## 2022-05-05 NOTE — THERAPY TREATMENT NOTE
Patient Name: Maya Ribera  : 1960    MRN: [id number]                              Today's Date: 2022       Admit Date: 2022    Visit Dx:     ICD-10-CM ICD-9-CM   1. Malignant neoplasm of lower third of esophagus (HCC)  C15.5 150.5     Patient Active Problem List   Diagnosis   • Esophageal cancer (HCC)   • Asymptomatic varicose veins of unspecified lower extremity   • Eczema   • Encounter for screening for malignant neoplasm of cervix   • Screening for malignant neoplasm of colon   • Arthritis   • Esophagitis   • Gastric ulcer   • Female cystocele   • Flat foot(734)   • Hiatal hernia   • Hyperlipidemia   • Hypertension   • Hyperthyroidism   • Irritable bowel syndrome   • Mitral regurgitation   • Paroxysmal atrial fibrillation (HCC)   • Severe esophageal dysplasia   • Mitral valve prolapse   • Encounter for management of implanted device     Past Medical History:   Diagnosis Date   • Boil, breast 2021    HEALING UNDER LEFT BREAST    • Cervical cancer (HCC)    • Dysphagia    • Esophageal cancer (HCC)    • Esophagitis 2021   • Gastric ulcer    • GERD (gastroesophageal reflux disease)    • Hyperlipidemia    • Hypertension    • Hyperthyroidism    • Irritable bowel    • Mitral valve prolapse     FOLLOWED BY     • PAF (paroxysmal atrial fibrillation) (HCC)    • Uses feeding tube      Past Surgical History:   Procedure Laterality Date   • CHOLECYSTECTOMY     • COLONOSCOPY  2021    [facility]   • ESOPHAGOGASTRECTOMY Right 2022    Procedure: BRONCHOSCOPY, RIGHT THORACOSCOPY WITH [employer] ROBOT WITH TOTAL ESOPHAGECTOMY, INTERCOSTAL NERVE BLOCK, JEJUNOSTOMY TUBE REPLACEMENT;  Surgeon: Hayder Finch III, MD;  Location: [facility] OR;  Service: Robotics - [employer]inci;  Laterality: Right;   • EXTERNAL EAR SURGERY     • JEJUNOSTOMY N/A 12/15/2021    Procedure: open JEJUNOSTOMY tube placement;  Surgeon: Bhanu Hollis MD;  Location: [facility] OR;  Service: General;   Laterality: N/A;   • KNEE SURGERY Left    • REPLACEMENT TOTAL KNEE Left    • UPPER ENDOSCOPIC ULTRASOUND W/ FNA N/A 12/7/2021    Procedure: Esophagogastroduodenoscopy with endoscopic ultrasound  WITH STAGING AND FINE NEEDLE BIOPSY;  Surgeon: Amrit Green MD;  Location: [facility] ENDOSCOPY;  Service: Gastroenterology;  Laterality: N/A;  post op: inlet patch, esophageal mass, T2   • VENOUS ACCESS DEVICE (PORT) INSERTION Left 12/15/2021    Procedure: INSERTION OF PORTACATH;  Surgeon: Bhanu Hollis MD;  Location: [facility] MAIN OR;  Service: General;  Laterality: Left;      General Information     Row Name 05/05/22 1627          Physical Therapy Time and Intention    Document Type therapy note (daily note)  -     Mode of Treatment physical therapy;individual therapy  -     Row Name 05/05/22 1627          General Information    Existing Precautions/Restrictions fall  2 chest tubes (1 on suction) and feeding tube.  -EB     Row Name 05/05/22 1627          Cognition    Orientation Status (Cognition) oriented x 3  -EB     Row Name 05/05/22 1627          Safety Issues, Functional Mobility    Impairments Affecting Function (Mobility) endurance/activity tolerance;strength  -           User Key  (r) = Recorded By, (t) = Taken By, (c) = Cosigned By    Initials Name Provider Type    EB Loli Bashir PTA Physical Therapist Assistant               Mobility     Row Name 05/05/22 1628          Bed Mobility    Comment, (Bed Mobility) [facility]  -     Row Name 05/05/22 1628          Sit-Stand Transfer    Sit-Stand [place] (Transfers) contact guard;nonverbal cues (demo/gesture);verbal cues;1 person to manage equipment  -EB     Assistive Device (Sit-Stand Transfers) walker, rolling platform  -EB     Row Name 05/05/22 1628          Gait/Stairs (Locomotion)    [place] Level (Gait) contact guard;nonverbal cues (demo/gesture);verbal cues;1 person to manage equipment  -EB     Assistive Device (Gait) walker, rolling platform   -EB     Distance in Feet (Gait) 300ft  -EB     Deviations/Abnormal Patterns (Gait) stride length decreased;gait speed decreased;janine decreased  -EB     Bilateral Gait Deviations forward flexed posture;heel strike decreased  -EB     Comment, (Gait/Stairs) pt on suction, used platform walker. No unsteadiness or lOB.  -EB           User Key  (r) = Recorded By, (t) = Taken By, (c) = Cosigned By    Initials Name Provider Type    Loli Cain PTA Physical Therapist Assistant               Obj/Interventions    No documentation.                Goals/Plan    No documentation.                Clinical Impression     Row Name 05/05/22 1630          Plan of Care Review    Plan of Care Reviewed With patient  -EB     Progress improving  -EB     Outcome Evaluation Pt tolerated treatment with no complaints. pt is CGA with transfers. Pt was able to increase ambulation distance, ambulating 300ft with platform walker and CGA. No unsteadiness or LOB noted.  -EB     Row Name 05/05/22 1630          Therapy Assessment/Plan (PT)    Therapy Frequency (PT) daily  -EB     Row Name 05/05/22 1630          Positioning and Restraints    Pre-Treatment Position sitting in chair/recliner  -EB     Post Treatment Position chair  -EB     In Chair sitting;call light within reach;encouraged to call for assist  -EB           User Key  (r) = Recorded By, (t) = Taken By, (c) = Cosigned By    Initials Name Provider Type    Loli Cain PTA Physical Therapist Assistant               Outcome Measures     Row Name 05/05/22 1631          How much help from another person do you currently need...    Turning from your back to your side while in flat bed without using bedrails? 3  -EB     Moving from lying on back to sitting on the side of a flat bed without bedrails? 2  -EB     Moving to and from a bed to a chair (including a wheelchair)? 3  -EB     Standing up from a chair using your arms (e.g., wheelchair, bedside chair)? 3  -EB     Climbing 3-5  steps with a railing? 2  -EB     To walk in hospital room? 3  -EB     AM-PAC 6 Clicks Score (PT) 16  -EB     Highest level of mobility 5 --> Static standing  -EB           User Key  (r) = Recorded By, (t) = Taken By, (c) = Cosigned By    Initials Name Provider Type    Loli Cain PTA Physical Therapist Assistant                             Physical Therapy Education                 Title: PT OT SLP Therapies (Done)     Topic: Physical Therapy (Done)     Point: Mobility training (Done)     Learning Progress Summary           Patient Acceptance, E, VU by EB at 5/5/2022 1631    Acceptance, E,TB,D, VU,DU,NR by KA at 5/3/2022 1150    Acceptance, E, VU by CH at 5/1/2022 1421    Acceptance, E, VU,NR by  at 4/30/2022 1430   Significant Other Acceptance, E, VU,NR by  at 4/30/2022 1430                   Point: Home exercise program (Done)     Learning Progress Summary           Patient Acceptance, E, VU by EB at 5/5/2022 1631    Acceptance, E,TB,D, VU,DU,NR by KA at 5/3/2022 1150    Acceptance, E, VU by  at 5/1/2022 1421    Acceptance, E, VU,NR by  at 4/30/2022 1430   Significant Other Acceptance, E, VU,NR by  at 4/30/2022 1430                   Point: Body mechanics (Done)     Learning Progress Summary           Patient Acceptance, E, VU by EB at 5/5/2022 1631    Acceptance, E,TB,D, VU,DU,NR by KA at 5/3/2022 1150    Acceptance, E, VU by  at 5/1/2022 1421    Acceptance, E, VU,NR by  at 4/30/2022 1430   Significant Other Acceptance, E, VU,NR by  at 4/30/2022 1430                   Point: Precautions (Done)     Learning Progress Summary           Patient Acceptance, E, VU by EB at 5/5/2022 1631    Acceptance, E,TB,D, VU,DU,NR by KA at 5/3/2022 1150    Acceptance, E, VU by CH at 5/1/2022 1421    Acceptance, E, VU,NR by  at 4/30/2022 1430   Significant Other Acceptance, E, VU,NR by  at 4/30/2022 1430                               User Key     Initials Effective Dates Name Provider Type Discipline    EB  06/16/21 -  Loli Bashir PTA Physical Therapist Assistant PT    CH 06/16/21 -  Adarsh Solorzano, PT Physical Therapist PT    KA 03/11/22 -  Taylor Arellano PT Student PT Student PT              PT Recommendation and Plan     Plan of Care Reviewed With: patient  Progress: improving  Outcome Evaluation: Pt tolerated treatment with no complaints. pt is CGA with transfers. Pt was able to increase ambulation distance, ambulating 300ft with platform walker and CGA. No unsteadiness or LOB noted.     Time Calculation:    PT Charges     Row Name 05/05/22 1627             Time Calculation    Start Time 1413  -EB      Stop Time 1428  -EB      Time Calculation (min) 15 min  -EB      PT Received On 05/05/22  -EB      PT - Next Appointment 05/06/22  -EB              Time Calculation- PT    Total Timed Code Minutes- PT 15 minute(s)  -EB            User Key  (r) = Recorded By, (t) = Taken By, (c) = Cosigned By    Initials Name Provider Type    EB Loli Bashir PTA Physical Therapist Assistant              Therapy Charges for Today     Code Description Service Date Service Provider Modifiers Qty    78346774681 HC GAIT TRAINING EA 15 MIN 5/5/2022 Loli Bashir PTA GP 1    35779788718 HC PT THER SUPP EA 15 MIN 5/5/2022 Loli Bashir PTA GP 1          PT G-Codes  Outcome Measure Options: AM-PAC 6 Clicks Basic Mobility (PT)  AM-PAC 6 Clicks Score (PT): 16    Loli Bashir PTA  5/5/2022

## 2022-05-05 NOTE — PROGRESS NOTES
Tele reviewed, remains in SR, on low dose metoprolol.  Please let us know if any new atrial arrhythmias occur.

## 2022-05-05 NOTE — PROGRESS NOTES
"  POST-OPERATIVE NOTE     Chief Complaint: Esophageal cancer, postoperative care  S/P: Right VAT with total decortication of the right lung with robot-assisted total esophagectomy, intercostal nerve block and jejunostomy tube insertion  POD # 6    Subjective:  Symptoms:  Stable.  She reports shortness of breath and weakness.    Diet:  NPO.  No nausea or vomiting.    Activity level: Impaired due to weakness.    Pain:  She complains of pain that is mild.        Objective:  General Appearance:  Uncomfortable, ill-appearing and in no acute distress.    Vital signs: (most recent): Blood pressure 118/61, pulse 83, temperature 98.1 °F (36.7 °C), temperature source Oral, resp. rate 17, height 162.6 cm (64.02\"), weight 53.1 kg (117 lb 1 oz), SpO2 96 %.  Vital signs are normal.  No fever.    Output: Producing urine and minimal stool output.    Lungs:  Normal effort and normal respiratory rate.  There are decreased breath sounds.    Heart: Normal rate.  Regular rhythm.    Chest: Chest wall tenderness present.    Abdomen: Abdomen is soft.  Bowel sounds are normal.   There is no abdominal tenderness.     Pulses: Distal pulses are intact.    Neurological: Patient is alert and oriented to person, place and time.    Skin:  Warm and dry.              Chest tube:   Site: Right, Clean, Dry, Intact and Securement device intact  Suction: waterseal  Air Leak: negative  24 Hour Total: 60/210cc    Results Review:     I reviewed the patient's new clinical results.  I reviewed the patient's new imaging results and agree with the interpretation.  I reviewed the patient's other test results and agree with the interpretation  Discussed with patient, spouse at bedside, RN and Dr. Finch.    Assessment/Plan        Patient is POD #6, s/p right VAT with right lung decortication and robot-assisted total esophagectomy by Dr. Joan Moseley and Dr. Hayder Finch.  She is tolerating tube feeds.  She was previously on cyclic feeds at home and we will " transition her back to this with likely feeds for 16 hours/day.  CT of the chest demonstrates what appears to be an apical hematoma on the right.  Chest tube placed by Dr. Ha with interventional radiology.  Approximately 60 cc of bloody output.  We will plan intrapleural Activase today.  Leave new chest tube to -20 cm suction and we will leave surgical chest tube on waterseal.  Patient is currently in sinus rhythm.  Appreciate assistance from cardiology.      BRANDI Ivy  [facility]  05/05/22  15:10 EDT    Patient was seen and assessed while wearing personal protective equipment including facemask, protective eyewear and gloves.  Hand hygiene performed prior to entering the room and upon exiting with doffing of gloves.

## 2022-05-06 ENCOUNTER — APPOINTMENT (OUTPATIENT)
Dept: GENERAL RADIOLOGY | Facility: HOSPITAL | Age: 62
End: 2022-05-06

## 2022-05-06 LAB
ALBUMIN SERPL-MCNC: 2.7 G/DL (ref 3.5–5.2)
ALBUMIN/GLOB SERPL: 0.9 G/DL
ALP SERPL-CCNC: 106 U/L (ref 39–117)
ALT SERPL W P-5'-P-CCNC: 13 U/L (ref 1–33)
ANION GAP SERPL CALCULATED.3IONS-SCNC: 6.8 MMOL/L (ref 5–15)
AST SERPL-CCNC: 17 U/L (ref 1–32)
BILIRUB SERPL-MCNC: 0.2 MG/DL (ref 0–1.2)
BUN SERPL-MCNC: 17 MG/DL (ref 8–23)
BUN/CREAT SERPL: 68 (ref 7–25)
CALCIUM SPEC-SCNC: 8.7 MG/DL (ref 8.6–10.5)
CHLORIDE SERPL-SCNC: 106 MMOL/L (ref 98–107)
CO2 SERPL-SCNC: 27.2 MMOL/L (ref 22–29)
CREAT SERPL-MCNC: 0.25 MG/DL (ref 0.57–1)
EGFRCR SERPLBLD CKD-EPI 2021: 126.3 ML/MIN/1.73
GLOBULIN UR ELPH-MCNC: 3 GM/DL
GLUCOSE SERPL-MCNC: 98 MG/DL (ref 65–99)
POTASSIUM SERPL-SCNC: 4 MMOL/L (ref 3.5–5.2)
PROT SERPL-MCNC: 5.7 G/DL (ref 6–8.5)
SODIUM SERPL-SCNC: 140 MMOL/L (ref 136–145)

## 2022-05-06 PROCEDURE — 80053 COMPREHEN METABOLIC PANEL: CPT | Performed by: NURSE PRACTITIONER

## 2022-05-06 PROCEDURE — 3E1L38Z IRRIGATION OF PLEURAL CAVITY USING IRRIGATING SUBSTANCE, PERCUTANEOUS APPROACH: ICD-10-PCS | Performed by: NURSE PRACTITIONER

## 2022-05-06 PROCEDURE — 99024 POSTOP FOLLOW-UP VISIT: CPT | Performed by: NURSE PRACTITIONER

## 2022-05-06 PROCEDURE — 25010000002 ALTEPLASE PER 1 MG: Performed by: NURSE PRACTITIONER

## 2022-05-06 PROCEDURE — 97116 GAIT TRAINING THERAPY: CPT

## 2022-05-06 PROCEDURE — 25010000002 HYDROMORPHONE 1 MG/ML SOLUTION: Performed by: NURSE PRACTITIONER

## 2022-05-06 PROCEDURE — 3E0L3GC INTRODUCTION OF OTHER THERAPEUTIC SUBSTANCE INTO PLEURAL CAVITY, PERCUTANEOUS APPROACH: ICD-10-PCS | Performed by: NURSE PRACTITIONER

## 2022-05-06 PROCEDURE — 25010000002 ENOXAPARIN PER 10 MG: Performed by: NURSE PRACTITIONER

## 2022-05-06 PROCEDURE — 32562 LYSE CHEST FIBRIN SUBQ DAY: CPT | Performed by: NURSE PRACTITIONER

## 2022-05-06 PROCEDURE — 71045 X-RAY EXAM CHEST 1 VIEW: CPT

## 2022-05-06 PROCEDURE — 97530 THERAPEUTIC ACTIVITIES: CPT

## 2022-05-06 RX ADMIN — PRAVASTATIN SODIUM 20 MG: 20 TABLET ORAL at 08:56

## 2022-05-06 RX ADMIN — METOPROLOL TARTRATE 12.5 MG: 25 TABLET ORAL at 08:56

## 2022-05-06 RX ADMIN — OXYCODONE HYDROCHLORIDE 5 MG: 5 SOLUTION ORAL at 19:24

## 2022-05-06 RX ADMIN — HYDROMORPHONE HYDROCHLORIDE 1 MG: 1 INJECTION, SOLUTION INTRAMUSCULAR; INTRAVENOUS; SUBCUTANEOUS at 15:07

## 2022-05-06 RX ADMIN — GABAPENTIN 300 MG: 250 SOLUTION ORAL at 15:50

## 2022-05-06 RX ADMIN — OXYCODONE HYDROCHLORIDE 5 MG: 5 SOLUTION ORAL at 06:07

## 2022-05-06 RX ADMIN — ENOXAPARIN SODIUM 40 MG: 100 INJECTION SUBCUTANEOUS at 08:56

## 2022-05-06 RX ADMIN — METHIMAZOLE 10 MG: 10 TABLET ORAL at 08:56

## 2022-05-06 RX ADMIN — OXYCODONE HYDROCHLORIDE 5 MG: 5 SOLUTION ORAL at 02:16

## 2022-05-06 RX ADMIN — Medication 10 ML: at 20:00

## 2022-05-06 RX ADMIN — ACETAMINOPHEN 1000 MG: 325 SUSPENSION ORAL at 21:34

## 2022-05-06 RX ADMIN — Medication 10 ML: at 08:56

## 2022-05-06 RX ADMIN — ACETAMINOPHEN 975 MG: 325 SUSPENSION ORAL at 08:55

## 2022-05-06 RX ADMIN — HYDROMORPHONE HYDROCHLORIDE 1 MG: 1 INJECTION, SOLUTION INTRAMUSCULAR; INTRAVENOUS; SUBCUTANEOUS at 04:13

## 2022-05-06 RX ADMIN — ALTEPLASE: KIT at 12:10

## 2022-05-06 RX ADMIN — GABAPENTIN 300 MG: 250 SOLUTION ORAL at 21:35

## 2022-05-06 RX ADMIN — ACETAMINOPHEN 975 MG: 325 SUSPENSION ORAL at 15:50

## 2022-05-06 RX ADMIN — OXYCODONE HYDROCHLORIDE 5 MG: 5 SOLUTION ORAL at 12:58

## 2022-05-06 RX ADMIN — GABAPENTIN 300 MG: 250 SOLUTION ORAL at 08:56

## 2022-05-06 RX ADMIN — HYDROMORPHONE HYDROCHLORIDE 1 MG: 1 INJECTION, SOLUTION INTRAMUSCULAR; INTRAVENOUS; SUBCUTANEOUS at 08:56

## 2022-05-06 NOTE — PROCEDURES
Pre-op Diagnosis: Loculated pleural effusion     Post-Op Diagnosis: Loculated pleural effusion     Procedure performed: Irrigation of pleural cavity with TPA    Anesthesia: None    Summary of procedure: Patient was placed in the left lateral decubitus position in his bed, right side elevated.  Under sterile technique the pleural catheter was opened.  5 mg of TPA and 10 cc of normal saline was instilled into the pleural cavity via catheter.  The patient tolerated this well.  The pleural tube was clamped.  The patient's position was changed to every 15 minutes for 2 hours.  The tube was then unclamped.  Patient has tolerated the procedure well.

## 2022-05-06 NOTE — PLAN OF CARE
Vss, 2L NC, A&O, NSR, up with standby assist, surgical chest tube to waterseal, Ct guided chest tube had tpa placed again this shift and was just unclamped and placed back to suction. C/o pain treated with PRN riky and dilaudid. Significant other at bedside. No distress

## 2022-05-06 NOTE — DISCHARGE PLACEMENT REQUEST
"Maya Ribera (61 y.o. Female)             Date of Birth   1960    Social Security Number       Address   [address] [place] [address]    Home Phone   [phone]    MRN   [id number]       Buddhist   None    Marital Status   Single                            Admission Date   4/29/22    Admission Type   Elective    Admitting Provider   Hayder Finch III, MD    Attending Provider   Hayder Finch III, MD    Department, Room/Bed   [address], E554/1       Discharge Date       Discharge Disposition       Discharge Destination                               Attending Provider: Hayder Finch III, MD    Allergies: Codeine    Isolation: None   Infection: None   Code Status: CPR   Advance Care Planning Activity    Ht: 162.6 cm (64.02\")   Wt: 57.6 kg (127 lb)    Admission Cmt: None   Principal Problem: Esophageal cancer (HCC) [C15.9]                 Active Insurance as of 4/29/2022     Primary Coverage     Payor Plan Insurance Group Employer/Plan Group    [facility] BLUE [place] [id number]     Payor Plan Address Payor Plan Phone Number Payor Plan Fax Number Effective Dates    [address]   1/2/2010 - None Entered    [place] [id number]       Subscriber Name Subscriber Birth Date Member ID       MAYA RIBERA 1960 [id number]                 Emergency Contacts      (Rel.) Home Phone Work Phone Mobile Phone    magan paz (Significant Other) [phone] -- [phone]    MinturnHaile rodríguez (Son) [phone] -- [phone]    BENJY,BE (Sister) -- -- [phone]              "

## 2022-05-06 NOTE — PROGRESS NOTES
"  POST-OPERATIVE NOTE     Chief Complaint: Esophageal cancer, postoperative care  S/P: Right VAT with total decortication of the right lung with robot-assisted total esophagectomy, intercostal nerve block and jejunostomy tube insertion  POD # 7    Subjective:  Symptoms:  Stable.  She reports shortness of breath and weakness.    Diet:  NPO.  No nausea or vomiting.    Activity level: Impaired due to weakness.    Pain:  She complains of pain that is mild.        Objective:  General Appearance:  Uncomfortable, ill-appearing and in no acute distress.    Vital signs: (most recent): Blood pressure 114/75, pulse 71, temperature 97.7 °F (36.5 °C), temperature source Oral, resp. rate 17, height 162.6 cm (64.02\"), weight 57.6 kg (127 lb), SpO2 93 %.  Vital signs are normal.  No fever.    Output: Producing urine and minimal stool output.    Lungs:  Normal effort and normal respiratory rate.  There are decreased breath sounds.    Heart: Normal rate.  Regular rhythm.    Chest: Chest wall tenderness present.    Abdomen: Abdomen is soft.  Bowel sounds are normal.   There is no abdominal tenderness.     Pulses: Distal pulses are intact.    Neurological: Patient is alert and oriented to person, place and time.    Skin:  Warm and dry.              Chest tube:   Site: Right, Clean, Dry, Intact and Securement device intact  Suction: waterseal  Air Leak: negative  24 Hour Total: 95/110cc    Results Review:     I reviewed the patient's new clinical results.  I reviewed the patient's new imaging results and agree with the interpretation.  I reviewed the patient's other test results and agree with the interpretation  Discussed with patient, spouse at bedside, RN and Dr. Finch.    Assessment/Plan        Patient is POD #7, s/p right VAT with right lung decortication and robot-assisted total esophagectomy by Dr. Joan Moseley and Dr. Hayder Finch.  She is tolerating tube feeds.  She was previously on cyclic feeds at home and we will " transition her back to this with likely feeds for 16 hours/day.  CT of the chest demonstrates what appears to be an apical hematoma on the right.  Chest tube placed by Dr. Ha with interventional radiology.  Intrapleural alteplase infused yesterday and the patient has had some expected sanguinous output.  We will plan to infuse alteplase intrapleurally again today.  This procedure will be dictated in a separate note.  We will leave this chest tube to -20 cm suction and maintain surgical tube on waterseal.  Check x-ray in a.m.      BRANDI Ivy  [facility]  05/06/22  11:22 EDT    Patient was seen and assessed while wearing personal protective equipment including facemask, protective eyewear and gloves.  Hand hygiene performed prior to entering the room and upon exiting with doffing of gloves.

## 2022-05-06 NOTE — PROCEDURES
Pre-op Diagnosis: Loculated pleural effusion     Post-Op Diagnosis: Loculated pleural effusion     Procedure performed: Irrigation of pleural cavity with TPA    Anesthesia: None    Summary of procedure: Patient was placed in supine position in her recliner.  Under sterile technique the pleural catheter was opened.  10 mg of TPA and 25 cc of normal saline was instilled into the pleural cavity via catheter.  The patient tolerated this well.  The pleural tube was clamped.  The patient's position was changed to every 15 minutes for 2 hours.  The tube was then unclamped.  Patient has tolerated the procedure well.

## 2022-05-06 NOTE — CASE MANAGEMENT/SOCIAL WORK
Continued Stay Note  [facility]     Patient Name: Maya Ribera  MRN: [id number]  Today's Date: 5/6/2022    Admit Date: 4/29/2022     Discharge Plan     Row Name 05/06/22 1428       Plan    Plan Home with [facility] and  infusion    Patient/Family in Agreement with Plan yes    Plan Comments [facility] unable to accept at this time.  Referral to [facility] who have accepted.    infsuion continues to follow.  ISABELLE Ruiz RN               Discharge Codes    No documentation.               Expected Discharge Date and Time     Expected Discharge Date Expected Discharge Time    May 5, 2022             Mell Ruiz, RN

## 2022-05-06 NOTE — PLAN OF CARE
Goal Outcome Evaluation:  Plan of Care Reviewed With: patient           Outcome Evaluation: Pt participated with PT this PM. Pt increased ambulation distance to 360' with use of platform walker due to presence of multiple chest tubes, 1 to suction and O2. No balance concerns noted. OK to mobilize this weekend with nursing staff, plan to follow up monday in hopes to progress mobility. Will trial ambulation without platform walker when chest tubes are both off of suction.

## 2022-05-06 NOTE — PLAN OF CARE
Goal Outcome Evaluation:  VSS. Pain controlled with oxycodone and dilaudid. Pigtail CT continues to have minimal output. Flushed pigtail CT every 8 hours per order. No air leak or subq air noted. Good UOP. Pulmonary hygiene encouraged. Will continue to monitor.

## 2022-05-06 NOTE — THERAPY TREATMENT NOTE
Patient Name: Maya Ribera  : 1960    MRN: [id number]                              Today's Date: 2022       Admit Date: 2022    Visit Dx:     ICD-10-CM ICD-9-CM   1. Malignant neoplasm of lower third of esophagus (HCC)  C15.5 150.5     Patient Active Problem List   Diagnosis   • Esophageal cancer (HCC)   • Asymptomatic varicose veins of unspecified lower extremity   • Eczema   • Encounter for screening for malignant neoplasm of cervix   • Screening for malignant neoplasm of colon   • Arthritis   • Esophagitis   • Gastric ulcer   • Female cystocele   • Flat foot(734)   • Hiatal hernia   • Hyperlipidemia   • Hypertension   • Hyperthyroidism   • Irritable bowel syndrome   • Mitral regurgitation   • Paroxysmal atrial fibrillation (HCC)   • Severe esophageal dysplasia   • Mitral valve prolapse   • Encounter for management of implanted device     Past Medical History:   Diagnosis Date   • Boil, breast 2021    HEALING UNDER LEFT BREAST    • Cervical cancer (HCC)    • Dysphagia    • Esophageal cancer (HCC)    • Esophagitis 2021   • Gastric ulcer    • GERD (gastroesophageal reflux disease)    • Hyperlipidemia    • Hypertension    • Hyperthyroidism    • Irritable bowel    • Mitral valve prolapse     FOLLOWED BY     • PAF (paroxysmal atrial fibrillation) (HCC)    • Uses feeding tube      Past Surgical History:   Procedure Laterality Date   • CHOLECYSTECTOMY     • COLONOSCOPY  2021    [facility]   • ESOPHAGOGASTRECTOMY Right 2022    Procedure: BRONCHOSCOPY, RIGHT THORACOSCOPY WITH GeoTracI ROBOT WITH TOTAL ESOPHAGECTOMY, INTERCOSTAL NERVE BLOCK, JEJUNOSTOMY TUBE REPLACEMENT;  Surgeon: Hayder Finch III, MD;  Location: [facility] OR;  Service: Robotics - DaVinci;  Laterality: Right;   • EXTERNAL EAR SURGERY     • JEJUNOSTOMY N/A 12/15/2021    Procedure: open JEJUNOSTOMY tube placement;  Surgeon: Bhanu Hollis MD;  Location: [facility] OR;  Service: General;   Laterality: N/A;   • KNEE SURGERY Left    • REPLACEMENT TOTAL KNEE Left    • UPPER ENDOSCOPIC ULTRASOUND W/ FNA N/A 12/7/2021    Procedure: Esophagogastroduodenoscopy with endoscopic ultrasound  WITH STAGING AND FINE NEEDLE BIOPSY;  Surgeon: Amrit Green MD;  Location: [facility] ENDOSCOPY;  Service: Gastroenterology;  Laterality: N/A;  post op: inlet patch, esophageal mass, T2   • VENOUS ACCESS DEVICE (PORT) INSERTION Left 12/15/2021    Procedure: INSERTION OF PORTACATH;  Surgeon: Bhanu Hollis MD;  Location: [facility] MAIN OR;  Service: General;  Laterality: Left;      General Information     Row Name 05/06/22 1608          Physical Therapy Time and Intention    Document Type therapy note (daily note)  -     Mode of Treatment individual therapy;physical therapy  -     Row Name 05/06/22 1608          General Information    Patient Profile Reviewed yes  -     Existing Precautions/Restrictions fall;other (see comments)  2 chest tubes, 1 to suction  -     Row Name 05/06/22 1608          Cognition    Orientation Status (Cognition) oriented x 3  -     Row Name 05/06/22 1608          Safety Issues, Functional Mobility    Impairments Affecting Function (Mobility) endurance/activity tolerance;strength  -           User Key  (r) = Recorded By, (t) = Taken By, (c) = Cosigned By    Initials Name Provider Type     Jacki Santana, PT Physical Therapist               Mobility     Row Name 05/06/22 1609          Bed Mobility    Comment, (Bed Mobility) [facility] at arrival and departure  -     Row Name 05/06/22 1609          Sit-Stand Transfer    Sit-Stand [place] (Transfers) standby assist  -     Assistive Device (Sit-Stand Transfers) walker, rolling platform  -     Row Name 05/06/22 1609          Gait/Stairs (Locomotion)    [place] Level (Gait) contact guard;1 person to manage equipment;verbal cues  -     Assistive Device (Gait) walker, rolling platform  -     Distance in Feet (Gait) 360'  -      Deviations/Abnormal Patterns (Gait) stride length decreased;gait speed decreased;janine decreased  -CF     Bilateral Gait Deviations forward flexed posture  -CF     Comment, (Gait/Stairs) amb on O2 and 1 chest to to suction  -CF           User Key  (r) = Recorded By, (t) = Taken By, (c) = Cosigned By    Initials Name Provider Type    CF Jacki Santana, PT Physical Therapist               Obj/Interventions    No documentation.                Goals/Plan    No documentation.                Clinical Impression     Row Name 05/06/22 1610          Pain    Pretreatment Pain Rating 0/10 - no pain  -CF     Row Name 05/06/22 1610          Plan of Care Review    Plan of Care Reviewed With patient  -CF     Outcome Evaluation Pt participated with PT this PM. Pt increased ambulation distance to 360' with use of platform walker due to presence of multiple chest tubes, 1 to suction and O2. No balance concerns noted. OK to mobilize this weekend with nursing staff, plan to follow up monday in hopes to progress mobility. Will trial ambulation without platform walker when chest tubes are both off of suction.  -CF     Row Name 05/06/22 1610          Vital Signs    O2 Delivery Pre Treatment supplemental O2  2L  -CF     O2 Delivery Intra Treatment supplemental O2  -CF     O2 Delivery Post Treatment supplemental O2  -CF     Row Name 05/06/22 1610          Positioning and Restraints    Pre-Treatment Position sitting in chair/recliner  -CF     Post Treatment Position chair  -CF     In Chair notified nsg;call light within reach;encouraged to call for assist;exit alarm on;reclined;legs elevated  -CF           User Key  (r) = Recorded By, (t) = Taken By, (c) = Cosigned By    Initials Name Provider Type    CF Jacki Santana, ALICIA Physical Therapist               Outcome Measures     Row Name 05/06/22 1613          How much help from another person do you currently need...    Turning from your back to your side while in flat bed without  using bedrails? 3  -CF     Moving from lying on back to sitting on the side of a flat bed without bedrails? 3  -CF     Moving to and from a bed to a chair (including a wheelchair)? 3  -CF     Standing up from a chair using your arms (e.g., wheelchair, bedside chair)? 3  -CF     Climbing 3-5 steps with a railing? 2  -CF     To walk in hospital room? 3  -CF     AM-PAC 6 Clicks Score (PT) 17  -CF     Highest level of mobility 5 --> Static standing  -CF     Row Name 05/06/22 1613          Functional Assessment    Outcome Measure Options AM-PAC 6 Clicks Basic Mobility (PT)  -CF           User Key  (r) = Recorded By, (t) = Taken By, (c) = Cosigned By    Initials Name Provider Type    CF Jacki Santana, PT Physical Therapist                             Physical Therapy Education                 Title: PT OT SLP Therapies (Done)     Topic: Physical Therapy (Done)     Point: Mobility training (Done)     Learning Progress Summary           Patient Acceptance, E, VU,NR by  at 5/6/2022 1614    Acceptance, E, VU by EB at 5/5/2022 1631    Acceptance, E,TB,D, VU,DU,NR by  at 5/3/2022 1150    Acceptance, E, VU by  at 5/1/2022 1421    Acceptance, E, VU,NR by  at 4/30/2022 1430   Significant Other Acceptance, E, VU,NR by  at 4/30/2022 1430                   Point: Home exercise program (Done)     Learning Progress Summary           Patient Acceptance, E, VU,NR by CF at 5/6/2022 1614    Acceptance, E, VU by EB at 5/5/2022 1631    Acceptance, E,TB,D, VU,DU,NR by KA at 5/3/2022 1150    Acceptance, E, VU by  at 5/1/2022 1421    Acceptance, E, VU,NR by  at 4/30/2022 1430   Significant Other Acceptance, E, VU,NR by  at 4/30/2022 1430                   Point: Body mechanics (Done)     Learning Progress Summary           Patient Acceptance, E, VU,NR by CF at 5/6/2022 1614    Acceptance, E, VU by EB at 5/5/2022 1631    Acceptance, E,TB,D, VU,DU,NR by  at 5/3/2022 1150    Acceptance, MARY, SWETA by  at 5/1/2022 1421     Acceptance, E, VU,NR by  at 4/30/2022 1430   Significant Other Acceptance, E, VU,NR by  at 4/30/2022 1430                   Point: Precautions (Done)     Learning Progress Summary           Patient Acceptance, E, VU,NR by  at 5/6/2022 1614    Acceptance, E, VU by EB at 5/5/2022 1631    Acceptance, E,TB,D, VU,DU,NR by  at 5/3/2022 1150    Acceptance, E, VU by  at 5/1/2022 1421    Acceptance, E, VU,NR by  at 4/30/2022 1430   Significant Other Acceptance, E, VU,NR by  at 4/30/2022 1430                               User Key     Initials Effective Dates Name Provider Type Discipline    EB 06/16/21 -  Loli Bashir, PTA Physical Therapist Assistant PT     06/16/21 -  Adarsh Solorzano, PT Physical Therapist PT     06/16/21 -  Jacki Santana, ALICIA Physical Therapist PT     03/11/22 -  Taylor Arellano PT Student PT Student PT              PT Recommendation and Plan     Plan of Care Reviewed With: patient  Outcome Evaluation: Pt participated with PT this PM. Pt increased ambulation distance to 360' with use of platform walker due to presence of multiple chest tubes, 1 to suction and O2. No balance concerns noted. OK to mobilize this weekend with nursing staff, plan to follow up monday in hopes to progress mobility. Will trial ambulation without platform walker when chest tubes are both off of suction.     Time Calculation:    PT Charges     Row Name 05/06/22 1607             Time Calculation    Start Time 1510  -CF      Stop Time 1540  -CF      Time Calculation (min) 30 min  -CF      PT Received On 05/06/22  -CF      PT - Next Appointment 05/09/22  -CF            User Key  (r) = Recorded By, (t) = Taken By, (c) = Cosigned By    Initials Name Provider Type     Jacki Santana, PT Physical Therapist              Therapy Charges for Today     Code Description Service Date Service Provider Modifiers Qty    03888526488  GAIT TRAINING EA 15 MIN 5/6/2022 Jacki Santana, PT GP 1    [id number]  PT  THERAPEUTIC ACT EA 15 MIN 5/6/2022 Jacki Santana, PT GP 1          PT G-Codes  Outcome Measure Options: AM-PAC 6 Clicks Basic Mobility (PT)  AM-PAC 6 Clicks Score (PT): 17    Jacki Santana, PT  5/6/2022

## 2022-05-07 ENCOUNTER — APPOINTMENT (OUTPATIENT)
Dept: GENERAL RADIOLOGY | Facility: HOSPITAL | Age: 62
End: 2022-05-07

## 2022-05-07 LAB
ALBUMIN SERPL-MCNC: 2.9 G/DL (ref 3.5–5.2)
ALBUMIN/GLOB SERPL: 1 G/DL
ALP SERPL-CCNC: 127 U/L (ref 39–117)
ALT SERPL W P-5'-P-CCNC: 10 U/L (ref 1–33)
ANION GAP SERPL CALCULATED.3IONS-SCNC: 6 MMOL/L (ref 5–15)
AST SERPL-CCNC: 18 U/L (ref 1–32)
BILIRUB SERPL-MCNC: 0.2 MG/DL (ref 0–1.2)
BUN SERPL-MCNC: 19 MG/DL (ref 8–23)
BUN/CREAT SERPL: 63.3 (ref 7–25)
CALCIUM SPEC-SCNC: 8.7 MG/DL (ref 8.6–10.5)
CHLORIDE SERPL-SCNC: 105 MMOL/L (ref 98–107)
CO2 SERPL-SCNC: 28 MMOL/L (ref 22–29)
CREAT SERPL-MCNC: 0.3 MG/DL (ref 0.57–1)
DEPRECATED RDW RBC AUTO: 45 FL (ref 37–54)
EGFRCR SERPLBLD CKD-EPI 2021: 120.9 ML/MIN/1.73
ERYTHROCYTE [DISTWIDTH] IN BLOOD BY AUTOMATED COUNT: 12.7 % (ref 12.3–15.4)
GLOBULIN UR ELPH-MCNC: 2.8 GM/DL
GLUCOSE SERPL-MCNC: 112 MG/DL (ref 65–99)
HCT VFR BLD AUTO: 25.7 % (ref 34–46.6)
HGB BLD-MCNC: 8.4 G/DL (ref 12–15.9)
MCH RBC QN AUTO: 32.2 PG (ref 26.6–33)
MCHC RBC AUTO-ENTMCNC: 32.7 G/DL (ref 31.5–35.7)
MCV RBC AUTO: 98.5 FL (ref 79–97)
PLATELET # BLD AUTO: 278 10*3/MM3 (ref 140–450)
PMV BLD AUTO: 10.2 FL (ref 6–12)
POTASSIUM SERPL-SCNC: 4.2 MMOL/L (ref 3.5–5.2)
PROT SERPL-MCNC: 5.7 G/DL (ref 6–8.5)
RBC # BLD AUTO: 2.61 10*6/MM3 (ref 3.77–5.28)
SODIUM SERPL-SCNC: 139 MMOL/L (ref 136–145)
WBC NRBC COR # BLD: 8.22 10*3/MM3 (ref 3.4–10.8)

## 2022-05-07 PROCEDURE — 80053 COMPREHEN METABOLIC PANEL: CPT | Performed by: NURSE PRACTITIONER

## 2022-05-07 PROCEDURE — 25010000002 ENOXAPARIN PER 10 MG: Performed by: NURSE PRACTITIONER

## 2022-05-07 PROCEDURE — 85027 COMPLETE CBC AUTOMATED: CPT | Performed by: NURSE PRACTITIONER

## 2022-05-07 PROCEDURE — 71045 X-RAY EXAM CHEST 1 VIEW: CPT

## 2022-05-07 PROCEDURE — 25010000002 HYDROMORPHONE 1 MG/ML SOLUTION: Performed by: NURSE PRACTITIONER

## 2022-05-07 PROCEDURE — 99024 POSTOP FOLLOW-UP VISIT: CPT | Performed by: THORACIC SURGERY (CARDIOTHORACIC VASCULAR SURGERY)

## 2022-05-07 RX ORDER — DIPHENOXYLATE HYDROCHLORIDE AND ATROPINE SULFATE 2.5; .025 MG/1; MG/1
1 TABLET ORAL ONCE AS NEEDED
Status: DISCONTINUED | OUTPATIENT
Start: 2022-05-07 | End: 2022-05-09 | Stop reason: HOSPADM

## 2022-05-07 RX ADMIN — OXYCODONE HYDROCHLORIDE 5 MG: 5 SOLUTION ORAL at 00:28

## 2022-05-07 RX ADMIN — HYDROMORPHONE HYDROCHLORIDE 1 MG: 1 INJECTION, SOLUTION INTRAMUSCULAR; INTRAVENOUS; SUBCUTANEOUS at 17:55

## 2022-05-07 RX ADMIN — OXYCODONE HYDROCHLORIDE 5 MG: 5 SOLUTION ORAL at 04:31

## 2022-05-07 RX ADMIN — PRAVASTATIN SODIUM 20 MG: 20 TABLET ORAL at 08:37

## 2022-05-07 RX ADMIN — ACETAMINOPHEN 1000 MG: 325 SUSPENSION ORAL at 15:05

## 2022-05-07 RX ADMIN — GABAPENTIN 300 MG: 250 SOLUTION ORAL at 15:05

## 2022-05-07 RX ADMIN — Medication 10 ML: at 21:24

## 2022-05-07 RX ADMIN — HYDROMORPHONE HYDROCHLORIDE 1 MG: 1 INJECTION, SOLUTION INTRAMUSCULAR; INTRAVENOUS; SUBCUTANEOUS at 14:04

## 2022-05-07 RX ADMIN — ENOXAPARIN SODIUM 40 MG: 100 INJECTION SUBCUTANEOUS at 08:38

## 2022-05-07 RX ADMIN — ACETAMINOPHEN 1000 MG: 325 SUSPENSION ORAL at 08:38

## 2022-05-07 RX ADMIN — OXYCODONE HYDROCHLORIDE 5 MG: 5 SOLUTION ORAL at 15:06

## 2022-05-07 RX ADMIN — GABAPENTIN 300 MG: 250 SOLUTION ORAL at 08:38

## 2022-05-07 RX ADMIN — METOPROLOL TARTRATE 12.5 MG: 25 TABLET ORAL at 21:24

## 2022-05-07 RX ADMIN — HYDROMORPHONE HYDROCHLORIDE 1 MG: 1 INJECTION, SOLUTION INTRAMUSCULAR; INTRAVENOUS; SUBCUTANEOUS at 10:16

## 2022-05-07 RX ADMIN — Medication 10 ML: at 08:38

## 2022-05-07 RX ADMIN — HYDROMORPHONE HYDROCHLORIDE 1 MG: 1 INJECTION, SOLUTION INTRAMUSCULAR; INTRAVENOUS; SUBCUTANEOUS at 21:15

## 2022-05-07 RX ADMIN — METHIMAZOLE 10 MG: 10 TABLET ORAL at 08:37

## 2022-05-07 RX ADMIN — GABAPENTIN 300 MG: 250 SOLUTION ORAL at 21:24

## 2022-05-07 RX ADMIN — METOPROLOL TARTRATE 12.5 MG: 25 TABLET ORAL at 00:29

## 2022-05-07 RX ADMIN — ACETAMINOPHEN 1000 MG: 325 SUSPENSION ORAL at 21:23

## 2022-05-07 RX ADMIN — HYDROMORPHONE HYDROCHLORIDE 1 MG: 1 INJECTION, SOLUTION INTRAMUSCULAR; INTRAVENOUS; SUBCUTANEOUS at 06:30

## 2022-05-07 RX ADMIN — METOPROLOL TARTRATE 12.5 MG: 25 TABLET ORAL at 08:38

## 2022-05-07 NOTE — PROGRESS NOTES
"  POST-OPERATIVE NOTE     Chief Complaint: Esophageal cancer, postoperative care  S/P: Right VAT with total decortication of the right lung with robot-assisted total esophagectomy, intercostal nerve block and jejunostomy tube insertion  POD # 8    Subjective:  Symptoms:  Stable.  She reports chest pain and anxiety.  No shortness of breath.    Diet:  NPO.  No nausea or vomiting.    Activity level: Returning to normal.    Pain:  She complains of pain that is mild.  Pain is well controlled.           Objective:  General Appearance:  Comfortable and in no acute distress.    Vital signs: (most recent): Blood pressure 108/61, pulse 77, temperature 98.1 °F (36.7 °C), temperature source Oral, resp. rate 16, height 162.6 cm (64.02\"), weight 57.6 kg (127 lb), SpO2 94 %.  No fever.    Output: Producing urine and producing stool.    Lungs:  Normal effort and normal respiratory rate.  Breath sounds clear to auscultation.    Heart: Normal rate.  Regular rhythm.  No murmur.   Abdomen: Abdomen is soft.  Bowel sounds are normal.   There is epigastric tenderness.    Extremities: There is no dependent edema.    Neurological: Patient is alert and oriented to person, place and time.  Normal strength.              Chest tube:   Site: Right, Clean, Dry, Intact and Securement device intact  Suction: waterseal and -20 cm  Air Leak: negative  24 Hour Total: 60/154 ml    Results Review:     I reviewed the patient's new clinical results.  I reviewed the patient's new imaging results and agree with the interpretation.    Assessment/Plan      Right lateral surgical chest tube has minimal drainage.  Chest x-ray has remained stable.  Chest tube was removed today completely and intact.  Apical pleural catheter is in good position.  We will discontinue suction on this catheter today with plans to remove the catheter in the next day or 2.  Anticipate discharge home Monday.      Hayder Finch III, MD  [facility]  05/07/22  13:23 " EDT    Patient was seen and assessed while wearing personal protective equipment including facemask, protective eyewear and gloves.  Hand hygiene performed prior to entering the room and upon exiting with doffing of gloves.

## 2022-05-07 NOTE — PLAN OF CARE
Problem: Adult Inpatient Plan of Care  Goal: Plan of Care Review  Flowsheets (Taken 5/7/2022 7110)  Progress: no change  Plan of Care Reviewed With: patient  Outcome Evaluation: pain controlled with liquid pain med per j tube having moderate amount of pain in fight flank around chest tubes and sore throat started having diarrhea liquid stools had apx 4 dr linker notified at 0030 orders received feeding hel for one hour until 0130 restarted at 0230 then had a stool at 0430 so went two hours tube feed to be discontinued at 0600 incisions intact chest tubes patent to on to waterseal and pigtial to suction tube flushed with 10cc of normal saline at 2200 and 0600 bloody drainage from pigtail   Goal Outcome Evaluation:  Plan of Care Reviewed With: patient        Progress: no change  Outcome Evaluation: pain controlled with liquid pain med per j tube having moderate amount of pain in fight flank around chest tubes and sore throat started having diarrhea liquid stools had apx 4 dr charisse notified at 0030 orders received feeding hel for one hour until 0130 restarted at 0230 then had a stool at 0430 so went two hours tube feed to be discontinued at 0600 incisions intact chest tubes patent to on to waterseal and pigtial to suction tube flushed with 10cc of normal saline at 2200 and 0600 bloody drainage from pigtail

## 2022-05-08 ENCOUNTER — APPOINTMENT (OUTPATIENT)
Dept: GENERAL RADIOLOGY | Facility: HOSPITAL | Age: 62
End: 2022-05-08

## 2022-05-08 LAB
ALBUMIN SERPL-MCNC: 2.8 G/DL (ref 3.5–5.2)
ALBUMIN/GLOB SERPL: 0.8 G/DL
ALP SERPL-CCNC: 135 U/L (ref 39–117)
ALT SERPL W P-5'-P-CCNC: 9 U/L (ref 1–33)
ANION GAP SERPL CALCULATED.3IONS-SCNC: 6.7 MMOL/L (ref 5–15)
AST SERPL-CCNC: 16 U/L (ref 1–32)
BILIRUB SERPL-MCNC: <0.2 MG/DL (ref 0–1.2)
BUN SERPL-MCNC: 22 MG/DL (ref 8–23)
BUN/CREAT SERPL: 68.8 (ref 7–25)
CALCIUM SPEC-SCNC: 9.1 MG/DL (ref 8.6–10.5)
CHLORIDE SERPL-SCNC: 102 MMOL/L (ref 98–107)
CO2 SERPL-SCNC: 27.3 MMOL/L (ref 22–29)
CREAT SERPL-MCNC: 0.32 MG/DL (ref 0.57–1)
EGFRCR SERPLBLD CKD-EPI 2021: 119 ML/MIN/1.73
GLOBULIN UR ELPH-MCNC: 3.4 GM/DL
GLUCOSE SERPL-MCNC: 128 MG/DL (ref 65–99)
POTASSIUM SERPL-SCNC: 4.6 MMOL/L (ref 3.5–5.2)
PROT SERPL-MCNC: 6.2 G/DL (ref 6–8.5)
SODIUM SERPL-SCNC: 136 MMOL/L (ref 136–145)

## 2022-05-08 PROCEDURE — 25010000002 HYDROMORPHONE 1 MG/ML SOLUTION: Performed by: NURSE PRACTITIONER

## 2022-05-08 PROCEDURE — 25010000002 ENOXAPARIN PER 10 MG: Performed by: NURSE PRACTITIONER

## 2022-05-08 PROCEDURE — 71045 X-RAY EXAM CHEST 1 VIEW: CPT

## 2022-05-08 PROCEDURE — 99024 POSTOP FOLLOW-UP VISIT: CPT | Performed by: THORACIC SURGERY (CARDIOTHORACIC VASCULAR SURGERY)

## 2022-05-08 PROCEDURE — 80053 COMPREHEN METABOLIC PANEL: CPT | Performed by: NURSE PRACTITIONER

## 2022-05-08 RX ADMIN — HYDROMORPHONE HYDROCHLORIDE 1 MG: 1 INJECTION, SOLUTION INTRAMUSCULAR; INTRAVENOUS; SUBCUTANEOUS at 14:30

## 2022-05-08 RX ADMIN — HYDROMORPHONE HYDROCHLORIDE 1 MG: 1 INJECTION, SOLUTION INTRAMUSCULAR; INTRAVENOUS; SUBCUTANEOUS at 01:51

## 2022-05-08 RX ADMIN — ENOXAPARIN SODIUM 40 MG: 100 INJECTION SUBCUTANEOUS at 08:42

## 2022-05-08 RX ADMIN — OXYCODONE HYDROCHLORIDE 5 MG: 5 SOLUTION ORAL at 20:19

## 2022-05-08 RX ADMIN — ACETAMINOPHEN 1000 MG: 325 SUSPENSION ORAL at 16:00

## 2022-05-08 RX ADMIN — GABAPENTIN 300 MG: 250 SOLUTION ORAL at 20:19

## 2022-05-08 RX ADMIN — ACETAMINOPHEN 1000 MG: 325 SUSPENSION ORAL at 20:20

## 2022-05-08 RX ADMIN — METOPROLOL TARTRATE 12.5 MG: 25 TABLET ORAL at 08:43

## 2022-05-08 RX ADMIN — GABAPENTIN 300 MG: 250 SOLUTION ORAL at 16:00

## 2022-05-08 RX ADMIN — OXYCODONE HYDROCHLORIDE 5 MG: 5 SOLUTION ORAL at 04:34

## 2022-05-08 RX ADMIN — OXYCODONE HYDROCHLORIDE 5 MG: 5 SOLUTION ORAL at 16:00

## 2022-05-08 RX ADMIN — HYDROMORPHONE HYDROCHLORIDE 1 MG: 1 INJECTION, SOLUTION INTRAMUSCULAR; INTRAVENOUS; SUBCUTANEOUS at 18:13

## 2022-05-08 RX ADMIN — HYDROMORPHONE HYDROCHLORIDE 1 MG: 1 INJECTION, SOLUTION INTRAMUSCULAR; INTRAVENOUS; SUBCUTANEOUS at 07:04

## 2022-05-08 RX ADMIN — Medication 10 ML: at 20:20

## 2022-05-08 RX ADMIN — METOPROLOL TARTRATE 12.5 MG: 25 TABLET ORAL at 20:20

## 2022-05-08 RX ADMIN — ACETAMINOPHEN 1000 MG: 325 SUSPENSION ORAL at 08:42

## 2022-05-08 RX ADMIN — OXYCODONE HYDROCHLORIDE 5 MG: 5 SOLUTION ORAL at 08:42

## 2022-05-08 RX ADMIN — METHIMAZOLE 10 MG: 10 TABLET ORAL at 08:42

## 2022-05-08 RX ADMIN — Medication 10 ML: at 08:43

## 2022-05-08 RX ADMIN — PRAVASTATIN SODIUM 20 MG: 20 TABLET ORAL at 08:42

## 2022-05-08 RX ADMIN — GABAPENTIN 300 MG: 250 SOLUTION ORAL at 09:00

## 2022-05-08 NOTE — PROGRESS NOTES
"  POST-OPERATIVE NOTE     Chief Complaint: Esophageal cancer, postoperative care  S/P: Right VAT with total decortication of the right lung with robot-assisted total esophagectomy, intercostal nerve block and jejunostomy tube insertion  POD # 9    Subjective:  Symptoms:  Improved.  No shortness of breath or chest pain.    Diet:  NPO.  No nausea or vomiting.    Activity level: Returning to normal.    Pain:  She reports no pain.        Objective:  General Appearance:  Comfortable and in no acute distress.    Vital signs: (most recent): Blood pressure 94/72, pulse 90, temperature 97.9 °F (36.6 °C), temperature source Oral, resp. rate 16, height 162.6 cm (64.02\"), weight 62.5 kg (137 lb 12.8 oz), SpO2 95 %.  No fever.    Output: Producing urine and producing stool.    Lungs:  Normal effort and normal respiratory rate.  There are decreased breath sounds.    Heart: Normal rate.  Regular rhythm.  No murmur.   Abdomen: Abdomen is soft.  Bowel sounds are normal.   There is no abdominal tenderness.   There is no mass.   Extremities: There is no dependent edema.    Neurological: Patient is alert and oriented to person, place and time.  Normal strength.              Chest tube:   Site: Right, Clean, Dry, Intact and Securement device intact  Suction: waterseal  Air Leak: negative  24 Hour Total: 60 ml    Results Review:     I reviewed the patient's new clinical results.  I reviewed the patient's new imaging results and agree with the interpretation.    Assessment/Plan     Chest x-ray was improved.  No significant pneumothorax.  Pleural catheter removed completely and intact.  Will check chest x-ray in the morning.  If stable plan to discharge patient home tomorrow.  Continue current therapy.    Hayder Finch III, MD  [facility]  05/08/22  17:10 EDT    Patient was seen and assessed while wearing personal protective equipment including facemask, protective eyewear and gloves.  Hand hygiene performed prior to " entering the room and upon exiting with doffing of gloves.

## 2022-05-08 NOTE — PLAN OF CARE
Problem: Adult Inpatient Plan of Care  Goal: Plan of Care Review  Flowsheets (Taken 5/8/2022 1173)  Progress: improving  Plan of Care Reviewed With: patient  Outcome Evaluation: chest tube to water seal irigated with 10cc of  normal saline q 8 hours dilaudid given iv for pain and is controlling pain no diarrhea this shift when tube feeds have been running since 6pm   Goal Outcome Evaluation:  Plan of Care Reviewed With: patient        Progress: improving  Outcome Evaluation: chest tube to water seal irigated with 10cc of  normal saline q 8 hours dilaudid given iv for pain and is controlling pain no diarrhea this shift when tube feeds have been running since 6pm

## 2022-05-09 ENCOUNTER — HOME HEALTH ADMISSION (OUTPATIENT)
Dept: HOME HEALTH SERVICES | Facility: HOME HEALTHCARE | Age: 62
End: 2022-05-09

## 2022-05-09 ENCOUNTER — READMISSION MANAGEMENT (OUTPATIENT)
Dept: CALL CENTER | Facility: HOSPITAL | Age: 62
End: 2022-05-09

## 2022-05-09 ENCOUNTER — APPOINTMENT (OUTPATIENT)
Dept: GENERAL RADIOLOGY | Facility: HOSPITAL | Age: 62
End: 2022-05-09

## 2022-05-09 VITALS
SYSTOLIC BLOOD PRESSURE: 124 MMHG | TEMPERATURE: 97.9 F | WEIGHT: 118.4 LBS | BODY MASS INDEX: 20.22 KG/M2 | HEIGHT: 64 IN | HEART RATE: 88 BPM | DIASTOLIC BLOOD PRESSURE: 63 MMHG | RESPIRATION RATE: 16 BRPM | OXYGEN SATURATION: 96 %

## 2022-05-09 LAB
ALBUMIN SERPL-MCNC: 3.3 G/DL (ref 3.5–5.2)
ALBUMIN/GLOB SERPL: 1 G/DL
ALP SERPL-CCNC: 144 U/L (ref 39–117)
ALT SERPL W P-5'-P-CCNC: 13 U/L (ref 1–33)
ANION GAP SERPL CALCULATED.3IONS-SCNC: 6.8 MMOL/L (ref 5–15)
AST SERPL-CCNC: 17 U/L (ref 1–32)
BILIRUB SERPL-MCNC: <0.2 MG/DL (ref 0–1.2)
BUN SERPL-MCNC: 24 MG/DL (ref 8–23)
BUN/CREAT SERPL: 75 (ref 7–25)
CALCIUM SPEC-SCNC: 9.2 MG/DL (ref 8.6–10.5)
CHLORIDE SERPL-SCNC: 103 MMOL/L (ref 98–107)
CO2 SERPL-SCNC: 26.2 MMOL/L (ref 22–29)
CREAT SERPL-MCNC: 0.32 MG/DL (ref 0.57–1)
EGFRCR SERPLBLD CKD-EPI 2021: 119 ML/MIN/1.73
GLOBULIN UR ELPH-MCNC: 3.4 GM/DL
GLUCOSE SERPL-MCNC: 123 MG/DL (ref 65–99)
POTASSIUM SERPL-SCNC: 4.4 MMOL/L (ref 3.5–5.2)
PROT SERPL-MCNC: 6.7 G/DL (ref 6–8.5)
SODIUM SERPL-SCNC: 136 MMOL/L (ref 136–145)

## 2022-05-09 PROCEDURE — 25010000002 HYDROMORPHONE 1 MG/ML SOLUTION: Performed by: NURSE PRACTITIONER

## 2022-05-09 PROCEDURE — 94618 PULMONARY STRESS TESTING: CPT

## 2022-05-09 PROCEDURE — 80053 COMPREHEN METABOLIC PANEL: CPT | Performed by: NURSE PRACTITIONER

## 2022-05-09 PROCEDURE — 25010000002 ENOXAPARIN PER 10 MG: Performed by: NURSE PRACTITIONER

## 2022-05-09 PROCEDURE — 97530 THERAPEUTIC ACTIVITIES: CPT

## 2022-05-09 PROCEDURE — 99024 POSTOP FOLLOW-UP VISIT: CPT | Performed by: NURSE PRACTITIONER

## 2022-05-09 PROCEDURE — 25010000002 HEPARIN LOCK FLUSH PER 10 UNITS: Performed by: NURSE PRACTITIONER

## 2022-05-09 PROCEDURE — 71046 X-RAY EXAM CHEST 2 VIEWS: CPT

## 2022-05-09 RX ORDER — OXYCODONE HCL 5 MG/5 ML
5 SOLUTION, ORAL ORAL EVERY 4 HOURS PRN
Qty: 473 ML | Refills: 0 | Status: SHIPPED | OUTPATIENT
Start: 2022-05-09 | End: 2022-05-17 | Stop reason: SDUPTHER

## 2022-05-09 RX ORDER — LANSOPRAZOLE 30 MG/1
30 TABLET, ORALLY DISINTEGRATING, DELAYED RELEASE ORAL
Qty: 60 TABLET | Refills: 1 | Status: SHIPPED | OUTPATIENT
Start: 2022-05-09 | End: 2022-06-03 | Stop reason: SDUPTHER

## 2022-05-09 RX ORDER — GABAPENTIN 250 MG/5ML
300 SOLUTION ORAL 3 TIMES DAILY
Qty: 366 ML | Refills: 0 | Status: SHIPPED | OUTPATIENT
Start: 2022-05-09 | End: 2022-05-17 | Stop reason: SDUPTHER

## 2022-05-09 RX ORDER — HEPARIN SODIUM (PORCINE) LOCK FLUSH IV SOLN 100 UNIT/ML 100 UNIT/ML
5 SOLUTION INTRAVENOUS AS NEEDED
Status: DISCONTINUED | OUTPATIENT
Start: 2022-05-09 | End: 2022-05-09 | Stop reason: HOSPADM

## 2022-05-09 RX ADMIN — ACETAMINOPHEN 1000 MG: 325 SUSPENSION ORAL at 08:24

## 2022-05-09 RX ADMIN — GABAPENTIN 300 MG: 250 SOLUTION ORAL at 08:37

## 2022-05-09 RX ADMIN — Medication 500 UNITS: at 15:05

## 2022-05-09 RX ADMIN — HYDROMORPHONE HYDROCHLORIDE 1 MG: 1 INJECTION, SOLUTION INTRAMUSCULAR; INTRAVENOUS; SUBCUTANEOUS at 02:56

## 2022-05-09 RX ADMIN — ENOXAPARIN SODIUM 40 MG: 100 INJECTION SUBCUTANEOUS at 08:25

## 2022-05-09 RX ADMIN — Medication 10 ML: at 08:25

## 2022-05-09 RX ADMIN — METOPROLOL TARTRATE 12.5 MG: 25 TABLET ORAL at 08:25

## 2022-05-09 RX ADMIN — OXYCODONE HYDROCHLORIDE 5 MG: 5 SOLUTION ORAL at 10:10

## 2022-05-09 RX ADMIN — OXYCODONE HYDROCHLORIDE 5 MG: 5 SOLUTION ORAL at 14:59

## 2022-05-09 RX ADMIN — OXYCODONE HYDROCHLORIDE 5 MG: 5 SOLUTION ORAL at 01:39

## 2022-05-09 RX ADMIN — PRAVASTATIN SODIUM 20 MG: 20 TABLET ORAL at 08:25

## 2022-05-09 RX ADMIN — METHIMAZOLE 10 MG: 10 TABLET ORAL at 08:25

## 2022-05-09 NOTE — PROGRESS NOTES
[facility] to provide Home Care services. Patient agreeable. Contact information confirmed. Please call YESSY Muniz to schedule HH visits: [phone].    [facility] provides Tube Feeding supplies.

## 2022-05-09 NOTE — PROGRESS NOTES
Adult Nutrition  Assessment/PES    Patient Name:  Maya Ribera  YOB: 1960  MRN: [id number]  Admit Date:  4/29/2022    Assessment Date:  5/9/2022  Nutrition follow up. POD 10  Jtube in place. Tolerating cyclic feeds- Impact Peptide 1.5 @ 100 cc/hr x 12 hours.   Spoke with patient regarding home TF regimen.  RD to follow.     Reason for Assessment     Row Name 05/09/22 1142          Reason for Assessment    Reason For Assessment follow-up protocol;TF/PN     Diagnosis  POD 10Right VAT with total decortication of the right lung with robot-assisted total esophagectomy, intercostal nerve block and jejunostomy tube insertion                Nutrition/Diet History     Row Name 05/09/22 1142          Nutrition/Diet History    Typical Intake (Food/Fluid/EN/PN) tolerating cyclic feeds-Impact Peptide 1.5 @ 100 cc/hr x 12 hours                Anthropometrics     Row Name 05/09/22 0510          Anthropometrics    Weight 53.7 kg (118 lb 6.4 oz)                Labs/Tests/Procedures/Meds     Row Name 05/09/22 1142          Labs/Procedures/Meds    Lab Results Reviewed reviewed, pertinent     Lab Results Comments Glu            Diagnostic Tests/Procedures    Diagnostic Test/Procedure Reviewed reviewed, pertinent            Medications    Pertinent Medications Reviewed reviewed, pertinent                Physical Findings     Row Name 05/09/22 1142          Physical Findings    Overall Physical Appearance incisions     Enteral Access Devices jejunostomy tube                Estimated/Assessed Needs - Anthropometrics     Row Name 05/09/22 0510          Anthropometrics    Weight 53.7 kg (118 lb 6.4 oz)                Nutrition Prescription Ordered     Row Name 05/09/22 1142          Nutrition Prescription PO    Current PO Diet NPO            Nutrition Prescription EN    Enteral Route Jejunostomy     Product Impact Peptide 1.5 (Pivot 1.5)     TF Delivery Method Cyclic     Cyclic TF Goal Rate (mL/hr) 100 mL/hr     Cyclic  TF Cycle Over (hrs)  12     Water flush (mL)  25 mL     Water Flush Frequency Per hour                Evaluation of Received Nutrient/Fluid Intake     Row Name 05/09/22 1143          Calories Evaluation    Enteral Calories (kcal) 1800            Protein Evaluation    Enteral Protein (g) 113            Intake Assessment    Energy/Calorie Requirement Assessment meeting needs     Protein Requirement Assessment meeting needs            Fluid Intake Evaluation    Enteral Fluid (mL) 1008     Other Fluid (mL) 600                     Problem/Interventions:           Intervention Goal     Row Name 05/09/22 1143          Intervention Goal    General Maintain nutrition;Meet nutritional needs for age/condition;Disease management/therapy     TF/PN Maintain TF/PN;Tolerate TF at goal     Weight Maintain weight                Nutrition Intervention     Row Name 05/09/22 1143          Nutrition Intervention    RD/Tech Action Follow Tx progress;Care plan reviewd;Interview for preference                Nutrition Prescription     Row Name 05/09/22 1143          Nutrition Prescription EN    Enteral Prescription Continue same protocol                Education/Evaluation     Row Name 05/09/22 1143          Education    Education Will Instruct as appropriate            Monitor/Evaluation    Monitor Per protocol;I&O;Pertinent labs;TF delivery/tolerance;Weight;Skin status;Symptoms                 Electronically signed by:  Tana Finn RD  05/09/22 11:44 EDT

## 2022-05-09 NOTE — PAYOR COMM NOTE
"Maya Ribera (61 y.o. Female)       ATTN: DISCHARGE SUMMARY TO REVIEW: [id number]     DEPT: [phone],  [phone]              Date of Birth   1960    Social Security Number       Address   [address] [id number]    Home Phone   [phone]    MRN   [id number]       Anglican   None    Marital Status   Single                            Admission Date   4/29/22    Admission Type   Elective    Admitting Provider   Hayder Finch III, MD    Attending Provider       Department, Room/Bed   [address], E554/1       Discharge Date   5/9/2022    [place] [facility]    [place] Destination                               Attending Provider: (none)   Allergies: Codeine    Isolation: None   Infection: None   Code Status: CPR   Advance Care Planning Activity    Ht: 162.6 cm (64.02\")   Wt: 53.7 kg (118 lb 6.4 oz)    Admission Cmt: None   Principal Problem: Esophageal cancer (HCC) [C15.9]                 Active Insurance as of 4/29/2022     Primary Coverage     Payor Plan Insurance Group Employer/Plan Group    [facility] BLUE CROSS [id number]     Payor Plan Address Payor Plan Phone Number Payor Plan Fax Number Effective Dates    [address]   1/2/2010 - None Entered    [place] [address]       Subscriber Name Subscriber Birth Date Member ID       MAYA RIBERA 1960 [id number]                 Emergency Contacts      (Rel.) Home Phone Work Phone Mobile Phone    magan paz (Significant Other) [phone] -- [phone]    Haile Ribera (Son) [phone] -- [phone]    BE RIBERA (Sister) -- -- [phone]               Discharge Summary      Julia Matson APRN at 05/09/22 1424     Attestation signed by Joan Moseley MD at 05/09/22 1617    I have reviewed this documentation and agree.                       Patient Care Team:  Provider, No Known as PCP - Hayder Jon III, " MD as Referring Physician (Thoracic Surgery)  Iggy Harrell MD as Consulting Physician (Hematology and Oncology)  Ciro Pelletier MD as Consulting Physician (Radiation Oncology)  Artie Sánchez MD as Consulting Physician (Cardiology)    Date of Admission: 4/29/2022   Date of Discharge:  5/9/2022    Discharge Diagnosis: Malignant neoplasm of lower third of esophagus, adenocarcinoma of esophagus    Presenting Problem  Malignant neoplasm of lower third of esophagus (HCC) [C15.5]  Adenocarcinoma of esophagus (HCC) [C15.9]     History of Present Illness  Maya Ribera is a 61 y.o. female who presented with to our office on 3/31/2022 for continued follow-up of an adenocarcinoma of the distal third of the esophagus.  Patient was diagnosed with a T3 N1 M0 adenocarcinoma of the esophagus in December 2021.  She underwent port and feeding jejunostomy tube insertion by Dr. Hollis with general surgery.  The patient was then started on neoadjuvant chemotherapy and radiation.  Chemotherapy had to be interrupted several times because of pancytopenia.  Patient completed treatment on February 14.  Subsequent CT PET scan was performed and the patient was referred back to Dr. Finch for evaluation for surgical resection.  The patient has lost approximately 18 pounds since her diagnosis.  She has been tolerating tube feeds and has gained small amount of weight with them.  She has not tried to take any solid food.  She denies nausea or vomiting.  She has had no hematic emesis.  She is quite cachectic.  PET scan demonstrates decrease in size of the long segment of intense metabolic uptake in the distal esophagus.  There is been decrease in size of gastrohepatic ligament lymph nodes and left hilar lymph nodes.  They are no longer hypermetabolic.  No evidence of distant metastases.  Dr. Finch recommended robot-assisted total esophagectomy.  He explained the procedure, as well as the risks and benefits at length with the patient  and her partner who accompanied her to her appointment.  All the patient's and her partner's questions were answered to their satisfaction.  The patient requested that Dr. Finch proceed with surgery.    Hospital Course  Myaa Ribera was admitted to [facility] on 4/29/2022 for a scheduled bronchoscopy with right thoracoscopy with partial decortication and da Demarcus robot-assisted total esophagectomy, jejunostomy tube replacement and intercostal nerve block.  Patient tolerated the procedure well, was extubated in the operating room and after initial recovery in the postanesthesia care unit was transferred to the intensive care unit for close monitoring and surveillance.  Ms. Ribera has had a satisfactory postoperative course.  She has been ambulating well with minimal assistance.  She has been tolerating tube feeds and they have been transition to a cyclic rate in anticipation of her discharge home.  Her surgical chest tube was discontinued 48 hours ago.  The patient is hemodynamically stable.  She did have a small bore chest tube placed via CT guidance into a small pleural-based hematoma.  Intrapleural tPA was administered with good results and the small bore chest tube was ultimately removed yesterday.  Follow-up chest x-ray this morning demonstrates stable appearance with no significant pleural effusion or pneumothorax.  Discharge instructions have been explained at length to the patient and her partner who is at bedside.  All of their questions were answered to their satisfaction and they verbalized understanding.  We will arrange for patient to undergo esophagram to assess for anastomotic leak 2 weeks from her surgery day which is this Friday, May 13.  She will then follow-up to see Dr. Finch in the office next week, with a chest x-ray.  Patient should continue to maintain strict n.p.o. status with all meds crushed via jejunostomy tube were given in liquid formulation.  Prescription  medication has been sent to the pharmacy and delivered to the bedside.  Patient may discharge home today to which both she and her partner are in agreement.    Procedures Performed  Procedure(s):  BRONCHOSCOPY, RIGHT THORACOSCOPY WITH DAVINCI ROBOT WITH TOTAL ESOPHAGECTOMY, INTERCOSTAL NERVE BLOCK, JEJUNOSTOMY TUBE REPLACEMENT       Consults:   Consults     Date and Time Order Name Status Description    5/1/2022  7:20 PM Inpatient Cardiology Consult Completed     4/30/2022 11:04 AM Inpatient Pulmonology Consult Completed           Pertinent Test Results:     Imaging Results (Last 24 Hours)     Procedure Component Value Units Date/Time    XR Chest PA & Lateral [[id number]] Collected: 05/09/22 1234     Updated: 05/09/22 1306    Narrative:      PA AND LATERAL CHEST X-RAY     HISTORY: Postop esophagectomy 04/29/2022.     TECHNIQUE: PA and lateral chest x-ray exam is correlated with multiple  recent prior exams.     FINDINGS: The pigtail right chest drain has been removed. Opacity at the  right lung apex measuring about 4 cm long is unchanged. A left Mediport  catheter is again observed. Blunting of the costophrenic angles is also  observed bilaterally and is unchanged. There is mild left retrocardiac  atelectasis, improved.       Impression:      Interval removal of pigtail right chest drain with small  residual pleural effusions at the right lung base and the right lung  apex. There is improved but persistent mild left base atelectasis.     This report was finalized on 5/9/2022 1:03 PM by Dr. Ciro Milan M.D.             Lab Results (last 24 hours)     Procedure Component Value Units Date/Time    Comprehensive Metabolic Panel [[id number]]  (Abnormal) Collected: 05/09/22 0752    Specimen: Blood Updated: 05/09/22 0832     Glucose 123 mg/dL      BUN 24 mg/dL      Creatinine 0.32 mg/dL      Sodium 136 mmol/L      Potassium 4.4 mmol/L      Chloride 103 mmol/L      CO2 26.2 mmol/L      Calcium 9.2 mg/dL      Total Protein  6.7 g/dL      Albumin 3.30 g/dL      ALT (SGPT) 13 U/L      AST (SGOT) 17 U/L      Alkaline Phosphatase 144 U/L      Total Bilirubin <0.2 mg/dL      Globulin 3.4 gm/dL      A/G Ratio 1.0 g/dL      BUN/Creatinine Ratio 75.0     Anion Gap 6.8 mmol/L      eGFR 119.0 mL/min/1.73      Comment: National Kidney Foundation and American Society of Nephrology (ASN) Task Force recommended calculation based on the Chronic Kidney Disease Epidemiology Collaboration (CKD-EPI) equation refit without adjustment for race.       Narrative:      GFR Normal >60  Chronic Kidney Disease <60  Kidney Failure <15              Condition on Discharge:  stable    Vital Signs  Temp:  [97.1 °F (36.2 °C)-97.9 °F (36.6 °C)] 97.9 °F (36.6 °C)  Heart Rate:  [79-90] 88  Resp:  [16] 16  BP: ()/(51-75) 124/63    Physical Exam:    General Appearance:    Alert, cooperative, in no acute distress   Head:    Normocephalic, without obvious abnormality, atraumatic   Eyes:            Lids and lashes normal, conjunctivae and sclerae normal, no   icterus, no pallor, corneas clear, PERRLA   Ears:    Ears appear intact with no abnormalities noted   Throat:   No oral lesions, no thrush, oral mucosa moist   Neck:   No adenopathy, supple, trachea midline, no thyromegaly, no     carotid bruit, no JVD   Back:     No kyphosis present, no scoliosis present, no skin lesions,       erythema or scars, no tenderness to percussion or                   palpation,   range of motion normal   Lungs:     Clear to auscultation,respirations regular, even and                   unlabored    Heart:    Regular rhythm and normal rate, normal S1 and S2, no            murmur, no gallop, no rub, no click   Breast Exam:    Deferred   Abdomen:     Normal bowel sounds, no masses, no organomegaly, soft        non-tender, non-distended, no guarding, no rebound                 Tenderness.  Jejunostomy tube site is clean and dry with no erythema or signs of infection.   Genitalia:    Deferred    Extremities:   Moves all extremities well, no edema, no cyanosis, no              redness   Pulses:   Pulses palpable and equal bilaterally   Skin:   No bleeding, bruising or rash.  Chest tube sites covered with gauze and DuoDERM.  There is no drainage.   Lymph nodes:   No palpable adenopathy   Neurologic:   Cranial nerves 2 - 12 grossly intact, sensation intact, DTR        present and equal bilaterally       Discharge Disposition  Home today    Discharge Medications     Discharge Medications      New Medications      Instructions Start Date   Gabapentin 50 mg/mL solution solution  Commonly known as: NEURONTIN   Administer 6 mL per J tube 3 (Three) Times a Day      metoprolol tartrate 25 MG tablet  Commonly known as: LOPRESSOR   12.5 mg, Per J Tube, Every 12 Hours Scheduled      oxyCODONE 5 MG/5ML solution  Commonly known as: ROXICODONE   Administer 5 mL per J tube Every 4 (Four) Hours As Needed for Moderate Pain         Changes to Medications      Instructions Start Date   aluminum-magnesium hydroxide 200-200 MG/5ML suspension, diphenhydrAMINE 12.5 MG/5ML liquid, Lidocaine Viscous HCl 2 % solution, nystatin 100,000 unit/mL suspension  What changed: how to take this   10 mL, Swish & Spit, 4 Times Daily After Meals & at Bedtime      lansoprazole 30 MG Tablet Delayed Release Dispersible disintegrating tablet  Commonly known as: PREVACID SOLUTAB  What changed:   · how to take this  · when to take this   30 mg, Per J Tube, Every Morning Before Breakfast         Continue These Medications      Instructions Start Date   Eliquis 5 MG tablet tablet  Generic drug: apixaban   5 mg, Oral, Every 12 Hours Scheduled, LD 12/4 PT STATED TO HOLD 72 HOURS PRIOR TO SURGERY      LORazepam 2 MG/ML concentrated solution  Commonly known as: LORazepam Intensol   1 mg, Oral, Every 8 Hours PRN, Or nausea      methIMAzole 10 MG tablet  Commonly known as: TAPAZOLE   10 mg, Oral, Daily, Take DOS      ondansetron ODT 8 MG disintegrating  tablet  Commonly known as: Zofran ODT   8 mg, Translingual, Every 8 Hours PRN      pravastatin 20 MG tablet  Commonly known as: PRAVACHOL   20 mg, Oral, Daily         Stop These Medications    fentaNYL 12 MCG/HR  Commonly known as: DURAGESIC     HYDROcodone-acetaminophen 7.5-325 MG/15ML solution  Commonly known as: HYCET     metoprolol succinate XL 25 MG 24 hr tablet  Commonly known as: Toprol XL     nystatin in Lidocaine Viscous HCl solution-diphenhydrAMINE liquid-aluminum-magnesium hydroxide-simethicone suspension     prochlorperazine 10 MG tablet  Commonly known as: COMPAZINE     sucralfate 1 GM/10ML suspension  Commonly known as: CARAFATE            Discharge Instructions:  · No heavy lifting, pushing, pulling greater than 10 pounds.  · No driving up until 2 weeks after surgery and no longer taking narcotics.  · Resume home diet as tolerated.  · Continue incentive spirometer at least 4 times per day.  · Remove dressing from post chest tube site after 48 hours, may shower and clean surgical sites with antibacterial soap or hydrogen peroxide, and apply gauze dressing or band-aid as needed for any drainage.  No dressing needed once no longer draining.          Follow-up Appointments  Future Appointments   Date Time Provider Department Center   5/13/2022  8:30 AM [facility] 5  [facility]   5/17/2022 11:00 AM Hayder Finch III, MD [facility]  [facility]   5/25/2022  9:50 AM LAB CHAIR 5 [facility]  LAB [facility]   5/25/2022 10:20 AM Iggy Harrell MD [facility]     Additional Instructions for the Follow-ups that You Need to Schedule     Ambulatory Referral to Home Health ([facility])   As directed      Face to Face Visit Date: 5/9/2022    Follow-up provider for Plan of Care?: I will be treating the patient on an ongoing basis.  Please send me the Plan of Care for signature.    Follow-up provider: MANINDER SABILLON [[id number]]    Reason/Clinical Findings: esophageal cancer    Describe mobility limitations that  make leaving home difficult: generalized weakness post-op    Nursing/Therapeutic Services Requested: Skilled Nursing    Skilled nursing orders: Enteral therapy (assistance with tube feeds)    Frequency: 1 Week 1         FL Esophagram Complete Double-Contrast   May 13, 2022      Exam reason: post-op esophagectomy    Release to patient: Immediate         XR Chest 2 View    May 16, 2022 (Approximate)      Exam reason: post-op               Test Results Pending at Discharge      For any questions regarding patient's stay, please refer to patient's chart.    BRANDI Ivy  [facility]  05/09/22  14:35 EDT    Patient was seen and assessed while wearing personal protective equipment including facemask, protective eyewear and gloves.  Hand hygiene performed prior to entering the room and upon exiting with doffing of gloves.    Greater than 30 minutes was spent on the unit discharging this patient, with more than 50% of time spent assessing the patient, counseling the patient on postoperative care and discharge planning.        Electronically signed by Joan Moseley MD at 05/09/22 3041               Johanny Cho, RN    Registered Nurse   [facility]   Progress Notes      Signed   Date of Service:  05/09/22 1453   Creation Time:  05/09/22 1453              Signed              Show:Clear all  [x]Manual[]Template[]Copied    Added by:  [x]Johanny Cho, RN      []Jamal for details    [facility] to provide Home Care services. Patient agreeable. Contact information confirmed. Please call SO Cristy to schedule  visits: [phone].     [facility] provides Tube Feeding supplies.

## 2022-05-09 NOTE — THERAPY TREATMENT NOTE
Patient Name: Maya Ribera  : 1960    MRN: [id number]                              Today's Date: 2022       Admit Date: 2022    Visit Dx:     ICD-10-CM ICD-9-CM   1. Malignant neoplasm of lower third of esophagus (HCC)  C15.5 150.5     Patient Active Problem List   Diagnosis   • Esophageal cancer (HCC)   • Asymptomatic varicose veins of unspecified lower extremity   • Eczema   • Encounter for screening for malignant neoplasm of cervix   • Screening for malignant neoplasm of colon   • Arthritis   • Esophagitis   • Gastric ulcer   • Female cystocele   • Flat foot(734)   • Hiatal hernia   • Hyperlipidemia   • Hypertension   • Hyperthyroidism   • Irritable bowel syndrome   • Mitral regurgitation   • Paroxysmal atrial fibrillation (HCC)   • Severe esophageal dysplasia   • Mitral valve prolapse   • Encounter for management of implanted device     Past Medical History:   Diagnosis Date   • Boil, breast 2021    HEALING UNDER LEFT BREAST    • Cervical cancer (HCC)    • Dysphagia    • Esophageal cancer (HCC)    • Esophagitis 2021   • Gastric ulcer    • GERD (gastroesophageal reflux disease)    • Hyperlipidemia    • Hypertension    • Hyperthyroidism    • Irritable bowel    • Mitral valve prolapse     FOLLOWED BY     • PAF (paroxysmal atrial fibrillation) (HCC)    • Uses feeding tube      Past Surgical History:   Procedure Laterality Date   • CHOLECYSTECTOMY     • COLONOSCOPY  2021    [facility]   • ESOPHAGOGASTRECTOMY Right 2022    Procedure: BRONCHOSCOPY, RIGHT THORACOSCOPY WITH [employer] ROBOT WITH TOTAL ESOPHAGECTOMY, INTERCOSTAL NERVE BLOCK, JEJUNOSTOMY TUBE REPLACEMENT;  Surgeon: Hayder Finch III, MD;  Location: [facility] OR;  Service: Robotics - DaVinci;  Laterality: Right;   • EXTERNAL EAR SURGERY     • JEJUNOSTOMY N/A 12/15/2021    Procedure: open JEJUNOSTOMY tube placement;  Surgeon: Bhanu Hollis MD;  Location: [facility] OR;  Service: General;   Laterality: N/A;   • KNEE SURGERY Left    • REPLACEMENT TOTAL KNEE Left    • UPPER ENDOSCOPIC ULTRASOUND W/ FNA N/A 12/7/2021    Procedure: Esophagogastroduodenoscopy with endoscopic ultrasound  WITH STAGING AND FINE NEEDLE BIOPSY;  Surgeon: Amrit Green MD;  Location[facility] ENDOSCOPY;  Service: Gastroenterology;  Laterality: N/A;  post op: inlet patch, esophageal mass, T2   • VENOUS ACCESS DEVICE (PORT) INSERTION Left 12/15/2021    Procedure: INSERTION OF PORTACATH;  Surgeon: Bhanu Hollis MD;  Location: [facility] MAIN OR;  Service: General;  Laterality: Left;      General Information     Row Name 05/09/22 1405          Physical Therapy Time and Intention    Document Type discharge evaluation/summary  -     Mode of Treatment physical therapy;individual therapy  -     Row Name 05/09/22 1405          General Information    Patient Profile Reviewed yes  -CF     Existing Precautions/Restrictions no known precautions/restrictions  -     Row Name 05/09/22 1405          Cognition    Orientation Status (Cognition) oriented x 4  -CF     Row Name 05/09/22 1405          Safety Issues, Functional Mobility    Safety Issues Affecting Function (Mobility) insight into deficits/self-awareness  -     Impairments Affecting Function (Mobility) endurance/activity tolerance;shortness of breath  -CF           User Key  (r) = Recorded By, (t) = Taken By, (c) = Cosigned By    Initials Name Provider Type    CF Jacki Santana PT Physical Therapist               Mobility     Row Name 05/09/22 1406          Bed Mobility    Comment, (Bed Mobility) Up in bathroom at arrival  -     Row Name 05/09/22 1406          Sit-Stand Transfer    Sit-Stand [place] (Transfers) supervision  -     Row Name 05/09/22 1406          Gait/Stairs (Locomotion)    [place] Level (Gait) standby assist  -     Distance in Feet (Gait) 100'  -CF     Deviations/Abnormal Patterns (Gait) stride length decreased;gait speed decreased;janine  decreased  -CF     Bilateral Gait Deviations forward flexed posture  -CF     [place] Level (Stairs) contact guard;stand by assist;verbal cues  -CF     Number of Steps (Stairs) 8  -CF     Ascending Technique (Stairs) step-to-step  -CF     Descending Technique (Stairs) step-to-step  -CF     Comment, (Gait/Stairs) Amb on room air, O2 95% upon return to room  -CF           User Key  (r) = Recorded By, (t) = Taken By, (c) = Cosigned By    Initials Name Provider Type    CF Jacki Santana, PT Physical Therapist               Obj/Interventions    No documentation.                Goals/Plan     Row Name 05/09/22 1409          Bed Mobility Goal 1 (PT)    Activity/Assistive Device (Bed Mobility Goal 1, PT) bed mobility activities, all  -CF     [place] Level/Cues Needed (Bed Mobility Goal 1, PT) minimum assist (75% or more patient effort)  -CF     Time Frame (Bed Mobility Goal 1, PT) 1 week  -CF     Progress/Outcomes (Bed Mobility Goal 1, PT) goal met  -CF     Row Name 05/09/22 1409          Transfer Goal 1 (PT)    Activity/Assistive Device (Transfer Goal 1, PT) transfers, all  -CF     [place] Level/Cues Needed (Transfer Goal 1, PT) contact guard required  -CF     Time Frame (Transfer Goal 1, PT) 1 week  -CF     Progress/Outcome (Transfer Goal 1, PT) goal met  -CF           User Key  (r) = Recorded By, (t) = Taken By, (c) = Cosigned By    Initials Name Provider Type    Jacki Jasmine, PT Physical Therapist               Clinical Impression     Row Name 05/09/22 1408          Pain    Pretreatment Pain Rating 0/10 - no pain  -CF     Row Name 05/09/22 1408          Plan of Care Review    Plan of Care Reviewed With patient  -CF     Outcome Evaluation Pt seen for PT treatment this PM. Pt ambulated on room air and without assistive device. She completed multiple stairs with use of handrail and cga. No further PT needs identified, is OK to be up ad mario in room when visitor is present. Hopeful to d/c home soon  with assist. PT to sign off.  -CF           User Key  (r) = Recorded By, (t) = Taken By, (c) = Cosigned By    Initials Name Provider Type    CF Jacki Santana, ALICIA Physical Therapist               Outcome Measures     Row Name 05/09/22 1409          How much help from another person do you currently need...    Turning from your back to your side while in flat bed without using bedrails? 4  -CF     Moving from lying on back to sitting on the side of a flat bed without bedrails? 3  -CF     Moving to and from a bed to a chair (including a wheelchair)? 4  -CF     Standing up from a chair using your arms (e.g., wheelchair, bedside chair)? 4  -CF     Climbing 3-5 steps with a railing? 3  -CF     To walk in hospital room? 4  -CF     AM-PAC 6 Clicks Score (PT) 22  -CF     Highest level of mobility 7 --> Walked 25 feet or more  -CF     Row Name 05/09/22 1409          Functional Assessment    Outcome Measure Options AM-PAC 6 Clicks Basic Mobility (PT)  -CF           User Key  (r) = Recorded By, (t) = Taken By, (c) = Cosigned By    Initials Name Provider Type    Jacki Jasmine, ALICIA Physical Therapist                             Physical Therapy Education                 Title: PT OT SLP Therapies (Done)     Topic: Physical Therapy (Done)     Point: Mobility training (Done)     Learning Progress Summary           Patient Acceptance, E, VU,NR by CF at 5/9/2022 1410    Acceptance, E, VU,NR by CF at 5/6/2022 1614    Acceptance, E, VU by EB at 5/5/2022 1631    Acceptance, E,TB,D, VU,DU,NR by KA at 5/3/2022 1150    Acceptance, E, VU by CH at 5/1/2022 1421    Acceptance, E, VU,NR by CH at 4/30/2022 1430   Significant Other Acceptance, E, VU,NR by  at 4/30/2022 1430                   Point: Home exercise program (Done)     Learning Progress Summary           Patient Acceptance, E, VU,NR by CF at 5/9/2022 1410    Acceptance, E, VU,NR by CF at 5/6/2022 1614    Acceptance, E, VU by EB at 5/5/2022 1631    Acceptance, E,TB,D,  VU,DU,NR by KA at 5/3/2022 1150    Acceptance, E, VU by  at 5/1/2022 1421    Acceptance, E, VU,NR by  at 4/30/2022 1430   Significant Other Acceptance, E, VU,NR by  at 4/30/2022 1430                   Point: Body mechanics (Done)     Learning Progress Summary           Patient Acceptance, E, VU,NR by CF at 5/9/2022 1410    Acceptance, E, VU,NR by CF at 5/6/2022 1614    Acceptance, E, VU by EB at 5/5/2022 1631    Acceptance, E,TB,D, VU,DU,NR by  at 5/3/2022 1150    Acceptance, E, VU by  at 5/1/2022 1421    Acceptance, E, VU,NR by  at 4/30/2022 1430   Significant Other Acceptance, E, VU,NR by  at 4/30/2022 1430                   Point: Precautions (Done)     Learning Progress Summary           Patient Acceptance, E, VU,NR by  at 5/9/2022 1410    Acceptance, E, VU,NR by  at 5/6/2022 1614    Acceptance, E, VU by EB at 5/5/2022 1631    Acceptance, E,TB,D, VU,DU,NR by  at 5/3/2022 1150    Acceptance, E, VU by  at 5/1/2022 1421    Acceptance, E, VU,NR by  at 4/30/2022 1430   Significant Other Acceptance, E, VU,NR by  at 4/30/2022 1430                               User Key     Initials Effective Dates Name Provider Type Discipline    EB 06/16/21 -  Loli Bashir, PTA Physical Therapist Assistant PT     06/16/21 -  Adarsh Solorzano, PT Physical Therapist PT    CF 06/16/21 -  Jacki Santana, PT Physical Therapist PT     03/11/22 -  Taylor Arellano, ALICIA Student PT Student PT              PT Recommendation and Plan     Plan of Care Reviewed With: patient  Outcome Evaluation: Pt seen for PT treatment this PM. Pt ambulated on room air and without assistive device. She completed multiple stairs with use of handrail and cga. No further PT needs identified, is OK to be up ad mario in room when visitor is present. Hopeful to d/c home soon with assist. PT to sign off.     Time Calculation:    PT Charges     Row Name 05/09/22 1403             Time Calculation    Start Time 1348  -CF      Stop Time 1356  -CF       Time Calculation (min) 8 min  -CF      PT Received On 05/09/22  -CF            User Key  (r) = Recorded By, (t) = Taken By, (c) = Cosigned By    Initials Name Provider Type    CF Jacki Santana PT Physical Therapist              Therapy Charges for Today     Code Description Service Date Service Provider Modifiers Qty    [id number]  PT THERAPEUTIC ACT EA 15 MIN 5/9/2022 Jacki Santana, ALICIA GP 1          PT G-Codes  Outcome Measure Options: AM-PAC 6 Clicks Basic Mobility (PT)  AM-PAC 6 Clicks Score (PT): 22    Jacki Santana PT  5/9/2022

## 2022-05-09 NOTE — PLAN OF CARE
Goal Outcome Evaluation:   Vitals stable. Pain controlled with prn and scheduled ERAS meds. Pt to d/c home with significant other today.

## 2022-05-09 NOTE — PLAN OF CARE
Goal Outcome Evaluation:  Plan of Care Reviewed With: patient           Outcome Evaluation: Pt seen for PT treatment this PM. Pt ambulated on room air and without assistive device. She completed multiple stairs with use of handrail and cga. No further PT needs identified, is OK to be up ad mario in room when visitor is present. Hopeful to d/c home soon with assist. PT to sign off.

## 2022-05-09 NOTE — DISCHARGE SUMMARY
Patient Care Team:  Provider, No Known as PCP - General Finch, Hayder EVANS III, MD as Referring Physician (Thoracic Surgery)  Iggy Harrell MD as Consulting Physician (Hematology and Oncology)  Ciro Pelletier MD as Consulting Physician (Radiation Oncology)  Artie Sánchez MD as Consulting Physician (Cardiology)    Date of Admission: 4/29/2022   Date of Discharge:  5/9/2022    Discharge Diagnosis: Malignant neoplasm of lower third of esophagus, adenocarcinoma of esophagus    Presenting Problem  Malignant neoplasm of lower third of esophagus (HCC) [C15.5]  Adenocarcinoma of esophagus (HCC) [C15.9]     History of Present Illness  Maya Ribera is a 61 y.o. female who presented with to our office on 3/31/2022 for continued follow-up of an adenocarcinoma of the distal third of the esophagus.  Patient was diagnosed with a T3 N1 M0 adenocarcinoma of the esophagus in December 2021.  She underwent port and feeding jejunostomy tube insertion by Dr. Hollis with general surgery.  The patient was then started on neoadjuvant chemotherapy and radiation.  Chemotherapy had to be interrupted several times because of pancytopenia.  Patient completed treatment on February 14.  Subsequent CT PET scan was performed and the patient was referred back to Dr. Finch for evaluation for surgical resection.  The patient has lost approximately 18 pounds since her diagnosis.  She has been tolerating tube feeds and has gained small amount of weight with them.  She has not tried to take any solid food.  She denies nausea or vomiting.  She has had no hematic emesis.  She is quite cachectic.  PET scan demonstrates decrease in size of the long segment of intense metabolic uptake in the distal esophagus.  There is been decrease in size of gastrohepatic ligament lymph nodes and left hilar lymph nodes.  They are no longer hypermetabolic.  No evidence of distant metastases.  Dr. Finch recommended robot-assisted total esophagectomy.  He  explained the procedure, as well as the risks and benefits at length with the patient and her partner who accompanied her to her appointment.  All the patient's and her partner's questions were answered to their satisfaction.  The patient requested that Dr. Finch proceed with surgery.    Hospital Course  Maya Ribera was admitted to [facility] on 4/29/2022 for a scheduled bronchoscopy with right thoracoscopy with partial decortication and da Demarcus robot-assisted total esophagectomy, jejunostomy tube replacement and intercostal nerve block.  Patient tolerated the procedure well, was extubated in the operating room and after initial recovery in the postanesthesia care unit was transferred to the intensive care unit for close monitoring and surveillance.  Ms. Ribera has had a satisfactory postoperative course.  She has been ambulating well with minimal assistance.  She has been tolerating tube feeds and they have been transition to a cyclic rate in anticipation of her discharge home.  Her surgical chest tube was discontinued 48 hours ago.  The patient is hemodynamically stable.  She did have a small bore chest tube placed via CT guidance into a small pleural-based hematoma.  Intrapleural tPA was administered with good results and the small bore chest tube was ultimately removed yesterday.  Follow-up chest x-ray this morning demonstrates stable appearance with no significant pleural effusion or pneumothorax.  Discharge instructions have been explained at length to the patient and her partner who is at bedside.  All of their questions were answered to their satisfaction and they verbalized understanding.  We will arrange for patient to undergo esophagram to assess for anastomotic leak 2 weeks from her surgery day which is this Friday, May 13.  She will then follow-up to see Dr. Finch in the office next week, with a chest x-ray.  Patient should continue to maintain strict n.p.o. status with all  meds crushed via jejunostomy tube were given in liquid formulation.  Prescription medication has been sent to the pharmacy and delivered to the bedside.  Patient may discharge home today to which both she and her partner are in agreement.    Procedures Performed  Procedure(s):  BRONCHOSCOPY, RIGHT THORACOSCOPY WITH [employer]INCI ROBOT WITH TOTAL ESOPHAGECTOMY, INTERCOSTAL NERVE BLOCK, JEJUNOSTOMY TUBE REPLACEMENT       Consults:   Consults     Date and Time Order Name Status Description    5/1/2022  7:20 PM Inpatient Cardiology Consult Completed     4/30/2022 11:04 AM Inpatient Pulmonology Consult Completed           Pertinent Test Results:     Imaging Results (Last 24 Hours)     Procedure Component Value Units Date/Time    XR Chest PA & Lateral [[id number]] Collected: 05/09/22 1234     Updated: 05/09/22 1306    Narrative:      PA AND LATERAL CHEST X-RAY     HISTORY: Postop esophagectomy 04/29/2022.     TECHNIQUE: PA and lateral chest x-ray exam is correlated with multiple  recent prior exams.     FINDINGS: The pigtail right chest drain has been removed. Opacity at the  right lung apex measuring about 4 cm long is unchanged. A left Mediport  catheter is again observed. Blunting of the costophrenic angles is also  observed bilaterally and is unchanged. There is mild left retrocardiac  atelectasis, improved.       Impression:      Interval removal of pigtail right chest drain with small  residual pleural effusions at the right lung base and the right lung  apex. There is improved but persistent mild left base atelectasis.     This report was finalized on 5/9/2022 1:03 PM by Dr. Ciro Milan M.D.             Lab Results (last 24 hours)     Procedure Component Value Units Date/Time    Comprehensive Metabolic Panel [[id number]]  (Abnormal) Collected: 05/09/22 0752    Specimen: Blood Updated: 05/09/22 0832     Glucose 123 mg/dL      BUN 24 mg/dL      Creatinine 0.32 mg/dL      Sodium 136 mmol/L      Potassium 4.4 mmol/L       Chloride 103 mmol/L      CO2 26.2 mmol/L      Calcium 9.2 mg/dL      Total Protein 6.7 g/dL      Albumin 3.30 g/dL      ALT (SGPT) 13 U/L      AST (SGOT) 17 U/L      Alkaline Phosphatase 144 U/L      Total Bilirubin <0.2 mg/dL      Globulin 3.4 gm/dL      A/G Ratio 1.0 g/dL      BUN/Creatinine Ratio 75.0     Anion Gap 6.8 mmol/L      eGFR 119.0 mL/min/1.73      Comment: National Kidney Foundation and American Society of Nephrology (ASN) Task Force recommended calculation based on the Chronic Kidney Disease Epidemiology Collaboration (CKD-EPI) equation refit without adjustment for race.       Narrative:      GFR Normal >60  Chronic Kidney Disease <60  Kidney Failure <15              Condition on Discharge:  stable    Vital Signs  Temp:  [97.1 °F (36.2 °C)-97.9 °F (36.6 °C)] 97.9 °F (36.6 °C)  Heart Rate:  [79-90] 88  Resp:  [16] 16  BP: ()/(51-75) 124/63    Physical Exam:    General Appearance:    Alert, cooperative, in no acute distress   Head:    Normocephalic, without obvious abnormality, atraumatic   Eyes:            Lids and lashes normal, conjunctivae and sclerae normal, no   icterus, no pallor, corneas clear, PERRLA   Ears:    Ears appear intact with no abnormalities noted   Throat:   No oral lesions, no thrush, oral mucosa moist   Neck:   No adenopathy, supple, trachea midline, no thyromegaly, no     carotid bruit, no JVD   Back:     No kyphosis present, no scoliosis present, no skin lesions,       erythema or scars, no tenderness to percussion or                   palpation,   range of motion normal   Lungs:     Clear to auscultation,respirations regular, even and                   unlabored    Heart:    Regular rhythm and normal rate, normal S1 and S2, no            murmur, no gallop, no rub, no click   Breast Exam:    Deferred   Abdomen:     Normal bowel sounds, no masses, no organomegaly, soft        non-tender, non-distended, no guarding, no rebound                 Tenderness.  Jejunostomy tube  site is clean and dry with no erythema or signs of infection.   Genitalia:    Deferred   Extremities:   Moves all extremities well, no edema, no cyanosis, no              redness   Pulses:   Pulses palpable and equal bilaterally   Skin:   No bleeding, bruising or rash.  Chest tube sites covered with gauze and DuoDERM.  There is no drainage.   Lymph nodes:   No palpable adenopathy   Neurologic:   Cranial nerves 2 - 12 grossly intact, sensation intact, DTR        present and equal bilaterally       Discharge Disposition  Home today    Discharge Medications     Discharge Medications      New Medications      Instructions Start Date   Gabapentin 50 mg/mL solution solution  Commonly known as: NEURONTIN   Administer 6 mL per J tube 3 (Three) Times a Day      metoprolol tartrate 25 MG tablet  Commonly known as: LOPRESSOR   12.5 mg, Per J Tube, Every 12 Hours Scheduled      oxyCODONE 5 MG/5ML solution  Commonly known as: ROXICODONE   Administer 5 mL per J tube Every 4 (Four) Hours As Needed for Moderate Pain         Changes to Medications      Instructions Start Date   aluminum-magnesium hydroxide 200-200 MG/5ML suspension, diphenhydrAMINE 12.5 MG/5ML liquid, Lidocaine Viscous HCl 2 % solution, nystatin 100,000 unit/mL suspension  What changed: how to take this   10 mL, Swish & Spit, 4 Times Daily After Meals & at Bedtime      lansoprazole 30 MG Tablet Delayed Release Dispersible disintegrating tablet  Commonly known as: PREVACID SOLUTAB  What changed:   · how to take this  · when to take this   30 mg, Per J Tube, Every Morning Before Breakfast         Continue These Medications      Instructions Start Date   Eliquis 5 MG tablet tablet  Generic drug: apixaban   5 mg, Oral, Every 12 Hours Scheduled, LD 12/4 PT STATED TO HOLD 72 HOURS PRIOR TO SURGERY      LORazepam 2 MG/ML concentrated solution  Commonly known as: LORazepam Intensol   1 mg, Oral, Every 8 Hours PRN, Or nausea      methIMAzole 10 MG tablet  Commonly known as:  TAPAZOLE   10 mg, Oral, Daily, Take DOS      ondansetron ODT 8 MG disintegrating tablet  Commonly known as: Zofran ODT   8 mg, Translingual, Every 8 Hours PRN      pravastatin 20 MG tablet  Commonly known as: PRAVACHOL   20 mg, Oral, Daily         Stop These Medications    fentaNYL 12 MCG/HR  Commonly known as: DURAGESIC     HYDROcodone-acetaminophen 7.5-325 MG/15ML solution  Commonly known as: HYCET     metoprolol succinate XL 25 MG 24 hr tablet  Commonly known as: Toprol XL     nystatin in Lidocaine Viscous HCl solution-diphenhydrAMINE liquid-aluminum-magnesium hydroxide-simethicone suspension     prochlorperazine 10 MG tablet  Commonly known as: COMPAZINE     sucralfate 1 GM/10ML suspension  Commonly known as: CARAFATE            Discharge Instructions:  · No heavy lifting, pushing, pulling greater than 10 pounds.  · No driving up until 2 weeks after surgery and no longer taking narcotics.  · Resume home diet as tolerated.  · Continue incentive spirometer at least 4 times per day.  · Remove dressing from post chest tube site after 48 hours, may shower and clean surgical sites with antibacterial soap or hydrogen peroxide, and apply gauze dressing or band-aid as needed for any drainage.  No dressing needed once no longer draining.          Follow-up Appointments  Future Appointments   Date Time Provider Department Center   5/13/2022  8:30 AM [facility] 5  [facility]   5/17/2022 11:00 AM Hayder Finch III, MD [facility]   5/25/2022  9:50 AM LAB CHAIR 5 [facility]  [facility]   5/25/2022 10:20 AM Iggy Harrell MD [facility] [place]     Additional Instructions for the Follow-ups that You Need to Schedule     Ambulatory Referral to Home Health (Hospital)   As directed      Face to Face Visit Date: 5/9/2022    Follow-up provider for Plan of Care?: I will be treating the patient on an ongoing basis.  Please send me the Plan of Care for signature.    Follow-up provider: MANINDER SABILLON [[id number]]     Reason/Clinical Findings: esophageal cancer    Describe mobility limitations that make leaving home difficult: generalized weakness post-op    Nursing/Therapeutic Services Requested: Skilled Nursing    Skilled nursing orders: Enteral therapy (assistance with tube feeds)    Frequency: 1 Week 1         FL Esophagram Complete Double-Contrast   May 13, 2022      Exam reason: post-op esophagectomy    Release to patient: Immediate         XR Chest 2 View    May 16, 2022 (Approximate)      Exam reason: post-op               Test Results Pending at Discharge      For any questions regarding patient's stay, please refer to patient's chart.    BRANDI Ivy  [facility]  05/09/22  14:35 EDT    Patient was seen and assessed while wearing personal protective equipment including facemask, protective eyewear and gloves.  Hand hygiene performed prior to entering the room and upon exiting with doffing of gloves.    Greater than 30 minutes was spent on the unit discharging this patient, with more than 50% of time spent assessing the patient, counseling the patient on postoperative care and discharge planning.

## 2022-05-09 NOTE — PLAN OF CARE
Goal Outcome Evaluation:  Plan of Care Reviewed With: patient        Progress: improving  Outcome Evaluation: Pt VSS. Pain treated with medication with improvement. Tube feeds ran through the night and stopped at 0600.  Pt strict NPO. Pt ambulating to restroom well with standby assist. Pt is in good spirits.  Plan for PA/Lateral chest xray this AM. Potential for d/c home this afternoon.

## 2022-05-09 NOTE — PROGRESS NOTES
Exercise Oximetry    Patient Name:Maya Ribera   MRN: [id number]   Date: 05/09/22             ROOM AIR BASELINE   SpO2% 95   Heart Rate 101   Blood Pressure      EXERCISE ON ROOM AIR SpO2% EXERCISE ON O2 @  LPM SpO2%   1 MINUTE 95 1 MINUTE    2 MINUTES 94 2 MINUTES    3 MINUTES 96 3 MINUTES    4 MINUTES 95 4 MINUTES    5 MINUTES 95 5 MINUTES    6 MINUTES 94 6 MINUTES               Distance Walked  360 Distance Walked   Dyspnea (Criss Scale)   Dyspnea (Criss Scale)   Fatigue (Criss Scale)   Fatigue (Criss Scale)   SpO2% Post Exercise  95 SpO2% Post Exercise   HR Post Exercise  110 HR Post Exercise   Time to Recovery   Time to Recovery     Comments:

## 2022-05-10 ENCOUNTER — HOME CARE VISIT (OUTPATIENT)
Dept: HOME HEALTH SERVICES | Facility: HOME HEALTHCARE | Age: 62
End: 2022-05-10

## 2022-05-10 PROCEDURE — G0299 HHS/HOSPICE OF RN EA 15 MIN: HCPCS

## 2022-05-10 NOTE — CASE MANAGEMENT/SOCIAL WORK
Case Management Discharge Note      Final Note: Pt discharged home with [facility].  ISABELLE Ruiz RN    Provided Post Acute Provider List?: Yes  Provided Post Acute Provider Quality & Resource List?: N/A    Selected Continued Care - Discharged on 5/9/2022 Admission date: 4/29/2022 - Discharge disposition: Home-Health Care Svc    Destination    No services have been selected for the patient.              Durable Medical Equipment    No services have been selected for the patient.              Dialysis/Infusion Coordination complete.    Service Provider Selected Services Address Phone Fax Patient Preferred    [facility]  Infusion and IV Therapy [id number] [phone] --          Home Medical Care     Service Provider Selected Services Address Phone Fax Patient Preferred     [facility]  [facility] [address] [address] [phone] --          Therapy    No services have been selected for the patient.              Community Resources    No services have been selected for the patient.              [facility]    No services have been selected for the patient.                       Final Discharge Disposition Code: 06 - home with home health care

## 2022-05-10 NOTE — OUTREACH NOTE
Prep Survey    Flowsheet Row Responses   [facility] facility patient discharged from? [place]   Is LACE score < 7 ? No   Emergency Room discharge w/ pulse ox? No   Eligibility Readm Mgmt   Discharge diagnosis Malignant neoplasm of lower third of esophagus, adenocarcinoma of esophagus   Does the patient have one of the following disease processes/diagnoses(primary or secondary)? Other   Does the patient have Home health ordered? Yes   What is the Home health agency?  [facility]   Is there a DME ordered? No   Prep survey completed? Yes          SUKH ALEJANDRE - Registered Nurse

## 2022-05-11 ENCOUNTER — READMISSION MANAGEMENT (OUTPATIENT)
Dept: CALL CENTER | Facility: HOSPITAL | Age: 62
End: 2022-05-11

## 2022-05-11 NOTE — OUTREACH NOTE
Medical Week 1 Survey    Flowsheet Row Responses   [facility] patient discharged from? [place]   Does the patient have one of the following disease processes/diagnoses(primary or secondary)? Other   Week 1 attempt successful? Yes   Call start time 1613   Call end time 1618   Discharge diagnosis Malignant neoplasm of lower third of esophagus, adenocarcinoma of esophagus   Medication alerts for this patient Rates pain at home 5/10,  with meds 2/10   Meds reviewed with patient/caregiver? Yes   Is the patient having any side effects they believe may be caused by any medication additions or changes? No   Does the patient have all medications ordered at discharge? Yes   Is the patient taking all medications as directed (includes completed medication regime)? Yes   Comments regarding appointments f/u appt 5-13 and 5-17   Does the patient have a primary care provider?  No   Does the patient have an appointment with their PCP within 7 days of discharge? No   Comments regarding PCP Pt has a number to find one.    What is preventing the patient from scheduling follow up appointments within 7 days of discharge? Haven't had time   Nursing Interventions Educated patient on importance of making appointment   What is the Home health agency?  [facility]   Has home health visited the patient within 72 hours of discharge? Yes   Comments Pt taking nothing by mouth, using J-tube for all meds/nutrition. Tolerating TF, been on since Dec.    Did the patient receive a copy of their discharge instructions? Yes   Nursing interventions Reviewed instructions with patient   What is the patient's perception of their health status since discharge? Improving   Is the patient/caregiver able to teach back signs and symptoms related to disease process for when to call PCP? Yes   Is the patient/caregiver able to teach back the hierarchy of who to call/visit for symptoms/problems? PCP, Specialist, Home health nurse, Urgent Care, ED,  911 Yes   If the patient is a current smoker, are they able to teach back resources for cessation? --  [have not smoked since DC]   Week 1 call completed? Yes          ANDREAS CARTER - Registered Nurse

## 2022-05-12 ENCOUNTER — HOME CARE VISIT (OUTPATIENT)
Dept: HOME HEALTH SERVICES | Facility: HOME HEALTHCARE | Age: 62
End: 2022-05-12

## 2022-05-12 VITALS
RESPIRATION RATE: 20 BRPM | DIASTOLIC BLOOD PRESSURE: 62 MMHG | TEMPERATURE: 99 F | HEART RATE: 100 BPM | SYSTOLIC BLOOD PRESSURE: 118 MMHG | OXYGEN SATURATION: 99 %

## 2022-05-12 PROCEDURE — G0300 HHS/HOSPICE OF LPN EA 15 MIN: HCPCS

## 2022-05-12 NOTE — CASE COMMUNICATION
HUD SOC – HUDDLE START OF CARE  ASSESS TUBE FEEDING VIA JTUBE W/ PUMP PEPTID 1.5  @100CC/HR X 12 HRS/DAY  ALL MEDS VIA J-TUBE- NPO  ASSESS INCISIONS- RT FLANK, MID LINE ABD, ANTERIOR NECK    PT LIVES IN OWN HOME HAS A PARTNER TO ASSIST HER, PT IS NPO R/T MALIGNANT NEOPLASM LOWER 1/3 OF ESOPHOGUS. PARTNER IS COMPETENT W/ USE OF PUMP, SHE HAS POINT OF CLARIFICATION FOR        Caregiver Status: PARTNER  Availability: 24/7  Ability to meet  patient's needs: YES  Willingness: YES    Risk for Hospitalization: HI    Patient's goal(s): TAKE FOOD/MEDS ORRALLY    Services required to achieve goals: SN    Potential Issues for goal attainment:  ESOPHAGEAL CANCER    Discipline Assessment Updates:     Problems identified:    Disease processes:  Acuity and severity:  Comorbidities:  Level of independence with management:     Knowledge deficits: FORMUAL/WATER RATE?  Current conditions :  Medications:  Care procedures:    Functional status and safety:    Mobility:    Self-care:     ADLs:     Any Duplication of Services:    Environmental issues:  physical environment:  set up/compatibility with patients needs  accessibility and safety    Equipment/Adjunct Services:  Devices for care present in the home:  Devices needed and not present:     Community resources involved/needed:  Dialysis:  Transportation:   Meals on Whee ls:    Any additional needs:    Based upon record review and collaboration conference, the recommended frequency for this patient is:     SN-----    PT-----    OT----    ST-----    HHA---    MSW---         Please note that above is a recommendation only and that the plan of care should reflect the patient's clinical needs and goals. If in agreement, please complete note. If a different frequency is necessary, please explain in note box  and proceed per patients clinical needs.

## 2022-05-12 NOTE — HOME HEALTH
ASSESS TUBE FEEDING VIA JTUBE W/ PUMP PEPTID 1.5  @100CC/HR X 12 HRS/DAY  ALL MEDS VIA J-TUBE- NPO  ASSESS INCISIONS- RT FLANK, MID LINE ABD, ANTERIOR NECK    PT LIVES IN OWN HOME HAS A PARTNER TO ASSIST HER, PT IS NPO R/T MALIGNANT NEOPLASM LOWER 1/3 OF ESOPHOGUS. PARTNER IS COMPETENT W/ USE OF PUMP, SHE HAS POINT OF CLARIFICATION FOR

## 2022-05-13 ENCOUNTER — HOSPITAL ENCOUNTER (OUTPATIENT)
Dept: GENERAL RADIOLOGY | Facility: HOSPITAL | Age: 62
Discharge: HOME | End: 2022-05-13

## 2022-05-13 VITALS
DIASTOLIC BLOOD PRESSURE: 60 MMHG | SYSTOLIC BLOOD PRESSURE: 120 MMHG | BODY MASS INDEX: 19.56 KG/M2 | WEIGHT: 114 LBS | HEART RATE: 108 BPM | OXYGEN SATURATION: 97 % | RESPIRATION RATE: 18 BRPM | TEMPERATURE: 97.8 F

## 2022-05-13 DIAGNOSIS — C15.5 MALIGNANT NEOPLASM OF LOWER THIRD OF ESOPHAGUS: ICD-10-CM

## 2022-05-13 PROCEDURE — 0 DIATRIZOATE MEGLUMINE & SODIUM PER 1 ML: Performed by: NURSE PRACTITIONER

## 2022-05-13 PROCEDURE — 74220 X-RAY XM ESOPHAGUS 1CNTRST: CPT

## 2022-05-13 PROCEDURE — 71046 X-RAY EXAM CHEST 2 VIEWS: CPT

## 2022-05-13 RX ADMIN — BARIUM SULFATE 183 ML: 960 POWDER, FOR SUSPENSION ORAL at 09:34

## 2022-05-13 RX ADMIN — DIATRIZOATE MEGLUMINE AND DIATRIZOATE SODIUM 240 ML: 660; 100 LIQUID ORAL; RECTAL at 09:34

## 2022-05-13 NOTE — HOME HEALTH
SN TO CONTINUE ASSESS TUBE FEEDING VIA JTUBE W/ PUMP PEPTID 1.5 @100CC/HR X 12 HRS/DAY . PATIENT NPO    ASSESS INCISIONS- RT FLANK, MID LINE ABD, ANTERIOR NECK  ASSESS FOR PAIN  PATIENT C/O LOW ENDURANCE AND STRENGTH. CALL MD FOR PHYSICAL THERAPY  MONITOR WEIGHT        Patient taking pain medication 4 x daily  GIVE MEDICATION BY GRAVITY  CALL md FOR PHYSICAL THERAPY

## 2022-05-16 ENCOUNTER — TELEPHONE (OUTPATIENT)
Dept: SURGERY | Facility: CLINIC | Age: 62
End: 2022-05-16

## 2022-05-16 NOTE — TELEPHONE ENCOUNTER
----- Message from BRANDI Ivy sent at 5/16/2022  8:40 AM EDT -----  Regarding: RE: [facility]  Yes she may. Thanks for taking care of that.  ----- Message -----  From: Lisy Borrego  Sent: 5/16/2022   8:39 AM EDT  To: BRANDI Ivy  Subject: [facility]                              Clarence from [facility] is calling to see if she can have verbal permission to get physical therapy for Mrs. Ribera for strengthening and endurance.    Phone:  [phone].  I can call her back with your answer.    Thank you.

## 2022-05-17 ENCOUNTER — OFFICE VISIT (OUTPATIENT)
Dept: SURGERY | Facility: CLINIC | Age: 62
End: 2022-05-17

## 2022-05-17 ENCOUNTER — HOME CARE VISIT (OUTPATIENT)
Dept: HOME HEALTH SERVICES | Facility: HOME HEALTHCARE | Age: 62
End: 2022-05-17

## 2022-05-17 VITALS
OXYGEN SATURATION: 96 % | WEIGHT: 113 LBS | SYSTOLIC BLOOD PRESSURE: 102 MMHG | HEIGHT: 64 IN | HEART RATE: 98 BPM | DIASTOLIC BLOOD PRESSURE: 62 MMHG | BODY MASS INDEX: 19.29 KG/M2

## 2022-05-17 VITALS
SYSTOLIC BLOOD PRESSURE: 102 MMHG | TEMPERATURE: 98.2 F | OXYGEN SATURATION: 95 % | HEART RATE: 101 BPM | RESPIRATION RATE: 16 BRPM | DIASTOLIC BLOOD PRESSURE: 68 MMHG

## 2022-05-17 DIAGNOSIS — Z09 FOLLOW-UP EXAMINATION FOLLOWING SURGERY: ICD-10-CM

## 2022-05-17 DIAGNOSIS — C15.5 MALIGNANT NEOPLASM OF LOWER THIRD OF ESOPHAGUS: Primary | ICD-10-CM

## 2022-05-17 PROCEDURE — G0299 HHS/HOSPICE OF RN EA 15 MIN: HCPCS

## 2022-05-17 PROCEDURE — 99024 POSTOP FOLLOW-UP VISIT: CPT | Performed by: THORACIC SURGERY (CARDIOTHORACIC VASCULAR SURGERY)

## 2022-05-17 RX ORDER — GABAPENTIN 250 MG/5ML
300 SOLUTION ORAL 3 TIMES DAILY
Qty: 366 ML | Refills: 0 | Status: SHIPPED | OUTPATIENT
Start: 2022-05-17 | End: 2022-06-08

## 2022-05-17 RX ORDER — OXYCODONE HCL 5 MG/5 ML
5 SOLUTION, ORAL ORAL EVERY 4 HOURS PRN
Qty: 473 ML | Refills: 0 | Status: SHIPPED | OUTPATIENT
Start: 2022-05-17 | End: 2022-05-24

## 2022-05-17 NOTE — PROGRESS NOTES
"Chief Complaint  First postoperative visit    Subjective          Maya Ribera presents to [facility] THORACIC SURGERY  History of Present Illness     Ms. Ribera was seen in the office today for follow-up of a robot-assisted total esophagectomy performed April 29, 2022.  Patient was originally a clinical T3 N1 M0 adenocarcinoma.  Final pathologic staging showed no residual viable cancer in the esophagus or resected lymph nodes.  This is a yp T0 N0 M0 adenocarcinoma.  Her postoperative course was uneventful.  She does have some weakness and some shortness of breath.  She has some pain in her back.  She has been gradually increasing her activities at home.  She is tolerating the tube feedings without difficulty.  She has started drinking water at home and is tolerating this without coughing or choking.    Objective   Vital Signs:  /62 (BP Location: Right arm, Patient Position: Sitting, Cuff Size: Adult)   Pulse 98   Ht 162.6 cm (64.02\")   Wt 51.3 kg (113 lb)   SpO2 96%   BMI 19.38 kg/m²   BMI is within normal parameters. No other follow-up for BMI required.      Physical Exam     Neck: Incision is well-healed.  No signs of infection.  No masses.  Full range of motion in the neck.    Chest: Right chest incisions are well-healed.  No subcutaneous masses or nodules.  Chest wall is stable.    Pulmonary: Diminished breath sounds at the right apex posteriorly otherwise the lungs are clear to auscultation.    Cardiac: Regular rate and rhythm.  No murmurs or gallops.  No dependent edema.    Abdomen: Midline incision is well-healed without signs of infection or herniation.  Feeding tube is in good position.  Surrounding skin is clean and dry.  The abdomen is soft and nontender.  No masses.  Good bowel sounds.    Result Review :   The following data was reviewed by: Hayder Finch III, MD on 05/17/2022:    Fluoroscopy esophagram shows mild narrowing of the esophagus at the anastomosis.  " There is free flow of contrast through the esophagus and conduit into the small bowel.  No evidence of leak.  Right apical hematoma is unchanged.         Assessment and Plan    Diagnoses and all orders for this visit:    1. Malignant neoplasm of lower third of esophagus (HCC) (Primary)  -     Gabapentin (NEURONTIN) 50 mg/mL solution solution; Administer 6 mL per J tube 3 (Three) Times a Day  Dispense: 366 mL; Refill: 0  -     oxyCODONE (ROXICODONE) 5 MG/5ML solution; Administer 5 mL per J tube Every 4 (Four) Hours As Needed for Moderate Pain  Dispense: 473 mL; Refill: 0  -     XR Chest 2 View; Future    2. Follow-up examination following surgery  -     XR Chest 2 View; Future    Other orders  -     metoprolol tartrate (LOPRESSOR) 25 MG tablet; Take 1 tablet by mouth Daily for 30 days.  Dispense: 30 tablet; Refill: 0      Patient is making a good recovery from her total esophagectomy.  She is tolerating water without aspiration.  We will start her on clear liquids and advance to a soft diet over the next few days.  As she increases her oral intake she will taper her tube feedings.  She will continue increasing her activities.  She will return to see me in 2weeks for discussion about removal of her feeding tube.  I have renewed her gabapentin and her oxycodone and her metoprolol.  I will keep you informed of her progress.       I spent 27 minutes caring for Maya on this date of service. This time includes time spent by me in the following activities:preparing for the visit, reviewing tests, performing a medically appropriate examination and/or evaluation , counseling and educating the patient/family/caregiver, ordering medications, tests, or procedures, referring and communicating with other health care professionals , documenting information in the medical record, independently interpreting results and communicating that information with the patient/family/caregiver and care coordination  Follow Up   Return in  about 2 weeks (around 5/31/2022).  Patient was given instructions and counseling regarding her condition or for health maintenance advice. Please see specific information pulled into the AVS if appropriate.

## 2022-05-18 ENCOUNTER — HOME CARE VISIT (OUTPATIENT)
Dept: HOME HEALTH SERVICES | Facility: HOME HEALTHCARE | Age: 62
End: 2022-05-18

## 2022-05-18 ENCOUNTER — HOSPITAL ENCOUNTER (OUTPATIENT)
Facility: HOSPITAL | Age: 62
Setting detail: OBSERVATION
Discharge: HOME | End: 2022-05-19
Attending: EMERGENCY MEDICINE | Admitting: INTERNAL MEDICINE

## 2022-05-18 VITALS
DIASTOLIC BLOOD PRESSURE: 78 MMHG | RESPIRATION RATE: 16 BRPM | HEART RATE: 98 BPM | SYSTOLIC BLOOD PRESSURE: 120 MMHG | OXYGEN SATURATION: 96 %

## 2022-05-18 DIAGNOSIS — C15.5 MALIGNANT NEOPLASM OF LOWER THIRD OF ESOPHAGUS: ICD-10-CM

## 2022-05-18 DIAGNOSIS — T85.528A DISLODGED JEJUNOSTOMY TUBE: Primary | ICD-10-CM

## 2022-05-18 PROCEDURE — C9803 HOPD COVID-19 SPEC COLLECT: HCPCS

## 2022-05-18 PROCEDURE — 85025 COMPLETE CBC W/AUTO DIFF WBC: CPT | Performed by: EMERGENCY MEDICINE

## 2022-05-18 PROCEDURE — U0003 INFECTIOUS AGENT DETECTION BY NUCLEIC ACID (DNA OR RNA); SEVERE ACUTE RESPIRATORY SYNDROME CORONAVIRUS 2 (SARS-COV-2) (CORONAVIRUS DISEASE [COVID-19]), AMPLIFIED PROBE TECHNIQUE, MAKING USE OF HIGH THROUGHPUT TECHNOLOGIES AS DESCRIBED BY CMS-2020-01-R: HCPCS | Performed by: EMERGENCY MEDICINE

## 2022-05-18 PROCEDURE — G0151 HHCP-SERV OF PT,EA 15 MIN: HCPCS

## 2022-05-18 PROCEDURE — 80053 COMPREHEN METABOLIC PANEL: CPT | Performed by: EMERGENCY MEDICINE

## 2022-05-18 PROCEDURE — 99284 EMERGENCY DEPT VISIT MOD MDM: CPT

## 2022-05-18 RX ORDER — SODIUM CHLORIDE 0.9 % (FLUSH) 0.9 %
10 SYRINGE (ML) INJECTION AS NEEDED
Status: DISCONTINUED | OUTPATIENT
Start: 2022-05-18 | End: 2022-05-19 | Stop reason: HOSPADM

## 2022-05-18 NOTE — CASE COMMUNICATION
PT IRA completed today.  No further visits planned. She is able to amb at least 6 minutes without a device on level and unlevel surfaces. Tinetti and 30 second sit to stand suggest low fall risk.  Recommend she gradually increase her walking duration and focus on PLB with exertion.     Thank you  Jenniffer Garcia

## 2022-05-18 NOTE — HOME HEALTH
PT referral from  for endurance and strengthening.   Pt presented home with her partner.  Pt is more concerned about increasing her lung capacity.  She denies hx of falls. She is transferring and amb independently without a device.   Pt has hx of esophageal cancer and s/p surgery.   Both outcome measures suggest low fall risk. She is amb I without device for at least 6 min on level and unlevel surfaces.    No furhter visits necessary by PT.

## 2022-05-19 ENCOUNTER — READMISSION MANAGEMENT (OUTPATIENT)
Dept: CALL CENTER | Facility: HOSPITAL | Age: 62
End: 2022-05-19

## 2022-05-19 ENCOUNTER — APPOINTMENT (OUTPATIENT)
Dept: GENERAL RADIOLOGY | Facility: HOSPITAL | Age: 62
End: 2022-05-19

## 2022-05-19 VITALS
TEMPERATURE: 98.2 F | OXYGEN SATURATION: 95 % | WEIGHT: 108.03 LBS | DIASTOLIC BLOOD PRESSURE: 64 MMHG | RESPIRATION RATE: 16 BRPM | BODY MASS INDEX: 18.44 KG/M2 | HEIGHT: 64 IN | HEART RATE: 89 BPM | SYSTOLIC BLOOD PRESSURE: 109 MMHG

## 2022-05-19 PROBLEM — G89.29 CHRONIC PAIN: Status: ACTIVE | Noted: 2022-05-19

## 2022-05-19 PROBLEM — T85.528A DISLODGED JEJUNOSTOMY TUBE: Status: ACTIVE | Noted: 2022-05-19

## 2022-05-19 LAB
ALBUMIN SERPL-MCNC: 3.8 G/DL (ref 3.5–5.2)
ALBUMIN/GLOB SERPL: 1.1 G/DL
ALP SERPL-CCNC: 126 U/L (ref 39–117)
ALT SERPL W P-5'-P-CCNC: 11 U/L (ref 1–33)
ANION GAP SERPL CALCULATED.3IONS-SCNC: 11 MMOL/L (ref 5–15)
ANION GAP SERPL CALCULATED.3IONS-SCNC: 8 MMOL/L (ref 5–15)
AST SERPL-CCNC: 17 U/L (ref 1–32)
BASOPHILS # BLD AUTO: 0.04 10*3/MM3 (ref 0–0.2)
BASOPHILS # BLD AUTO: 0.04 10*3/MM3 (ref 0–0.2)
BASOPHILS NFR BLD AUTO: 0.4 % (ref 0–1.5)
BASOPHILS NFR BLD AUTO: 0.5 % (ref 0–1.5)
BILIRUB SERPL-MCNC: 0.3 MG/DL (ref 0–1.2)
BUN SERPL-MCNC: 19 MG/DL (ref 8–23)
BUN SERPL-MCNC: 20 MG/DL (ref 8–23)
BUN/CREAT SERPL: 46.5 (ref 7–25)
BUN/CREAT SERPL: 47.5 (ref 7–25)
CALCIUM SPEC-SCNC: 9.4 MG/DL (ref 8.6–10.5)
CALCIUM SPEC-SCNC: 9.8 MG/DL (ref 8.6–10.5)
CHLORIDE SERPL-SCNC: 100 MMOL/L (ref 98–107)
CHLORIDE SERPL-SCNC: 102 MMOL/L (ref 98–107)
CO2 SERPL-SCNC: 26 MMOL/L (ref 22–29)
CO2 SERPL-SCNC: 27 MMOL/L (ref 22–29)
CREAT SERPL-MCNC: 0.4 MG/DL (ref 0.57–1)
CREAT SERPL-MCNC: 0.43 MG/DL (ref 0.57–1)
DEPRECATED RDW RBC AUTO: 42.8 FL (ref 37–54)
DEPRECATED RDW RBC AUTO: 45.7 FL (ref 37–54)
EGFRCR SERPLBLD CKD-EPI 2021: 110.8 ML/MIN/1.73
EGFRCR SERPLBLD CKD-EPI 2021: 112.8 ML/MIN/1.73
EOSINOPHIL # BLD AUTO: 0.13 10*3/MM3 (ref 0–0.4)
EOSINOPHIL # BLD AUTO: 0.21 10*3/MM3 (ref 0–0.4)
EOSINOPHIL NFR BLD AUTO: 1.3 % (ref 0.3–6.2)
EOSINOPHIL NFR BLD AUTO: 2.8 % (ref 0.3–6.2)
ERYTHROCYTE [DISTWIDTH] IN BLOOD BY AUTOMATED COUNT: 12.8 % (ref 12.3–15.4)
ERYTHROCYTE [DISTWIDTH] IN BLOOD BY AUTOMATED COUNT: 13 % (ref 12.3–15.4)
GLOBULIN UR ELPH-MCNC: 3.5 GM/DL
GLUCOSE SERPL-MCNC: 103 MG/DL (ref 65–99)
GLUCOSE SERPL-MCNC: 93 MG/DL (ref 65–99)
HCT VFR BLD AUTO: 28.9 % (ref 34–46.6)
HCT VFR BLD AUTO: 30.6 % (ref 34–46.6)
HGB BLD-MCNC: 10.2 G/DL (ref 12–15.9)
HGB BLD-MCNC: 9.4 G/DL (ref 12–15.9)
IMM GRANULOCYTES # BLD AUTO: 0.03 10*3/MM3 (ref 0–0.05)
IMM GRANULOCYTES # BLD AUTO: 0.04 10*3/MM3 (ref 0–0.05)
IMM GRANULOCYTES NFR BLD AUTO: 0.4 % (ref 0–0.5)
IMM GRANULOCYTES NFR BLD AUTO: 0.4 % (ref 0–0.5)
INR PPP: 1.25 (ref 0.9–1.1)
LYMPHOCYTES # BLD AUTO: 0.78 10*3/MM3 (ref 0.7–3.1)
LYMPHOCYTES # BLD AUTO: 1.02 10*3/MM3 (ref 0.7–3.1)
LYMPHOCYTES NFR BLD AUTO: 13.5 % (ref 19.6–45.3)
LYMPHOCYTES NFR BLD AUTO: 7.8 % (ref 19.6–45.3)
MCH RBC QN AUTO: 31 PG (ref 26.6–33)
MCH RBC QN AUTO: 31.2 PG (ref 26.6–33)
MCHC RBC AUTO-ENTMCNC: 32.5 G/DL (ref 31.5–35.7)
MCHC RBC AUTO-ENTMCNC: 33.3 G/DL (ref 31.5–35.7)
MCV RBC AUTO: 93 FL (ref 79–97)
MCV RBC AUTO: 96 FL (ref 79–97)
MONOCYTES # BLD AUTO: 0.75 10*3/MM3 (ref 0.1–0.9)
MONOCYTES # BLD AUTO: 0.78 10*3/MM3 (ref 0.1–0.9)
MONOCYTES NFR BLD AUTO: 7.8 % (ref 5–12)
MONOCYTES NFR BLD AUTO: 9.9 % (ref 5–12)
NEUTROPHILS NFR BLD AUTO: 5.51 10*3/MM3 (ref 1.7–7)
NEUTROPHILS NFR BLD AUTO: 72.9 % (ref 42.7–76)
NEUTROPHILS NFR BLD AUTO: 8.17 10*3/MM3 (ref 1.7–7)
NEUTROPHILS NFR BLD AUTO: 82.3 % (ref 42.7–76)
NRBC BLD AUTO-RTO: 0 /100 WBC (ref 0–0.2)
NRBC BLD AUTO-RTO: 0 /100 WBC (ref 0–0.2)
PLATELET # BLD AUTO: 307 10*3/MM3 (ref 140–450)
PLATELET # BLD AUTO: 345 10*3/MM3 (ref 140–450)
PMV BLD AUTO: 8.9 FL (ref 6–12)
PMV BLD AUTO: 9 FL (ref 6–12)
POTASSIUM SERPL-SCNC: 3.7 MMOL/L (ref 3.5–5.2)
POTASSIUM SERPL-SCNC: 3.8 MMOL/L (ref 3.5–5.2)
PROT SERPL-MCNC: 7.3 G/DL (ref 6–8.5)
PROTHROMBIN TIME: 15.6 SECONDS (ref 11.7–14.2)
RBC # BLD AUTO: 3.01 10*6/MM3 (ref 3.77–5.28)
RBC # BLD AUTO: 3.29 10*6/MM3 (ref 3.77–5.28)
SARS-COV-2 RNA RESP QL NAA+PROBE: NOT DETECTED
SODIUM SERPL-SCNC: 137 MMOL/L (ref 136–145)
SODIUM SERPL-SCNC: 137 MMOL/L (ref 136–145)
WBC NRBC COR # BLD: 7.56 10*3/MM3 (ref 3.4–10.8)
WBC NRBC COR # BLD: 9.94 10*3/MM3 (ref 3.4–10.8)

## 2022-05-19 PROCEDURE — 25010000002 MORPHINE PER 10 MG: Performed by: NURSE PRACTITIONER

## 2022-05-19 PROCEDURE — 96374 THER/PROPH/DIAG INJ IV PUSH: CPT

## 2022-05-19 PROCEDURE — 96361 HYDRATE IV INFUSION ADD-ON: CPT

## 2022-05-19 PROCEDURE — 85610 PROTHROMBIN TIME: CPT | Performed by: NURSE PRACTITIONER

## 2022-05-19 PROCEDURE — 85025 COMPLETE CBC W/AUTO DIFF WBC: CPT | Performed by: NURSE PRACTITIONER

## 2022-05-19 PROCEDURE — 96375 TX/PRO/DX INJ NEW DRUG ADDON: CPT

## 2022-05-19 PROCEDURE — G0378 HOSPITAL OBSERVATION PER HR: HCPCS

## 2022-05-19 PROCEDURE — 80048 BASIC METABOLIC PNL TOTAL CA: CPT | Performed by: NURSE PRACTITIONER

## 2022-05-19 PROCEDURE — 25010000002 HYDROMORPHONE PER 4 MG: Performed by: EMERGENCY MEDICINE

## 2022-05-19 PROCEDURE — 99213 OFFICE O/P EST LOW 20 MIN: CPT | Performed by: SURGERY

## 2022-05-19 PROCEDURE — 25010000002 ONDANSETRON PER 1 MG: Performed by: EMERGENCY MEDICINE

## 2022-05-19 PROCEDURE — 49465 FLUORO EXAM OF G/COLON TUBE: CPT

## 2022-05-19 PROCEDURE — 0 DIATRIZOATE MEGLUMINE & SODIUM PER 1 ML: Performed by: INTERNAL MEDICINE

## 2022-05-19 RX ORDER — METHIMAZOLE 10 MG/1
10 TABLET ORAL DAILY
Status: DISCONTINUED | OUTPATIENT
Start: 2022-05-19 | End: 2022-05-19 | Stop reason: HOSPADM

## 2022-05-19 RX ORDER — MORPHINE SULFATE 2 MG/ML
1 INJECTION, SOLUTION INTRAMUSCULAR; INTRAVENOUS EVERY 4 HOURS PRN
Status: DISCONTINUED | OUTPATIENT
Start: 2022-05-19 | End: 2022-05-19 | Stop reason: HOSPADM

## 2022-05-19 RX ORDER — SODIUM CHLORIDE 0.9 % (FLUSH) 0.9 %
10 SYRINGE (ML) INJECTION AS NEEDED
Status: DISCONTINUED | OUTPATIENT
Start: 2022-05-19 | End: 2022-05-19 | Stop reason: HOSPADM

## 2022-05-19 RX ORDER — ACETAMINOPHEN 650 MG/1
650 SUPPOSITORY RECTAL EVERY 4 HOURS PRN
Status: DISCONTINUED | OUTPATIENT
Start: 2022-05-19 | End: 2022-05-19 | Stop reason: HOSPADM

## 2022-05-19 RX ORDER — GABAPENTIN 250 MG/5ML
300 SOLUTION ORAL 3 TIMES DAILY
Status: DISCONTINUED | OUTPATIENT
Start: 2022-05-19 | End: 2022-05-19 | Stop reason: HOSPADM

## 2022-05-19 RX ORDER — SODIUM CHLORIDE 9 MG/ML
75 INJECTION, SOLUTION INTRAVENOUS CONTINUOUS
Status: DISCONTINUED | OUTPATIENT
Start: 2022-05-19 | End: 2022-05-19 | Stop reason: HOSPADM

## 2022-05-19 RX ORDER — HYDROMORPHONE HYDROCHLORIDE 1 MG/ML
0.5 INJECTION, SOLUTION INTRAMUSCULAR; INTRAVENOUS; SUBCUTANEOUS ONCE
Status: COMPLETED | OUTPATIENT
Start: 2022-05-19 | End: 2022-05-19

## 2022-05-19 RX ORDER — ONDANSETRON 2 MG/ML
4 INJECTION INTRAMUSCULAR; INTRAVENOUS ONCE
Status: COMPLETED | OUTPATIENT
Start: 2022-05-19 | End: 2022-05-19

## 2022-05-19 RX ORDER — PANTOPRAZOLE SODIUM 40 MG/10ML
40 INJECTION, POWDER, LYOPHILIZED, FOR SOLUTION INTRAVENOUS
Status: DISCONTINUED | OUTPATIENT
Start: 2022-05-19 | End: 2022-05-19 | Stop reason: HOSPADM

## 2022-05-19 RX ORDER — SODIUM CHLORIDE 0.9 % (FLUSH) 0.9 %
10 SYRINGE (ML) INJECTION EVERY 12 HOURS SCHEDULED
Status: DISCONTINUED | OUTPATIENT
Start: 2022-05-19 | End: 2022-05-19 | Stop reason: HOSPADM

## 2022-05-19 RX ORDER — HYDROMORPHONE HYDROCHLORIDE 1 MG/ML
0.5 INJECTION, SOLUTION INTRAMUSCULAR; INTRAVENOUS; SUBCUTANEOUS
Status: DISCONTINUED | OUTPATIENT
Start: 2022-05-19 | End: 2022-05-19 | Stop reason: HOSPADM

## 2022-05-19 RX ORDER — LORAZEPAM 2 MG/ML
1 CONCENTRATE ORAL EVERY 8 HOURS PRN
Status: DISCONTINUED | OUTPATIENT
Start: 2022-05-19 | End: 2022-05-19 | Stop reason: HOSPADM

## 2022-05-19 RX ORDER — ACETAMINOPHEN 160 MG/5ML
650 SOLUTION ORAL EVERY 4 HOURS PRN
Status: DISCONTINUED | OUTPATIENT
Start: 2022-05-19 | End: 2022-05-19 | Stop reason: HOSPADM

## 2022-05-19 RX ORDER — NALOXONE HCL 0.4 MG/ML
0.4 VIAL (ML) INJECTION
Status: DISCONTINUED | OUTPATIENT
Start: 2022-05-19 | End: 2022-05-19 | Stop reason: HOSPADM

## 2022-05-19 RX ORDER — ONDANSETRON 2 MG/ML
4 INJECTION INTRAMUSCULAR; INTRAVENOUS EVERY 6 HOURS PRN
Status: DISCONTINUED | OUTPATIENT
Start: 2022-05-19 | End: 2022-05-19 | Stop reason: HOSPADM

## 2022-05-19 RX ORDER — ACETAMINOPHEN 325 MG/1
650 TABLET ORAL EVERY 4 HOURS PRN
Status: DISCONTINUED | OUTPATIENT
Start: 2022-05-19 | End: 2022-05-19 | Stop reason: HOSPADM

## 2022-05-19 RX ADMIN — METHIMAZOLE 10 MG: 10 TABLET ORAL at 14:30

## 2022-05-19 RX ADMIN — MORPHINE SULFATE 1 MG: 2 INJECTION, SOLUTION INTRAMUSCULAR; INTRAVENOUS at 12:15

## 2022-05-19 RX ADMIN — Medication 10 ML: at 12:15

## 2022-05-19 RX ADMIN — Medication 10 ML: at 06:27

## 2022-05-19 RX ADMIN — ONDANSETRON 4 MG: 2 INJECTION INTRAMUSCULAR; INTRAVENOUS at 00:30

## 2022-05-19 RX ADMIN — HYDROMORPHONE HYDROCHLORIDE 0.5 MG: 1 INJECTION, SOLUTION INTRAMUSCULAR; INTRAVENOUS; SUBCUTANEOUS at 00:30

## 2022-05-19 RX ADMIN — PANTOPRAZOLE SODIUM 40 MG: 40 INJECTION, POWDER, FOR SOLUTION INTRAVENOUS at 12:15

## 2022-05-19 RX ADMIN — SODIUM CHLORIDE 75 ML/HR: 9 INJECTION, SOLUTION INTRAVENOUS at 06:26

## 2022-05-19 RX ADMIN — DIATRIZOATE MEGLUMINE AND DIATRIZOATE SODIUM 30 ML: 600; 100 SOLUTION ORAL; RECTAL at 08:09

## 2022-05-19 NOTE — PROGRESS NOTES
Case Management Discharge Note      Final Note: Home         Selected Continued Care - Discharged on 5/19/2022 Admission date: 5/18/2022 - Discharge disposition: Home or Self Care    Destination    No services have been selected for the patient.              Durable Medical Equipment    No services have been selected for the patient.              Dialysis/Infusion    No services have been selected for the patient.              Home Medical Care    No services have been selected for the patient.              Therapy    No services have been selected for the patient.              Community Resources    No services have been selected for the patient.              Community & DME    No services have been selected for the patient.                Selected Continued Care - Prior Encounters Includes selections from prior encounters from 2/17/2022 to 5/19/2022    Discharged on 5/9/2022 Admission date: 4/29/2022 - Discharge disposition: Home-Health Care Svc    Dialysis/Infusion     Service Provider Selected Services Address Phone Fax Patient Preferred    [facility]  Infusion and IV Therapy [address] [facility] [address] [phone] --          Home Medical Care     Service Provider Selected Services Address Phone Fax Patient Preferred    [facility]  [facility] [address] [address] [phone] --                    Transportation Services  Private: Car    Final Discharge Disposition Code: 01 - home or self-care

## 2022-05-19 NOTE — CONSULTS
Cc: Dislodged jejunostomy tube    History of presenting illness:   This is a 61-year-old female known to me from prior ports and jejunostomy tube placement (December 2021) for neoadjuvant treatment of esophageal malignancy.  She underwent esophagogastrectomy last month with good results, but is still fairly dependent on her J-tube.  She has been approved to try a diet although she really has not taken much.  Her jejunostomy tube became dislodged last night and she presented to the emergency room.  It has not been replaced.    Past Medical History: Esophageal cancer, hypertension, hyperlipidemia, past history of atrial fibrillation    Past Surgical History: Remote cholecystectomy, left knee replacement.  She had port placement and open jejunostomy by me December 2021.  Recent esophagogastrectomy (Dr. Finch)    Medications: Reviewed and reconciled in epic    Allergies: Codeine    Social History: Long-term smoker    Family History: Negative for esophageal or colorectal cancer    Review of Systems:  Constitutional: Positive for weakness, weight loss, poor appetite  Neck: no swollen glands, positive for dysphagia  Respiratory: negative for SOB, cough, hemoptysis or wheezing  Cardiovascular: negative for chest pain, palpitations or peripheral edema  Gastrointestinal: Positive for mild abdominal pain, negative for rectal bleeding      Physical Exam:  BMI: 18.5  General: alert and oriented, appropriate, no acute distress, frail appearance, chronic ill  Eyes: No scleral icterus, extraocular movements are intact  Neck: Supple without lymphadenopathy or thyromegaly, trachea is in the midline, incision in good order  Respiratory: There is good bilateral chest expansion, no use of accessory muscles is noted  Cardiovascular: No jugular venous distention or peripheral edema is seen  Gastrointestinal: Soft, J-tube site with small amount of bilious drainage.  Midline incision in good order.    Laboratory data: White blood cell  count 7.5 hemoglobin 9.4, chemistries are in good order    Imaging data: Recent esophagram demonstrates no leak, there is an expected stricture      Assessment and plan:   -Dislodged jejunostomy tube  -History of esophageal cancer, status post esophagectomy, seemingly doing reasonably well to this point  -Seems that she will likely still need her jejunostomy for now.  The tract appeared to be open and as such at the bedside I gently reinserted an 18 French Avonos jejunostomy tube and inflated the balloon with 3 cc of water with minimal difficulty.  I taped it in place for now.  I will order a tube contrast study to confirm appropriate placement.  If it looks appropriate then I will secure it later today.  The patient and family wish to be switched to a balloon based catheter, which may be appropriate as well.  I will follow-up with her later today.  If the tube is not able to be replaced either at bedside or percutaneously, I think that further discussion would need to be undertaken with thoracic surgery.  Under that circumstance, it might even be the case that TPN would be more appropriate than another laparotomy for tube placement.      Bhanu Hollis MD, FACS  General, Minimally Invasive and Endoscopic Surgery  [facility]    [address], [address]  [place], [place], [address]  P: [phone]  F: [phone]     Addendum:  J-tube study reviewed and tube is in good position.  I secured the 18 French tube with 2-0 silk suture.  Tube is okay to be used for feeding and medications.  Okay for discharge from my standpoint, follow-up as needed.

## 2022-05-19 NOTE — ED NOTES
Distribution called to send tube replacement. Pt's tube is an 18F, which we do not have in stock. Per Andrea, he will send a 20F and a 16F.

## 2022-05-19 NOTE — DISCHARGE SUMMARY
Patient Name: Maya Ribera  : 1960  MRN: [id number]    Date of Admission: 2022  Date of Discharge:  2022  Primary Care Physician: Provider, No Known      Chief Complaint:   feeding tube dislodged      Discharge Diagnoses     Active Hospital Problems    Diagnosis  POA   • **Dislodged jejunostomy tube [T85.528A]  Not Applicable   • Chronic pain [G89.29]  Yes   • Esophageal cancer (HCC) [C15.9]  Yes   • Paroxysmal atrial fibrillation (HCC) [I48.0]  Yes   • Hyperthyroidism [E05.90]  Yes   • Hypertension [I10]  Yes      Resolved Hospital Problems   No resolved problems to display.        Hospital Course     Ms. Ribera is a 61 y.o. female with a history of esophageal cancer status post esophagogastrectomy, jejunostomy tube who presented to [facility] initially complaining of displacement of J-tube.  Please see the admitting history and physical for further details.  She was seen by general surgery who replaced her J-tube at bedside.  Placement has been confirmed with J-tube contrast study.  General surgery has now secured J-tube with stitch and approved use of J-tube.  She is stable for discharge to home today.       Day of Discharge     Subjective:  No new complaints. Jtube successfully replaced and secured    Physical Exam:  Temp:  [98.1 °F (36.7 °C)] 98.1 °F (36.7 °C)  Heart Rate:  [] 84  Resp:  [16-18] 16  BP: ()/(59-65) 98/59  Body mass index is 18.54 kg/m².  Physical Exam  See H&P  Consultants     Consult Orders (all) (From admission, onward)     Start     Ordered    22 0045  Inpatient General Surgery Consult  Once        Specialty:  General Surgery  Provider:  Bhanu Hollis MD    22 0046    22 0026  [facility] (on-call MD unless specified) Details  Once        Specialty:  Hospitalist  Provider:  (Not yet assigned)    22 0025              Procedures     * Surgery not found *      Imaging Results (All)     Procedure Component Value Units  Date/Time    IR J or G Tube Contrast Eval [[id number]] Collected: 05/19/22 0838     Updated: 05/19/22 0838    Narrative:      J-TUBE EVALUATION.     HISTORY: 61-year-old female with a J-tube.     FINDINGS:  film of the abdomen demonstrates residual enteric  contrast within the colon from likely the esophagram performed on  05/13/2022. 30 mL of Gastroview were injected into the J-tube and  contrast seen to flow freely into the jejunum.                  Pertinent Labs     Results from last 7 days   Lab Units 05/19/22  0551 05/18/22  2356   WBC 10*3/mm3 7.56 9.94   HEMOGLOBIN g/dL 9.4* 10.2*   PLATELETS 10*3/mm3 307 345     Results from last 7 days   Lab Units 05/19/22  0551 05/18/22  2356   SODIUM mmol/L 137 137   POTASSIUM mmol/L 3.8 3.7   CHLORIDE mmol/L 102 100   CO2 mmol/L 27.0 26.0   BUN mg/dL 19 20   CREATININE mg/dL 0.40* 0.43*   GLUCOSE mg/dL 93 103*   EGFR mL/min/1.73 112.8 110.8     Results from last 7 days   Lab Units 05/18/22  2356   ALBUMIN g/dL 3.80   BILIRUBIN mg/dL 0.3   ALK PHOS U/L 126*   AST (SGOT) U/L 17   ALT (SGPT) U/L 11     Results from last 7 days   Lab Units 05/19/22  0551 05/18/22  2356   CALCIUM mg/dL 9.4 9.8   ALBUMIN g/dL  --  3.80               Invalid input(s): LDLCALC      Results from last 7 days   Lab Units 05/18/22  2357   COVID19  Not Detected       Test Results Pending at Discharge       Discharge Details        Discharge Medications      Changes to Medications      Instructions Start Date   ondansetron ODT 8 MG disintegrating tablet  Commonly known as: Zofran ODT  What changed: how to take this   8 mg, Translingual, Every 8 Hours PRN         Continue These Medications      Instructions Start Date   aluminum-magnesium hydroxide 200-200 MG/5ML suspension, diphenhydrAMINE 12.5 MG/5ML liquid, Lidocaine Viscous HCl 2 % solution, nystatin 100,000 unit/mL suspension   10 mL, Swish & Spit, 4 Times Daily After Meals & at Bedtime      Eliquis 5 MG tablet tablet  Generic drug:  apixaban   5 mg, Per J Tube, Every 12 Hours Scheduled, LD 12/4 PT STATED TO HOLD 72 HOURS PRIOR TO SURGERY      Gabapentin 50 mg/mL solution solution  Commonly known as: NEURONTIN   Administer 6 mL per J tube 3 (Three) Times a Day      lansoprazole 30 MG Tablet Delayed Release Dispersible disintegrating tablet  Commonly known as: PREVACID SOLUTAB   30 mg, Per J Tube, Every Morning Before Breakfast      LORazepam 2 MG/ML concentrated solution  Commonly known as: LORazepam Intensol   1 mg, Oral, Every 8 Hours PRN, Or nausea      methIMAzole 10 MG tablet  Commonly known as: TAPAZOLE   10 mg, Per J Tube, Daily, Take DOS      metoprolol tartrate 25 MG tablet  Commonly known as: LOPRESSOR   25 mg, Oral, Daily      oxyCODONE 5 MG/5ML solution  Commonly known as: ROXICODONE   Administer 5 mL per J tube Every 4 (Four) Hours As Needed for Moderate Pain      pravastatin 20 MG tablet  Commonly known as: PRAVACHOL   20 mg, Per J Tube, Daily             Allergies   Allergen Reactions   • Codeine Hives     Patient states this is when she was very young.        Discharge Disposition:  Home or Self Care      Discharge Diet:  Diet Order   Procedures   • NPO Diet NPO Type: Strict NPO       Discharge Activity:   Activity Instructions     Activity as Tolerated            CODE STATUS:    Code Status and Medical Interventions:   Ordered at: 05/19/22 0046     Code Status (Patient has no pulse and is not breathing):    CPR (Attempt to Resuscitate)     Medical Interventions (Patient has pulse or is breathing):    Full Support       Future Appointments   Date Time Provider Department Center   5/20/2022 To Be Determined Nirali Yap RN  [facility] None   5/24/2022 To Be Determined Clarence Sears LPN  [facility] None   5/25/2022  9:50 AM LAB CHAIR 5 ABE [facility]  [facility]   5/25/2022 10:20 AM Iggy Harrell MD [facility]   5/31/2022 To Be Determined Clarence Sears LPN  [facility] None   6/1/2022 11:00 AM Hayder Finch  III, MD [place]  [facility]   6/7/2022 To Be Determined Clarence Sears LPN  [facility] None   6/14/2022 To Be Determined Nirali Yap RN [facility] None      Follow-up Information     Provider, No Known .    Contact information:  [facility] [address] [phone]                         Time Spent on Discharge:  Greater than 37 minutes      BRANDI Beach  [facility]  05/19/22  13:03 EDT

## 2022-05-19 NOTE — H&P
Patient Name:  Maya Ribera  YOB: 1960  MRN:  [id number]  Admit Date:  5/18/2022  Patient Care Team:  Provider, No Known as PCP - General Finch, Hayder EVANS III, MD as Referring Physician (Thoracic Surgery)  Iggy Harrell MD as Consulting Physician (Hematology and Oncology)  Ciro Pelletier MD as Consulting Physician (Radiation Oncology)  Artie Sánchez MD as Consulting Physician (Cardiology)      Subjective   History Present Illness     Chief Complaint   Patient presents with   • feeding tube dislodged       Ms. Ribera is a 61 y.o. with a history of esophageal cancer, HTN, HLD status post esophagogastrectomy, jejunostomy tube placement December 2021.  She presented when her J-tube became dislodged.  She has been cleared to take food and liquids and by mouth and was hoping to have the J-tube permanently removed in several weeks.  Dr. Ruiz, general surgery saw the patient and has reinserted the tube followed by tube contrast study which has confirmed appropriate placement.  Plans later per surgery to switch to a balloon base catheter.  She denies any other acute illness or symptoms.    History of Present Illness  Review of Systems   Constitutional: Negative for appetite change and unexpected weight change.   HENT: Negative for trouble swallowing.    Respiratory: Negative for choking and shortness of breath.    Cardiovascular: Negative for chest pain.   Gastrointestinal: Negative for abdominal distention, abdominal pain, blood in stool, constipation, diarrhea, nausea and vomiting.        J-tube replacement   Genitourinary: Negative for difficulty urinating and dysuria.   Musculoskeletal: Negative for back pain.        Chronic pain   Skin: Negative for color change.   Neurological: Negative for dizziness.   Hematological: Does not bruise/bleed easily.   Psychiatric/Behavioral: Negative.         Personal History     Past Medical History:   Diagnosis Date   • Boil, breast 12/13/2021     HEALING UNDER LEFT BREAST    • Cervical cancer (HCC)    • Dysphagia    • Esophageal cancer (HCC)    • Esophagitis 9/21/2021   • Gastric ulcer    • GERD (gastroesophageal reflux disease)    • Hyperlipidemia    • Hypertension    • Hyperthyroidism    • Irritable bowel    • Mitral valve prolapse     FOLLOWED BY     • PAF (paroxysmal atrial fibrillation) (HCC)    • Uses feeding tube      Past Surgical History:   Procedure Laterality Date   • CHOLECYSTECTOMY     • COLONOSCOPY  08/27/2021    [facility]   • ESOPHAGOGASTRECTOMY Right 4/29/2022    Procedure: BRONCHOSCOPY, RIGHT THORACOSCOPY WITH DAVINCI ROBOT WITH TOTAL ESOPHAGECTOMY, INTERCOSTAL NERVE BLOCK, JEJUNOSTOMY TUBE REPLACEMENT;  Surgeon: Hayder Finch III, MD;  Location: [facility];  Service: Robotics - DaVinci;  Laterality: Right;   • EXTERNAL EAR SURGERY     • JEJUNOSTOMY N/A 12/15/2021    Procedure: open JEJUNOSTOMY tube placement;  Surgeon: Bhanu Hollis MD;  Location: [facility];  Service: General;  Laterality: N/A;   • KNEE SURGERY Left    • REPLACEMENT TOTAL KNEE Left    • UPPER ENDOSCOPIC ULTRASOUND W/ FNA N/A 12/7/2021    Procedure: Esophagogastroduodenoscopy with endoscopic ultrasound  WITH STAGING AND FINE NEEDLE BIOPSY;  Surgeon: Amrit Green MD;  Location: [facility] ENDOSCOPY;  Service: Gastroenterology;  Laterality: N/A;  post op: inlet patch, esophageal mass, T2   • VENOUS ACCESS DEVICE (PORT) INSERTION Left 12/15/2021    Procedure: INSERTION OF PORTACATH;  Surgeon: Bhanu Hollis MD;  Location: [facility] OR;  Service: General;  Laterality: Left;     Family History   Problem Relation Age of Onset   • Breast cancer Mother    • Diabetes Mother    • Bipolar disorder Father    • Diabetes Father    • Hypertension Father    • Breast cancer Sister    • COPD Sister    • Diabetes Sister    • Hypertension Sister    • Alcohol abuse Brother    • Asthma Brother    • Bipolar disorder Brother    • Diabetes Brother    • Seizures  Brother    • Breast cancer Maternal Grandmother    • Diabetes Maternal Grandmother    • Diabetes Maternal Grandfather    • Diabetes Paternal Grandmother    • Diabetes Paternal Grandfather    • Hypertension Son    • Kidney cancer Sister    • Hypertension Sister    • Diabetes Sister    • Malig Hyperthermia Neg Hx      Social History     Tobacco Use   • Smoking status: Current Every Day Smoker     Packs/day: 1.00     Years: 40.00     Pack years: 40.00     Types: Cigarettes     Start date: 1981   • Smokeless tobacco: Never Used   • Tobacco comment: 2 per day   Vaping Use   • Vaping Use: Never used   Substance Use Topics   • Alcohol use: Not Currently   • Drug use: Never     No current facility-administered medications on file prior to encounter.     Current Outpatient Medications on File Prior to Encounter   Medication Sig Dispense Refill   • Eliquis 5 MG tablet tablet Administer 5 mg per J tube Every 12 (Twelve) Hours. LD 12/4  PT STATED TO HOLD 72 HOURS PRIOR TO SURGERY     • Gabapentin (NEURONTIN) 50 mg/mL solution solution Administer 6 mL per J tube 3 (Three) Times a Day 366 mL 0   • lansoprazole (PREVACID SOLUTAB) 30 MG Tablet Delayed Release Dispersible disintegrating tablet Administer 1 tablet per J tube Every Morning Before Breakfast. 60 tablet 1   • LORazepam (LORazepam Intensol) 2 MG/ML concentrated solution Take 0.5 mL by mouth Every 8 (Eight) Hours As Needed for Anxiety. Or nausea 30 mL 0   • methIMAzole (TAPAZOLE) 10 MG tablet Administer 10 mg per J tube Daily. Take DOS     • metoprolol tartrate (LOPRESSOR) 25 MG tablet Take 1 tablet by mouth Daily for 30 days. 30 tablet 0   • ondansetron ODT (Zofran ODT) 8 MG disintegrating tablet Place 1 tablet on the tongue Every 8 (Eight) Hours As Needed for Nausea or Vomiting. (Patient taking differently: Administer 8 mg per J tube Every 8 (Eight) Hours As Needed for Nausea or Vomiting.) 30 tablet 0   • oxyCODONE (ROXICODONE) 5 MG/5ML solution Administer 5 mL per J  tube Every 4 (Four) Hours As Needed for Moderate Pain 473 mL 0   • pravastatin (PRAVACHOL) 20 MG tablet Administer 20 mg per J tube Daily.     • aluminum-magnesium hydroxide 200-200 MG/5ML suspension, diphenhydrAMINE 12.5 MG/5ML liquid, Lidocaine Viscous HCl 2 % solution, nystatin 100,000 unit/mL suspension Swish and spit 10 mL 4 (Four) Times a Day After Meals & at Bedtime. 400 mL 2     Allergies   Allergen Reactions   • Codeine Hives     Patient states this is when she was very young.        Objective    Objective     Vital Signs  Temp:  [98.1 °F (36.7 °C)] 98.1 °F (36.7 °C)  Heart Rate:  [] 84  Resp:  [16-18] 16  BP: ()/(59-65) 98/59  SpO2:  [93 %-98 %] 93 %  on   ;   Device (Oxygen Therapy): room air  Body mass index is 18.54 kg/m².    Physical Exam  Vitals and nursing note reviewed.   Constitutional:       General: She is not in acute distress.     Appearance: She is well-developed. She is not toxic-appearing.      Comments: Chronically ill-appearing but not acutely ill, frail and thin   HENT:      Head: Normocephalic and atraumatic.   Eyes:      Pupils: Pupils are equal, round, and reactive to light.   Cardiovascular:      Rate and Rhythm: Normal rate and regular rhythm.      Heart sounds: Normal heart sounds.   Pulmonary:      Effort: Pulmonary effort is normal. No respiratory distress.      Breath sounds: Normal breath sounds.   Abdominal:      General: Bowel sounds are normal. There is no distension or abdominal bruit.      Palpations: Abdomen is soft. Abdomen is not rigid. There is no shifting dullness, fluid wave, mass or pulsatile mass.      Tenderness: There is no abdominal tenderness. There is no guarding.      Hernia: No hernia is present.      Comments: J-tube in place without evidence of complication   Musculoskeletal:         General: Normal range of motion.   Skin:     General: Skin is warm and dry.   Neurological:      General: No focal deficit present.      Mental Status: She is alert  and oriented to person, place, and time.   Psychiatric:         Behavior: Behavior normal.         Thought Content: Thought content normal.         Results Review:  I reviewed the patient's new clinical results.  I reviewed the patient's new imaging results and agree with the interpretation.  I reviewed the patient's other test results and agree with the interpretation  I personally viewed and interpreted the patient's EKG/Telemetry data  Discussed with ED provider.    Lab Results (last 24 hours)     Procedure Component Value Units Date/Time    CBC & Differential [[id number]]  (Abnormal) Collected: 05/18/22 2356    Specimen: Blood Updated: 05/19/22 0010    Narrative:      The following orders were created for panel order CBC & Differential.  Procedure                               Abnormality         Status                     ---------                               -----------         ------                     CBC Auto Differential[[id number]]        Abnormal            Final result                 Please view results for these tests on the individual orders.    Comprehensive Metabolic Panel [[id number]]  (Abnormal) Collected: 05/18/22 2356    Specimen: Blood Updated: 05/19/22 0027     Glucose 103 mg/dL      BUN 20 mg/dL      Creatinine 0.43 mg/dL      Sodium 137 mmol/L      Potassium 3.7 mmol/L      Chloride 100 mmol/L      CO2 26.0 mmol/L      Calcium 9.8 mg/dL      Total Protein 7.3 g/dL      Albumin 3.80 g/dL      ALT (SGPT) 11 U/L      AST (SGOT) 17 U/L      Alkaline Phosphatase 126 U/L      Total Bilirubin 0.3 mg/dL      Globulin 3.5 gm/dL      A/G Ratio 1.1 g/dL      BUN/Creatinine Ratio 46.5     Anion Gap 11.0 mmol/L      eGFR 110.8 mL/min/1.73      Comment: National Kidney Foundation and American Society of Nephrology (ASN) Task Force recommended calculation based on the Chronic Kidney Disease Epidemiology Collaboration (CKD-EPI) equation refit without adjustment for race.       Narrative:      GFR Normal  >60  Chronic Kidney Disease <60  Kidney Failure <15      CBC Auto Differential [[id number]]  (Abnormal) Collected: 05/18/22 2356    Specimen: Blood Updated: 05/19/22 0010     WBC 9.94 10*3/mm3      RBC 3.29 10*6/mm3      Hemoglobin 10.2 g/dL      Hematocrit 30.6 %      MCV 93.0 fL      MCH 31.0 pg      MCHC 33.3 g/dL      RDW 12.8 %      RDW-SD 42.8 fl      MPV 9.0 fL      Platelets 345 10*3/mm3      Neutrophil % 82.3 %      Lymphocyte % 7.8 %      Monocyte % 7.8 %      Eosinophil % 1.3 %      Basophil % 0.4 %      Immature Grans % 0.4 %      Neutrophils, Absolute 8.17 10*3/mm3      Lymphocytes, Absolute 0.78 10*3/mm3      Monocytes, Absolute 0.78 10*3/mm3      Eosinophils, Absolute 0.13 10*3/mm3      Basophils, Absolute 0.04 10*3/mm3      Immature Grans, Absolute 0.04 10*3/mm3      nRBC 0.0 /100 WBC     COVID PRE-OP / PRE-PROCEDURE SCREENING ORDER (NO ISOLATION) - Swab, Nasopharynx [[id number]]  (Normal) Collected: 05/18/22 2357    Specimen: Swab from Nasopharynx Updated: 05/19/22 0042    Narrative:      The following orders were created for panel order COVID PRE-OP / PRE-PROCEDURE SCREENING ORDER (NO ISOLATION) - Swab, Nasopharynx.  Procedure                               Abnormality         Status                     ---------                               -----------         ------                     COVID-19,[facility]HOU[facility]...[[id number]]  Normal              Final result                 Please view results for these tests on the individual orders.    COVID-19,[facility]HOU[facility] CEPHEID/STEFANO NP SWAB IN TRANSPORT MEDIA 8-12 HR TAT - Swab, Nasopharynx [[id number]]  (Normal) Collected: 05/18/22 2357    Specimen: Swab from Nasopharynx Updated: 05/19/22 0042     COVID19 Not Detected    Narrative:      Fact sheet for providers: https://www.fda.gov/media/090989/download     Fact sheet for patients: https://www.fda.gov/media/879826/download    Basic Metabolic Panel [[id number]]  (Abnormal) Collected: 05/19/22 0520     Specimen: Blood Updated: 05/19/22 0618     Glucose 93 mg/dL      BUN 19 mg/dL      Creatinine 0.40 mg/dL      Sodium 137 mmol/L      Potassium 3.8 mmol/L      Chloride 102 mmol/L      CO2 27.0 mmol/L      Calcium 9.4 mg/dL      BUN/Creatinine Ratio 47.5     Anion Gap 8.0 mmol/L      eGFR 112.8 mL/min/1.73      Comment: National Kidney Foundation and American Society of Nephrology (ASN) Task Force recommended calculation based on the Chronic Kidney Disease Epidemiology Collaboration (CKD-EPI) equation refit without adjustment for race.       Narrative:      GFR Normal >60  Chronic Kidney Disease <60  Kidney Failure <15      CBC Auto Differential [[id number]]  (Abnormal) Collected: 05/19/22 0551    Specimen: Blood Updated: 05/19/22 0602     WBC 7.56 10*3/mm3      RBC 3.01 10*6/mm3      Hemoglobin 9.4 g/dL      Hematocrit 28.9 %      MCV 96.0 fL      MCH 31.2 pg      MCHC 32.5 g/dL      RDW 13.0 %      RDW-SD 45.7 fl      MPV 8.9 fL      Platelets 307 10*3/mm3      Neutrophil % 72.9 %      Lymphocyte % 13.5 %      Monocyte % 9.9 %      Eosinophil % 2.8 %      Basophil % 0.5 %      Immature Grans % 0.4 %      Neutrophils, Absolute 5.51 10*3/mm3      Lymphocytes, Absolute 1.02 10*3/mm3      Monocytes, Absolute 0.75 10*3/mm3      Eosinophils, Absolute 0.21 10*3/mm3      Basophils, Absolute 0.04 10*3/mm3      Immature Grans, Absolute 0.03 10*3/mm3      nRBC 0.0 /100 WBC     Protime-INR [[id number]]  (Abnormal) Collected: 05/19/22 0551    Specimen: Blood Updated: 05/19/22 0612     Protime 15.6 Seconds      INR 1.25          Imaging Results (Last 24 Hours)     Procedure Component Value Units Date/Time    IR J or G Tube Contrast Eval [[id number]] Collected: 05/19/22 0838     Updated: 05/19/22 0838    Narrative:      J-TUBE EVALUATION.     HISTORY: 61-year-old female with a J-tube.     FINDINGS:  film of the abdomen demonstrates residual enteric  contrast within the colon from likely the esophagram performed on  05/13/2022.  30 mL of Gastroview were injected into the J-tube and  contrast seen to flow freely into the jejunum.                    No orders to display        Assessment/Plan     Active Hospital Problems    Diagnosis  POA   • **Dislodged jejunostomy tube [T85.528A]  Not Applicable   • Chronic pain [G89.29]  Unknown   • Esophageal cancer (HCC) [C15.9]  Yes   • Paroxysmal atrial fibrillation (HCC) [I48.0]  Yes   • Hyperthyroidism [E05.90]  Yes   • Hypertension [I10]  Yes      Resolved Hospital Problems   No resolved problems to display.       Ms. Ribera is a 61 y.o. with a history of esophageal cancer status post esophagogastrectomy that presented with dislodged J-tube.  Dr. Ruiz following and has replaced J-tube but plans on switching to balloon base catheter later today.  If tube cannot be placed at bedside or percutaneously will consult thoracic surgery.  Continue home medications IV if possible.  Continue IV PPI    · Paroxysmal atrial fibrillation  Currently in sinus rhythm.  Continue Lopressor.    · HTN  BP acceptable and family bedside states they have been holding many of her home antihypertensive agents due to low blood pressures    · Hyperparathyroidism  Continue home medication per G-tube when cleared by surgery    · Chronic pain  Continue pain control via IV until cleared for J-tube use    ·    · I discussed the patient's findings and my recommendations with patient, family and nursing staff.    VTE Prophylaxis - SCDs.  Code Status - Full code.       BRANDI Beach  [facility]  05/19/22  11:58 EDT

## 2022-05-19 NOTE — ED PROVIDER NOTES
EMERGENCY DEPARTMENT ENCOUNTER    CHIEF COMPLAINT  Chief Complaint: J-tube dislodged  History given by: Patient  History limited by: None  Room Number: 14/14  PMD: Provider, No Known      HPI:  Pt is a 61 y.o. female who presents to the emergency room tonight following dislodgment of her jejunostomy tube.  The patient has a history of esophageal cancer that gets most of her nutrition as well as medications through her J-tube.  Tonight, she felt as though the suture dislodged and the next thing she knows her J-tube is out completely.  She has no physical complaints other than her chronic and ongoing pain.  She denies chest pain, shortness of breath, nausea/vomiting, or fevers and chills.    Duration: Just prior to ED arrival  Onset: Sudden  Location: Abdomen/J-tube site  Radiation: None  Quality: J-tube dislodgment  Intensity/Severity: Moderate  Progression: Unchanging  Associated Symptoms: Chronic and ongoing pain  Aggravating Factors: None  Alleviating Factors: None  Previous Episodes: Yes, previous replacements of J-tube  Treatment before arrival: None    PAST MEDICAL HISTORY  Active Ambulatory Problems     Diagnosis Date Noted   • Esophageal cancer (HCC) 12/08/2021   • Asymptomatic varicose veins of unspecified lower extremity 12/12/2019   • Eczema 10/18/2021   • Encounter for screening for malignant neoplasm of cervix 10/02/2017   • Screening for malignant neoplasm of colon 08/18/2021   • Arthritis 10/18/2021   • Esophagitis 09/21/2021   • Gastric ulcer 10/18/2021   • Female cystocele 11/08/2019   • Flat foot(734) 01/13/2015   • Hiatal hernia 09/21/2021   • Hyperlipidemia 01/13/2015   • Hypertension 06/14/2014   • Hyperthyroidism 07/15/2019   • Irritable bowel syndrome 10/18/2021   • Mitral regurgitation 02/27/2013   • Paroxysmal atrial fibrillation (HCC) 11/18/2019   • Severe esophageal dysplasia 10/18/2021   • Mitral valve prolapse 01/13/2015   • Encounter for management of implanted device 02/10/2022      Resolved Ambulatory Problems     Diagnosis Date Noted   • Moderate malnutrition (CMS/HCC) 12/18/2021   • Acute low back pain 04/12/2016   • Acute sinusitis 09/25/2015   • Alkaline phosphatase raised 10/05/2017     Past Medical History:   Diagnosis Date   • Boil, breast 12/13/2021   • Cervical cancer (HCC)    • Dysphagia    • GERD (gastroesophageal reflux disease)    • Irritable bowel    • PAF (paroxysmal atrial fibrillation) ([facility])    • Uses feeding tube        PAST SURGICAL HISTORY  Past Surgical History:   Procedure Laterality Date   • CHOLECYSTECTOMY     • COLONOSCOPY  08/27/2021    [facility]   • ESOPHAGOGASTRECTOMY Right 4/29/2022    Procedure: BRONCHOSCOPY, RIGHT THORACOSCOPY WITH [employer] WITH TOTAL ESOPHAGECTOMY, INTERCOSTAL NERVE BLOCK, JEJUNOSTOMY TUBE REPLACEMENT;  Surgeon: Hayder Finch III, MD;  Location: [facility];  Service: Robotics - DaVinci;  Laterality: Right;   • EXTERNAL EAR SURGERY     • JEJUNOSTOMY N/A 12/15/2021    Procedure: open JEJUNOSTOMY tube placement;  Surgeon: Bhanu Hollis MD;  Location: [facility];  Service: General;  Laterality: N/A;   • KNEE SURGERY Left    • REPLACEMENT TOTAL KNEE Left    • UPPER ENDOSCOPIC ULTRASOUND W/ FNA N/A 12/7/2021    Procedure: Esophagogastroduodenoscopy with endoscopic ultrasound  WITH STAGING AND FINE NEEDLE BIOPSY;  Surgeon: Amrit Green MD;  Location: [facility];  Service: Gastroenterology;  Laterality: N/A;  post op: inlet patch, esophageal mass, T2   • VENOUS ACCESS DEVICE (PORT) INSERTION Left 12/15/2021    Procedure: INSERTION OF PORTACATH;  Surgeon: Bhanu Hollis MD;  Location: [facility];  Service: General;  Laterality: Left;       FAMILY HISTORY  Family History   Problem Relation Age of Onset   • Breast cancer Mother    • Diabetes Mother    • Bipolar disorder Father    • Diabetes Father    • Hypertension Father    • Breast cancer Sister    • COPD Sister    • Diabetes Sister    • Hypertension  Sister    • Alcohol abuse Brother    • Asthma Brother    • Bipolar disorder Brother    • Diabetes Brother    • Seizures Brother    • Breast cancer Maternal Grandmother    • Diabetes Maternal Grandmother    • Diabetes Maternal Grandfather    • Diabetes Paternal Grandmother    • Diabetes Paternal Grandfather    • Hypertension Son    • Kidney cancer Sister    • Hypertension Sister    • Diabetes Sister    • Malig Hyperthermia Neg Hx        SOCIAL HISTORY  Social History     Socioeconomic History   • Marital status: Significant Other   • Number of children: 1   • Years of education: High school   Tobacco Use   • Smoking status: Current Every Day Smoker     Packs/day: 1.00     Years: 40.00     Pack years: 40.00     Types: Cigarettes     Start date: 1981   • Smokeless tobacco: Never Used   • Tobacco comment: 2 per day   Vaping Use   • Vaping Use: Never used   Substance and Sexual Activity   • Alcohol use: Not Currently   • Drug use: Never   • Sexual activity: Defer       ALLERGIES  Codeine    REVIEW OF SYSTEMS  Review of Systems   Constitutional: Negative for fever.   HENT: Negative for sore throat.    Eyes: Negative.    Respiratory: Negative for cough and shortness of breath.    Cardiovascular: Negative for chest pain.   Gastrointestinal: Negative for abdominal pain, diarrhea and vomiting.   Genitourinary: Negative for dysuria.   Musculoskeletal: Negative for neck pain.   Skin: Negative for rash.   Allergic/Immunologic: Negative.    Neurological: Negative for weakness, numbness and headaches.   Hematological: Negative.    Psychiatric/Behavioral: Negative.    All other systems reviewed and are negative.      PHYSICAL EXAM  ED Triage Vitals [05/18/22 2224]   Temp Heart Rate Resp BP SpO2   98.1 °F (36.7 °C) 103 18 117/64 98 %      Temp src Heart Rate Source Patient Position BP Location FiO2 (%)   Oral Monitor -- -- --       Physical Exam  Vitals and nursing note reviewed.   Constitutional:       General: She is not in acute  distress.  HENT:      Head: Normocephalic and atraumatic.   Eyes:      Pupils: Pupils are equal, round, and reactive to light.   Cardiovascular:      Rate and Rhythm: Normal rate and regular rhythm.      Heart sounds: Normal heart sounds.   Pulmonary:      Effort: Pulmonary effort is normal. No respiratory distress.      Breath sounds: Normal breath sounds.   Abdominal:      Palpations: Abdomen is soft.      Tenderness: There is no abdominal tenderness. There is no guarding or rebound.      Comments: J-tube site patent and now covered with gauze   Musculoskeletal:         General: Normal range of motion.      Cervical back: Normal range of motion and neck supple.   Skin:     General: Skin is warm and dry.      Findings: No rash.   Neurological:      Mental Status: She is alert and oriented to person, place, and time.      Sensory: Sensation is intact.   Psychiatric:         Mood and Affect: Mood and affect normal.         LAB RESULTS  Lab Results (last 24 hours)     Procedure Component Value Units Date/Time    CBC & Differential [[id number]]  (Abnormal) Collected: 05/18/22 2356    Specimen: Blood Updated: 05/19/22 0010    Narrative:      The following orders were created for panel order CBC & Differential.  Procedure                               Abnormality         Status                     ---------                               -----------         ------                     CBC Auto Differential[[id number]]        Abnormal            Final result                 Please view results for these tests on the individual orders.    Comprehensive Metabolic Panel [[id number]]  (Abnormal) Collected: 05/18/22 2356    Specimen: Blood Updated: 05/19/22 0027     Glucose 103 mg/dL      BUN 20 mg/dL      Creatinine 0.43 mg/dL      Sodium 137 mmol/L      Potassium 3.7 mmol/L      Chloride 100 mmol/L      CO2 26.0 mmol/L      Calcium 9.8 mg/dL      Total Protein 7.3 g/dL      Albumin 3.80 g/dL      ALT (SGPT) 11 U/L      AST (SGOT)  17 U/L      Alkaline Phosphatase 126 U/L      Total Bilirubin 0.3 mg/dL      Globulin 3.5 gm/dL      A/G Ratio 1.1 g/dL      BUN/Creatinine Ratio 46.5     Anion Gap 11.0 mmol/L      eGFR 110.8 mL/min/1.73      Comment: National Kidney Foundation and American Society of Nephrology (ASN) Task Force recommended calculation based on the Chronic Kidney Disease Epidemiology Collaboration (CKD-EPI) equation refit without adjustment for race.       Narrative:      GFR Normal >60  Chronic Kidney Disease <60  Kidney Failure <15      CBC Auto Differential [[id number]]  (Abnormal) Collected: 05/18/22 2356    Specimen: Blood Updated: 05/19/22 0010     WBC 9.94 10*3/mm3      RBC 3.29 10*6/mm3      Hemoglobin 10.2 g/dL      Hematocrit 30.6 %      MCV 93.0 fL      MCH 31.0 pg      MCHC 33.3 g/dL      RDW 12.8 %      RDW-SD 42.8 fl      MPV 9.0 fL      Platelets 345 10*3/mm3      Neutrophil % 82.3 %      Lymphocyte % 7.8 %      Monocyte % 7.8 %      Eosinophil % 1.3 %      Basophil % 0.4 %      Immature Grans % 0.4 %      Neutrophils, Absolute 8.17 10*3/mm3      Lymphocytes, Absolute 0.78 10*3/mm3      Monocytes, Absolute 0.78 10*3/mm3      Eosinophils, Absolute 0.13 10*3/mm3      Basophils, Absolute 0.04 10*3/mm3      Immature Grans, Absolute 0.04 10*3/mm3      nRBC 0.0 /100 WBC     COVID PRE-OP / PRE-PROCEDURE SCREENING ORDER (NO ISOLATION) - Swab, Nasopharynx [[id number]]  (Normal) Collected: 05/18/22 2357    Specimen: Swab from Nasopharynx Updated: 05/19/22 0042    Narrative:      The following orders were created for panel order COVID PRE-OP / PRE-PROCEDURE SCREENING ORDER (NO ISOLATION) - Swab, Nasopharynx.  Procedure                               Abnormality         Status                     ---------                               -----------         ------                     COVID-19, [facility]SE...[[id number]]  Normal              Final result                 Please view results for these tests on the individual orders.     COVID-19, [facility]SE CEPHEID/STEFANO NP SWAB IN TRANSPORT MEDIA 8-12 HR TAT - Swab, Nasopharynx [[id number]]  (Normal) Collected: 05/18/22 2357    Specimen: Swab from Nasopharynx Updated: 05/19/22 0042     COVID19 Not Detected    Narrative:      Fact sheet for providers: https://www.fda.gov/media/842163/download     Fact sheet for patients: https://www.fda.gov/media/695765/download          I ordered the above labs and reviewed the results    RADIOLOGY  No orders to display        I ordered the above noted radiological studies. Interpreted by radiologist.  Reviewed by me in PACS.       PROCEDURES  Procedures      PROGRESS AND CONSULTS     The patient was wearing a facemask upon entrance into the room and remained in such throughout their visit.  I was wearing PPE including a facemask, eye protection, as well as gloves at any point entering the room and throughout the visit    2330  I did inform the patient that we would admit her to the hospital to see her surgeon as is necessary for replacement of her jejunostomy tube.  She has asked for medication to treat her pain as she normally gets her pain medication through her J-tube.  We will access her port and treat her pain intravenously.  The patient is in agreement with the plan and all questions have been answered.    0030  Case discussed with BRANDI Leigh for [facility], who agrees to admit the patient to the hospital for Dr. Carrera.      MEDICAL DECISION MAKING  Results were reviewed/discussed with the patient and they were also made aware of online access. Pt also made aware that some labs, such as cultures, will not be resulted during ER visit and follow up with PMD is necessary.     MDM  Number of Diagnoses or Management Options     Amount and/or Complexity of Data Reviewed  Clinical lab tests: ordered and reviewed  Tests in the medicine section of CPT®: ordered and reviewed  Review and summarize past medical records: yes (Upon medical records review, the  patient was last seen and evaluated on 4/29/2022 for a bronchoscopy with Dr. Finch)  Discuss the patient with other providers: yes (BRANDI Leigh for [facility], who will admit the patient for Dr. Carrera)           DIAGNOSIS  Final diagnoses:   Dislodged jejunostomy tube   Malignant neoplasm of lower third of esophagus (HCC)       DISPOSITION  ADMISSION    Discussed treatment plan and reason for admission with pt/family and admitting physician.  Pt/family voiced understanding of the plan for admission for further testing/treatment as needed.         Latest Documented Vital Signs:  As of 03:01 EDT  BP- 117/64 HR- 93 Temp- 98.1 °F (36.7 °C) (Oral) O2 sat- 93%         Agustin Holt MD  05/19/22 4745

## 2022-05-19 NOTE — ED NOTES
Pt to triage from home via [facility] [id number] with c/o feeding tube dislodgement.  Pt history of esophageal cancer, has feeding tube that became dislodged @ 2100.  Family attempted to put tube back in, without success. Family then advised to bring pt to hospital.  Pt has no other c/o at this time.   Pt wearing mask in triage. Triage personnel wore appropriate PPE

## 2022-05-19 NOTE — ED NOTES
"Chief Complaint   Patient presents with   • feeding tube dislodged     Blood pressure 117/64, pulse 103, temperature 98.1 °F (36.7 °C), temperature source Oral, resp. rate 18, height 162.6 cm (64\"), weight 49 kg (108 lb 0.4 oz), SpO2 98 %.    The patient presents to the ER via [facility] complaining of her feeding tube came out about an hour prior to arrival.  States she has esophageal cancer and an esophagectomy and the feeding tube was placed.  The tube became dislodged and then she coughed and the whole tube came out.    "

## 2022-05-19 NOTE — ED NOTES
Removed gauze from J-tube site, cleansed skin with saline and gauze and placed dry clean gauze to J-tube site with paper tape.  Izabel area of the insertion site is red and painful to the touch.  Removed the patient's Tshirt and placed her in a clean gown.  She requested I throw away her Tshirt because it was dirty.

## 2022-05-20 ENCOUNTER — HOME CARE VISIT (OUTPATIENT)
Dept: HOME HEALTH SERVICES | Facility: HOME HEALTHCARE | Age: 62
End: 2022-05-20

## 2022-05-20 VITALS
DIASTOLIC BLOOD PRESSURE: 60 MMHG | HEART RATE: 96 BPM | OXYGEN SATURATION: 99 % | SYSTOLIC BLOOD PRESSURE: 104 MMHG | TEMPERATURE: 98 F | RESPIRATION RATE: 16 BRPM

## 2022-05-20 PROCEDURE — G0299 HHS/HOSPICE OF RN EA 15 MIN: HCPCS

## 2022-05-20 NOTE — OUTREACH NOTE
Prep Survey    Flowsheet Row Responses   [facility] facility patient discharged from? [place]   Is LACE score < 7 ? No   Emergency Room discharge w/ pulse ox? No   Eligibility Readm Mgmt   Discharge diagnosis Dislodged jejunostomy tube,  Reinsertion of an 18 [employer] jejunostomy tube ( at bedside)   Does the patient have one of the following disease processes/diagnoses(primary or secondary)? Other   Does the patient have Home health ordered? No   Is there a DME ordered? No   Comments regarding appointments See AVS   Medication alerts for this patient Eliquis   General alerts for this patient Jx - Esophageal Cancer   Prep survey completed? Yes          APRIL PAK - Registered Nurse

## 2022-05-24 ENCOUNTER — HOME CARE VISIT (OUTPATIENT)
Dept: HOME HEALTH SERVICES | Facility: HOME HEALTHCARE | Age: 62
End: 2022-05-24

## 2022-05-24 ENCOUNTER — READMISSION MANAGEMENT (OUTPATIENT)
Dept: CALL CENTER | Facility: HOSPITAL | Age: 62
End: 2022-05-24

## 2022-05-24 NOTE — PROGRESS NOTES
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 female with recent diagnosis of distal esophageal cancer, initiating concurrent chemoradiation with carboplatin/Taxol on 1/5/2022.  Unfortunately, the patient has had frequent delays due to pancytopenia, weight loss, malnutrition and COVID-19.  She is completed only 3 weeks of chemotherapy.  She is due today for her fourth dose of carboplatin Taxol.  She has 5 radiation treatments remaining.    Her case was presented at the multidisciplinary clinic this morning with plans to complete the last week of concurrent chemoradiation followed by PET scan.  It is unclear at this time if the patient would qualify for surgery given her struggles and malnutrition.  She does have a J-tube for which her partner utilizes for nutrition.  She is still able to swallow pills by mouth.  Her weight is stable over the past few weeks.    She has been struggling with her blood pressure.  She is on metoprolol originally 50 mg twice daily for her atrial fibrillation.  She has cut this down to once daily.  She does report she has been holding her Eliquis since her platelets dropped and has not yet resumed.  Her and her partner expressed concerns regarding the timeline of PET scan and possible surgery.  They are very eager to proceed with PET scan.  We did discuss today the possibility for false positives with esophagitis symptoms.  We will plan PET scan 4 weeks after the completion of radiation which is the soonest this should be obtained.    A PET/CT was obtained 3/17/2022 demonstrating significant decrease in FDG avid thickened mid to distal esophagus, decrease in activity in gastrohepatic and left hilar nodes no longer demonstrating that above blood pool, new  subcentimeter nodule right apex thought to be an endobronchial filling defect it was indeterminant with a new area of tree-in-bud nodularity medial aspect left upper lobe thought to be reactive.    The patient is seen with her partner 3/23/2022.  She recently had nausea and vomiting for several days thought to be?  Food poisoning which, fortunately, is now resolving.  We have reviewed her scans and she could well be a candidate for surgery which we will asked Dr. Finch to review her for next available.    The patient is next reviewed 4/20/2022.  She is anxious for surgery 4/29/2022 but ready to proceed.    The patient was admitted 4/29 - 5/9/2022 undergoing bronchoscopy with right thoracoscopy, partial decortication, robot-assisted total esophagectomy, jejunostomy tube placement and intercostal nerve block.  She had a satisfactory postoperative course and was able to be discharged 5/9/2022 though was briefly readmitted requiring replacement of her J-tube at bedside.    Pathology demonstrated rare focal metaplasia at the GE junction consistent with Cotto's esophagus, focal submucosal scar, chronic esophagitis, no evidence of dysplasia or malignancy identified by staining, focal active chronic gastritis, 13 lymph nodes negative, paraesophageal soft tissue margins negative, level 7 lymph node negative x3, final esophageal margin negative, final gastric margin negative-patient's final pathologic staging could be considered ypT0, N0.     The patient is seen with significant other 5/25/2022 and reviewed her findings recognizing that she could benefit substantially from immunotherapy  considering nivolumab every 4 weeks x1 year as adjuvant therapy.  This is discussed extensively as to potential side effect profile and potential benefit.      ONCOLOGY HISTORY     The patient is a 61-year-old female who had been seen in multidisciplinary thoracic oncology conference with complaints of painful swallowing and dysphagia over  the previous several months with 10 to 15 pound weight loss.  This led to EGD and colonoscopy with normal colonoscopy but EGD demonstrated tumor at the GE junction with biopsies consistent with severe dysplasia including carcinoma in situ.  The patient states that she saw a number of physicians and attempting to have a diagnosis completed.  CT of chest demonstrated thickening of the mid esophagus with no lymphadenopathy and CT PET demonstrated uptake in the esophagus gastropathic lymph nodes it is felt that she likely had early stage carcinoma of the esophagus and that we could present to record with surgical resection.  Endoscopic ultrasound, however, was requested and we could not have this done any sooner than GI physicians available at [facility].     This was performed 12/7/2021 revealing a 5 cm esophageal mass present in the distal third esophagus without extension into the GE junction into the cardia, the mass extended into into the muscularis propria into the adventitia not penetrating through the adventitia with a single 1 cm gastrohepatic/celiac axis lymph node and FNB x1 performed-?  T2 N1 M0 distal esophageal cancer-clinical stage III, pathologic stage IIIa  In contacting the [facility] pathology department the results of this FNB are not yet available and will be by 12/9/2021.      The patient is seen with her sister and son all indicating that she has had difficulty with swallowing since late summer likely July 2021 and has been attempting to have a diagnosis made since that time.  They are all somewhat frustrated about the length of time to obtain an answer for this problem and we have spent considerable time discussing her findings thus far and how we might proceed to treat what appears to be a contained malignancy.  This has, additionally, been discussed with Dr. Stef cruz who feels that she is not immediately and operative candidate and should proceed with chemo radiation  "therapy initially-neoadjuvant treatment before consideration for subsequent surgery.  This has been discussed with the patient, her sister and her son again in detail 12/8/2021.  We went on to initiate pain control with liquid hydrocodone, referred the patient to general surgery and oncology nutrition.    The patient was seen by  who proceeded 12/15/2021 to PowerPort placement and open jejunostomy.  She was seen during her hospital stay by oncology nutrition required hospitalization up to including 12/19/2021.    The patient required hospitalization to 12/20/2021 and after discharge along with her partner's health was able to gradually improve her caloric intake and was seen back 12/27 by Dr. Hollis who felt she had improved enough to initiate chemotherapy.  The patient was seen by radiation therapy 12/21 with plans to proceed with 40 CT simulation and planning-IMRT/IGR T-4140 cGy in 23 fractions with pleased to be considered.    The patient is seen back with her partner Cristy 12/29/2021 now able to \"manage\" and we have discussed extensively concurrent chemotherapy radiation therapy using Carboplatin/Taxol weekly.    Currently the patient is scheduled to begin radiation therapy 1/3/2021, undergoing teaching 1/4/2022 and will begin concurrent chemotherapy 1/5/2022    The patient had difficulty tolerating this treatment but was eventually able to complete it though modified for toxicity-1/3/2022-2/18/2022 4680 with 26 total treatments, carboplatinum/Taxol weekly 1/5, 1/12, 1/19 and delayed until 2/10/2022.    A PET/CT was obtained 3/17/2022 demonstrating significant decrease in FDG avid thickened mid to distal esophagus, decrease in activity in gastrohepatic and left hilar nodes no longer demonstrating that above blood pool, new subcentimeter nodule right apex thought to be an endobronchial filling defect it was indeterminant with a new area of tree-in-bud nodularity medial aspect left upper lobe thought " to be reactive.    Patient had been presented at thoracic conference and upon completion of therapy and repeat PET/CT was to be reevaluated for surgery.  Patient seen in office 3/23/2022 referred back to thoracic surgery.            The patient was briefly admitted 5//2022 with dislodged J-tube.  G-tube was placed and the patient was able to be discharged.     The patient's final pathologic staging could be considered ypT0, N0.     The patient is seen with significant other 5/25/2022 and reviewed her findings recognizing that she could benefit substantially from immunotherapy  considering nivolumab every 4 weeks x1 year as adjuvant therapy.  This is discussed extensively as to potential side effect profile and potential benefit.      Past Medical History:   Diagnosis Date   • Boil, breast 12/13/2021    HEALING UNDER LEFT BREAST    • Cervical cancer (HCC)    • Dysphagia    • Esophageal cancer (HCC)    • Esophagitis 9/21/2021   • Gastric ulcer    • GERD (gastroesophageal reflux disease)    • Hyperlipidemia    • Hypertension    • Hyperthyroidism    • Irritable bowel    • Mitral valve prolapse     FOLLOWED BY     • PAF (paroxysmal atrial fibrillation) (HCC)    • Uses feeding tube         Past Surgical History:   Procedure Laterality Date   • CHOLECYSTECTOMY     • COLONOSCOPY  08/27/2021    [facility]   • ESOPHAGOGASTRECTOMY Right 4/29/2022    Procedure: BRONCHOSCOPY, RIGHT THORACOSCOPY WITH MopedI ROBOT WITH TOTAL ESOPHAGECTOMY, INTERCOSTAL NERVE BLOCK, JEJUNOSTOMY TUBE REPLACEMENT;  Surgeon: Hayder Finch III, MD;  Location: [facility];  Service: Robotics - DaVinci;  Laterality: Right;   • EXTERNAL EAR SURGERY     • JEJUNOSTOMY N/A 12/15/2021    Procedure: open JEJUNOSTOMY tube placement;  Surgeon: Bhanu Hollis MD;  Location: [facility];  Service: General;  Laterality: N/A;   • KNEE SURGERY Left    • REPLACEMENT TOTAL KNEE Left    • UPPER ENDOSCOPIC ULTRASOUND W/ FNA N/A  2021    Procedure: Esophagogastroduodenoscopy with endoscopic ultrasound  WITH STAGING AND FINE NEEDLE BIOPSY;  Surgeon: Amrit Green MD;  Location: [facility] ENDOSCOPY;  Service: Gastroenterology;  Laterality: N/A;  post op: inlet patch, esophageal mass, T2   • VENOUS ACCESS DEVICE (PORT) INSERTION Left 12/15/2021    Procedure: INSERTION OF PORTACATH;  Surgeon: Bhanu Hollis MD;  Location: [facility] MAIN OR;  Service: General;  Laterality: Left;        Current Outpatient Medications on File Prior to Visit   Medication Sig Dispense Refill   • aluminum-magnesium hydroxide 200-200 MG/5ML suspension, diphenhydrAMINE 12.5 MG/5ML liquid, Lidocaine Viscous HCl 2 % solution, nystatin 100,000 unit/mL suspension Swish and spit 10 mL 4 (Four) Times a Day After Meals & at Bedtime. 400 mL 2   • Eliquis 5 MG tablet tablet Administer 5 mg per J tube Every 12 (Twelve) Hours. LD   PT STATED TO HOLD 72 HOURS PRIOR TO SURGERY     • Gabapentin (NEURONTIN) 50 mg/mL solution solution Administer 6 mL per J tube 3 (Three) Times a Day 366 mL 0   • lansoprazole (PREVACID SOLUTAB) 30 MG Tablet Delayed Release Dispersible disintegrating tablet Administer 1 tablet per J tube Every Morning Before Breakfast. 60 tablet 1   • LORazepam (LORazepam Intensol) 2 MG/ML concentrated solution Take 0.5 mL by mouth Every 8 (Eight) Hours As Needed for Anxiety. Or nausea 30 mL 0   • methIMAzole (TAPAZOLE) 10 MG tablet Administer 10 mg per J tube Daily. Take DOS     • metoprolol tartrate (LOPRESSOR) 25 MG tablet Take 1 tablet by mouth Daily for 30 days. 30 tablet 0   • ondansetron ODT (Zofran ODT) 8 MG disintegrating tablet Place 1 tablet on the tongue Every 8 (Eight) Hours As Needed for Nausea or Vomiting. (Patient taking differently: Administer 8 mg per J tube Every 8 (Eight) Hours As Needed for Nausea or Vomiting.) 30 tablet 0   • pravastatin (PRAVACHOL) 20 MG tablet Administer 20 mg per J tube Daily.     • [] oxyCODONE (ROXICODONE) 5  "MG/5ML solution Administer 5 mL per J tube Every 4 (Four) Hours As Needed for Moderate Pain 473 mL 0     No current facility-administered medications on file prior to visit.        ALLERGIES:    Allergies   Allergen Reactions   • Codeine Hives     Patient states this is when she was very young.         Social History     Socioeconomic History   • Marital status: Significant Other   • Number of children: 1   • Years of education: High school   Tobacco Use   • Smoking status: Current Every Day Smoker     Packs/day: 1.00     Years: 40.00     Pack years: 40.00     Types: Cigarettes     Start date: 1981   • Smokeless tobacco: Never Used   • Tobacco comment: 2 per day   Vaping Use   • Vaping Use: Never used   Substance and Sexual Activity   • Alcohol use: Not Currently   • Drug use: Never   • Sexual activity: Defer        Family History   Problem Relation Age of Onset   • Breast cancer Mother    • Diabetes Mother    • Bipolar disorder Father    • Diabetes Father    • Hypertension Father    • Breast cancer Sister    • COPD Sister    • Diabetes Sister    • Hypertension Sister    • Alcohol abuse Brother    • Asthma Brother    • Bipolar disorder Brother    • Diabetes Brother    • Seizures Brother    • Breast cancer Maternal Grandmother    • Diabetes Maternal Grandmother    • Diabetes Maternal Grandfather    • Diabetes Paternal Grandmother    • Diabetes Paternal Grandfather    • Hypertension Son    • Kidney cancer Sister    • Hypertension Sister    • Diabetes Sister    • Malig Hyperthermia Neg Hx           Objective     Vitals:    05/25/22 1013   BP: 111/68   Pulse: 81   Resp: 16   Temp: 97.3 °F (36.3 °C)   TempSrc: Infrared   SpO2: 96%   Weight: 51.3 kg (113 lb 1.6 oz)   Height: 162.6 cm (64.02\")   PainSc:   6   PainLoc: Back     Current Status 5/25/2022   ECOG score 1       Physical Exam  Constitutional:       Appearance: Normal appearance. She is underweight.   HENT:      Head: Normocephalic and atraumatic.      Nose: Nose " normal.   Eyes:      Extraocular Movements: Extraocular movements intact.      Conjunctiva/sclera: Conjunctivae normal.      Pupils: Pupils are equal, round, and reactive to light.   Cardiovascular:      Rate and Rhythm: Normal rate and regular rhythm.      Pulses: Normal pulses.      Heart sounds: Normal heart sounds.   Pulmonary:      Effort: Pulmonary effort is normal.      Breath sounds: Normal breath sounds.   Abdominal:      General: Bowel sounds are normal.      Palpations: Abdomen is soft. There is no mass.      Tenderness: There is abdominal tenderness (Midepigastrium).      Comments: Status post esophagectomy, wounds well-healing, J-tube in place and functioning   Musculoskeletal:         General: Normal range of motion.      Cervical back: Normal range of motion.   Skin:     General: Skin is warm and dry.      Findings: No rash.   Neurological:      General: No focal deficit present.      Mental Status: She is alert and oriented to person, place, and time.   Psychiatric:         Mood and Affect: Mood normal.     I have reexamined the patient and the results are consistent with the previously documented exam. Iggy Harrell MD     RECENT LABS:  Results from last 7 days   Lab Units 05/25/22  0929 05/19/22  0551 05/18/22  2356   WBC 10*3/mm3 10.18 7.56 9.94   NEUTROS ABS 10*3/mm3 7.74* 5.51 8.17*   HEMOGLOBIN g/dL 10.6* 9.4* 10.2*   HEMATOCRIT % 33.3* 28.9* 30.6*   PLATELETS 10*3/mm3 317 307 345     Results from last 7 days   Lab Units 05/19/22  0551 05/18/22  2356   SODIUM mmol/L 137 137   POTASSIUM mmol/L 3.8 3.7   CHLORIDE mmol/L 102 100   CO2 mmol/L 27.0 26.0   BUN mg/dL 19 20   CREATININE mg/dL 0.40* 0.43*   CALCIUM mg/dL 9.4 9.8   ALBUMIN g/dL  --  3.80   BILIRUBIN mg/dL  --  0.3   ALK PHOS U/L  --  126*   ALT (SGPT) U/L  --  11   AST (SGOT) U/L  --  17   GLUCOSE mg/dL 93 103*     Results from last 7 days   Lab Units 05/19/22  0551   INR  1.25*        Assessment & Plan          61-year-old female  with a history of hyperthyroidism, previous gastric ulcer, GE reflux, atrial fibrillation, hyperlipidemia, hypertension irritable bowel syndrome,          1.  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  · here has been a number of physicians involved in the patient's case and several months before she was ultimately seen by Dr. Finch at [facility].   · CT scan of the chest demonstrating thickening of the mid esophagus with no lymphadenopathy and PET/CT demonstrated uptake in the mid to distal esophagus with SUV intensely elevated at 16 with a few nonenlarged left hilar and gastrohepatic lymph nodes with mild increased FDG activity, few scattered subcentimeter pulmonary nodules bilaterally indeterminate.  · EUS was felt necessary and ultimately obtained at [facility] performed 12/7/2021 revealing a 5 cm esophageal mass present in the distal third esophagus without extension into the GE junction into the cardia, the mass extended into into the muscularis propria into the adventitia not penetrating through the adventitia with a single 1 cm gastrohepatic/celiac axis lymph node and FNB x1 performed-?   · Discussed with Dr. Finch directly who feels that she is not immediately and operative candidate and should proceed with chemo radiation therapy initially-neoadjuvant treatment before consideration for subsequent surgery. Discussed extensively concurrent chemotherapy radiation therapy using Carboplatin/Taxol weekly.  ·  who proceeded 12/15/2021 to PowerPort placement and open jejunostomy.  She was seen during her hospital stay by oncology nutrition required hospitalization up to including 12/19/2021.  · seen by radiation  therapy 12/21 with plans to proceed with 40 CT simulation and planning-IMRT/IGR T-4140 cGy in 23 fractions with pleased to be considered.   · Radiation therapy initiated 1/3/2022  · 1/5/2021 cycle 1 weekly carboplatin/Taxol.    · Patient status post week 2 on 1/12/2022  · Patient treated 1/19/2022-week 3  · 1/26/2022, hold treatment today due to platelet counts 42,000 and feeling unwell.  Covid positive at that time  · Chemotherapy held 2/2/2022 due to ongoing normal cytopenia, platelets of 40,000  · Presentation at multidisciplinary clinic 2/10/2022 with plans to complete fourth dose of carboplatin Taxol followed by PET scan.  It is unclear at this time if the patient will be a surgical candidate pending her response and nutritional state post treatment  · We will proceed today with her fourth dose of carboplatin Taxol  · Upon completion of chemotherapy patient reevaluated with PET/CT 3/17/2022 with significant response for esophageal lesion, gastrohepatic left hilar lymphadenopathy no longer FDG avid, indeterminate subcentimeter nodule right apex thought to be endobronchial filling defect, tree-in-bud nodularity felt to be reactive.  · Patient seen in office 3/23/2022 referred back to thoracic surgery.  Discussed potential adjuvant nivolumab therapy post surgery.  · Patient seen 4/20/2022 with surgery planned 4/29/2022.  · The patient was admitted 4/29 - 5/9/2022 undergoing bronchoscopy with right thoracoscopy, partial decortication, robot-assisted total esophagectomy, jejunostomy tube placement and intercostal nerve block.  She had a satisfactory postoperative course and was able to be discharged 5/9/2022 though was briefly readmitted requiring replacement of her J-tube at bedside.  · Pathology demonstrated rare focal metaplasia at the GE junction consistent with Cotto's esophagus, focal submucosal scar, chronic esophagitis, no evidence of dysplasia or malignancy identified by staining, focal active chronic  gastritis, 13 lymph nodes negative, paraesophageal soft tissue margins negative, level 7 lymph node negative x3, final esophageal margin negative, final gastric margin negative-patient's final pathologic staging could be considered ypT0, N0.  · The patient is seen with significant other 5/25/2022 and reviewed her findings recognizing that she could benefit substantially from immunotherapy  considering nivolumab every 4 weeks x1 year as adjuvant therapy.  This is discussed extensively as to potential side effect profile and potential benefit.      2.  Nutrition  · Use of Jejunostomy tube. Continuous feed currently 12 hours a night, slowly working up to recommended 16 hours/day at 90 cc/h  · She is still trying to drink at least 1 boost a day and able to take pills by mouth  · She continues to take pills by mouth.  She does utilize her J-tube for 16 hours a day.  Her weight is stable over the past 3 weeks.  · Patient seen 3/23/2022 with her J-tube no longer anchored properly, thoracic surgery request for evaluation  · J-tube postoperative required replacement 5//22    3.  Atrial fibrillation  · Patient continues Eliquis 5 mg twice daily  · Toprol 25 mg XL once daily.        Plan:  *Patient's pathology again reviewed with both the patient and her significant other with findings of complete response.  *Written information given concerning nivolumab  *Return in 3 weeks for teaching for nivolumab  *Thoracic surgery follow-up, nutritional advancement as tolerated  *Return in 5 weeks for consideration of potential nivolumab.    I spent 65 minutes caring for Maya on this date of service. This time includes time spent by me in the following activities: preparing for the visit, reviewing tests, obtaining and/or reviewing a separately obtained history, performing a medically appropriate examination and/or evaluation, counseling and educating the patient/family/caregiver, ordering medications, tests, or procedures,  referring and communicating with other health care professionals, documenting information in the medical record, independently interpreting results and communicating that information with the patient/family/caregiver and care coordination.

## 2022-05-24 NOTE — OUTREACH NOTE
Medical Week 1 Survey    Flowsheet Row Responses   [facility] patient discharged from? [place]   Does the patient have one of the following disease processes/diagnoses(primary or secondary)? Other   Week 1 attempt successful? Yes   Call start time 1006   Call end time 1013   General alerts for this patient Jx - Esophageal Cancer   Discharge diagnosis Dislodged jejunostomy tube,  Reinsertion of an 18 Romanian Avonos jejunostomy tube ( at bedside)   Does the patient have all medications ordered at discharge? N/A   Is the patient taking all medications as directed (includes completed medication regime)? Yes   Medication comments No changes in meds   Does the patient have a primary care provider?  No   Does the patient have an appointment with their PCP within 7 days of discharge? N/A   Comments regarding PCP HUB Number provided.    What is the Home health agency?  [facility]   Has home health visited the patient within 72 hours of discharge? Call prior to 72 hours   Psychosocial issues? No   Did the patient receive a copy of their discharge instructions? Yes   Nursing interventions Reviewed instructions with patient   What is the patient's perception of their health status since discharge? Improving   Is the patient/caregiver able to teach back signs and symptoms related to disease process for when to call PCP? Yes   Is the patient/caregiver able to teach back signs and symptoms related to disease process for when to call 911? Yes   Is the patient/caregiver able to teach back the hierarchy of who to call/visit for symptoms/problems? PCP, Specialist, Home health nurse, Urgent Care, ED, 911 Yes   Week 1 call completed? Yes   Wrap up additional comments Phone was cutting in and out. Called Pt back. Pt reports she is doing well. No issues with surgical site. States HH called and she ask them to come out Friday. Denies needs at this time. Encouraged Pt to call HUB for PCP.           PEDRO MUNSON - Registered  Nurse

## 2022-05-25 ENCOUNTER — LAB (OUTPATIENT)
Dept: LAB | Facility: HOSPITAL | Age: 62
End: 2022-05-25

## 2022-05-25 ENCOUNTER — OFFICE VISIT (OUTPATIENT)
Dept: ONCOLOGY | Facility: CLINIC | Age: 62
End: 2022-05-25

## 2022-05-25 VITALS
RESPIRATION RATE: 16 BRPM | HEIGHT: 64 IN | WEIGHT: 113.1 LBS | BODY MASS INDEX: 19.31 KG/M2 | TEMPERATURE: 97.3 F | HEART RATE: 81 BPM | OXYGEN SATURATION: 96 % | DIASTOLIC BLOOD PRESSURE: 68 MMHG | SYSTOLIC BLOOD PRESSURE: 111 MMHG

## 2022-05-25 DIAGNOSIS — C15.5 MALIGNANT NEOPLASM OF LOWER THIRD OF ESOPHAGUS: Primary | ICD-10-CM

## 2022-05-25 DIAGNOSIS — C15.5 MALIGNANT NEOPLASM OF LOWER THIRD OF ESOPHAGUS: ICD-10-CM

## 2022-05-25 LAB
BASOPHILS # BLD AUTO: 0.02 10*3/MM3 (ref 0–0.2)
BASOPHILS NFR BLD AUTO: 0.2 % (ref 0–1.5)
DEPRECATED RDW RBC AUTO: 46.5 FL (ref 37–54)
EOSINOPHIL # BLD AUTO: 0.15 10*3/MM3 (ref 0–0.4)
EOSINOPHIL NFR BLD AUTO: 1.5 % (ref 0.3–6.2)
ERYTHROCYTE [DISTWIDTH] IN BLOOD BY AUTOMATED COUNT: 13.6 % (ref 12.3–15.4)
HCT VFR BLD AUTO: 33.3 % (ref 34–46.6)
HGB BLD-MCNC: 10.6 G/DL (ref 12–15.9)
IMM GRANULOCYTES # BLD AUTO: 0.04 10*3/MM3 (ref 0–0.05)
IMM GRANULOCYTES NFR BLD AUTO: 0.4 % (ref 0–0.5)
LYMPHOCYTES # BLD AUTO: 1.33 10*3/MM3 (ref 0.7–3.1)
LYMPHOCYTES NFR BLD AUTO: 13.1 % (ref 19.6–45.3)
MCH RBC QN AUTO: 29.8 PG (ref 26.6–33)
MCHC RBC AUTO-ENTMCNC: 31.8 G/DL (ref 31.5–35.7)
MCV RBC AUTO: 93.5 FL (ref 79–97)
MONOCYTES # BLD AUTO: 0.9 10*3/MM3 (ref 0.1–0.9)
MONOCYTES NFR BLD AUTO: 8.8 % (ref 5–12)
NEUTROPHILS NFR BLD AUTO: 7.74 10*3/MM3 (ref 1.7–7)
NEUTROPHILS NFR BLD AUTO: 76 % (ref 42.7–76)
NRBC BLD AUTO-RTO: 0 /100 WBC (ref 0–0.2)
PLATELET # BLD AUTO: 317 10*3/MM3 (ref 140–450)
PMV BLD AUTO: 9.7 FL (ref 6–12)
RBC # BLD AUTO: 3.56 10*6/MM3 (ref 3.77–5.28)
WBC NRBC COR # BLD: 10.18 10*3/MM3 (ref 3.4–10.8)

## 2022-05-25 PROCEDURE — 85025 COMPLETE CBC W/AUTO DIFF WBC: CPT

## 2022-05-25 PROCEDURE — 99215 OFFICE O/P EST HI 40 MIN: CPT | Performed by: INTERNAL MEDICINE

## 2022-05-25 PROCEDURE — 36415 COLL VENOUS BLD VENIPUNCTURE: CPT

## 2022-06-01 ENCOUNTER — OFFICE VISIT (OUTPATIENT)
Dept: SURGERY | Facility: CLINIC | Age: 62
End: 2022-06-01

## 2022-06-01 ENCOUNTER — HOSPITAL ENCOUNTER (OUTPATIENT)
Dept: GENERAL RADIOLOGY | Facility: HOSPITAL | Age: 62
Discharge: HOME | End: 2022-06-01
Admitting: THORACIC SURGERY (CARDIOTHORACIC VASCULAR SURGERY)

## 2022-06-01 VITALS
BODY MASS INDEX: 19.26 KG/M2 | OXYGEN SATURATION: 98 % | SYSTOLIC BLOOD PRESSURE: 112 MMHG | WEIGHT: 112.8 LBS | HEART RATE: 101 BPM | DIASTOLIC BLOOD PRESSURE: 62 MMHG | HEIGHT: 64 IN

## 2022-06-01 DIAGNOSIS — C15.5 MALIGNANT NEOPLASM OF LOWER THIRD OF ESOPHAGUS: Primary | ICD-10-CM

## 2022-06-01 DIAGNOSIS — C15.5 MALIGNANT NEOPLASM OF LOWER THIRD OF ESOPHAGUS: ICD-10-CM

## 2022-06-01 DIAGNOSIS — Z09 FOLLOW-UP EXAMINATION FOLLOWING SURGERY: ICD-10-CM

## 2022-06-01 PROCEDURE — 71046 X-RAY EXAM CHEST 2 VIEWS: CPT

## 2022-06-01 PROCEDURE — 99024 POSTOP FOLLOW-UP VISIT: CPT | Performed by: THORACIC SURGERY (CARDIOTHORACIC VASCULAR SURGERY)

## 2022-06-01 RX ORDER — OXYCODONE HYDROCHLORIDE 5 MG/1
5 TABLET ORAL EVERY 6 HOURS PRN
Qty: 28 TABLET | Refills: 0 | Status: SHIPPED | OUTPATIENT
Start: 2022-06-01 | End: 2022-06-08

## 2022-06-01 NOTE — PROGRESS NOTES
"Chief Complaint  Aftercare following esophagectomy    Nazanin Ribera presents to [facility] THORACIC SURGERY  History of Present Illness     Ms. Ribera was seen in the office today for further follow-up of a robot-assisted total esophagectomy performed April 29, 2022.  Since her last visit she has been eating and drinking without difficulty.  She has transitioned from liquids to soft diet to regular food without difficulty.  She has been maintaining her weight during this period.  She remains at 112 to 113 pounds.  She has had no nausea or vomiting.  She has been having normal bowel movements.  She reports no pain.    Objective   Vital Signs:  /62 (BP Location: Right arm, Patient Position: Sitting, Cuff Size: Adult)   Pulse 101   Ht 162.6 cm (64.02\")   Wt 51.2 kg (112 lb 12.8 oz)   SpO2 98%   BMI 19.35 kg/m²     BMI has not been calculated during today's encounter.       Physical Exam     Neck: Incision is well-healed.  No subcutaneous masses.  Full range of motion.    Chest: Right chest incisions are well-healed.  No subcutaneous masses or nodules.  Chest wall is stable.    Cardiac: Regular rate and rhythm.  No murmurs or gallops.  No dependent edema.    Abdomen: Midline incision is well-healed without signs of infection.  Abdomen is soft and nontender.  There are no masses and no organomegaly.  Feeding tube was removed completely and intact and without difficulty.    Result Review :  The following data was reviewed by: Hayder Finch III, MD on 06/01/2022:    Chest x-ray performed today was independently reviewed and compared to previous x-rays.  Apical opacity on the right is slowly resolving.  Small right pleural effusion is unchanged.  No pulmonary infiltrates.  No pneumothorax.  Left lung is clear.         Assessment and Plan   Diagnoses and all orders for this visit:    1. Malignant neoplasm of lower third of esophagus (HCC) (Primary)  -     oxyCODONE " (Roxicodone) 5 MG immediate release tablet; Take 1 tablet by mouth Every 6 (Six) Hours As Needed for Moderate Pain  for up to 7 days.  Dispense: 28 tablet; Refill: 0    2. Follow-up examination following surgery  -     oxyCODONE (Roxicodone) 5 MG immediate release tablet; Take 1 tablet by mouth Every 6 (Six) Hours As Needed for Moderate Pain  for up to 7 days.  Dispense: 28 tablet; Refill: 0      Ms. Ribera is making good progress.  She is now taking regular diet.  I have encouraged her to supplement her food with boost or Ensure.  I have stopped her gabapentin.  She will take oxycodone as needed for another 10 days.  She will continue increasing her activities including driving her car.  She will return here in 4 weeks with a chest x-ray for further follow-up.  I will keep you informed of her progress.       I spent 15 minutes caring for Maya on this date of service. This time includes time spent by me in the following activities:preparing for the visit, reviewing tests, performing a medically appropriate examination and/or evaluation , counseling and educating the patient/family/caregiver, ordering medications, tests, or procedures, referring and communicating with other health care professionals , documenting information in the medical record, independently interpreting results and communicating that information with the patient/family/caregiver and care coordination  Follow Up   Return in about 4 weeks (around 6/29/2022) for Recheck.  Patient was given instructions and counseling regarding her condition or for health maintenance advice. Please see specific information pulled into the AVS if appropriate.

## 2022-06-02 ENCOUNTER — TELEMEDICINE - AUDIO (OUTPATIENT)
Dept: NUTRITION | Facility: HOSPITAL | Age: 62
End: 2022-06-02

## 2022-06-02 NOTE — PROGRESS NOTES
Outpatient Oncology Nutrition      Patient Name:  Maya Ribera  YOB: 1960  MRN: [id number]    Assessment Date:  6/2/2022    Comments:  Spoke to patient on the phone today. J tube was removed in the thoracic surgery office yesterday.   S/p right thoracoscopy, partial decortication, robot-assisted total esophagectomy, jejunostomy tube placement and intercostal nerve block 4/29/22.   Patient is working on increasing po intake. Weight 112 lb,   Wt Readings from Last 3 Encounters:   06/01/22 51.2 kg (112 lb 12.8 oz)   05/25/22 51.3 kg (113 lb 1.6 oz)   05/18/22 49 kg (108 lb 0.4 oz)     Meds and labs reviewed.     Planning to begin Nivolumab, education in three weeks.     Will send patient copies of esophagectomy diet with sample menus. Encouraged patient to call with any questions. Will continue to follow up.      Electronically signed by:  Tracey Lozano RD, DEVIN  06/02/22 12:57 EDT

## 2022-06-03 ENCOUNTER — HOME CARE VISIT (OUTPATIENT)
Dept: HOME HEALTH SERVICES | Facility: HOME HEALTHCARE | Age: 62
End: 2022-06-03

## 2022-06-03 VITALS
HEART RATE: 98 BPM | RESPIRATION RATE: 16 BRPM | DIASTOLIC BLOOD PRESSURE: 62 MMHG | OXYGEN SATURATION: 99 % | SYSTOLIC BLOOD PRESSURE: 98 MMHG | TEMPERATURE: 98.1 F

## 2022-06-03 PROCEDURE — G0299 HHS/HOSPICE OF RN EA 15 MIN: HCPCS

## 2022-06-03 RX ORDER — LANSOPRAZOLE 30 MG/1
30 TABLET, ORALLY DISINTEGRATING, DELAYED RELEASE ORAL
Qty: 60 TABLET | Refills: 1 | Status: SHIPPED | OUTPATIENT
Start: 2022-06-03 | End: 2022-10-27 | Stop reason: SDUPTHER

## 2022-06-07 ENCOUNTER — HOME CARE VISIT (OUTPATIENT)
Dept: HOME HEALTH SERVICES | Facility: HOME HEALTHCARE | Age: 62
End: 2022-06-07

## 2022-06-07 VITALS
RESPIRATION RATE: 18 BRPM | HEART RATE: 86 BPM | SYSTOLIC BLOOD PRESSURE: 120 MMHG | OXYGEN SATURATION: 96 % | TEMPERATURE: 97.2 F | DIASTOLIC BLOOD PRESSURE: 64 MMHG

## 2022-06-07 PROCEDURE — G0300 HHS/HOSPICE OF LPN EA 15 MIN: HCPCS

## 2022-06-07 RX ORDER — LANSOPRAZOLE 30 MG/1
TABLET, ORALLY DISINTEGRATING, DELAYED RELEASE ORAL
Qty: 60 TABLET | Refills: 1 | OUTPATIENT
Start: 2022-06-07

## 2022-06-08 ENCOUNTER — OFFICE VISIT (OUTPATIENT)
Dept: FAMILY MEDICINE CLINIC | Facility: CLINIC | Age: 62
End: 2022-06-08

## 2022-06-08 VITALS
TEMPERATURE: 97.1 F | RESPIRATION RATE: 20 BRPM | SYSTOLIC BLOOD PRESSURE: 110 MMHG | WEIGHT: 110 LBS | HEART RATE: 85 BPM | OXYGEN SATURATION: 97 % | DIASTOLIC BLOOD PRESSURE: 68 MMHG | HEIGHT: 64 IN | BODY MASS INDEX: 18.78 KG/M2

## 2022-06-08 DIAGNOSIS — E78.5 HYPERLIPIDEMIA, UNSPECIFIED HYPERLIPIDEMIA TYPE: ICD-10-CM

## 2022-06-08 DIAGNOSIS — Z00.00 ANNUAL PHYSICAL EXAM: Primary | ICD-10-CM

## 2022-06-08 DIAGNOSIS — Z12.31 SCREENING MAMMOGRAM FOR BREAST CANCER: ICD-10-CM

## 2022-06-08 PROCEDURE — 99396 PREV VISIT EST AGE 40-64: CPT

## 2022-06-08 PROCEDURE — 99213 OFFICE O/P EST LOW 20 MIN: CPT

## 2022-06-08 NOTE — PROGRESS NOTES
Subjective   Maya Ribera is a 61 y.o. female. Presents today as a New patient here to establish care  And annual  Chief Complaint   Patient presents with   • Establish Care       History of Present Illness  Previous PCP: Dr Nicole  Significant medical hx: esophageal cancer, HLD, HTN, MVP, Afib, hiatal hernia, hyperthyroidism    Denies Smoking- quit before surgery, denies alcohol, drug use    Hyperthyroidism- sees Dr Downing  Hx afib, MVP and HTN- Sees Dr Sánchez    Recent hx esophageal ca- has been treated w surgery, chemo and radiation.  J tube out 1-2 weeks ago- doing well with PO - some nausea.   R lung still hurts. Doing IS and walking.   Taking oxycodone as needed     Pap smear-11/12/2019- negative  Mammogram- 2/2021- due  Last CRC screening in august, 16 2021- normal    Denies concerns today. Used to work as .     Review of Systems   Constitutional: Positive for fatigue. Negative for chills, fever and unexpected weight change.   Eyes: Negative for visual disturbance.   Respiratory: Negative.    Cardiovascular: Negative.    Gastrointestinal: Positive for nausea. Negative for abdominal distention, abdominal pain, constipation, diarrhea and vomiting.   Genitourinary: Negative for decreased urine volume and difficulty urinating.   Psychiatric/Behavioral: Negative for self-injury and sleep disturbance. The patient is not nervous/anxious.        Patient Active Problem List   Diagnosis   • Esophageal cancer (HCC)   • Asymptomatic varicose veins of unspecified lower extremity   • Eczema   • Encounter for screening for malignant neoplasm of cervix   • Screening for malignant neoplasm of colon   • Arthritis   • Esophagitis   • Gastric ulcer   • Female cystocele   • Flat foot(734)   • Hiatal hernia   • Hyperlipidemia   • Hypertension   • Hyperthyroidism   • Irritable bowel syndrome   • Mitral regurgitation   • Paroxysmal atrial fibrillation (HCC)   • Severe esophageal dysplasia   • Mitral valve prolapse    • Encounter for management of implanted device   • Dislodged jejunostomy tube   • Chronic pain     Past Medical History:   Diagnosis Date   • Boil, breast 12/13/2021    HEALING UNDER LEFT BREAST    • Cervical cancer (HCC)    • Dysphagia    • Esophageal cancer (HCC)    • Esophagitis 9/21/2021   • Gastric ulcer    • GERD (gastroesophageal reflux disease)    • Hyperlipidemia    • Hypertension    • Hyperthyroidism    • Irritable bowel    • Mitral valve prolapse     FOLLOWED BY     • PAF (paroxysmal atrial fibrillation) (HCC)    • Uses feeding tube      Past Surgical History:   Procedure Laterality Date   • CHOLECYSTECTOMY     • COLONOSCOPY  08/27/2021    [facility]   • ESOPHAGOGASTRECTOMY Right 4/29/2022    Procedure: BRONCHOSCOPY, RIGHT THORACOSCOPY WITH [employer] ROBOT WITH TOTAL ESOPHAGECTOMY, INTERCOSTAL NERVE BLOCK, JEJUNOSTOMY TUBE REPLACEMENT;  Surgeon: Hayder Finch III, MD;  Location: [facility];  Service: Robotics - DaVinci;  Laterality: Right;   • EXTERNAL EAR SURGERY     • JEJUNOSTOMY N/A 12/15/2021    Procedure: open JEJUNOSTOMY tube placement;  Surgeon: Bhanu Hollis MD;  Location: [facility];  Service: General;  Laterality: N/A;   • KNEE SURGERY Left    • REPLACEMENT TOTAL KNEE Left    • UPPER ENDOSCOPIC ULTRASOUND W/ FNA N/A 12/7/2021    Procedure: Esophagogastroduodenoscopy with endoscopic ultrasound  WITH STAGING AND FINE NEEDLE BIOPSY;  Surgeon: Amrit Green MD;  Location: [facility] ENDOSCOPY;  Service: Gastroenterology;  Laterality: N/A;  post op: inlet patch, esophageal mass, T2   • VENOUS ACCESS DEVICE (PORT) INSERTION Left 12/15/2021    Procedure: INSERTION OF PORTACATH;  Surgeon: Bhanu Hollis MD;  Location: [facility];  Service: General;  Laterality: Left;     Family History   Problem Relation Age of Onset   • Breast cancer Mother    • Diabetes Mother    • Bipolar disorder Father    • Diabetes Father    • Hypertension Father    • Breast cancer Sister    •  COPD Sister    • Diabetes Sister    • Hypertension Sister    • Alcohol abuse Brother    • Asthma Brother    • Bipolar disorder Brother    • Diabetes Brother    • Seizures Brother    • Breast cancer Maternal Grandmother    • Diabetes Maternal Grandmother    • Diabetes Maternal Grandfather    • Diabetes Paternal Grandmother    • Diabetes Paternal Grandfather    • Hypertension Son    • Kidney cancer Sister    • Hypertension Sister    • Diabetes Sister    • Malig Hyperthermia Neg Hx      Social History     Socioeconomic History   • Marital status: Significant Other   • Number of children: 1   • Years of education: High school   Tobacco Use   • Smoking status: Former Smoker     Packs/day: 1.00     Years: 40.00     Pack years: 40.00     Types: Cigarettes     Start date:      Quit date: 2022     Years since quittin.1   • Smokeless tobacco: Never Used   • Tobacco comment: 2 per day   Vaping Use   • Vaping Use: Never used   Substance and Sexual Activity   • Alcohol use: Not Currently   • Drug use: Never   • Sexual activity: Defer       Allergies   Allergen Reactions   • Codeine Hives     Patient states this is when she was very young.        Current Outpatient Medications on File Prior to Visit   Medication Sig Dispense Refill   • aluminum-magnesium hydroxide 200-200 MG/5ML suspension, diphenhydrAMINE 12.5 MG/5ML liquid, Lidocaine Viscous HCl 2 % solution, nystatin 100,000 unit/mL suspension Swish and spit 10 mL 4 (Four) Times a Day After Meals & at Bedtime. 400 mL 2   • Eliquis 5 MG tablet tablet Take 5 mg by mouth Every 12 (Twelve) Hours. LD   PT STATED TO HOLD 72 HOURS PRIOR TO SURGERY     • lansoprazole (PREVACID SOLUTAB) 30 MG Tablet Delayed Release Dispersible disintegrating tablet Take 1 tablet by mouth Every Morning Before Breakfast. 60 tablet 1   • methIMAzole (TAPAZOLE) 10 MG tablet Take 10 mg by mouth 3 (Three) Times a Day. Take DOS     • metoprolol tartrate (LOPRESSOR) 25 MG tablet Take 1 tablet  "by mouth Daily for 30 days. 30 tablet 0   • pravastatin (PRAVACHOL) 20 MG tablet Take 20 mg by mouth Daily.       No current facility-administered medications on file prior to visit.       Objective   Vitals:    06/08/22 1304   BP: 110/68   BP Location: Left arm   Patient Position: Sitting   Cuff Size: Adult   Pulse: 85   Resp: 20   Temp: 97.1 °F (36.2 °C)   TempSrc: Temporal   SpO2: 97%   Weight: 49.9 kg (110 lb)   Height: 162.6 cm (64.02\")   PainSc: 0-No pain     Body mass index is 18.87 kg/m².  Physical Exam  Constitutional:       Appearance: Normal appearance. She is not ill-appearing.   Neck:      Thyroid: No thyroid mass, thyromegaly or thyroid tenderness.      Vascular: No carotid bruit.   Cardiovascular:      Rate and Rhythm: Normal rate and regular rhythm.      Pulses: Normal pulses.           Carotid pulses are 2+ on the right side and 2+ on the left side.     Heart sounds: Normal heart sounds, S1 normal and S2 normal. No murmur heard.  Pulmonary:      Effort: Pulmonary effort is normal. No respiratory distress.      Breath sounds: Normal breath sounds.   Abdominal:      General: Bowel sounds are normal. There is no distension.      Palpations: Abdomen is soft.   Musculoskeletal:      Right lower leg: No edema.      Left lower leg: No edema.   Neurological:      General: No focal deficit present.      Mental Status: She is alert and oriented to person, place, and time.      Gait: Gait is intact.   Psychiatric:         Attention and Perception: Attention normal.         Mood and Affect: Mood normal. Affect is flat.         Speech: Speech normal.         Behavior: Behavior normal.         Thought Content: Thought content normal.         Cognition and Memory: Cognition normal.         Judgment: Judgment normal.       Assessment & Plan   Diagnoses and all orders for this visit:    1. Annual physical exam (Primary)  -     Hemoglobin A1c  -     Hepatitis C Antibody  -     Seems recovering well overall. Eat a " healthy diet- advance as tolerated; and exercise routinely. Avoid smoking/alcohol and drugs. Use seatbelt 100% of time. Increase routine activity as tolerated. Update mammogram. Pap and CRC screen UTD.   -     Continue current medications as prescribed. Does not need refills.   -     Recent labs reviewed. She is anemic, otherwise labs stable. Will check A1C, Lipids.    2. Screening mammogram for breast cancer  -     Mammo Screening Bilateral With CAD; Future  -     Mammo Screening Bilateral With CAD    3. Hyperlipidemia, unspecified hyperlipidemia type  -     Lipid Panel       -Follow up: 3 months, sooner as needed. Call if questions/concerns. Will call once test results available.      - Pt agrees with plan of care and states no further concerns or questions today    This document is intended for medical expert use only. Reading of this document by patients and/or patient's family without participating medical staff guidance may result in misinterpretation and unintended morbidity.  Any interpretation of such data is the responsibility of the patient and/or family member responsible for the patient in concert with their primary or specialist providers, not to be left for sources of online searches such as [employer]igi [employer], [employer] or similar queries. Relying on these approaches to knowledge may result in misinterpretation, misguided goals of care and even death should patients or family members try recommendations outside of the realm of professional medical care in a supervised way.     Please allow 3-5 business days for recommendations based on new results     Go to the ER for any possible lifethreatening symptoms such as chest pain or shortness of air.      I personally spent 30 minutes with this patient, preparing for the visit, reviewing tests, obtaining and/or reviewing a separately obtained history, performing a medically appropriate examination and/or evaluation , counseling and educating the patient/family/caregiver,  ordering medications, tests, or procedures, documenting information in the medical record and independently interpreting results.

## 2022-06-09 LAB
CHOLEST SERPL-MCNC: 103 MG/DL (ref 100–199)
HBA1C MFR BLD: 6.1 % (ref 4.8–5.6)
HCV AB S/CO SERPL IA: <0.1 S/CO RATIO (ref 0–0.9)
HDLC SERPL-MCNC: 40 MG/DL
LDLC SERPL CALC-MCNC: 48 MG/DL (ref 0–99)
TRIGL SERPL-MCNC: 73 MG/DL (ref 0–149)
VLDLC SERPL CALC-MCNC: 15 MG/DL (ref 5–40)

## 2022-06-09 NOTE — PROGRESS NOTES
Please inform pt of lab results    Cholesterol level stable. Cont same treatment.     A1c is also elevated at pre diabetic level. You do not have diabetes but at increased risk. At this point lifestyle modification with diet and exercise is also first line treatment.     Repeat labs in 6 months. Call if questions/concerns.    Baby girl born at 41+4 wks via  to a 29 y/o   _ blood type mother. Maternal history of _. Prenatal history of _ or no significant maternal or prenatal history. PNL nr/immune/-, GBS +/- on __. ROM at __ with clear/meconium fluids. Baby emerged vigorous, crying, was warmed, dried, suctioned, and stimulated with APGARS of _/_. Mom would like to breast/bottle feed, consents/declines Hep B and consents/declines circ.  EOS ___. Baby girl born at 41+4 wks via  to a 27 y/o  A+ blood type mother. No significant maternal or prenatal history. PNL nr/immune/-, GBS- on  (). AROM at 0339  with clear fluids. Baby emerged vigorous, crying, was warmed, dried, suctioned, and stimulated with APGARS of 8/9. Mom would like to breast feed, consents to Hep B. Highest maternal tep 37.2. EOS 0.26.

## 2022-06-16 ENCOUNTER — HOME CARE VISIT (OUTPATIENT)
Dept: HOME HEALTH SERVICES | Facility: HOME HEALTHCARE | Age: 62
End: 2022-06-16

## 2022-06-16 VITALS
RESPIRATION RATE: 18 BRPM | SYSTOLIC BLOOD PRESSURE: 112 MMHG | DIASTOLIC BLOOD PRESSURE: 68 MMHG | HEART RATE: 96 BPM | OXYGEN SATURATION: 92 %

## 2022-06-16 PROCEDURE — G0493 RN CARE EA 15 MIN HH/HOSPICE: HCPCS

## 2022-06-17 ENCOUNTER — TELEPHONE (OUTPATIENT)
Dept: FAMILY MEDICINE CLINIC | Facility: CLINIC | Age: 62
End: 2022-06-17

## 2022-06-24 ENCOUNTER — DOCUMENTATION (OUTPATIENT)
Dept: ONCOLOGY | Facility: CLINIC | Age: 62
End: 2022-06-24

## 2022-06-24 NOTE — PROGRESS NOTES
Pt called stating that she does not want to reschedule her chemo education because she is unsure she wants to take Opdivo. She would like to talk to Dr. Harrell about everything again next week. Informed Dr. Harrell of this.

## 2022-06-28 NOTE — PROGRESS NOTES
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      History of Present Illness   This is a 61 y.o. female with recent diagnosis of distal esophageal cancer, initiating concurrent chemoradiation with carboplatin/Taxol on 1/5/2022.  Unfortunately, the patient has had frequent delays due to pancytopenia, weight loss, malnutrition and COVID-19.  She is completed only 3 weeks of chemotherapy.  She is due today for her fourth dose of carboplatin Taxol.  She has 5 radiation treatments remaining.    Her case was presented at the multidisciplinary clinic this morning with plans to complete the last week of concurrent chemoradiation followed by PET scan.  It is unclear at this time if the patient would qualify for surgery given her struggles and malnutrition.  She does have a J-tube for which her partner utilizes for nutrition.  She is still able to swallow pills by mouth.  Her weight is stable over the past few weeks.    She has been struggling with her blood pressure.  She is on metoprolol originally 50 mg twice daily for her atrial fibrillation.  She has cut this down to once daily.  She does report she has been holding her Eliquis since her platelets dropped and has not yet resumed.  Her and her partner expressed concerns regarding the timeline of PET scan and possible surgery.  They are very eager to proceed with PET scan.  We did discuss today the possibility for false positives with esophagitis symptoms.  We will plan PET scan 4 weeks after the completion of radiation which is the soonest this should be obtained.    A PET/CT was obtained 3/17/2022  demonstrating significant decrease in FDG avid thickened mid to distal esophagus, decrease in activity in gastrohepatic and left hilar nodes no longer demonstrating that above blood pool, new subcentimeter nodule right apex thought to be an endobronchial filling defect it was indeterminant with a new area of tree-in-bud nodularity medial aspect left upper lobe thought to be reactive.    The patient is seen with her partner 3/23/2022.  She recently had nausea and vomiting for several days thought to be?  Food poisoning which, fortunately, is now resolving.  We have reviewed her scans and she could well be a candidate for surgery which we will asked Dr. Finch to review her for next available.    The patient is next reviewed 4/20/2022.  She is anxious for surgery 4/29/2022 but ready to proceed.    The patient was admitted 4/29 - 5/9/2022 undergoing bronchoscopy with right thoracoscopy, partial decortication, robot-assisted total esophagectomy, jejunostomy tube placement and intercostal nerve block.  She had a satisfactory postoperative course and was able to be discharged 5/9/2022 though was briefly readmitted requiring replacement of her J-tube at bedside.    Pathology demonstrated rare focal metaplasia at the GE junction consistent with Cotto's esophagus, focal submucosal scar, chronic esophagitis, no evidence of dysplasia or malignancy identified by staining, focal active chronic gastritis, 13 lymph nodes negative, paraesophageal soft tissue margins negative, level 7 lymph node negative x3, final esophageal margin negative, final gastric margin negative-patient's final pathologic staging could be considered ypT0, N0.     The patient is seen with significant other 5/25/2022 and reviewed her findings recognizing that she could benefit substantially from immunotherapy  considering nivolumab every 4 weeks x1 year as adjuvant therapy.  This is discussed extensively as to potential side effect profile and potential  benefit.  The patient was seen by thoracic surgery 6/1/2022 and felt to be making good progress.      We made plans for her to undergo chemo education for the possible use of nivolumab but this was held into the patient was to return and she is next in 6/29/2022.        We discussed availability of information Checkmate 577 trial in patients who had residual pathologic disease at the time of surgery to nivolumab with median disease-free survival nearly twice as long 22 months versus 11 months across follow-up routine regardless of PD-L1 overexpression.     As result we reviewed the records available including biopsies done at more than 1 location with PD-L1 never obtained.     At some point this may be an issue with the patient does have recurrence.     She is seen back 6/29/2022 indicating that she does not wish to take immunotherapy at this point and we have discussed the above rationale.        ONCOLOGY HISTORY     The patient is a 61-year-old female who had been seen in multidisciplinary thoracic oncology conference with complaints of painful swallowing and dysphagia over the previous several months with 10 to 15 pound weight loss.  This led to EGD and colonoscopy with normal colonoscopy but EGD demonstrated tumor at the GE junction with biopsies consistent with severe dysplasia including carcinoma in situ.  The patient states that she saw a number of physicians and attempting to have a diagnosis completed.  CT of chest demonstrated thickening of the mid esophagus with no lymphadenopathy and CT PET demonstrated uptake in the esophagus gastropathic lymph nodes it is felt that she likely had early stage carcinoma of the esophagus and that we could present to record with surgical resection.  Endoscopic ultrasound, however, was requested and we could not have this done any sooner than GI physicians available at [facility].     This was performed 12/7/2021 revealing a 5 cm esophageal mass present in the distal  third esophagus without extension into the GE junction into the cardia, the mass extended into into the muscularis propria into the adventitia not penetrating through the adventitia with a single 1 cm gastrohepatic/celiac axis lymph node and FNB x1 performed-?  T2 N1 M0 distal esophageal cancer-clinical stage III, pathologic stage IIIa  In contacting the [facility] pathology department the results of this FNB are not yet available and will be by 12/9/2021.      The patient is seen with her sister and son all indicating that she has had difficulty with swallowing since late summer likely July 2021 and has been attempting to have a diagnosis made since that time.  They are all somewhat frustrated about the length of time to obtain an answer for this problem and we have spent considerable time discussing her findings thus far and how we might proceed to treat what appears to be a contained malignancy.  This has, additionally, been discussed with Dr. Stef cruz who feels that she is not immediately and operative candidate and should proceed with chemo radiation therapy initially-neoadjuvant treatment before consideration for subsequent surgery.  This has been discussed with the patient, her sister and her son again in detail 12/8/2021.  We went on to initiate pain control with liquid hydrocodone, referred the patient to general surgery and oncology nutrition.    The patient was seen by  who proceeded 12/15/2021 to PowerPort placement and open jejunostomy.  She was seen during her hospital stay by oncology nutrition required hospitalization up to including 12/19/2021.    The patient required hospitalization to 12/20/2021 and after discharge along with her partner's health was able to gradually improve her caloric intake and was seen back 12/27 by Dr. Hollis who felt she had improved enough to initiate chemotherapy.  The patient was seen by radiation therapy 12/21 with plans to proceed with 40 CT  "simulation and planning-IMRT/IGR T-4140 cGy in 23 fractions with pleased to be considered.    The patient is seen back with her partner Cristy 12/29/2021 now able to \"manage\" and we have discussed extensively concurrent chemotherapy radiation therapy using Carboplatin/Taxol weekly.    Currently the patient is scheduled to begin radiation therapy 1/3/2021, undergoing teaching 1/4/2022 and will begin concurrent chemotherapy 1/5/2022    The patient had difficulty tolerating this treatment but was eventually able to complete it though modified for toxicity-1/3/2022-2/18/2022 4680 with 26 total treatments, carboplatinum/Taxol weekly 1/5, 1/12, 1/19 and delayed until 2/10/2022.    A PET/CT was obtained 3/17/2022 demonstrating significant decrease in FDG avid thickened mid to distal esophagus, decrease in activity in gastrohepatic and left hilar nodes no longer demonstrating that above blood pool, new subcentimeter nodule right apex thought to be an endobronchial filling defect it was indeterminant with a new area of tree-in-bud nodularity medial aspect left upper lobe thought to be reactive.    Patient had been presented at thoracic conference and upon completion of therapy and repeat PET/CT was to be reevaluated for surgery.  Patient seen in office 3/23/2022 referred back to thoracic surgery.            The patient was briefly admitted 5//2022 with dislodged J-tube.  G-tube was placed and the patient was able to be discharged.     The patient's final pathologic staging could be considered ypT0, N0.     The patient is seen with significant other 5/25/2022 and reviewed her findings recognizing that she could benefit substantially from immunotherapy  considering nivolumab every 4 weeks x1 year as adjuvant therapy.  This is discussed extensively as to potential side effect profile and potential benefit.    We discussed availability of information Checkmate 577 trial in patients who had residual pathologic disease at " the time of surgery to nivolumab with median disease-free survival nearly twice as long 22 months versus 11 months across follow-up routine regardless of PD-L1 overexpression.     As result we reviewed the records available including biopsies done at more than 1 location with PD-L1 never obtained.     At some point this may be an issue with the patient does have recurrence.     She is seen back 6/29/2022 indicating that she does not wish to take immunotherapy at this point and we have discussed the above rationale.    Past Medical History:   Diagnosis Date   • Boil, breast 12/13/2021    HEALING UNDER LEFT BREAST    • Cervical cancer (HCC)    • Dysphagia    • Esophageal cancer (HCC)    • Esophagitis 9/21/2021   • Gastric ulcer    • GERD (gastroesophageal reflux disease)    • Hyperlipidemia    • Hypertension    • Hyperthyroidism    • Irritable bowel    • Mitral valve prolapse     FOLLOWED BY     • PAF (paroxysmal atrial fibrillation) (HCC)    • Uses feeding tube         Past Surgical History:   Procedure Laterality Date   • CHOLECYSTECTOMY     • COLONOSCOPY  08/27/2021    [facility]   • ESOPHAGOGASTRECTOMY Right 4/29/2022    Procedure: BRONCHOSCOPY, RIGHT THORACOSCOPY WITH DAVINCI ROBOT WITH TOTAL ESOPHAGECTOMY, INTERCOSTAL NERVE BLOCK, JEJUNOSTOMY TUBE REPLACEMENT;  Surgeon: Hayder Finch III, MD;  Location: [facility];  Service: Robotics - DaVinci;  Laterality: Right;   • EXTERNAL EAR SURGERY     • JEJUNOSTOMY N/A 12/15/2021    Procedure: open JEJUNOSTOMY tube placement;  Surgeon: Bhanu Hollis MD;  Location: [facility];  Service: General;  Laterality: N/A;   • KNEE SURGERY Left    • REPLACEMENT TOTAL KNEE Left    • UPPER ENDOSCOPIC ULTRASOUND W/ FNA N/A 12/7/2021    Procedure: Esophagogastroduodenoscopy with endoscopic ultrasound  WITH STAGING AND FINE NEEDLE BIOPSY;  Surgeon: Amrit Green MD;  Location: [facility] ENDOSCOPY;  Service: Gastroenterology;  Laterality: N/A;  post op:  inlet patch, esophageal mass, T2   • VENOUS ACCESS DEVICE (PORT) INSERTION Left 12/15/2021    Procedure: INSERTION OF PORTACATH;  Surgeon: Bhanu Hollis MD;  Location: [facility];  Service: General;  Laterality: Left;        Current Outpatient Medications on File Prior to Visit   Medication Sig Dispense Refill   • aluminum-magnesium hydroxide 200-200 MG/5ML suspension, diphenhydrAMINE 12.5 MG/5ML liquid, Lidocaine Viscous HCl 2 % solution, nystatin 100,000 unit/mL suspension Swish and spit 10 mL 4 (Four) Times a Day After Meals & at Bedtime. 400 mL 2   • Eliquis 5 MG tablet tablet Take 5 mg by mouth Every 12 (Twelve) Hours. LD   PT STATED TO HOLD 72 HOURS PRIOR TO SURGERY     • lansoprazole (PREVACID SOLUTAB) 30 MG Tablet Delayed Release Dispersible disintegrating tablet Take 1 tablet by mouth Every Morning Before Breakfast. 60 tablet 1   • lansoprazole (PREVACID SOLUTAB) 30 MG Tablet Delayed Release Dispersible disintegrating tablet Take 30 mg by mouth Daily.     • methIMAzole (TAPAZOLE) 10 MG tablet Take 10 mg by mouth 3 (Three) Times a Day. Take DOS     • pravastatin (PRAVACHOL) 20 MG tablet Take 20 mg by mouth Daily.     • metoprolol tartrate (LOPRESSOR) 25 MG tablet Take 1 tablet by mouth Daily for 30 days. 30 tablet 0     No current facility-administered medications on file prior to visit.        ALLERGIES:    Allergies   Allergen Reactions   • Codeine Hives     Patient states this is when she was very young.         Social History     Socioeconomic History   • Marital status: Significant Other   • Number of children: 1   • Years of education: High school   Tobacco Use   • Smoking status: Former Smoker     Packs/day: 1.00     Years: 40.00     Pack years: 40.00     Types: Cigarettes     Start date:      Quit date: 2022     Years since quittin.1   • Smokeless tobacco: Never Used   • Tobacco comment: 2 per day   Vaping Use   • Vaping Use: Never used   Substance and Sexual Activity   •  "Alcohol use: Not Currently   • Drug use: Never   • Sexual activity: Defer        Family History   Problem Relation Age of Onset   • Breast cancer Mother    • Diabetes Mother    • Bipolar disorder Father    • Diabetes Father    • Hypertension Father    • Breast cancer Sister    • COPD Sister    • Diabetes Sister    • Hypertension Sister    • Alcohol abuse Brother    • Asthma Brother    • Bipolar disorder Brother    • Diabetes Brother    • Seizures Brother    • Breast cancer Maternal Grandmother    • Diabetes Maternal Grandmother    • Diabetes Maternal Grandfather    • Diabetes Paternal Grandmother    • Diabetes Paternal Grandfather    • Hypertension Son    • Kidney cancer Sister    • Hypertension Sister    • Diabetes Sister    • Malig Hyperthermia Neg Hx           Objective     Vitals:    06/29/22 1041   BP: 107/73   Pulse: 84   Resp: 20   Temp: 98.4 °F (36.9 °C)   TempSrc: Temporal   SpO2: 98%   Weight: 47.4 kg (104 lb 6.4 oz)   Height: 162.6 cm (64.02\")   PainSc:   4   PainLoc: Back  Comment: RIGHT LUNG PAIN     Current Status 6/29/2022   ECOG score 0       Physical Exam  Constitutional:       Appearance: Normal appearance. She is underweight.   HENT:      Head: Normocephalic and atraumatic.      Nose: Nose normal.   Eyes:      Extraocular Movements: Extraocular movements intact.      Conjunctiva/sclera: Conjunctivae normal.      Pupils: Pupils are equal, round, and reactive to light.   Cardiovascular:      Rate and Rhythm: Normal rate and regular rhythm.      Pulses: Normal pulses.      Heart sounds: Normal heart sounds.   Pulmonary:      Effort: Pulmonary effort is normal.      Breath sounds: Normal breath sounds.   Abdominal:      General: Bowel sounds are normal.      Palpations: Abdomen is soft. There is no mass.      Tenderness: There is abdominal tenderness (Midepigastrium).      Comments: Status post esophagectomy, wounds well-healed, J-tube removed   Musculoskeletal:         General: Normal range of " motion.      Cervical back: Normal range of motion.   Skin:     General: Skin is warm and dry.      Findings: No rash.   Neurological:      General: No focal deficit present.      Mental Status: She is alert and oriented to person, place, and time.   Psychiatric:         Mood and Affect: Mood normal.     I have reexamined the patient and the results are consistent with the previously documented exam. Iggy Harrell MD     RECENT LABS:  Results from last 7 days   Lab Units 06/29/22  1025   WBC 10*3/mm3 6.24   NEUTROS ABS 10*3/mm3 4.64   HEMOGLOBIN g/dL 12.3   HEMATOCRIT % 39.9   PLATELETS 10*3/mm3 226     Results from last 7 days   Lab Units 06/29/22  1025   SODIUM mmol/L 138   POTASSIUM mmol/L 4.3   CHLORIDE mmol/L 102   CO2 mmol/L 24.2   BUN mg/dL 9   CREATININE mg/dL 0.59*   CALCIUM mg/dL 10.0   ALBUMIN g/dL 3.90   BILIRUBIN mg/dL 0.3   ALK PHOS U/L 114   ALT (SGPT) U/L 9   AST (SGOT) U/L 16   GLUCOSE mg/dL 128*            Assessment & Plan          61-year-old female with a history of hyperthyroidism, previous gastric ulcer, GE reflux, atrial fibrillation, hyperlipidemia, hypertension irritable bowel syndrome,          1.  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  · here has been a number of physicians involved in the patient's case and several months before she was ultimately seen by Dr. Finch at [facility].   · CT scan of the chest demonstrating thickening of the mid esophagus with no lymphadenopathy and PET/CT demonstrated uptake in the mid to distal esophagus with SUV intensely elevated at 16 with a few nonenlarged left hilar and gastrohepatic lymph nodes with mild increased FDG activity, few scattered subcentimeter pulmonary  nodules bilaterally indeterminate.  · EUS was felt necessary and ultimately obtained at [facility] performed 12/7/2021 revealing a 5 cm esophageal mass present in the distal third esophagus without extension into the GE junction into the cardia, the mass extended into into the muscularis propria into the adventitia not penetrating through the adventitia with a single 1 cm gastrohepatic/celiac axis lymph node and FNB x1 performed- FNA negative for malignancy  · Discussed with Dr. Stef cruz who feels that she is not immediately and operative candidate and should proceed with chemo radiation therapy initially-neoadjuvant treatment before consideration for subsequent surgery. Discussed extensively concurrent chemotherapy radiation therapy using Carboplatin/Taxol weekly.  ·  who proceeded 12/15/2021 to PowerPort placement and open jejunostomy.  She was seen during her hospital stay by oncology nutrition required hospitalization up to including 12/19/2021.  · seen by radiation therapy 12/21 with plans to proceed with 40 CT simulation and planning-IMRT/IGR T-4140 cGy in 23 fractions with pleased to be considered.   · Radiation therapy initiated 1/3/2022  · 1/5/2021 cycle 1 weekly carboplatin/Taxol.    · Patient status post week 2 on 1/12/2022  · Patient treated 1/19/2022-week 3  · 1/26/2022, hold treatment today due to platelet counts 42,000 and feeling unwell.  Covid positive at that time  · Chemotherapy held 2/2/2022 due to ongoing normal cytopenia, platelets of 40,000  · Presentation at multidisciplinary clinic 2/10/2022 with plans to complete fourth dose of carboplatin Taxol followed by PET scan.  It is unclear at this time if the patient will be a surgical candidate pending her response and nutritional state post treatment  · We will proceed today with her fourth dose of carboplatin Taxol  · Upon completion of chemotherapy patient reevaluated with PET/CT 3/17/2022 with significant response  for esophageal lesion, gastrohepatic left hilar lymphadenopathy no longer FDG avid, indeterminate subcentimeter nodule right apex thought to be endobronchial filling defect, tree-in-bud nodularity felt to be reactive.  · Patient seen in office 3/23/2022 referred back to thoracic surgery.  Discussed potential adjuvant nivolumab therapy post surgery.  · Patient seen 4/20/2022 with surgery planned 4/29/2022.  · The patient was admitted 4/29 - 5/9/2022 undergoing bronchoscopy with right thoracoscopy, partial decortication, robot-assisted total esophagectomy, jejunostomy tube placement and intercostal nerve block.  She had a satisfactory postoperative course and was able to be discharged 5/9/2022 though was briefly readmitted requiring replacement of her J-tube at bedside.  · Pathology demonstrated rare focal metaplasia at the GE junction consistent with Cotto's esophagus, focal submucosal scar, chronic esophagitis, no evidence of dysplasia or malignancy identified by staining, focal active chronic gastritis, 13 lymph nodes negative, paraesophageal soft tissue margins negative, level 7 lymph node negative x3, final esophageal margin negative, final gastric margin negative-patient's final pathologic staging could be considered ypT0, N0.  · The patient is seen with significant other 5/25/2022 and reviewed her findings recognizing that she could benefit substantially from immunotherapy  considering nivolumab every 4 weeks x1 year as adjuvant therapy.  This is discussed extensively as to potential side effect profile and potential benefit.  · Patient seen 6/29/2022,Checkmate 577 trial in patients who had residual pathologic disease at the time of surgery to nivolumab with median disease-free survival nearly twice as long 22 months versus 11 months across follow-up routine regardless of PD-L1 overexpression.  ·  As result we reviewed the records available including biopsies done at more than 1 location with PD-L1 never  obtained.  ·  At some point this may be an issue with the patient does have recurrence.  ·  She is seen back 6/29/2022 indicating that she does not wish to take immunotherapy at this point and we have discussed the above rationale.Consider guardant 360 analysis        2.  Nutrition  · Use of Jejunostomy tube. Continuous feed currently 12 hours a night, slowly working up to recommended 16 hours/day at 90 cc/h  · She is still trying to drink at least 1 boost a day and able to take pills by mouth  · She continues to take pills by mouth.  She does utilize her J-tube for 16 hours a day.  Her weight is stable over the past 3 weeks.  · Patient seen 3/23/2022 with her J-tube no longer anchored properly, thoracic surgery request for evaluation  · J-tube postoperative required replacement 5//22  · Discussed additional nutrition, Enterade sample, prednisone 10 mg p.o. daily E scribed to pharmacy as trial appetite stimulant.    3.  Atrial fibrillation  · Patient continues Eliquis 5 mg twice daily  · Toprol 25 mg XL once daily.        Plan:  *The patient is decided against treatment at this point but will be followed carefully and perhaps a guardant analysis would be helpful or biopsy with Caris analysis if he does relapse.  *Nutritionally she is still reduced and is asked to start prednisone 10 mg p.o. daily E scribed to pharmacy  *The patient subsequent TSH and thyroid hormone levels are escalated and she has a history of apparent hyperthyroidism.  We will contact her about treatment for that process which is likely contributing to her continued weight loss.  *2-month follow-up, Enterade trial.I spent 55 minutes caring for Maya on this date of service. This time includes time spent by me in the following activities: preparing for the visit, reviewing tests, obtaining and/or reviewing a separately obtained history, performing a medically appropriate examination and/or evaluation, counseling and educating the  patient/family/caregiver, ordering medications, tests, or procedures, referring and communicating with other health care professionals, documenting information in the medical record, independently interpreting results and communicating that information with the patient/family/caregiver and care coordination.

## 2022-06-29 ENCOUNTER — OFFICE VISIT (OUTPATIENT)
Dept: ONCOLOGY | Facility: CLINIC | Age: 62
End: 2022-06-29

## 2022-06-29 ENCOUNTER — APPOINTMENT (OUTPATIENT)
Dept: ONCOLOGY | Facility: HOSPITAL | Age: 62
End: 2022-06-29

## 2022-06-29 ENCOUNTER — INFUSION (OUTPATIENT)
Dept: ONCOLOGY | Facility: HOSPITAL | Age: 62
End: 2022-06-29

## 2022-06-29 ENCOUNTER — TELEPHONE (OUTPATIENT)
Dept: ONCOLOGY | Facility: CLINIC | Age: 62
End: 2022-06-29

## 2022-06-29 ENCOUNTER — INFUSION (OUTPATIENT)
Dept: LAB | Facility: HOSPITAL | Age: 62
End: 2022-06-29

## 2022-06-29 VITALS
SYSTOLIC BLOOD PRESSURE: 107 MMHG | TEMPERATURE: 98.4 F | WEIGHT: 104.4 LBS | OXYGEN SATURATION: 98 % | HEIGHT: 64 IN | DIASTOLIC BLOOD PRESSURE: 73 MMHG | BODY MASS INDEX: 17.83 KG/M2 | HEART RATE: 84 BPM | RESPIRATION RATE: 20 BRPM

## 2022-06-29 DIAGNOSIS — C15.5 MALIGNANT NEOPLASM OF LOWER THIRD OF ESOPHAGUS: ICD-10-CM

## 2022-06-29 DIAGNOSIS — Z45.9 ENCOUNTER FOR MANAGEMENT OF IMPLANTED DEVICE: Primary | ICD-10-CM

## 2022-06-29 DIAGNOSIS — E44.0 MODERATE MALNUTRITION: ICD-10-CM

## 2022-06-29 DIAGNOSIS — C15.5 MALIGNANT NEOPLASM OF LOWER THIRD OF ESOPHAGUS: Primary | ICD-10-CM

## 2022-06-29 LAB
ALBUMIN SERPL-MCNC: 3.9 G/DL (ref 3.5–5.2)
ALBUMIN/GLOB SERPL: 1.1 G/DL (ref 1.1–2.4)
ALP SERPL-CCNC: 114 U/L (ref 38–116)
ALT SERPL W P-5'-P-CCNC: 9 U/L (ref 0–33)
ANION GAP SERPL CALCULATED.3IONS-SCNC: 11.8 MMOL/L (ref 5–15)
AST SERPL-CCNC: 16 U/L (ref 0–32)
BASOPHILS # BLD AUTO: 0.03 10*3/MM3 (ref 0–0.2)
BASOPHILS NFR BLD AUTO: 0.5 % (ref 0–1.5)
BILIRUB SERPL-MCNC: 0.3 MG/DL (ref 0.2–1.2)
BUN SERPL-MCNC: 9 MG/DL (ref 6–20)
BUN/CREAT SERPL: 15.3 (ref 7.3–30)
CALCIUM SPEC-SCNC: 10 MG/DL (ref 8.5–10.2)
CHLORIDE SERPL-SCNC: 102 MMOL/L (ref 98–107)
CO2 SERPL-SCNC: 24.2 MMOL/L (ref 22–29)
CREAT SERPL-MCNC: 0.59 MG/DL (ref 0.6–1.1)
DEPRECATED RDW RBC AUTO: 49.1 FL (ref 37–54)
EGFRCR SERPLBLD CKD-EPI 2021: 102.7 ML/MIN/1.73
EOSINOPHIL # BLD AUTO: 0.13 10*3/MM3 (ref 0–0.4)
EOSINOPHIL NFR BLD AUTO: 2.1 % (ref 0.3–6.2)
ERYTHROCYTE [DISTWIDTH] IN BLOOD BY AUTOMATED COUNT: 14.7 % (ref 12.3–15.4)
GLOBULIN UR ELPH-MCNC: 3.4 GM/DL (ref 1.8–3.5)
GLUCOSE SERPL-MCNC: 128 MG/DL (ref 74–124)
HCT VFR BLD AUTO: 39.9 % (ref 34–46.6)
HGB BLD-MCNC: 12.3 G/DL (ref 12–15.9)
IMM GRANULOCYTES # BLD AUTO: 0.02 10*3/MM3 (ref 0–0.05)
IMM GRANULOCYTES NFR BLD AUTO: 0.3 % (ref 0–0.5)
LYMPHOCYTES # BLD AUTO: 1 10*3/MM3 (ref 0.7–3.1)
LYMPHOCYTES NFR BLD AUTO: 16 % (ref 19.6–45.3)
MCH RBC QN AUTO: 27.8 PG (ref 26.6–33)
MCHC RBC AUTO-ENTMCNC: 30.8 G/DL (ref 31.5–35.7)
MCV RBC AUTO: 90.3 FL (ref 79–97)
MONOCYTES # BLD AUTO: 0.42 10*3/MM3 (ref 0.1–0.9)
MONOCYTES NFR BLD AUTO: 6.7 % (ref 5–12)
NEUTROPHILS NFR BLD AUTO: 4.64 10*3/MM3 (ref 1.7–7)
NEUTROPHILS NFR BLD AUTO: 74.4 % (ref 42.7–76)
NRBC BLD AUTO-RTO: 0 /100 WBC (ref 0–0.2)
PLATELET # BLD AUTO: 226 10*3/MM3 (ref 140–450)
PMV BLD AUTO: 8.8 FL (ref 6–12)
POTASSIUM SERPL-SCNC: 4.3 MMOL/L (ref 3.5–4.7)
PROT SERPL-MCNC: 7.3 G/DL (ref 6.3–8)
RBC # BLD AUTO: 4.42 10*6/MM3 (ref 3.77–5.28)
SODIUM SERPL-SCNC: 138 MMOL/L (ref 134–145)
T4 FREE SERPL-MCNC: 1.77 NG/DL (ref 0.93–1.7)
TSH SERPL DL<=0.05 MIU/L-ACNC: <0.005 UIU/ML (ref 0.27–4.2)
WBC NRBC COR # BLD: 6.24 10*3/MM3 (ref 3.4–10.8)

## 2022-06-29 PROCEDURE — 36415 COLL VENOUS BLD VENIPUNCTURE: CPT

## 2022-06-29 PROCEDURE — G0463 HOSPITAL OUTPT CLINIC VISIT: HCPCS

## 2022-06-29 PROCEDURE — 96523 IRRIG DRUG DELIVERY DEVICE: CPT

## 2022-06-29 PROCEDURE — 25010000002 HEPARIN LOCK FLUSH PER 10 UNITS: Performed by: INTERNAL MEDICINE

## 2022-06-29 PROCEDURE — 99215 OFFICE O/P EST HI 40 MIN: CPT | Performed by: INTERNAL MEDICINE

## 2022-06-29 PROCEDURE — 80050 GENERAL HEALTH PANEL: CPT

## 2022-06-29 PROCEDURE — 84439 ASSAY OF FREE THYROXINE: CPT | Performed by: INTERNAL MEDICINE

## 2022-06-29 RX ORDER — HEPARIN SODIUM (PORCINE) LOCK FLUSH IV SOLN 100 UNIT/ML 100 UNIT/ML
500 SOLUTION INTRAVENOUS AS NEEDED
Status: CANCELLED | OUTPATIENT
Start: 2022-06-29

## 2022-06-29 RX ORDER — PREDNISONE 10 MG/1
10 TABLET ORAL DAILY
Qty: 30 TABLET | Refills: 1 | Status: SHIPPED | OUTPATIENT
Start: 2022-06-29 | End: 2022-08-24 | Stop reason: SDUPTHER

## 2022-06-29 RX ORDER — SODIUM CHLORIDE 0.9 % (FLUSH) 0.9 %
10 SYRINGE (ML) INJECTION AS NEEDED
Status: DISCONTINUED | OUTPATIENT
Start: 2022-06-29 | End: 2022-06-29 | Stop reason: HOSPADM

## 2022-06-29 RX ORDER — SODIUM CHLORIDE 0.9 % (FLUSH) 0.9 %
10 SYRINGE (ML) INJECTION AS NEEDED
Status: CANCELLED | OUTPATIENT
Start: 2022-06-29

## 2022-06-29 RX ORDER — LANSOPRAZOLE 30 MG/1
30 TABLET, ORALLY DISINTEGRATING, DELAYED RELEASE ORAL DAILY
COMMUNITY
Start: 2022-06-03 | End: 2022-10-31 | Stop reason: SDUPTHER

## 2022-06-29 RX ORDER — HEPARIN SODIUM (PORCINE) LOCK FLUSH IV SOLN 100 UNIT/ML 100 UNIT/ML
500 SOLUTION INTRAVENOUS AS NEEDED
Status: DISCONTINUED | OUTPATIENT
Start: 2022-06-29 | End: 2022-06-29 | Stop reason: HOSPADM

## 2022-06-29 RX ADMIN — Medication 500 UNITS: at 11:51

## 2022-06-29 RX ADMIN — Medication 10 ML: at 11:51

## 2022-06-29 NOTE — TELEPHONE ENCOUNTER
Per Dr. Harrell, call pt and let her know that her TSH is out of range and her methimazole may be under-dosed. Called pt and informed her of this. Pt states she has recently seen Dr. Downing, endocrinologist and he increased her Methimazole from BID to TID. Advised her to reach out to his office as her thyroid levels are still abnormal. She v/u.

## 2022-06-30 ENCOUNTER — OFFICE VISIT (OUTPATIENT)
Dept: SURGERY | Facility: CLINIC | Age: 62
End: 2022-06-30

## 2022-06-30 ENCOUNTER — HOSPITAL ENCOUNTER (OUTPATIENT)
Dept: GENERAL RADIOLOGY | Facility: HOSPITAL | Age: 62
Discharge: HOME | End: 2022-06-30
Admitting: THORACIC SURGERY (CARDIOTHORACIC VASCULAR SURGERY)

## 2022-06-30 VITALS
OXYGEN SATURATION: 97 % | HEIGHT: 64 IN | DIASTOLIC BLOOD PRESSURE: 70 MMHG | HEART RATE: 88 BPM | SYSTOLIC BLOOD PRESSURE: 122 MMHG | BODY MASS INDEX: 17.79 KG/M2 | WEIGHT: 104.2 LBS

## 2022-06-30 DIAGNOSIS — C15.5 MALIGNANT NEOPLASM OF LOWER THIRD OF ESOPHAGUS: Primary | ICD-10-CM

## 2022-06-30 DIAGNOSIS — C15.5 MALIGNANT NEOPLASM OF LOWER THIRD OF ESOPHAGUS: ICD-10-CM

## 2022-06-30 DIAGNOSIS — Z09 FOLLOW-UP EXAMINATION FOLLOWING SURGERY: ICD-10-CM

## 2022-06-30 PROCEDURE — 71046 X-RAY EXAM CHEST 2 VIEWS: CPT

## 2022-06-30 PROCEDURE — 99024 POSTOP FOLLOW-UP VISIT: CPT | Performed by: THORACIC SURGERY (CARDIOTHORACIC VASCULAR SURGERY)

## 2022-06-30 NOTE — PROGRESS NOTES
"Chief Complaint  Follow-up esophagectomy for adenocarcinoma    Subjective        Maya Ribera presents to [facility] THORACIC SURGERY  History of Present Illness     Ms. Ribera was seen in the office today for further follow-up of a total esophagectomy performed April 29, 2022.  Patient has a poor appetite.  Despite this she does try to eat.  Certain foods do cause a problem with swallowing.  She has had no food gets stuck in her esophagus.  She has had 1 episode of dumping.  She has had no nausea vomiting or hematemesis.  Her weight is 104 pounds.  She saw Dr. Harrell of the [facility] group yesterday.  He has started her on steroids to stimulate her appetite.  Her endocrinologist is adjusting her thyroid medication and hopefully this will help.  She has some tenderness in her epigastrium around the incision but no other complaints of pain.  She has no complaints of shortness of breath.    Objective   Vital Signs:  /70 (BP Location: Right arm, Patient Position: Sitting, Cuff Size: Adult)   Pulse 88   Ht 162.6 cm (64\")   Wt 47.3 kg (104 lb 3.2 oz)   SpO2 97%   BMI 17.89 kg/m²   Estimated body mass index is 17.89 kg/m² as calculated from the following:    Height as of this encounter: 162.6 cm (64\").    Weight as of this encounter: 47.3 kg (104 lb 3.2 oz).      Physical Exam     Neck: Incision is well-healed.  No masses.    Chest: All incisions are well-healed.  No subcutaneous masses or nodules.  Chest wall is stable.    Pulmonary: Lungs are clear to auscultation with equal breath sounds bilaterally    Abdomen: Midline incision is well-healed without hernia.  Jejunostomy site is well-healed.  No masses and no organomegaly.  Some tenderness around the incision.  Good bowel sounds.    Result Review :  The following data was reviewed by: Hayder Finch III, MD on 06/30/2022:    Chest x-ray performed today was independently reviewed and compared to previous x-rays.  Apical fluid is " resolving.  Both lungs are fully expanded.  Some blunting of the right costophrenic angle.  No pulmonary infiltrates.  No pneumothorax.         Assessment and Plan   Diagnoses and all orders for this visit:    1. Malignant neoplasm of lower third of esophagus (HCC) (Primary)  -     CT Abdomen Pelvis With Contrast; Future  -     CT Chest Without Contrast Diagnostic; Future    2. Follow-up examination following surgery  -     CT Abdomen Pelvis With Contrast; Future  -     CT Chest Without Contrast Diagnostic; Future    Ms. Ribera is making a slow recovery from her surgery.  She has had counseling by nutrition and is following their instructions.  Her weight loss is being addressed medically by both oncology and endocrinology.  I plan to see her back in the office in 3 months with a CT of the chest abdomen and pelvis.  If she continues to have issues swallowing we may elect to do endoscopy to assess the anastomosis at that time.  I will keep you informed of her progress.       I spent 17 minutes caring for Maya on this date of service. This time includes time spent by me in the following activities:preparing for the visit, reviewing tests, performing a medically appropriate examination and/or evaluation , counseling and educating the patient/family/caregiver, ordering medications, tests, or procedures, referring and communicating with other health care professionals , documenting information in the medical record, independently interpreting results and communicating that information with the patient/family/caregiver and care coordination  Follow Up {Instructions Charge Capture  Follow-up Communications :23}  Return in about 3 months (around 9/30/2022) for Recheck.  Patient was given instructions and counseling regarding her condition or for health maintenance advice. Please see specific information pulled into the AVS if appropriate.

## 2022-07-06 ENCOUNTER — TELEMEDICINE - AUDIO (OUTPATIENT)
Dept: NUTRITION | Facility: HOSPITAL | Age: 62
End: 2022-07-06

## 2022-07-06 NOTE — PROGRESS NOTES
Outpatient Oncology Nutrition  Assessment/PES    Patient Name:  Maya Ribera  YOB: 1960  MRN: [id number]    Assessment Date:  7/6/2022    Comments:  Spoke to patient today on the phone. She reports that she believes her appetite has improved since beginning steroid last week. She is also drinking carnation instant breakfast shakes two per day and has protein bars. Discussed the need to continue small frequent meals, 5-6 times per day, calorically dense high protein foods.   She no longer has the jejunostomy feeding tube as this has been removed.   She is careful to chew and swallow food and eats soft foods for the most part. She tried Enterade which was given to her to sample and she does not like it and was unable to drink it.   Last weight 104 lb.   Will continue to be available as needed.     Electronically signed by:  Tracey Lozano RD, LD  07/06/22 13:38 EDT

## 2022-07-08 ENCOUNTER — PATIENT ROUNDING (BHMG ONLY) (OUTPATIENT)
Dept: FAMILY MEDICINE CLINIC | Facility: CLINIC | Age: 62
End: 2022-07-08

## 2022-07-08 NOTE — PROGRESS NOTES
A [employer] message has been sent to the patient for PATIENT ROUNDING with [facility].

## 2022-07-26 ENCOUNTER — INFUSION (OUTPATIENT)
Dept: ONCOLOGY | Facility: HOSPITAL | Age: 62
End: 2022-07-26

## 2022-07-26 DIAGNOSIS — Z45.9 ENCOUNTER FOR MANAGEMENT OF IMPLANTED DEVICE: Primary | ICD-10-CM

## 2022-07-26 PROCEDURE — 96523 IRRIG DRUG DELIVERY DEVICE: CPT

## 2022-07-26 PROCEDURE — 25010000002 HEPARIN LOCK FLUSH PER 10 UNITS: Performed by: INTERNAL MEDICINE

## 2022-07-26 RX ORDER — SODIUM CHLORIDE 0.9 % (FLUSH) 0.9 %
10 SYRINGE (ML) INJECTION AS NEEDED
Status: DISCONTINUED | OUTPATIENT
Start: 2022-07-26 | End: 2022-07-26 | Stop reason: HOSPADM

## 2022-07-26 RX ORDER — HEPARIN SODIUM (PORCINE) LOCK FLUSH IV SOLN 100 UNIT/ML 100 UNIT/ML
500 SOLUTION INTRAVENOUS AS NEEDED
Status: DISCONTINUED | OUTPATIENT
Start: 2022-07-26 | End: 2022-07-26 | Stop reason: HOSPADM

## 2022-07-26 RX ORDER — HEPARIN SODIUM (PORCINE) LOCK FLUSH IV SOLN 100 UNIT/ML 100 UNIT/ML
500 SOLUTION INTRAVENOUS AS NEEDED
Status: CANCELLED | OUTPATIENT
Start: 2022-07-26

## 2022-07-26 RX ORDER — SODIUM CHLORIDE 0.9 % (FLUSH) 0.9 %
10 SYRINGE (ML) INJECTION AS NEEDED
Status: CANCELLED | OUTPATIENT
Start: 2022-07-26

## 2022-07-26 RX ADMIN — Medication 10 ML: at 12:21

## 2022-07-26 RX ADMIN — Medication 500 UNITS: at 12:21

## 2022-08-23 NOTE — PROGRESS NOTES
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      History of Present Illness   This is a 62 y.o. female with recent diagnosis of distal esophageal cancer, initiating concurrent chemoradiation with carboplatin/Taxol on 1/5/2022.  Unfortunately, the patient has had frequent delays due to pancytopenia, weight loss, malnutrition and COVID-19.  She is completed only 3 weeks of chemotherapy.  She is due today for her fourth dose of carboplatin Taxol.  She has 5 radiation treatments remaining.    Her case was presented at the multidisciplinary clinic this morning with plans to complete the last week of concurrent chemoradiation followed by PET scan.  It is unclear at this time if the patient would qualify for surgery given her struggles and malnutrition.  She does have a J-tube for which her partner utilizes for nutrition.  She is still able to swallow pills by mouth.  Her weight is stable over the past few weeks.    She has been struggling with her blood pressure.  She is on metoprolol originally 50 mg twice daily for her atrial fibrillation.  She has cut this down to once daily.  She does report she has been holding her Eliquis since her platelets dropped and has not yet resumed.  Her and her partner expressed concerns regarding the timeline of PET scan and possible surgery.  They are very eager to proceed with PET scan.  We did discuss today the possibility for false positives with esophagitis symptoms.  We will plan PET scan 4 weeks after the completion of radiation which is the soonest this should be obtained.    A PET/CT was obtained 3/17/2022  demonstrating significant decrease in FDG avid thickened mid to distal esophagus, decrease in activity in gastrohepatic and left hilar nodes no longer demonstrating that above blood pool, new subcentimeter nodule right apex thought to be an endobronchial filling defect it was indeterminant with a new area of tree-in-bud nodularity medial aspect left upper lobe thought to be reactive.    The patient is seen with her partner 3/23/2022.  She recently had nausea and vomiting for several days thought to be?  Food poisoning which, fortunately, is now resolving.  We have reviewed her scans and she could well be a candidate for surgery which we will asked Dr. Finch to review her for next available.    The patient is next reviewed 4/20/2022.  She is anxious for surgery 4/29/2022 but ready to proceed.    The patient was admitted 4/29 - 5/9/2022 undergoing bronchoscopy with right thoracoscopy, partial decortication, robot-assisted total esophagectomy, jejunostomy tube placement and intercostal nerve block.  She had a satisfactory postoperative course and was able to be discharged 5/9/2022 though was briefly readmitted requiring replacement of her J-tube at bedside.    Pathology demonstrated rare focal metaplasia at the GE junction consistent with Cotto's esophagus, focal submucosal scar, chronic esophagitis, no evidence of dysplasia or malignancy identified by staining, focal active chronic gastritis, 13 lymph nodes negative, paraesophageal soft tissue margins negative, level 7 lymph node negative x3, final esophageal margin negative, final gastric margin negative-patient's final pathologic staging could be considered ypT0, N0.     The patient is seen with significant other 5/25/2022 and reviewed her findings recognizing that she could benefit substantially from immunotherapy  considering nivolumab every 4 weeks x1 year as adjuvant therapy.  This is discussed extensively as to potential side effect profile and potential  benefit.  The patient was seen by thoracic surgery 6/1/2022 and felt to be making good progress.      We made plans for her to undergo chemo education for the possible use of nivolumab but this was held into the patient was to return and she is next in 6/29/2022.        We discussed availability of information Checkmate 577 trial in patients who had residual pathologic disease at the time of surgery to nivolumab with median disease-free survival nearly twice as long 22 months versus 11 months across follow-up routine regardless of PD-L1 overexpression.     As result we reviewed the records available including biopsies done at more than 1 location with PD-L1 never obtained.     At some point this may be an issue with the patient does have recurrence.     She is seen back 6/29/2022 indicating that she does not wish to take immunotherapy at this point and we have discussed the above rationale.    It was elected to follow the patient and she is reviewed 8/24/2022.  She has gained approximately 4 pounds, CBC normalized except for mild leukocytosis of 14,560.  She is feeling reasonably well with her partner echoing her continued improvement.  She has a follow-up CT series scheduled in September, follow-up thereafter with thoracic surgery.        ONCOLOGY HISTORY     The patient is a 61-year-old female who had been seen in multidisciplinary thoracic oncology conference with complaints of painful swallowing and dysphagia over the previous several months with 10 to 15 pound weight loss.  This led to EGD and colonoscopy with normal colonoscopy but EGD demonstrated tumor at the GE junction with biopsies consistent with severe dysplasia including carcinoma in situ.  The patient states that she saw a number of physicians and attempting to have a diagnosis completed.  CT of chest demonstrated thickening of the mid esophagus with no lymphadenopathy and CT PET demonstrated uptake in the esophagus gastropathic lymph nodes it is felt  that she likely had early stage carcinoma of the esophagus and that we could present to record with surgical resection.  Endoscopic ultrasound, however, was requested and we could not have this done any sooner than GI physicians available at [facility].     This was performed 12/7/2021 revealing a 5 cm esophageal mass present in the distal third esophagus without extension into the GE junction into the cardia, the mass extended into into the muscularis propria into the adventitia not penetrating through the adventitia with a single 1 cm gastrohepatic/celiac axis lymph node and FNB x1 performed-?  T2 N1 M0 distal esophageal cancer-clinical stage III, pathologic stage IIIa  In contacting the [facility] pathology department the results of this FNB are not yet available and will be by 12/9/2021.      The patient is seen with her sister and son all indicating that she has had difficulty with swallowing since late summer likely July 2021 and has been attempting to have a diagnosis made since that time.  They are all somewhat frustrated about the length of time to obtain an answer for this problem and we have spent considerable time discussing her findings thus far and how we might proceed to treat what appears to be a contained malignancy.  This has, additionally, been discussed with Dr. Stef cruz who feels that she is not immediately and operative candidate and should proceed with chemo radiation therapy initially-neoadjuvant treatment before consideration for subsequent surgery.  This has been discussed with the patient, her sister and her son again in detail 12/8/2021.  We went on to initiate pain control with liquid hydrocodone, referred the patient to general surgery and oncology nutrition.    The patient was seen by  who proceeded 12/15/2021 to PowerPort placement and open jejunostomy.  She was seen during her hospital stay by oncology nutrition required hospitalization up to including  "12/19/2021.    The patient required hospitalization to 12/20/2021 and after discharge along with her partner's health was able to gradually improve her caloric intake and was seen back 12/27 by Dr. Hollis who felt she had improved enough to initiate chemotherapy.  The patient was seen by radiation therapy 12/21 with plans to proceed with 40 CT simulation and planning-IMRT/IGR T-4140 cGy in 23 fractions with pleased to be considered.    The patient is seen back with her partner Cristy 12/29/2021 now able to \"manage\" and we have discussed extensively concurrent chemotherapy radiation therapy using Carboplatin/Taxol weekly.    Currently the patient is scheduled to begin radiation therapy 1/3/2021, undergoing teaching 1/4/2022 and will begin concurrent chemotherapy 1/5/2022    The patient had difficulty tolerating this treatment but was eventually able to complete it though modified for toxicity-1/3/2022-2/18/2022 4680 with 26 total treatments, carboplatinum/Taxol weekly 1/5, 1/12, 1/19 and delayed until 2/10/2022.    A PET/CT was obtained 3/17/2022 demonstrating significant decrease in FDG avid thickened mid to distal esophagus, decrease in activity in gastrohepatic and left hilar nodes no longer demonstrating that above blood pool, new subcentimeter nodule right apex thought to be an endobronchial filling defect it was indeterminant with a new area of tree-in-bud nodularity medial aspect left upper lobe thought to be reactive.    Patient had been presented at thoracic conference and upon completion of therapy and repeat PET/CT was to be reevaluated for surgery.  Patient seen in office 3/23/2022 referred back to thoracic surgery.            The patient was briefly admitted 5//2022 with dislodged J-tube.  G-tube was placed and the patient was able to be discharged.     The patient's final pathologic staging could be considered ypT0, N0.     The patient is seen with significant other 5/25/2022 and reviewed her " findings recognizing that she could benefit substantially from immunotherapy  considering nivolumab every 4 weeks x1 year as adjuvant therapy.  This is discussed extensively as to potential side effect profile and potential benefit.    We discussed availability of information Checkmate 577 trial in patients who had residual pathologic disease at the time of surgery to nivolumab with median disease-free survival nearly twice as long 22 months versus 11 months across follow-up routine regardless of PD-L1 overexpression.     As result we reviewed the records available including biopsies done at more than 1 location with PD-L1 never obtained.     At some point this may be an issue with the patient does have recurrence.     She is seen back 6/29/2022 indicating that she does not wish to take immunotherapy at this point and we have discussed the above rationale.    Repeat scan scheduled September 2022.    Past Medical History:   Diagnosis Date   • Boil, breast 12/13/2021    HEALING UNDER LEFT BREAST    • Cervical cancer (HCC)    • Dysphagia    • Esophageal cancer (HCC)    • Esophagitis 9/21/2021   • Gastric ulcer    • GERD (gastroesophageal reflux disease)    • Hyperlipidemia    • Hypertension    • Hyperthyroidism    • Irritable bowel    • Mitral valve prolapse     FOLLOWED BY     • PAF (paroxysmal atrial fibrillation) (HCC)    • Uses feeding tube         Past Surgical History:   Procedure Laterality Date   • CHOLECYSTECTOMY     • COLONOSCOPY  08/27/2021    [facility]   • ESOPHAGOGASTRECTOMY Right 4/29/2022    Procedure: BRONCHOSCOPY, RIGHT THORACOSCOPY WITH DAVINCI ROBOT WITH TOTAL ESOPHAGECTOMY, INTERCOSTAL NERVE BLOCK, JEJUNOSTOMY TUBE REPLACEMENT;  Surgeon: Hayder Finch III, MD;  Location: [facility];  Service: Robotics - DaVinci;  Laterality: Right;   • EXTERNAL EAR SURGERY     • JEJUNOSTOMY N/A 12/15/2021    Procedure: open JEJUNOSTOMY tube placement;  Surgeon: Bhanu Hollis MD;   Location: [facility] OR;  Service: General;  Laterality: N/A;   • KNEE SURGERY Left    • REPLACEMENT TOTAL KNEE Left    • UPPER ENDOSCOPIC ULTRASOUND W/ FNA N/A 12/7/2021    Procedure: Esophagogastroduodenoscopy with endoscopic ultrasound  WITH STAGING AND FINE NEEDLE BIOPSY;  Surgeon: Amrit Green MD;  Location: [facility] ENDOSCOPY;  Service: Gastroenterology;  Laterality: N/A;  post op: inlet patch, esophageal mass, T2   • VENOUS ACCESS DEVICE (PORT) INSERTION Left 12/15/2021    Procedure: INSERTION OF PORTACATH;  Surgeon: Bhanu Hollis MD;  Location: [facility] OR;  Service: General;  Laterality: Left;        Current Outpatient Medications on File Prior to Visit   Medication Sig Dispense Refill   • aluminum-magnesium hydroxide 200-200 MG/5ML suspension, diphenhydrAMINE 12.5 MG/5ML liquid, Lidocaine Viscous HCl 2 % solution, nystatin 100,000 unit/mL suspension Swish and spit 10 mL 4 (Four) Times a Day After Meals & at Bedtime. 400 mL 2   • Eliquis 5 MG tablet tablet Take 5 mg by mouth Every 12 (Twelve) Hours. LD 12/4  PT STATED TO HOLD 72 HOURS PRIOR TO SURGERY     • lansoprazole (PREVACID SOLUTAB) 30 MG Tablet Delayed Release Dispersible disintegrating tablet Take 1 tablet by mouth Every Morning Before Breakfast. 60 tablet 1   • lansoprazole (PREVACID SOLUTAB) 30 MG Tablet Delayed Release Dispersible disintegrating tablet Take 30 mg by mouth Daily.     • methIMAzole (TAPAZOLE) 10 MG tablet Take 10 mg by mouth 3 (Three) Times a Day. Take DOS     • pravastatin (PRAVACHOL) 20 MG tablet Take 20 mg by mouth Daily.     • [DISCONTINUED] predniSONE (DELTASONE) 10 MG tablet Take 1 tablet by mouth Daily. 30 tablet 1   • metoprolol tartrate (LOPRESSOR) 25 MG tablet Take 1 tablet by mouth Daily for 30 days. 30 tablet 0     No current facility-administered medications on file prior to visit.        ALLERGIES:    Allergies   Allergen Reactions   • Codeine Hives     Patient states this is when she was very young.      "    Social History     Socioeconomic History   • Marital status: Significant Other   • Number of children: 1   • Years of education: High school   Tobacco Use   • Smoking status: Former Smoker     Packs/day: 1.00     Years: 40.00     Pack years: 40.00     Types: Cigarettes     Start date:      Quit date: 2022     Years since quittin.3   • Smokeless tobacco: Never Used   • Tobacco comment: 2 per day   Vaping Use   • Vaping Use: Never used   Substance and Sexual Activity   • Alcohol use: Not Currently   • Drug use: Never   • Sexual activity: Defer        Family History   Problem Relation Age of Onset   • Breast cancer Mother    • Diabetes Mother    • Bipolar disorder Father    • Diabetes Father    • Hypertension Father    • Breast cancer Sister    • COPD Sister    • Diabetes Sister    • Hypertension Sister    • Alcohol abuse Brother    • Asthma Brother    • Bipolar disorder Brother    • Diabetes Brother    • Seizures Brother    • Breast cancer Maternal Grandmother    • Diabetes Maternal Grandmother    • Diabetes Maternal Grandfather    • Diabetes Paternal Grandmother    • Diabetes Paternal Grandfather    • Hypertension Son    • Kidney cancer Sister    • Hypertension Sister    • Diabetes Sister    • Malig Hyperthermia Neg Hx           Objective     Vitals:    22 1315   BP: 115/66   Pulse: 73   Resp: 18   Temp: 96 °F (35.6 °C)   TempSrc: Temporal   SpO2: 97%   Weight: 49.1 kg (108 lb 4.8 oz)   Height: 162.6 cm (64.02\")   PainSc: 0-No pain     Current Status 2022   ECOG score 0       Physical Exam  Constitutional:       Appearance: Normal appearance. She is underweight.   HENT:      Head: Normocephalic and atraumatic.      Nose: Nose normal.   Eyes:      Extraocular Movements: Extraocular movements intact.      Conjunctiva/sclera: Conjunctivae normal.      Pupils: Pupils are equal, round, and reactive to light.   Cardiovascular:      Rate and Rhythm: Normal rate and regular rhythm.      Pulses: " Normal pulses.      Heart sounds: Normal heart sounds.   Pulmonary:      Effort: Pulmonary effort is normal.      Breath sounds: Normal breath sounds.   Abdominal:      General: Bowel sounds are normal.      Palpations: Abdomen is soft. There is no mass.      Tenderness: There is abdominal tenderness (Midepigastrium).      Comments: Status post esophagectomy, wounds well-healed, J-tube removed   Musculoskeletal:         General: Normal range of motion.      Cervical back: Normal range of motion.   Skin:     General: Skin is warm and dry.      Findings: No rash.   Neurological:      General: No focal deficit present.      Mental Status: She is alert and oriented to person, place, and time.   Psychiatric:         Mood and Affect: Mood normal.     I have reexamined the patient and the results are consistent with the previously documented exam. Iggy Harrell MD     RECENT LABS:  Results from last 7 days   Lab Units 08/24/22  1251   WBC 10*3/mm3 14.56*   NEUTROS ABS 10*3/mm3 12.60*   HEMOGLOBIN g/dL 13.9   HEMATOCRIT % 43.7   PLATELETS 10*3/mm3 233                Assessment & Plan          61-year-old female with a history of hyperthyroidism, previous gastric ulcer, GE reflux, atrial fibrillation, hyperlipidemia, hypertension irritable bowel syndrome,          1.  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  · here has been a number of physicians involved in the patient's case and several months before she was ultimately seen by Dr. Finch at [facility].   · CT scan of the chest demonstrating thickening of the mid esophagus with no lymphadenopathy and PET/CT demonstrated uptake in the mid to distal esophagus with SUV intensely elevated at 16  with a few nonenlarged left hilar and gastrohepatic lymph nodes with mild increased FDG activity, few scattered subcentimeter pulmonary nodules bilaterally indeterminate.  · EUS was felt necessary and ultimately obtained at [facility] performed 12/7/2021 revealing a 5 cm esophageal mass present in the distal third esophagus without extension into the GE junction into the cardia, the mass extended into into the muscularis propria into the adventitia not penetrating through the adventitia with a single 1 cm gastrohepatic/celiac axis lymph node and FNB x1 performed- FNA negative for malignancy  · Discussed with Dr. Stef cruz who feels that she is not immediately and operative candidate and should proceed with chemo radiation therapy initially-neoadjuvant treatment before consideration for subsequent surgery. Discussed extensively concurrent chemotherapy radiation therapy using Carboplatin/Taxol weekly.  ·  who proceeded 12/15/2021 to PowerPort placement and open jejunostomy.  She was seen during her hospital stay by oncology nutrition required hospitalization up to including 12/19/2021.  · seen by radiation therapy 12/21 with plans to proceed with 40 CT simulation and planning-IMRT/IGR T-4140 cGy in 23 fractions with pleased to be considered.   · Radiation therapy initiated 1/3/2022  · 1/5/2021 cycle 1 weekly carboplatin/Taxol.    · Patient status post week 2 on 1/12/2022  · Patient treated 1/19/2022-week 3  · 1/26/2022, hold treatment today due to platelet counts 42,000 and feeling unwell.  Covid positive at that time  · Chemotherapy held 2/2/2022 due to ongoing normal cytopenia, platelets of 40,000  · Presentation at multidisciplinary clinic 2/10/2022 with plans to complete fourth dose of carboplatin Taxol followed by PET scan.  It is unclear at this time if the patient will be a surgical candidate pending her response and nutritional state post treatment  · We will proceed today with her  fourth dose of carboplatin Taxol  · Upon completion of chemotherapy patient reevaluated with PET/CT 3/17/2022 with significant response for esophageal lesion, gastrohepatic left hilar lymphadenopathy no longer FDG avid, indeterminate subcentimeter nodule right apex thought to be endobronchial filling defect, tree-in-bud nodularity felt to be reactive.  · Patient seen in office 3/23/2022 referred back to thoracic surgery.  Discussed potential adjuvant nivolumab therapy post surgery.  · Patient seen 4/20/2022 with surgery planned 4/29/2022.  · The patient was admitted 4/29 - 5/9/2022 undergoing bronchoscopy with right thoracoscopy, partial decortication, robot-assisted total esophagectomy, jejunostomy tube placement and intercostal nerve block.  She had a satisfactory postoperative course and was able to be discharged 5/9/2022 though was briefly readmitted requiring replacement of her J-tube at bedside.  · Pathology demonstrated rare focal metaplasia at the GE junction consistent with Cotto's esophagus, focal submucosal scar, chronic esophagitis, no evidence of dysplasia or malignancy identified by staining, focal active chronic gastritis, 13 lymph nodes negative, paraesophageal soft tissue margins negative, level 7 lymph node negative x3, final esophageal margin negative, final gastric margin negative-patient's final pathologic staging could be considered ypT0, N0.  · The patient is seen with significant other 5/25/2022 and reviewed her findings recognizing that she could benefit substantially from immunotherapy  considering nivolumab every 4 weeks x1 year as adjuvant therapy.  This is discussed extensively as to potential side effect profile and potential benefit.  · Patient seen 6/29/2022,Checkmate 577 trial in patients who had residual pathologic disease at the time of surgery to nivolumab with median disease-free survival nearly twice as long 22 months versus 11 months across follow-up routine regardless of  PD-L1 overexpression.  ·  As result we reviewed the records available including biopsies done at more than 1 location with PD-L1 never obtained.  ·  At some point this may be an issue with the patient does have recurrence.  ·  She is seen back 6/29/2022 indicating that she does not wish to take immunotherapy at this point and we have discussed the above rationale.Consider guardant 360 analysis  · Patient assessed 8/24/22 clinically improved, repeat CT scan scheduled September 2022        2.  Nutrition  · Use of Jejunostomy tube. Continuous feed currently 12 hours a night, slowly working up to recommended 16 hours/day at 90 cc/h  · She is still trying to drink at least 1 boost a day and able to take pills by mouth  · She continues to take pills by mouth.  She does utilize her J-tube for 16 hours a day.  Her weight is stable over the past 3 weeks.  · Patient seen 3/23/2022 with her J-tube no longer anchored properly, thoracic surgery request for evaluation  · J-tube postoperative required replacement 5//22  · Discussed additional nutrition, Enterade sample, prednisone 10 mg p.o. daily E scribed to pharmacy as trial appetite stimulant.  · Assessed 8/24/2022 with associated leukocytosis, 1 additional month of prednisone, then discontinued    3.  Atrial fibrillation  · Patient continues Eliquis 5 mg twice daily  · Toprol 25 mg XL once daily.        Plan:  *The patient again decided against treatment at this point but will be followed carefully and perhaps a guardant analysis would be helpful or biopsy with Caris analysis if she does relapse.  At present she has a follow-up CT scan series in September 2022.  *Nutritionally she is gradually improving, prednisone at 10 mg p.o. daily will continue at least 1 more month then discontinued.  *The patient subsequent TSH  and thyroid hormone levels are escalated 6/29/2022 and she has a history of apparent hyperthyroidism.  She follows with primary care concerning  this.  *Plan to confirm her follow-up CT scans and scheduling  *Port flush every 6 weeks  *MD 12 weeks

## 2022-08-24 ENCOUNTER — TELEPHONE (OUTPATIENT)
Dept: SURGERY | Facility: CLINIC | Age: 62
End: 2022-08-24

## 2022-08-24 ENCOUNTER — INFUSION (OUTPATIENT)
Dept: ONCOLOGY | Facility: HOSPITAL | Age: 62
End: 2022-08-24

## 2022-08-24 ENCOUNTER — OFFICE VISIT (OUTPATIENT)
Dept: ONCOLOGY | Facility: CLINIC | Age: 62
End: 2022-08-24

## 2022-08-24 VITALS
TEMPERATURE: 96 F | OXYGEN SATURATION: 97 % | RESPIRATION RATE: 18 BRPM | HEIGHT: 64 IN | SYSTOLIC BLOOD PRESSURE: 115 MMHG | HEART RATE: 73 BPM | DIASTOLIC BLOOD PRESSURE: 66 MMHG | BODY MASS INDEX: 18.49 KG/M2 | WEIGHT: 108.3 LBS

## 2022-08-24 DIAGNOSIS — Z45.9 ENCOUNTER FOR MANAGEMENT OF IMPLANTED DEVICE: Primary | ICD-10-CM

## 2022-08-24 DIAGNOSIS — C15.5 MALIGNANT NEOPLASM OF LOWER THIRD OF ESOPHAGUS: Primary | ICD-10-CM

## 2022-08-24 DIAGNOSIS — E44.0 MODERATE MALNUTRITION: ICD-10-CM

## 2022-08-24 DIAGNOSIS — C15.5 MALIGNANT NEOPLASM OF LOWER THIRD OF ESOPHAGUS: ICD-10-CM

## 2022-08-24 LAB
BASOPHILS # BLD AUTO: 0.06 10*3/MM3 (ref 0–0.2)
BASOPHILS NFR BLD AUTO: 0.4 % (ref 0–1.5)
DEPRECATED RDW RBC AUTO: 68.8 FL (ref 37–54)
EOSINOPHIL # BLD AUTO: 0.06 10*3/MM3 (ref 0–0.4)
EOSINOPHIL NFR BLD AUTO: 0.4 % (ref 0.3–6.2)
ERYTHROCYTE [DISTWIDTH] IN BLOOD BY AUTOMATED COUNT: 21.2 % (ref 12.3–15.4)
HCT VFR BLD AUTO: 43.7 % (ref 34–46.6)
HGB BLD-MCNC: 13.9 G/DL (ref 12–15.9)
IMM GRANULOCYTES # BLD AUTO: 0.07 10*3/MM3 (ref 0–0.05)
IMM GRANULOCYTES NFR BLD AUTO: 0.5 % (ref 0–0.5)
LYMPHOCYTES # BLD AUTO: 1.17 10*3/MM3 (ref 0.7–3.1)
LYMPHOCYTES NFR BLD AUTO: 8 % (ref 19.6–45.3)
MCH RBC QN AUTO: 28.6 PG (ref 26.6–33)
MCHC RBC AUTO-ENTMCNC: 31.8 G/DL (ref 31.5–35.7)
MCV RBC AUTO: 89.9 FL (ref 79–97)
MONOCYTES # BLD AUTO: 0.6 10*3/MM3 (ref 0.1–0.9)
MONOCYTES NFR BLD AUTO: 4.1 % (ref 5–12)
NEUTROPHILS NFR BLD AUTO: 12.6 10*3/MM3 (ref 1.7–7)
NEUTROPHILS NFR BLD AUTO: 86.6 % (ref 42.7–76)
NRBC BLD AUTO-RTO: 0 /100 WBC (ref 0–0.2)
PLATELET # BLD AUTO: 233 10*3/MM3 (ref 140–450)
PMV BLD AUTO: 9 FL (ref 6–12)
RBC # BLD AUTO: 4.86 10*6/MM3 (ref 3.77–5.28)
WBC NRBC COR # BLD: 14.56 10*3/MM3 (ref 3.4–10.8)

## 2022-08-24 PROCEDURE — 85025 COMPLETE CBC W/AUTO DIFF WBC: CPT

## 2022-08-24 PROCEDURE — 99214 OFFICE O/P EST MOD 30 MIN: CPT | Performed by: INTERNAL MEDICINE

## 2022-08-24 PROCEDURE — 25010000002 HEPARIN LOCK FLUSH PER 10 UNITS: Performed by: INTERNAL MEDICINE

## 2022-08-24 PROCEDURE — 36591 DRAW BLOOD OFF VENOUS DEVICE: CPT

## 2022-08-24 RX ORDER — HEPARIN SODIUM (PORCINE) LOCK FLUSH IV SOLN 100 UNIT/ML 100 UNIT/ML
500 SOLUTION INTRAVENOUS AS NEEDED
Status: DISCONTINUED | OUTPATIENT
Start: 2022-08-24 | End: 2022-08-24 | Stop reason: HOSPADM

## 2022-08-24 RX ORDER — SODIUM CHLORIDE 0.9 % (FLUSH) 0.9 %
10 SYRINGE (ML) INJECTION AS NEEDED
Status: CANCELLED | OUTPATIENT
Start: 2022-08-24

## 2022-08-24 RX ORDER — HEPARIN SODIUM (PORCINE) LOCK FLUSH IV SOLN 100 UNIT/ML 100 UNIT/ML
500 SOLUTION INTRAVENOUS AS NEEDED
Status: CANCELLED | OUTPATIENT
Start: 2022-08-24

## 2022-08-24 RX ORDER — SODIUM CHLORIDE 0.9 % (FLUSH) 0.9 %
10 SYRINGE (ML) INJECTION AS NEEDED
Status: DISCONTINUED | OUTPATIENT
Start: 2022-08-24 | End: 2022-08-24 | Stop reason: HOSPADM

## 2022-08-24 RX ORDER — PREDNISONE 10 MG/1
10 TABLET ORAL DAILY
Qty: 30 TABLET | Refills: 0 | Status: SHIPPED | OUTPATIENT
Start: 2022-08-24 | End: 2022-12-09

## 2022-08-24 RX ADMIN — Medication 10 ML: at 12:51

## 2022-08-24 RX ADMIN — Medication 500 UNITS: at 12:51

## 2022-08-24 NOTE — TELEPHONE ENCOUNTER
Caller: Maya Ribera    Relationship to patient: Self    Best call back number: [phone]    Chief complaint: CT IS SCHEDULED ON 9/30/22     Type of visit: 3 MONTH CT CHEST    Requested date: AFTER 9/30/22    If rescheduling, when is the original appointment: 9/27/22    Additional notes: PROVIDER CALLED IN TO RESCHEDULE PATIENTS APPOINTMENT WITH DR. MAHAJAN ON 9/27/22 DUE TO HER NOT HAVING CT UNTIL 9/30/22. PROVIDER ASKED FOR US TO CALL THE PATIENT TO MOVE THIS APPOINTMENT. DR LI FIRST AVAILABLE IS 1/24/22

## 2022-10-04 ENCOUNTER — OFFICE VISIT (OUTPATIENT)
Dept: SURGERY | Facility: CLINIC | Age: 62
End: 2022-10-04

## 2022-10-04 ENCOUNTER — INFUSION (OUTPATIENT)
Dept: ONCOLOGY | Facility: HOSPITAL | Age: 62
End: 2022-10-04

## 2022-10-04 ENCOUNTER — PREP FOR SURGERY (OUTPATIENT)
Dept: OTHER | Facility: HOSPITAL | Age: 62
End: 2022-10-04

## 2022-10-04 VITALS
HEART RATE: 65 BPM | SYSTOLIC BLOOD PRESSURE: 122 MMHG | BODY MASS INDEX: 18.78 KG/M2 | WEIGHT: 110 LBS | HEIGHT: 64 IN | OXYGEN SATURATION: 97 % | DIASTOLIC BLOOD PRESSURE: 70 MMHG

## 2022-10-04 DIAGNOSIS — C15.5 MALIGNANT NEOPLASM OF LOWER THIRD OF ESOPHAGUS: Primary | ICD-10-CM

## 2022-10-04 DIAGNOSIS — Z45.9 ENCOUNTER FOR MANAGEMENT OF IMPLANTED DEVICE: Primary | ICD-10-CM

## 2022-10-04 DIAGNOSIS — Z48.3 AFTERCARE FOLLOWING SURGERY FOR NEOPLASM: ICD-10-CM

## 2022-10-04 PROCEDURE — G0463 HOSPITAL OUTPT CLINIC VISIT: HCPCS

## 2022-10-04 PROCEDURE — 96523 IRRIG DRUG DELIVERY DEVICE: CPT

## 2022-10-04 PROCEDURE — 25010000002 HEPARIN LOCK FLUSH PER 10 UNITS: Performed by: INTERNAL MEDICINE

## 2022-10-04 PROCEDURE — 99213 OFFICE O/P EST LOW 20 MIN: CPT | Performed by: THORACIC SURGERY (CARDIOTHORACIC VASCULAR SURGERY)

## 2022-10-04 RX ORDER — SODIUM CHLORIDE 0.9 % (FLUSH) 0.9 %
10 SYRINGE (ML) INJECTION AS NEEDED
Status: CANCELLED | OUTPATIENT
Start: 2022-10-04

## 2022-10-04 RX ORDER — SODIUM CHLORIDE 0.9 % (FLUSH) 0.9 %
10 SYRINGE (ML) INJECTION AS NEEDED
Status: DISCONTINUED | OUTPATIENT
Start: 2022-10-04 | End: 2022-10-04 | Stop reason: HOSPADM

## 2022-10-04 RX ORDER — SODIUM CHLORIDE 0.9 % (FLUSH) 0.9 %
3 SYRINGE (ML) INJECTION EVERY 12 HOURS SCHEDULED
Status: CANCELLED | OUTPATIENT
Start: 2022-10-13

## 2022-10-04 RX ORDER — SODIUM CHLORIDE 0.9 % (FLUSH) 0.9 %
3-10 SYRINGE (ML) INJECTION AS NEEDED
Status: CANCELLED | OUTPATIENT
Start: 2022-10-13

## 2022-10-04 RX ORDER — HEPARIN SODIUM (PORCINE) LOCK FLUSH IV SOLN 100 UNIT/ML 100 UNIT/ML
500 SOLUTION INTRAVENOUS AS NEEDED
Status: CANCELLED | OUTPATIENT
Start: 2022-10-04

## 2022-10-04 RX ORDER — HEPARIN SODIUM (PORCINE) LOCK FLUSH IV SOLN 100 UNIT/ML 100 UNIT/ML
500 SOLUTION INTRAVENOUS AS NEEDED
Status: DISCONTINUED | OUTPATIENT
Start: 2022-10-04 | End: 2022-10-04 | Stop reason: HOSPADM

## 2022-10-04 RX ORDER — ONDANSETRON 4 MG/1
4 TABLET, FILM COATED ORAL DAILY PRN
Qty: 30 TABLET | Refills: 1 | Status: SHIPPED | OUTPATIENT
Start: 2022-10-04 | End: 2022-12-09

## 2022-10-04 RX ADMIN — Medication 10 ML: at 10:36

## 2022-10-04 RX ADMIN — Medication 500 UNITS: at 10:36

## 2022-10-04 NOTE — PROGRESS NOTES
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  Patient is doing quite well.  She is eating and swallowing without difficulty.  She reports no reflux and no aspiration.  She is having no chest or abdominal pain.  Bowels are moving regularly.  Her weight is holding steady at around 110 pounds.  She has regained most of her strength and has resumed her normal activities.    Objective   Vital Signs:  /70 (BP Location: Right arm, Patient Position: Sitting, Cuff Size: Adult)   Pulse 65   Ht 162.2 cm (63.86\")   Wt 49.9 kg (110 lb)   SpO2 97%   BMI 18.97 kg/m²   Estimated body mass index is 18.97 kg/m² as calculated from the following:    Height as of this encounter: 162.2 cm (63.86\").    Weight as of this encounter: 49.9 kg (110 lb).    BMI is within normal parameters. No other follow-up for BMI required.      Physical Exam     Neck: Incision is well-healed.  No subcutaneous masses or nodules.  No cervical or supraclavicular adenopathy    Chest: All incisions are clean dry and well-healed.  There are no subcutaneous masses or nodules.  The chest wall is stable.    Pulmonary: Lungs are clear to auscultation with equal breath sounds bilaterally    Cardiac: Regular rate and rhythm.  No murmurs or gallops.  No dependent edema.    Abdomen: Soft and nontender.  No masses and no organomegaly.  Good bowel sounds in all quadrants.  Incisions are well-healed without evidence for hernia.      Result Review :  The following data was reviewed by: Hayder Finch III, MD on 10/04/2022:    CT scan of the chest abdomen and pelvis performed at [facility] shows postoperative changes of the gastric pull-through.  Conduit and gastric " pouch appear normal.  No suspicious hilar or mediastinal adenopathy.  No pulmonary infiltrates nodules or masses.  No intra-abdominal lymphadenopathy.  Adrenal glands and liver showed no evidence of metastatic disease.         Assessment and Plan   Diagnoses and all orders for this visit:    1. Malignant neoplasm of lower third of esophagus (HCC) (Primary)  -     Case request    2. Aftercare following surgery for neoplasm  -     Case request    Other orders  -     ondansetron (Zofran) 4 MG tablet; Take 1 tablet by mouth Daily As Needed for Nausea or Vomiting.  Dispense: 30 tablet; Refill: 1      Ms. Ribera is doing very well.  She is eating and swallowing without difficulty.  She has gained weight and is maintaining that weight.  CT scans show no evidence of recurrence or metastatic disease.  As per NCCN guidelines we will schedule her for a surveillance EGD with biopsy possible dilation.  I have explained this to the patient and her partner in detail.  We have discussed the procedure as well as the risks and benefits.  I have answered all of her questions.  She is requested that we proceed.    Case request and preoperative orders have been placed in [facility].  Patient will undergo EGD with biopsy and possible dilation at [facility] in the near future.  I will keep you informed of her progress.  Next     I spent 32 minutes caring for Maya on this date of service. This time includes time spent by me in the following activities:preparing for the visit, reviewing tests, performing a medically appropriate examination and/or evaluation , counseling and educating the patient/family/caregiver, ordering medications, tests, or procedures, referring and communicating with other health care professionals , documenting information in the medical record, independently interpreting results and communicating that information with the patient/family/caregiver and care coordination  Follow Up   Return for Return to  office following procedure.  Patient was given instructions and counseling regarding her condition or for health maintenance advice. Please see specific information pulled into the AVS if appropriate.

## 2022-10-04 NOTE — H&P (VIEW-ONLY)
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  Patient is doing quite well.  She is eating and swallowing without difficulty.  She reports no reflux and no aspiration.  She is having no chest or abdominal pain.  Bowels are moving regularly.  Her weight is holding steady at around 110 pounds.  She has regained most of her strength and has resumed her normal activities.    Objective   Vital Signs:  /70 (BP Location: Right arm, Patient Position: Sitting, Cuff Size: Adult)   Pulse 65   Ht 162.2 cm (63.86\")   Wt 49.9 kg (110 lb)   SpO2 97%   BMI 18.97 kg/m²   Estimated body mass index is 18.97 kg/m² as calculated from the following:    Height as of this encounter: 162.2 cm (63.86\").    Weight as of this encounter: 49.9 kg (110 lb).    BMI is within normal parameters. No other follow-up for BMI required.      Physical Exam     Neck: Incision is well-healed.  No subcutaneous masses or nodules.  No cervical or supraclavicular adenopathy    Chest: All incisions are clean dry and well-healed.  There are no subcutaneous masses or nodules.  The chest wall is stable.    Pulmonary: Lungs are clear to auscultation with equal breath sounds bilaterally    Cardiac: Regular rate and rhythm.  No murmurs or gallops.  No dependent edema.    Abdomen: Soft and nontender.  No masses and no organomegaly.  Good bowel sounds in all quadrants.  Incisions are well-healed without evidence for hernia.      Result Review :  The following data was reviewed by: Hayder Finch III, MD on 10/04/2022:    CT scan of the chest abdomen and pelvis performed at [facility] shows postoperative changes of the gastric pull-through.  Conduit and gastric " pouch appear normal.  No suspicious hilar or mediastinal adenopathy.  No pulmonary infiltrates nodules or masses.  No intra-abdominal lymphadenopathy.  Adrenal glands and liver showed no evidence of metastatic disease.         Assessment and Plan   Diagnoses and all orders for this visit:    1. Malignant neoplasm of lower third of esophagus (HCC) (Primary)  -     Case request    2. Aftercare following surgery for neoplasm  -     Case request    Other orders  -     ondansetron (Zofran) 4 MG tablet; Take 1 tablet by mouth Daily As Needed for Nausea or Vomiting.  Dispense: 30 tablet; Refill: 1      Ms. Ribera is doing very well.  She is eating and swallowing without difficulty.  She has gained weight and is maintaining that weight.  CT scans show no evidence of recurrence or metastatic disease.  As per NCCN guidelines we will schedule her for a surveillance EGD with biopsy possible dilation.  I have explained this to the patient and her partner in detail.  We have discussed the procedure as well as the risks and benefits.  I have answered all of her questions.  She is requested that we proceed.    Case request and preoperative orders have been placed in [facility].  Patient will undergo EGD with biopsy and possible dilation at [facility] in the near future.  I will keep you informed of her progress.  Next     I spent 32 minutes caring for Maya on this date of service. This time includes time spent by me in the following activities:preparing for the visit, reviewing tests, performing a medically appropriate examination and/or evaluation , counseling and educating the patient/family/caregiver, ordering medications, tests, or procedures, referring and communicating with other health care professionals , documenting information in the medical record, independently interpreting results and communicating that information with the patient/family/caregiver and care coordination  Follow Up   Return for Return to  office following procedure.  Patient was given instructions and counseling regarding her condition or for health maintenance advice. Please see specific information pulled into the AVS if appropriate.

## 2022-10-05 ENCOUNTER — APPOINTMENT (OUTPATIENT)
Dept: ONCOLOGY | Facility: HOSPITAL | Age: 62
End: 2022-10-05

## 2022-10-13 ENCOUNTER — ANESTHESIA (OUTPATIENT)
Dept: GASTROENTEROLOGY | Facility: HOSPITAL | Age: 62
End: 2022-10-13

## 2022-10-13 ENCOUNTER — APPOINTMENT (OUTPATIENT)
Dept: GENERAL RADIOLOGY | Facility: HOSPITAL | Age: 62
End: 2022-10-13

## 2022-10-13 ENCOUNTER — ANESTHESIA EVENT (OUTPATIENT)
Dept: GASTROENTEROLOGY | Facility: HOSPITAL | Age: 62
End: 2022-10-13

## 2022-10-13 ENCOUNTER — HOSPITAL ENCOUNTER (OUTPATIENT)
Facility: HOSPITAL | Age: 62
Setting detail: HOSPITAL OUTPATIENT SURGERY
Discharge: HOME | End: 2022-10-13
Attending: THORACIC SURGERY (CARDIOTHORACIC VASCULAR SURGERY) | Admitting: THORACIC SURGERY (CARDIOTHORACIC VASCULAR SURGERY)

## 2022-10-13 VITALS
DIASTOLIC BLOOD PRESSURE: 54 MMHG | HEART RATE: 65 BPM | WEIGHT: 109 LBS | RESPIRATION RATE: 16 BRPM | OXYGEN SATURATION: 95 % | TEMPERATURE: 98.1 F | SYSTOLIC BLOOD PRESSURE: 105 MMHG | HEIGHT: 64 IN | BODY MASS INDEX: 18.61 KG/M2

## 2022-10-13 DIAGNOSIS — C15.5 MALIGNANT NEOPLASM OF LOWER THIRD OF ESOPHAGUS: ICD-10-CM

## 2022-10-13 PROCEDURE — C1769 GUIDE WIRE: HCPCS | Performed by: THORACIC SURGERY (CARDIOTHORACIC VASCULAR SURGERY)

## 2022-10-13 PROCEDURE — 0 LIDOCAINE 1 % SOLUTION: Performed by: NURSE ANESTHETIST, CERTIFIED REGISTERED

## 2022-10-13 PROCEDURE — 25010000002 PHENYLEPHRINE 10 MG/ML SOLUTION: Performed by: NURSE ANESTHETIST, CERTIFIED REGISTERED

## 2022-10-13 PROCEDURE — 74360 X-RAY GUIDE GI DILATION: CPT

## 2022-10-13 PROCEDURE — 25010000002 PROPOFOL 10 MG/ML EMULSION: Performed by: NURSE ANESTHETIST, CERTIFIED REGISTERED

## 2022-10-13 PROCEDURE — 43249 ESOPH EGD DILATION <30 MM: CPT | Performed by: THORACIC SURGERY (CARDIOTHORACIC VASCULAR SURGERY)

## 2022-10-13 RX ORDER — SODIUM CHLORIDE, SODIUM LACTATE, POTASSIUM CHLORIDE, CALCIUM CHLORIDE 600; 310; 30; 20 MG/100ML; MG/100ML; MG/100ML; MG/100ML
30 INJECTION, SOLUTION INTRAVENOUS CONTINUOUS PRN
Status: DISCONTINUED | OUTPATIENT
Start: 2022-10-13 | End: 2022-10-13 | Stop reason: HOSPADM

## 2022-10-13 RX ORDER — PROPOFOL 10 MG/ML
VIAL (ML) INTRAVENOUS AS NEEDED
Status: DISCONTINUED | OUTPATIENT
Start: 2022-10-13 | End: 2022-10-13 | Stop reason: SURG

## 2022-10-13 RX ORDER — PHENYLEPHRINE HYDROCHLORIDE 10 MG/ML
INJECTION INTRAVENOUS AS NEEDED
Status: DISCONTINUED | OUTPATIENT
Start: 2022-10-13 | End: 2022-10-13 | Stop reason: SURG

## 2022-10-13 RX ORDER — SODIUM CHLORIDE 0.9 % (FLUSH) 0.9 %
3 SYRINGE (ML) INJECTION EVERY 12 HOURS SCHEDULED
Status: DISCONTINUED | OUTPATIENT
Start: 2022-10-13 | End: 2022-10-13 | Stop reason: HOSPADM

## 2022-10-13 RX ORDER — LIDOCAINE HYDROCHLORIDE 10 MG/ML
INJECTION, SOLUTION INFILTRATION; PERINEURAL AS NEEDED
Status: DISCONTINUED | OUTPATIENT
Start: 2022-10-13 | End: 2022-10-13 | Stop reason: SURG

## 2022-10-13 RX ORDER — SODIUM CHLORIDE 0.9 % (FLUSH) 0.9 %
3-10 SYRINGE (ML) INJECTION AS NEEDED
Status: DISCONTINUED | OUTPATIENT
Start: 2022-10-13 | End: 2022-10-13 | Stop reason: HOSPADM

## 2022-10-13 RX ADMIN — PHENYLEPHRINE HYDROCHLORIDE 200 MCG: 10 INJECTION INTRAVENOUS at 13:07

## 2022-10-13 RX ADMIN — PHENYLEPHRINE HYDROCHLORIDE 200 MCG: 10 INJECTION INTRAVENOUS at 13:15

## 2022-10-13 RX ADMIN — LIDOCAINE HYDROCHLORIDE 50 MG: 10 INJECTION, SOLUTION INFILTRATION; PERINEURAL at 12:40

## 2022-10-13 RX ADMIN — SODIUM CHLORIDE, POTASSIUM CHLORIDE, SODIUM LACTATE AND CALCIUM CHLORIDE 30 ML/HR: 600; 310; 30; 20 INJECTION, SOLUTION INTRAVENOUS at 12:15

## 2022-10-13 RX ADMIN — PROPOFOL 100 MG: 10 INJECTION, EMULSION INTRAVENOUS at 12:41

## 2022-10-13 RX ADMIN — PROPOFOL 300 MCG/KG/MIN: 10 INJECTION, EMULSION INTRAVENOUS at 12:41

## 2022-10-13 NOTE — ANESTHESIA PREPROCEDURE EVALUATION
Anesthesia Evaluation     NPO Solid Status: > 8 hours  NPO Liquid Status: > 8 hours           Airway   Mallampati: II  TM distance: >3 FB  No difficulty expected  Dental - normal exam     Pulmonary    Cardiovascular     (+) hypertension, dysrhythmias Atrial Fib, hyperlipidemia,       Neuro/Psych  GI/Hepatic/Renal/Endo    (+)  hiatal hernia, GERD, PUD,  thyroid problem hypothyroidism    Musculoskeletal     Abdominal    Substance History      OB/GYN          Other   arthritis,    history of cancer                    Anesthesia Plan    ASA 3     MAC     intravenous induction     Anesthetic plan, risks, benefits, and alternatives have been provided, discussed and informed consent has been obtained with: patient.    Plan discussed with CRNA.        CODE STATUS:

## 2022-10-13 NOTE — ANESTHESIA POSTPROCEDURE EVALUATION
Patient: Maya Ribera    Procedure Summary     Date: 10/13/22 Room / Location:  [facility]    Anesthesia Start: 1238 Anesthesia Stop: 1311    Procedure: ESOPHAGOGASTRODUODENOSCOPY WITH  SAVARY DILATATION (12.8;14;15;16) WITH FLUOROSCOPY (Esophagus) Diagnosis:       Malignant neoplasm of lower third of esophagus (HCC)      Aftercare following surgery for neoplasm      (Malignant neoplasm of lower third of esophagus (HCC) [C15.5])      (Aftercare following surgery for neoplasm [Z48.3])    Surgeons: Hayder Finch III, MD Provider: Hood Pineda MD    Anesthesia Type: MAC ASA Status: 3          Anesthesia Type: MAC    Vitals  Vitals Value Taken Time   /54 10/13/22 1319   Temp     Pulse 65 10/13/22 1319   Resp 16 10/13/22 1319   SpO2 95 % 10/13/22 1319           Post Anesthesia Care and Evaluation    Patient location during evaluation: PACU  Patient participation: complete - patient participated  Level of consciousness: awake  Pain scale: See nurse's notes for pain score.  Pain management: adequate    Airway patency: patent  Anesthetic complications: No anesthetic complications  PONV Status: none  Cardiovascular status: acceptable  Respiratory status: acceptable  Hydration status: acceptable    Comments: Patient seen and examined postoperatively; vital signs stable; SpO2 greater than or equal to 90%; cardiopulmonary status stable; nausea/vomiting adequately controlled; pain adequately controlled; no apparent anesthesia complications; patient discharged from anesthesia care when discharge criteria were met

## 2022-10-13 NOTE — DISCHARGE INSTRUCTIONS
For the next 24 hours patient needs to be with a responsible adult.    For 24 hours DO NOT drive, operate machinery, appliances, drink alcohol, make important decisions or sign legal documents.    Start with a light or bland diet if you are feeling sick to your stomach otherwise advance to regular diet as tolerated.    Follow recommendations on procedure report if provided by your doctor.    Call Dr Finch for problems [phone]    Problems may include but not limited to: large amounts of bleeding, trouble breathing, repeated vomiting, severe unrelieved pain, fever or chills.

## 2022-10-13 NOTE — OP NOTE
ESOPHAGOGASTRODUODENOSCOPY WITH  DILATATION WITH FLUOROSCOPY  Progress Note    Maya Ribera  10/13/2022    Pre-op Diagnosis:   Malignant neoplasm of lower third of esophagus (HCC) [C15.5]  Aftercare following surgery for neoplasm [Z48.3]       Post-Op Diagnosis Codes:     * Malignant neoplasm of lower third of esophagus (HCC) [C15.5]     * Aftercare following surgery for neoplasm [Z48.3]    Procedure/CPT® Codes:        Procedure(s):  ESOPHAGOGASTRODUODENOSCOPY WITH  SAVARY DILATATION (12.8;14;15;16) WITH FLUOROSCOPY        Surgeon(s):  Haydre Finch III, MD    Anesthesia: Monitored Anesthesia Care    Staff:   Radiology Technologist: Gavin Cunha  Endo Technician: Rick Hardwick  Endo Nurse: Reyna Perkins RN         Estimated Blood Loss: none    Urine Voided: * No values recorded between 10/13/2022 12:38 PM and 10/13/2022  1:00 PM *    Specimens:                None          Drains:   [REMOVED] Chest Tube 1 Right (Removed)       [REMOVED] Chest Tube 1 Right (Removed)       [REMOVED] NG/OG Tube Nasogastric Right nostril (Removed)       [REMOVED] Gastrostomy/Enterostomy Jejunostomy 1 20 Fr. LLQ (Removed)       [REMOVED] Gastrostomy/Enterostomy Jejunostomy 1 18 Fr. LUQ (Removed)       [REMOVED] Gastrostomy/Enterostomy Jejunostomy 16 Fr. LLQ (Removed)       [REMOVED] Urethral Catheter Silicone;Straight-tip;Double-lumen 16 Fr. (Removed)       Findings: Anastomosis identified at 20 cm from the incisors.  Anastomosis was widely patent.  No evidence for ulceration, esophagitis, or tumor.  Nothing obvious to biopsy.        Complications: none    Summary procedure: Ms. Maya Ribera was brought to the endoscopy suite and placed on the fluoroscopy table in the supine position.  Blood pressure EKG pulse oximetry and airway were monitored by the anesthesia service.  Supplemental oxygen and intravenous sedation were administered by anesthesia.  With the patient adequately sedated she was turned into  the left lateral decubitus position.  The scope was passed through the mouth and into the esophagus without difficulty.  Almost immediately we encountered the anastomosis.  This was identified at 20 cm from the incisors.  No ulceration.  No esophagitis.  No obvious tumor.  The scope passed easily through the anastomosis into the gastric conduit.  The entire conduit was closely examined and showed no evidence for gastritis.  There were no ulcers and no tumor.  Gastric pouch also showed no evidence of ulceration or tumor.  The scope passed easily through the pylorus and was directed out to the fourth portion of the duodenum.  The entire duodenum appeared normal.  Scope was pulled back into the gastric conduit.  Guidewire was threaded through the scope under fluoroscopic control and positioned in the gastric conduit.  Savory dilators were now passed over the guidewire.  We began with a 12.8 mm dilator and successively increased to 14 mm, 15 mm, and 16 mm.  After having dilated with a 16mm dilator the scope was reintroduced.  No obvious injury to the esophagus or the stomach.  Scope was withdrawn and the patient tolerated the procedure well.    .        Dictated utilizing Dragon dictation          Hayder Finch III, MD     Date: 10/13/2022  Time: 13:03 EDT

## 2022-10-25 ENCOUNTER — OFFICE VISIT (OUTPATIENT)
Dept: SURGERY | Facility: CLINIC | Age: 62
End: 2022-10-25

## 2022-10-25 VITALS
OXYGEN SATURATION: 98 % | BODY MASS INDEX: 18.61 KG/M2 | WEIGHT: 109 LBS | DIASTOLIC BLOOD PRESSURE: 62 MMHG | HEART RATE: 79 BPM | SYSTOLIC BLOOD PRESSURE: 102 MMHG | HEIGHT: 64 IN

## 2022-10-25 DIAGNOSIS — Z48.3 AFTERCARE FOLLOWING SURGERY FOR NEOPLASM: ICD-10-CM

## 2022-10-25 DIAGNOSIS — C15.5 MALIGNANT NEOPLASM OF LOWER THIRD OF ESOPHAGUS: Primary | ICD-10-CM

## 2022-10-25 PROCEDURE — 99212 OFFICE O/P EST SF 10 MIN: CPT | Performed by: THORACIC SURGERY (CARDIOTHORACIC VASCULAR SURGERY)

## 2022-10-25 NOTE — PROGRESS NOTES
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  She has recently undergone CT scan of the chest abdomen and pelvis showing no evidence of recurrent or metastatic disease.  As part of her surveillance she has undergone a EGD.  Anastomosis was mildly strictured and was dilated up to a size 16 mm savory dilator without difficulty.  There was no evidence for esophagitis, ulcer, or recurrent tumor.  Patient notes marked improvement in her swallowing.  She is taking a regular diet without any difficulty and is maintaining her weight.    Objective   Vital Signs:  /62 (BP Location: Left arm, Patient Position: Sitting, Cuff Size: Adult)   Pulse 79   Ht 162.6 cm (64\")   Wt 49.4 kg (109 lb)   SpO2 98%   BMI 18.71 kg/m²   Estimated body mass index is 18.71 kg/m² as calculated from the following:    Height as of this encounter: 162.6 cm (64\").    Weight as of this encounter: 49.4 kg (109 lb).    BMI is within normal parameters. No other follow-up for BMI required.      Physical Exam     Neck: Incision is well-healed.  No masses.  No adenopathy.    Chest: Incisions are well-healed.  No subcutaneous masses or nodules.  Chest wall is stable.    Pulmonary: Lungs are clear to auscultation with equal breath sounds bilaterally    Abdomen: Midline incision is well-healed without hernia.  Jejunostomy site is also well-healed without hernia.  No masses and no organomegaly.  No tenderness.  Good bowel sounds in all quadrants.    Result Review :           Assessment and Plan   Diagnoses and all orders for this visit:    1. Malignant neoplasm of lower third of esophagus (HCC) (Primary)  -     CT Abdomen " Pelvis With Contrast; Future  -     CT Chest Without Contrast Diagnostic; Future    2. Aftercare following surgery for neoplasm  -     CT Abdomen Pelvis With Contrast; Future  -     CT Chest Without Contrast Diagnostic; Future    Ms. Ribera is doing well following total esophagectomy for adenocarcinoma of the esophagus.  She shows no evidence of local, regional, or distant malignancy.  We will continue our surveillance in 6 months at which time she will undergo CT of the chest abdomen and pelvis and a repeat EGD.  We will keep you informed of her progress.  Thank you for allowing us to participate in the care of Ms. Ribera.  Neck       I spent 12 minutes caring for Maya on this date of service. This time includes time spent by me in the following activities:preparing for the visit, reviewing tests, performing a medically appropriate examination and/or evaluation , counseling and educating the patient/family/caregiver, ordering medications, tests, or procedures, referring and communicating with other health care professionals , documenting information in the medical record, independently interpreting results and communicating that information with the patient/family/caregiver and care coordination  Follow Up   Return in about 6 months (around 4/25/2023) for Recheck.  Patient was given instructions and counseling regarding her condition or for health maintenance advice. Please see specific information pulled into the AVS if appropriate.

## 2022-10-31 RX ORDER — LANSOPRAZOLE 30 MG/1
30 TABLET, ORALLY DISINTEGRATING, DELAYED RELEASE ORAL
Qty: 60 TABLET | Refills: 1 | Status: SHIPPED | OUTPATIENT
Start: 2022-10-31

## 2022-10-31 NOTE — TELEPHONE ENCOUNTER
I'm happy to refill if she wants it, but if she's not having symptoms of reflex or indigestion she doesn't have to stay on it.

## 2022-11-30 ENCOUNTER — INFUSION (OUTPATIENT)
Dept: ONCOLOGY | Facility: HOSPITAL | Age: 62
End: 2022-11-30

## 2022-11-30 ENCOUNTER — OFFICE VISIT (OUTPATIENT)
Dept: ONCOLOGY | Facility: CLINIC | Age: 62
End: 2022-11-30

## 2022-11-30 VITALS
HEART RATE: 79 BPM | HEIGHT: 64 IN | SYSTOLIC BLOOD PRESSURE: 104 MMHG | BODY MASS INDEX: 18.8 KG/M2 | RESPIRATION RATE: 16 BRPM | OXYGEN SATURATION: 96 % | DIASTOLIC BLOOD PRESSURE: 68 MMHG | WEIGHT: 110.1 LBS | TEMPERATURE: 96.9 F

## 2022-11-30 DIAGNOSIS — Z45.9 ENCOUNTER FOR MANAGEMENT OF IMPLANTED DEVICE: Primary | ICD-10-CM

## 2022-11-30 DIAGNOSIS — C15.5 MALIGNANT NEOPLASM OF LOWER THIRD OF ESOPHAGUS: Primary | ICD-10-CM

## 2022-11-30 DIAGNOSIS — C15.5 MALIGNANT NEOPLASM OF LOWER THIRD OF ESOPHAGUS: ICD-10-CM

## 2022-11-30 LAB
BASOPHILS # BLD AUTO: 0.05 10*3/MM3 (ref 0–0.2)
BASOPHILS NFR BLD AUTO: 0.6 % (ref 0–1.5)
DEPRECATED RDW RBC AUTO: 52.8 FL (ref 37–54)
EOSINOPHIL # BLD AUTO: 0.14 10*3/MM3 (ref 0–0.4)
EOSINOPHIL NFR BLD AUTO: 1.7 % (ref 0.3–6.2)
ERYTHROCYTE [DISTWIDTH] IN BLOOD BY AUTOMATED COUNT: 15 % (ref 12.3–15.4)
HCT VFR BLD AUTO: 41.6 % (ref 34–46.6)
HGB BLD-MCNC: 13.5 G/DL (ref 12–15.9)
IMM GRANULOCYTES # BLD AUTO: 0.01 10*3/MM3 (ref 0–0.05)
IMM GRANULOCYTES NFR BLD AUTO: 0.1 % (ref 0–0.5)
LYMPHOCYTES # BLD AUTO: 1.55 10*3/MM3 (ref 0.7–3.1)
LYMPHOCYTES NFR BLD AUTO: 18.4 % (ref 19.6–45.3)
MCH RBC QN AUTO: 31 PG (ref 26.6–33)
MCHC RBC AUTO-ENTMCNC: 32.5 G/DL (ref 31.5–35.7)
MCV RBC AUTO: 95.6 FL (ref 79–97)
MONOCYTES # BLD AUTO: 0.55 10*3/MM3 (ref 0.1–0.9)
MONOCYTES NFR BLD AUTO: 6.5 % (ref 5–12)
NEUTROPHILS NFR BLD AUTO: 6.14 10*3/MM3 (ref 1.7–7)
NEUTROPHILS NFR BLD AUTO: 72.7 % (ref 42.7–76)
NRBC BLD AUTO-RTO: 0 /100 WBC (ref 0–0.2)
PLATELET # BLD AUTO: 236 10*3/MM3 (ref 140–450)
PMV BLD AUTO: 8.9 FL (ref 6–12)
RBC # BLD AUTO: 4.35 10*6/MM3 (ref 3.77–5.28)
WBC NRBC COR # BLD: 8.44 10*3/MM3 (ref 3.4–10.8)

## 2022-11-30 PROCEDURE — 99214 OFFICE O/P EST MOD 30 MIN: CPT | Performed by: INTERNAL MEDICINE

## 2022-11-30 PROCEDURE — 85025 COMPLETE CBC W/AUTO DIFF WBC: CPT

## 2022-11-30 PROCEDURE — 36591 DRAW BLOOD OFF VENOUS DEVICE: CPT

## 2022-11-30 PROCEDURE — 25010000002 HEPARIN LOCK FLUSH PER 10 UNITS: Performed by: INTERNAL MEDICINE

## 2022-11-30 RX ORDER — HEPARIN SODIUM (PORCINE) LOCK FLUSH IV SOLN 100 UNIT/ML 100 UNIT/ML
500 SOLUTION INTRAVENOUS AS NEEDED
Status: DISCONTINUED | OUTPATIENT
Start: 2022-11-30 | End: 2022-11-30 | Stop reason: HOSPADM

## 2022-11-30 RX ORDER — HEPARIN SODIUM (PORCINE) LOCK FLUSH IV SOLN 100 UNIT/ML 100 UNIT/ML
500 SOLUTION INTRAVENOUS AS NEEDED
Status: CANCELLED | OUTPATIENT
Start: 2022-11-30

## 2022-11-30 RX ORDER — SODIUM CHLORIDE 0.9 % (FLUSH) 0.9 %
10 SYRINGE (ML) INJECTION AS NEEDED
Status: CANCELLED | OUTPATIENT
Start: 2022-11-30

## 2022-11-30 RX ORDER — SODIUM CHLORIDE 0.9 % (FLUSH) 0.9 %
10 SYRINGE (ML) INJECTION AS NEEDED
Status: DISCONTINUED | OUTPATIENT
Start: 2022-11-30 | End: 2022-11-30 | Stop reason: HOSPADM

## 2022-11-30 RX ADMIN — Medication 500 UNITS: at 13:33

## 2022-11-30 RX ADMIN — Medication 10 ML: at 13:33

## 2022-11-30 NOTE — PROGRESS NOTES
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      History of Present Illness   This is a 62 y.o. female with recent diagnosis of distal esophageal cancer, initiating concurrent chemoradiation with carboplatin/Taxol on 1/5/2022.  Unfortunately, the patient has had frequent delays due to pancytopenia, weight loss, malnutrition and COVID-19.  She is completed only 3 weeks of chemotherapy.  She is due today for her fourth dose of carboplatin Taxol.  She has 5 radiation treatments remaining.    Her case was presented at the multidisciplinary clinic this morning with plans to complete the last week of concurrent chemoradiation followed by PET scan.  It is unclear at this time if the patient would qualify for surgery given her struggles and malnutrition.  She does have a J-tube for which her partner utilizes for nutrition.  She is still able to swallow pills by mouth.  Her weight is stable over the past few weeks.    She has been struggling with her blood pressure.  She is on metoprolol originally 50 mg twice daily for her atrial fibrillation.  She has cut this down to once daily.  She does report she has been holding her Eliquis since her platelets dropped and has not yet resumed.  Her and her partner expressed concerns regarding the timeline of PET scan and possible surgery.  They are very eager to proceed with PET scan.  We did discuss today the possibility for false positives with esophagitis symptoms.  We will plan PET scan 4 weeks after the completion of radiation which is the soonest this should be obtained.    A PET/CT was obtained  3/17/2022 demonstrating significant decrease in FDG avid thickened mid to distal esophagus, decrease in activity in gastrohepatic and left hilar nodes no longer demonstrating that above blood pool, new subcentimeter nodule right apex thought to be an endobronchial filling defect it was indeterminant with a new area of tree-in-bud nodularity medial aspect left upper lobe thought to be reactive.    The patient is seen with her partner 3/23/2022.  She recently had nausea and vomiting for several days thought to be?  Food poisoning which, fortunately, is now resolving.  We have reviewed her scans and she could well be a candidate for surgery which we will asked Dr. Finch to review her for next available.    The patient is next reviewed 4/20/2022.  She is anxious for surgery 4/29/2022 but ready to proceed.    The patient was admitted 4/29 - 5/9/2022 undergoing bronchoscopy with right thoracoscopy, partial decortication, robot-assisted total esophagectomy, jejunostomy tube placement and intercostal nerve block.  She had a satisfactory postoperative course and was able to be discharged 5/9/2022 though was briefly readmitted requiring replacement of her J-tube at bedside.    Pathology demonstrated rare focal metaplasia at the GE junction consistent with Cotto's esophagus, focal submucosal scar, chronic esophagitis, no evidence of dysplasia or malignancy identified by staining, focal active chronic gastritis, 13 lymph nodes negative, paraesophageal soft tissue margins negative, level 7 lymph node negative x3, final esophageal margin negative, final gastric margin negative-patient's final pathologic staging could be considered ypT0, N0.     The patient is seen with significant other 5/25/2022 and reviewed her findings recognizing that she could benefit substantially from immunotherapy  considering nivolumab every 4 weeks x1 year as adjuvant therapy.  This is discussed extensively as to potential side effect profile and  potential benefit.  The patient was seen by thoracic surgery 6/1/2022 and felt to be making good progress.      We made plans for her to undergo chemo education for the possible use of nivolumab but this was held into the patient was to return and she is next in 6/29/2022.        We discussed availability of information Checkmate 577 trial in patients who had residual pathologic disease at the time of surgery to nivolumab with median disease-free survival nearly twice as long 22 months versus 11 months across follow-up routine regardless of PD-L1 overexpression.     As result we reviewed the records available including biopsies done at more than 1 location with PD-L1 never obtained.     At some point this may be an issue with the patient does have recurrence.     She is seen back 6/29/2022 indicating that she does not wish to take immunotherapy at this point and we have discussed the above rationale.    It was elected to follow the patient and she is reviewed 8/24/2022.  She has gained approximately 4 pounds, CBC normalized except for mild leukocytosis of 14,560.  She is feeling reasonably well with her partner echoing her continued improvement.  She has a follow-up CT series scheduled in September, follow-up thereafter with thoracic surgery.    The patient is next assessed 11/30/2022 having undergone outpatient CT at another location which, fortunately, revealed no evidence of disease.  She is doing fairly well though slow to gain weight and requiring multiple small meals per day with occasional abdominal pain and reflux discomfort.  We have had a lengthy discussion about her eating habits and expected side effects after her particular surgery.  She is also been very concerned about continuing anticoagulation with an assessment for of her cardiac rhythm by long-term monitor anticipated initially.  She is urged to complete this and possibly come off anticoagulation.      ONCOLOGY HISTORY     The patient is a  62-year-old female who had been seen in multidisciplinary thoracic oncology conference with complaints of painful swallowing and dysphagia over the previous several months with 10 to 15 pound weight loss.  This led to EGD and colonoscopy with normal colonoscopy but EGD demonstrated tumor at the GE junction with biopsies consistent with severe dysplasia including carcinoma in situ.  The patient states that she saw a number of physicians and attempting to have a diagnosis completed.  CT of chest demonstrated thickening of the mid esophagus with no lymphadenopathy and CT PET demonstrated uptake in the esophagus gastropathic lymph nodes it is felt that she likely had early stage carcinoma of the esophagus and that we could present to record with surgical resection.  Endoscopic ultrasound, however, was requested and we could not have this done any sooner than GI physicians available at [facility].     This was performed 12/7/2021 revealing a 5 cm esophageal mass present in the distal third esophagus without extension into the GE junction into the cardia, the mass extended into into the muscularis propria into the adventitia not penetrating through the adventitia with a single 1 cm gastrohepatic/celiac axis lymph node and FNB x1 performed-?  T2 N1 M0 distal esophageal cancer-clinical stage III, pathologic stage IIIa  In contacting the [facility] pathology department the results of this FNB are not yet available and will be by 12/9/2021.      The patient is seen with her sister and son all indicating that she has had difficulty with swallowing since late summer likely July 2021 and has been attempting to have a diagnosis made since that time.  They are all somewhat frustrated about the length of time to obtain an answer for this problem and we have spent considerable time discussing her findings thus far and how we might proceed to treat what appears to be a contained malignancy.  This has, additionally, been  "discussed with Dr. Stef cruz who feels that she is not immediately and operative candidate and should proceed with chemo radiation therapy initially-neoadjuvant treatment before consideration for subsequent surgery.  This has been discussed with the patient, her sister and her son again in detail 12/8/2021.  We went on to initiate pain control with liquid hydrocodone, referred the patient to general surgery and oncology nutrition.    The patient was seen by  who proceeded 12/15/2021 to PowerPort placement and open jejunostomy.  She was seen during her hospital stay by oncology nutrition required hospitalization up to including 12/19/2021.    The patient required hospitalization to 12/20/2021 and after discharge along with her partner's health was able to gradually improve her caloric intake and was seen back 12/27 by Dr. Hollis who felt she had improved enough to initiate chemotherapy.  The patient was seen by radiation therapy 12/21 with plans to proceed with 40 CT simulation and planning-IMRT/IGR T-4140 cGy in 23 fractions with pleased to be considered.    The patient is seen back with her partner Cristy 12/29/2021 now able to \"manage\" and we have discussed extensively concurrent chemotherapy radiation therapy using Carboplatin/Taxol weekly.    Currently the patient is scheduled to begin radiation therapy 1/3/2021, undergoing teaching 1/4/2022 and will begin concurrent chemotherapy 1/5/2022    The patient had difficulty tolerating this treatment but was eventually able to complete it though modified for toxicity-1/3/2022-2/18/2022 4680 with 26 total treatments, carboplatinum/Taxol weekly 1/5, 1/12, 1/19 and delayed until 2/10/2022.    A PET/CT was obtained 3/17/2022 demonstrating significant decrease in FDG avid thickened mid to distal esophagus, decrease in activity in gastrohepatic and left hilar nodes no longer demonstrating that above blood pool, new subcentimeter nodule right apex thought to " be an endobronchial filling defect it was indeterminant with a new area of tree-in-bud nodularity medial aspect left upper lobe thought to be reactive.    Patient had been presented at thoracic conference and upon completion of therapy and repeat PET/CT was to be reevaluated for surgery.  Patient seen in office 3/23/2022 referred back to thoracic surgery.            The patient was briefly admitted 5//2022 with dislodged J-tube.  G-tube was placed and the patient was able to be discharged.     The patient's final pathologic staging could be considered ypT0, N0.     The patient is seen with significant other 5/25/2022 and reviewed her findings recognizing that she could benefit substantially from immunotherapy  considering nivolumab every 4 weeks x1 year as adjuvant therapy.  This is discussed extensively as to potential side effect profile and potential benefit.    We discussed availability of information Checkmate 577 trial in patients who had residual pathologic disease at the time of surgery to nivolumab with median disease-free survival nearly twice as long 22 months versus 11 months across follow-up routine regardless of PD-L1 overexpression.     As result we reviewed the records available including biopsies done at more than 1 location with PD-L1 never obtained.     At some point this may be an issue with the patient does have recurrence.     She is seen back 6/29/2022 indicating that she does not wish to take immunotherapy at this point and we have discussed the above rationale.    Repeat scan scheduled September 2022.  The patient is seen 11/30/2022 stable clinically.  Repeat scans done outside location failed to show any evidence of recurrent disease and the patient is now followed at 6-month intervals.    Past Medical History:   Diagnosis Date   • Boil, breast 12/13/2021    HEALING UNDER LEFT BREAST    • Cervical cancer (HCC)    • Dysphagia    • Esophageal cancer (HCC)    • Esophagitis 9/21/2021   •  Gastric ulcer    • GERD (gastroesophageal reflux disease)    • Hyperlipidemia    • Hypertension    • Hyperthyroidism    • Irritable bowel    • Mitral valve prolapse     FOLLOWED BY     • PAF (paroxysmal atrial fibrillation) (HCC)    • Uses feeding tube         Past Surgical History:   Procedure Laterality Date   • CHOLECYSTECTOMY     • COLONOSCOPY  08/27/2021    [facility]   • ENDOSCOPY N/A 10/13/2022    Procedure: ESOPHAGOGASTRODUODENOSCOPY WITH  SAVARY DILATATION (12.8;14;15;16) WITH FLUOROSCOPY;  Surgeon: Hayder Finch III, MD;  Location: [facility] ENDOSCOPY;  Service: Gastroenterology;  Laterality: N/A;  FOLLOW UP S/P ESOPHAGECTOMY   • ESOPHAGOGASTRECTOMY Right 4/29/2022    Procedure: BRONCHOSCOPY, RIGHT THORACOSCOPY WITH [employer] ROB[employer] WITH TOTAL ESOPHAGECTOMY, INTERCOSTAL NERVE BLOCK, JEJUNOSTOMY TUBE REPLACEMENT;  Surgeon: Hayder Finch III, MD;  Location: [facility] OR;  Service: Robotics - DaVinci;  Laterality: Right;   • EXTERNAL EAR SURGERY     • JEJUNOSTOMY N/A 12/15/2021    Procedure: open JEJUNOSTOMY tube placement;  Surgeon: Bhanu Hollis MD;  Location: [facility] OR;  Service: General;  Laterality: N/A;   • KNEE SURGERY Left    • REPLACEMENT TOTAL KNEE Left    • UPPER ENDOSCOPIC ULTRASOUND W/ FNA N/A 12/7/2021    Procedure: Esophagogastroduodenoscopy with endoscopic ultrasound  WITH STAGING AND FINE NEEDLE BIOPSY;  Surgeon: Amrit Green MD;  Location: [facility] ENDOSCOPY;  Service: Gastroenterology;  Laterality: N/A;  post op: inlet patch, esophageal mass, T2   • VENOUS ACCESS DEVICE (PORT) INSERTION Left 12/15/2021    Procedure: INSERTION OF PORTACATH;  Surgeon: Bhanu Hollis MD;  Location: [facility] OR;  Service: General;  Laterality: Left;        Current Outpatient Medications on File Prior to Visit   Medication Sig Dispense Refill   • aluminum-magnesium hydroxide 200-200 MG/5ML suspension, diphenhydrAMINE 12.5 MG/5ML liquid, Lidocaine Viscous HCl 2 % solution,  nystatin 100,000 unit/mL suspension Swish and spit 10 mL 4 (Four) Times a Day After Meals & at Bedtime. 400 mL 2   • Eliquis 5 MG tablet tablet Take 5 mg by mouth Every 12 (Twelve) Hours. LD   PT STATED TO HOLD 72 HOURS PRIOR TO SURGERY     • lansoprazole (PREVACID SOLUTAB) 30 MG Tablet Delayed Release Dispersible disintegrating tablet Take 1 tablet by mouth Every Morning Before Breakfast. 60 tablet 1   • methIMAzole (TAPAZOLE) 10 MG tablet Take 10 mg by mouth 3 (Three) Times a Day. Take DOS     • ondansetron (Zofran) 4 MG tablet Take 1 tablet by mouth Daily As Needed for Nausea or Vomiting. 30 tablet 1   • pravastatin (PRAVACHOL) 20 MG tablet Take 20 mg by mouth Daily.     • predniSONE (DELTASONE) 10 MG tablet Take 1 tablet by mouth Daily. 30 tablet 0   • metoprolol tartrate (LOPRESSOR) 25 MG tablet Take 1 tablet by mouth Daily for 30 days. 30 tablet 0     Current Facility-Administered Medications on File Prior to Visit   Medication Dose Route Frequency Provider Last Rate Last Admin   • [DISCONTINUED] heparin injection 500 Units  500 Units Intravenous PRN Iggy Harrell MD   500 Units at 22 1333   • [DISCONTINUED] sodium chloride 0.9 % flush 10 mL  10 mL Intravenous PRN Iggy Harrell MD   10 mL at 22 1333        ALLERGIES:    Allergies   Allergen Reactions   • Codeine Hives     Patient states this is when she was very young.         Social History     Socioeconomic History   • Marital status: Significant Other   • Number of children: 1   • Years of education: High school   Tobacco Use   • Smoking status: Former     Packs/day: 1.00     Years: 40.00     Pack years: 40.00     Types: Cigarettes     Start date:      Quit date: 2022     Years since quittin.5   • Smokeless tobacco: Never   • Tobacco comments:     2 per day   Vaping Use   • Vaping Use: Never used   Substance and Sexual Activity   • Alcohol use: Not Currently   • Drug use: Never   • Sexual activity: Defer     "    Family History   Problem Relation Age of Onset   • Breast cancer Mother    • Diabetes Mother    • Bipolar disorder Father    • Diabetes Father    • Hypertension Father    • Breast cancer Sister    • COPD Sister    • Diabetes Sister    • Hypertension Sister    • Alcohol abuse Brother    • Asthma Brother    • Bipolar disorder Brother    • Diabetes Brother    • Seizures Brother    • Breast cancer Maternal Grandmother    • Diabetes Maternal Grandmother    • Diabetes Maternal Grandfather    • Diabetes Paternal Grandmother    • Diabetes Paternal Grandfather    • Hypertension Son    • Kidney cancer Sister    • Hypertension Sister    • Diabetes Sister    • Malig Hyperthermia Neg Hx           Objective     Vitals:    11/30/22 1346   BP: 104/68   Pulse: 79   Resp: 16   Temp: 96.9 °F (36.1 °C)   TempSrc: Temporal   SpO2: 96%   Weight: 49.9 kg (110 lb 1.6 oz)   Height: 162.6 cm (64.02\")   PainSc:   4   PainLoc: Abdomen     Current Status 11/30/2022   ECOG score 0       Physical Exam  Constitutional:       Appearance: Normal appearance. She is underweight.   HENT:      Head: Normocephalic and atraumatic.      Nose: Nose normal.   Eyes:      Extraocular Movements: Extraocular movements intact.      Conjunctiva/sclera: Conjunctivae normal.      Pupils: Pupils are equal, round, and reactive to light.   Cardiovascular:      Rate and Rhythm: Normal rate and regular rhythm.      Pulses: Normal pulses.      Heart sounds: Normal heart sounds.   Pulmonary:      Effort: Pulmonary effort is normal.      Breath sounds: Normal breath sounds.   Abdominal:      General: Bowel sounds are normal.      Palpations: Abdomen is soft. There is no mass.      Comments: Status post esophagectomy, wounds well-healed, J-tube removed   Musculoskeletal:         General: Normal range of motion.      Cervical back: Normal range of motion.   Skin:     General: Skin is warm and dry.      Findings: No rash.   Neurological:      General: No focal deficit " present.      Mental Status: She is alert and oriented to person, place, and time.   Psychiatric:         Mood and Affect: Mood normal.     I have reexamined the patient and the results are consistent with the previously documented exam. Iggy Harrell MD     RECENT LABS:  Results from last 7 days   Lab Units 11/30/22  1332   WBC 10*3/mm3 8.44   NEUTROS ABS 10*3/mm3 6.14   HEMOGLOBIN g/dL 13.5   HEMATOCRIT % 41.6   PLATELETS 10*3/mm3 236                Assessment & Plan          62-year-old female with a history of hyperthyroidism, previous gastric ulcer, GE reflux, atrial fibrillation, hyperlipidemia, hypertension irritable bowel syndrome,          1.  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  · here has been a number of physicians involved in the patient's case and several months before she was ultimately seen by Dr. Finch at [facility].   · CT scan of the chest demonstrating thickening of the mid esophagus with no lymphadenopathy and PET/CT demonstrated uptake in the mid to distal esophagus with SUV intensely elevated at 16 with a few nonenlarged left hilar and gastrohepatic lymph nodes with mild increased FDG activity, few scattered subcentimeter pulmonary nodules bilaterally indeterminate.  · EUS was felt necessary and ultimately obtained at [facility] performed 12/7/2021 revealing a 5 cm esophageal mass present in the distal third esophagus without extension into the GE junction into the cardia, the mass extended into into the muscularis propria into the adventitia not penetrating through the adventitia with a single 1 cm gastrohepatic/celiac axis lymph node and FNB x1 performed- FNA negative for malignancy  · Discussed  with Dr. Stef cruz who feels that she is not immediately and operative candidate and should proceed with chemo radiation therapy initially-neoadjuvant treatment before consideration for subsequent surgery. Discussed extensively concurrent chemotherapy radiation therapy using Carboplatin/Taxol weekly.  ·  who proceeded 12/15/2021 to PowerPort placement and open jejunostomy.  She was seen during her hospital stay by oncology nutrition required hospitalization up to including 12/19/2021.  · seen by radiation therapy 12/21 with plans to proceed with 40 CT simulation and planning-IMRT/IGR T-4140 cGy in 23 fractions with pleased to be considered.   · Radiation therapy initiated 1/3/2022  · 1/5/2021 cycle 1 weekly carboplatin/Taxol.    · Patient status post week 2 on 1/12/2022  · Patient treated 1/19/2022-week 3  · 1/26/2022, hold treatment today due to platelet counts 42,000 and feeling unwell.  Covid positive at that time  · Chemotherapy held 2/2/2022 due to ongoing normal cytopenia, platelets of 40,000  · Presentation at multidisciplinary clinic 2/10/2022 with plans to complete fourth dose of carboplatin Taxol followed by PET scan.  It is unclear at this time if the patient will be a surgical candidate pending her response and nutritional state post treatment  · We will proceed today with her fourth dose of carboplatin Taxol  · Upon completion of chemotherapy patient reevaluated with PET/CT 3/17/2022 with significant response for esophageal lesion, gastrohepatic left hilar lymphadenopathy no longer FDG avid, indeterminate subcentimeter nodule right apex thought to be endobronchial filling defect, tree-in-bud nodularity felt to be reactive.  · Patient seen in office 3/23/2022 referred back to thoracic surgery.  Discussed potential adjuvant nivolumab therapy post surgery.  · Patient seen 4/20/2022 with surgery planned 4/29/2022.  · The patient was admitted 4/29 - 5/9/2022 undergoing bronchoscopy with right  thoracoscopy, partial decortication, robot-assisted total esophagectomy, jejunostomy tube placement and intercostal nerve block.  She had a satisfactory postoperative course and was able to be discharged 5/9/2022 though was briefly readmitted requiring replacement of her J-tube at bedside.  · Pathology demonstrated rare focal metaplasia at the GE junction consistent with Cotto's esophagus, focal submucosal scar, chronic esophagitis, no evidence of dysplasia or malignancy identified by staining, focal active chronic gastritis, 13 lymph nodes negative, paraesophageal soft tissue margins negative, level 7 lymph node negative x3, final esophageal margin negative, final gastric margin negative-patient's final pathologic staging could be considered ypT0, N0.  · The patient is seen with significant other 5/25/2022 and reviewed her findings recognizing that she could benefit substantially from immunotherapy  considering nivolumab every 4 weeks x1 year as adjuvant therapy.  This is discussed extensively as to potential side effect profile and potential benefit.  · Patient seen 6/29/2022,Checkmate 577 trial in patients who had residual pathologic disease at the time of surgery to nivolumab with median disease-free survival nearly twice as long 22 months versus 11 months across follow-up routine regardless of PD-L1 overexpression.  ·  As result we reviewed the records available including biopsies done at more than 1 location with PD-L1 never obtained.  ·  At some point this may be an issue with the patient does have recurrence.  ·  She is seen back 6/29/2022 indicating that she does not wish to take immunotherapy at this point and we have discussed the above rationale.Consider guardant 360 analysis  · Patient assessed 8/24/22 clinically improved, repeat CT scan scheduled September 2022  · Repeat scans obtained and reviewed by thoracic surgery 10/26/2022 negative for recurrence  · Follow-up assessment every 6 months        2.   Nutrition  · Use of Jejunostomy tube. Continuous feed currently 12 hours a night, slowly working up to recommended 16 hours/day at 90 cc/h  · She is still trying to drink at least 1 boost a day and able to take pills by mouth  · She continues to take pills by mouth.  She does utilize her J-tube for 16 hours a day.  Her weight is stable over the past 3 weeks.  · Patient seen 3/23/2022 with her J-tube no longer anchored properly, thoracic surgery request for evaluation  · J-tube postoperative required replacement 5//22  · Discussed additional nutrition, Enterade sample, prednisone 10 mg p.o. daily E scribed to pharmacy as trial appetite stimulant.  · Assessed 8/24/2022 with associated leukocytosis, 1 additional month of prednisone, then discontinued  · Patient seen 11/3/2022 continuing small meals routinely, medications assessed, Tums discontinued    3.  Atrial fibrillation  · Patient continues Eliquis 5 mg twice daily  · Toprol 25 mg XL once daily.  · Patient offered assessment by cardiology for atrial fibrillation and she is urged to complete this possibly coming off Eliquis.    Plan:  *Every 6 week port flush    *6 months MD, CBC, CMP

## 2022-12-09 ENCOUNTER — OFFICE VISIT (OUTPATIENT)
Dept: FAMILY MEDICINE CLINIC | Facility: CLINIC | Age: 62
End: 2022-12-09

## 2022-12-09 VITALS
BODY MASS INDEX: 18.27 KG/M2 | TEMPERATURE: 96.9 F | DIASTOLIC BLOOD PRESSURE: 70 MMHG | WEIGHT: 107 LBS | HEIGHT: 64 IN | HEART RATE: 60 BPM | RESPIRATION RATE: 18 BRPM | SYSTOLIC BLOOD PRESSURE: 110 MMHG | OXYGEN SATURATION: 100 %

## 2022-12-09 DIAGNOSIS — E78.5 HYPERLIPIDEMIA, UNSPECIFIED HYPERLIPIDEMIA TYPE: Primary | ICD-10-CM

## 2022-12-09 DIAGNOSIS — I10 PRIMARY HYPERTENSION: ICD-10-CM

## 2022-12-09 PROCEDURE — 99212 OFFICE O/P EST SF 10 MIN: CPT

## 2022-12-09 RX ORDER — METHIMAZOLE 10 MG/1
10 TABLET ORAL DAILY
COMMUNITY
Start: 2022-12-01 | End: 2022-12-31

## 2022-12-09 NOTE — PROGRESS NOTES
"Subjective   Maya Ribera is a 62 y.o. female. Who presents for routine follow up      History of Present Illness   Recent hx esophageal ca- recent work up negative for reoccurrence and pt has been feeling better. Follows with oncology closely.   has been trying to eat better and keep weight, lost 3 lbs since last time weighted on 11/30-  can only 6 oz at a time so has been trying to eat more frequently.     Smoking 0.5 pack/day. Had quit the last time I saw her but states winter months are harder for her and restarted. Not ready to quit at this time but working on it.    Sees endocrinology for hyperthyroidism- recent medication change - Decrease  Methimazole to 10 mg daily.    Recent cbc, cmp reviewed and stable.   Lipids and A1c due but defers at this time- wants to get with annual in june    Denies concerns today. Does not need refills  The following portions of the patient's history were reviewed and updated as appropriate: allergies, current medications, past family history, past medical history, past social history and past surgical history.    Review of Systems   Constitutional: Negative for fatigue, fever and unexpected weight loss.   Respiratory: Negative.    Cardiovascular: Negative.    Gastrointestinal: Negative for abdominal pain and nausea.   Genitourinary: Negative for difficulty urinating.   Neurological: Negative for dizziness and headache.   Psychiatric/Behavioral: Negative for sleep disturbance and depressed mood.       Objective    /70   Pulse 60   Temp 96.9 °F (36.1 °C) (Temporal)   Resp 18   Ht 162.6 cm (64.02\")   Wt 48.5 kg (107 lb)   SpO2 100%   BMI 18.36 kg/m²     Physical Exam  Constitutional:       Appearance: Normal appearance. She is not ill-appearing.   Cardiovascular:      Rate and Rhythm: Normal rate and regular rhythm.      Pulses: Normal pulses.      Heart sounds: Normal heart sounds, S1 normal and S2 normal. No murmur heard.  Pulmonary:      Effort: Pulmonary " effort is normal. No respiratory distress.      Breath sounds: Normal breath sounds.   Neurological:      General: No focal deficit present.      Mental Status: She is alert and oriented to person, place, and time.   Psychiatric:         Attention and Perception: Attention normal.         Mood and Affect: Mood normal.         Speech: Speech normal.         Behavior: Behavior normal.         Thought Content: Thought content normal.         Cognition and Memory: Cognition normal.         Judgment: Judgment normal.       Assessment & Plan   Diagnoses and all orders for this visit:    1. Hyperlipidemia, unspecified hyperlipidemia type (Primary)  2. Primary hypertension          -    Vitals stable. Cont same tx. Offered to check lipids and a1c but defers at this time. Continue working on healthy, nutritious diet. Follow Recommendations per oncology.           - pap due- will make appointment.                - follow up sometimes after 6/8/23 for annual, sooner as needed.        - Pt agrees with plan of care and states no further concerns or questions today    This document is intended for medical expert use only. Reading of this document by patients and/or patient's family without participating medical staff guidance may result in misinterpretation and unintended morbidity.  Any interpretation of such data is the responsibility of the patient and/or family member responsible for the patient in concert with their primary or specialist providers, not to be left for sources of online searches such as [employer], [employer] or similar queries. Relying on these approaches to knowledge may result in misinterpretation, misguided goals of care and even death should patients or family members try recommendations outside of the realm of professional medical care in a supervised way.     Please allow 3-5 business days for recommendations based on new results     Go to the ER for any possible lifethreatening symptoms such as chest pain or  shortness of air.      I personally spent 15 minutes with this patient, preparing for the visit, reviewing tests, obtaining and/or reviewing a separately obtained history, performing a medically appropriate examination and/or evaluation , counseling and educating the patient/family/caregiver, ordering medications, tests, or procedures, documenting information in the medical record and independently interpreting results.

## 2023-01-05 PROCEDURE — FACE2FACE: Performed by: RADIOLOGY

## 2023-01-11 ENCOUNTER — INFUSION (OUTPATIENT)
Dept: ONCOLOGY | Facility: HOSPITAL | Age: 63
End: 2023-01-11
Payer: COMMERCIAL

## 2023-01-11 DIAGNOSIS — Z45.9 ENCOUNTER FOR MANAGEMENT OF IMPLANTED DEVICE: Primary | ICD-10-CM

## 2023-01-11 PROCEDURE — 96523 IRRIG DRUG DELIVERY DEVICE: CPT

## 2023-01-11 PROCEDURE — 25010000002 HEPARIN LOCK FLUSH PER 10 UNITS: Performed by: INTERNAL MEDICINE

## 2023-01-11 RX ORDER — HEPARIN SODIUM (PORCINE) LOCK FLUSH IV SOLN 100 UNIT/ML 100 UNIT/ML
500 SOLUTION INTRAVENOUS AS NEEDED
Status: DISCONTINUED | OUTPATIENT
Start: 2023-01-11 | End: 2023-01-11 | Stop reason: HOSPADM

## 2023-01-11 RX ORDER — SODIUM CHLORIDE 0.9 % (FLUSH) 0.9 %
10 SYRINGE (ML) INJECTION AS NEEDED
Status: DISCONTINUED | OUTPATIENT
Start: 2023-01-11 | End: 2023-01-11 | Stop reason: HOSPADM

## 2023-01-11 RX ORDER — HEPARIN SODIUM (PORCINE) LOCK FLUSH IV SOLN 100 UNIT/ML 100 UNIT/ML
500 SOLUTION INTRAVENOUS AS NEEDED
Status: CANCELLED | OUTPATIENT
Start: 2023-01-11

## 2023-01-11 RX ORDER — SODIUM CHLORIDE 0.9 % (FLUSH) 0.9 %
10 SYRINGE (ML) INJECTION AS NEEDED
Status: CANCELLED | OUTPATIENT
Start: 2023-01-11

## 2023-01-11 RX ADMIN — Medication 500 UNITS: at 11:51

## 2023-01-11 RX ADMIN — Medication 10 ML: at 11:51

## 2023-02-08 ENCOUNTER — OFFICE VISIT (OUTPATIENT)
Dept: FAMILY MEDICINE CLINIC | Facility: CLINIC | Age: 63
End: 2023-02-08
Payer: COMMERCIAL

## 2023-02-08 VITALS
HEIGHT: 64 IN | OXYGEN SATURATION: 97 % | WEIGHT: 106 LBS | RESPIRATION RATE: 16 BRPM | TEMPERATURE: 97.1 F | BODY MASS INDEX: 18.1 KG/M2 | DIASTOLIC BLOOD PRESSURE: 62 MMHG | HEART RATE: 74 BPM | SYSTOLIC BLOOD PRESSURE: 110 MMHG

## 2023-02-08 DIAGNOSIS — Z12.4 PAP SMEAR FOR CERVICAL CANCER SCREENING: Primary | ICD-10-CM

## 2023-02-08 PROCEDURE — 99213 OFFICE O/P EST LOW 20 MIN: CPT

## 2023-02-08 NOTE — PROGRESS NOTES
"Nazanin Ribera is a 62 y.o. female. Who presents for pap smear      History of Present Illness     Last cervical ca screen thinks about 3 yrs ago- normal.   Pt does have prolapse and uses a pessary routinely. Took out today for pap    No vaginal pain, bleeding, discharge  No urinary symptoms  No breast symptoms  Mammogram UTD    The following portions of the patient's history were reviewed and updated as appropriate: allergies, current medications, past family history, past medical history, past social history and past surgical history.    Review of Systems   Genitourinary: Negative for breast discharge, breast lump, breast pain, difficulty urinating, dysuria, flank pain, frequency, genital sores, pelvic pain, pelvic pressure, vaginal bleeding, vaginal discharge and vaginal pain.       Objective    /62   Pulse 74   Temp 97.1 °F (36.2 °C) (Temporal)   Resp 16   Ht 162.6 cm (64.02\")   Wt 48.1 kg (106 lb)   SpO2 97%   BMI 18.19 kg/m²     Physical Exam  Exam conducted with a chaperone present.   Constitutional:       Appearance: Normal appearance. She is not ill-appearing.   Pulmonary:      Effort: Pulmonary effort is normal. No respiratory distress.   Chest:   Breasts:     Breasts are symmetrical.      Right: Normal.      Left: Normal.   Genitourinary:     General: Normal vulva.      Exam position: Lithotomy position.      Pubic Area: No rash or pubic lice.       Labia:         Right: No rash, tenderness, lesion or injury.         Left: No rash, tenderness, lesion or injury.       Vagina: Prolapsed vaginal walls present. No vaginal discharge, erythema, tenderness or bleeding.      Cervix: No discharge or cervical bleeding.      Comments: Difficult assessment. Tried x3 and barely able to find cervix due to prolapse.   Lymphadenopathy:      Upper Body:      Right upper body: No supraclavicular, axillary or pectoral adenopathy.      Left upper body: No supraclavicular, axillary or pectoral " adenopathy.      Lower Body: No right inguinal adenopathy. No left inguinal adenopathy.   Neurological:      General: No focal deficit present.      Mental Status: She is alert and oriented to person, place, and time.   Psychiatric:         Attention and Perception: Attention normal.         Mood and Affect: Mood normal.         Speech: Speech normal.         Behavior: Behavior normal.         Thought Content: Thought content normal.         Cognition and Memory: Cognition normal.         Judgment: Judgment normal.       Assessment & Plan   Diagnoses and all orders for this visit:    1. Pap smear for cervical cancer screening (Primary)  -     IGP, Aptima HPV, Rfx 16 / 18,45    tolerated procedure well but difficulties finding cervix due to a lot of tissue- hx prolapse. Advised sample may not be satisfactory and if so repeat in 6 months   Otherwise will let know if results when available- usually takes 3-4 days.     Normal breast exam    Follow up as needed     - Pt agrees with plan of care and states no further concerns or questions today    This document is intended for medical expert use only. Reading of this document by patients and/or patient's family without participating medical staff guidance may result in misinterpretation and unintended morbidity.  Any interpretation of such data is the responsibility of the patient and/or family member responsible for the patient in concert with their primary or specialist providers, not to be left for sources of online searches such as [employer], [employer] or similar queries. Relying on these approaches to knowledge may result in misinterpretation, misguided goals of care and even death should patients or family members try recommendations outside of the realm of professional medical care in a supervised way.     Please allow 3-5 business days for recommendations based on new results     Go to the ER for any possible lifethreatening symptoms such as chest pain or shortness of  air.      I personally spent20 minutes with this patient, preparing for the visit, reviewing tests, obtaining and/or reviewing a separately obtained history, performing a medically appropriate examination and/or evaluation , counseling and educating the patient/family/caregiver, ordering medications, tests, or procedures, documenting information in the medical record and independently interpreting results.

## 2023-02-10 PROCEDURE — 77386: CPT | Performed by: RADIOLOGY

## 2023-02-15 LAB
CYTOLOGIST CVX/VAG CYTO: NORMAL
CYTOLOGY CVX/VAG DOC CYTO: NORMAL
CYTOLOGY CVX/VAG DOC THIN PREP: NORMAL
DX ICD CODE: NORMAL
HIV 1 & 2 AB SER-IMP: NORMAL
HPV GENOTYPE REFLEX: NORMAL
HPV I/H RISK 4 DNA CVX QL PROBE+SIG AMP: NEGATIVE
OTHER STN SPEC: NORMAL
PATHOLOGIST CVX/VAG CYTO: NORMAL
RECOM F/U CVX/VAG CYTO: NORMAL
STAT OF ADQ CVX/VAG CYTO-IMP: NORMAL

## 2023-02-16 NOTE — PROGRESS NOTES
Please inform pt that as explained, the sample we obtained during the pap was not satisfactory for them to evaluate the cells.   It was however negative for HPV. Would repeat pap in 1 year.

## 2023-02-22 ENCOUNTER — INFUSION (OUTPATIENT)
Dept: ONCOLOGY | Facility: HOSPITAL | Age: 63
End: 2023-02-22
Payer: COMMERCIAL

## 2023-02-22 DIAGNOSIS — Z45.9 ENCOUNTER FOR MANAGEMENT OF IMPLANTED DEVICE: Primary | ICD-10-CM

## 2023-02-22 PROCEDURE — 96523 IRRIG DRUG DELIVERY DEVICE: CPT

## 2023-02-22 PROCEDURE — 25010000002 HEPARIN LOCK FLUSH PER 10 UNITS: Performed by: INTERNAL MEDICINE

## 2023-02-22 RX ORDER — SODIUM CHLORIDE 0.9 % (FLUSH) 0.9 %
10 SYRINGE (ML) INJECTION AS NEEDED
Status: DISCONTINUED | OUTPATIENT
Start: 2023-02-22 | End: 2023-02-22 | Stop reason: HOSPADM

## 2023-02-22 RX ORDER — HEPARIN SODIUM (PORCINE) LOCK FLUSH IV SOLN 100 UNIT/ML 100 UNIT/ML
500 SOLUTION INTRAVENOUS AS NEEDED
Status: DISCONTINUED | OUTPATIENT
Start: 2023-02-22 | End: 2023-02-22 | Stop reason: HOSPADM

## 2023-02-22 RX ADMIN — Medication 10 ML: at 11:59

## 2023-02-22 RX ADMIN — Medication 500 UNITS: at 11:59

## 2023-03-17 ENCOUNTER — TELEPHONE (OUTPATIENT)
Dept: ONCOLOGY | Facility: CLINIC | Age: 63
End: 2023-03-17

## 2023-03-17 ENCOUNTER — DOCUMENTATION (OUTPATIENT)
Dept: ONCOLOGY | Facility: CLINIC | Age: 63
End: 2023-03-17
Payer: COMMERCIAL

## 2023-03-17 NOTE — TELEPHONE ENCOUNTER
"  Caller: Maya Ribera \"COLLEEN\"    Relationship: Self    Best call back number: [phone]    What is the best time to reach you: ASAP    Who are you requesting to speak with (clinical staff, provider,  specific staff member): CLINICAL    What was the call regarding: PT HAS LETTER FROM INSURANCE ABOUT HER LANSOPRAZOLE, SAYS IF SHE IS SUPPOSED TO CONTINUE TAKING IT, THEY NEED DR DESAI TO FILL OUT ONLINE PRIOR AUTH FORM    Do you require a callback: YES, PLEASE CALL PT BACK TO ADVISE IF SHE NEEDS TO CONTINUE TAKING, AND IF THIS PRIOR AUTH WILL BE COMPLETED.          "

## 2023-04-05 ENCOUNTER — INFUSION (OUTPATIENT)
Dept: ONCOLOGY | Facility: HOSPITAL | Age: 63
End: 2023-04-05
Payer: COMMERCIAL

## 2023-04-05 DIAGNOSIS — Z45.9 ENCOUNTER FOR MANAGEMENT OF IMPLANTED DEVICE: Primary | ICD-10-CM

## 2023-04-05 PROCEDURE — 96523 IRRIG DRUG DELIVERY DEVICE: CPT

## 2023-04-05 PROCEDURE — 25010000002 HEPARIN LOCK FLUSH PER 10 UNITS: Performed by: INTERNAL MEDICINE

## 2023-04-05 RX ORDER — SODIUM CHLORIDE 0.9 % (FLUSH) 0.9 %
10 SYRINGE (ML) INJECTION AS NEEDED
Status: ACTIVE | OUTPATIENT
Start: 2023-04-05

## 2023-04-05 RX ORDER — SODIUM CHLORIDE 0.9 % (FLUSH) 0.9 %
10 SYRINGE (ML) INJECTION AS NEEDED
OUTPATIENT
Start: 2023-04-05

## 2023-04-05 RX ORDER — HEPARIN SODIUM (PORCINE) LOCK FLUSH IV SOLN 100 UNIT/ML 100 UNIT/ML
500 SOLUTION INTRAVENOUS AS NEEDED
Status: ACTIVE | OUTPATIENT
Start: 2023-04-05

## 2023-04-05 RX ORDER — HEPARIN SODIUM (PORCINE) LOCK FLUSH IV SOLN 100 UNIT/ML 100 UNIT/ML
500 SOLUTION INTRAVENOUS AS NEEDED
Status: DISCONTINUED | OUTPATIENT
Start: 2023-04-05 | End: 2023-04-05 | Stop reason: HOSPADM

## 2023-04-05 RX ORDER — HEPARIN SODIUM (PORCINE) LOCK FLUSH IV SOLN 100 UNIT/ML 100 UNIT/ML
500 SOLUTION INTRAVENOUS AS NEEDED
OUTPATIENT
Start: 2023-04-05

## 2023-04-05 RX ORDER — SODIUM CHLORIDE 0.9 % (FLUSH) 0.9 %
10 SYRINGE (ML) INJECTION AS NEEDED
Status: DISCONTINUED | OUTPATIENT
Start: 2023-04-05 | End: 2023-04-05 | Stop reason: HOSPADM

## 2023-04-05 RX ADMIN — Medication 500 UNITS: at 14:52

## 2023-04-05 RX ADMIN — Medication 10 ML: at 14:52

## 2023-05-01 ENCOUNTER — OFFICE VISIT (OUTPATIENT)
Dept: SURGERY | Facility: CLINIC | Age: 63
End: 2023-05-01
Payer: COMMERCIAL

## 2023-05-01 VITALS
WEIGHT: 105.4 LBS | OXYGEN SATURATION: 98 % | BODY MASS INDEX: 17.99 KG/M2 | HEIGHT: 64 IN | DIASTOLIC BLOOD PRESSURE: 78 MMHG | HEART RATE: 71 BPM | SYSTOLIC BLOOD PRESSURE: 128 MMHG

## 2023-05-01 DIAGNOSIS — C15.9 MALIGNANT NEOPLASM OF ESOPHAGUS, UNSPECIFIED LOCATION: Primary | ICD-10-CM

## 2023-05-01 DIAGNOSIS — Z48.3 AFTERCARE FOLLOWING SURGERY FOR NEOPLASM: ICD-10-CM

## 2023-05-01 RX ORDER — ONDANSETRON 4 MG/1
4 TABLET, FILM COATED ORAL DAILY PRN
Qty: 15 TABLET | Refills: 0 | Status: SHIPPED | OUTPATIENT
Start: 2023-05-01

## 2023-05-01 RX ORDER — LANSOPRAZOLE 30 MG/1
30 TABLET, ORALLY DISINTEGRATING, DELAYED RELEASE ORAL
Qty: 60 TABLET | Refills: 1 | Status: SHIPPED | OUTPATIENT
Start: 2023-05-01

## 2023-05-01 RX ORDER — METHIMAZOLE 10 MG/1
TABLET ORAL
COMMUNITY
Start: 2023-04-19

## 2023-05-01 NOTE — PROGRESS NOTES
"Chief Complaint  Aftercare following surgery for adenocarcinoma of the esophagus.    Subjective        Maya Ribera presents to [facility]AC[facility] SUR[facility]ERY  History of Present Illness     Maya Ribera is a pleasant 62-year-old who presents in follow-up today.  She is known to our service after undergoing a robot-assisted total esophagectomy performed by Dr. Hayder Finch in April 29, 2022 secondary to adenocarcinoma of the esophagus.  She was last seen in follow-up on 10/25/2022 by Dr. Finch following an EGD for continued surveillance.  The anastomosis was noted to be mildly strictured and was dilated up to 16 mm without difficulty.  Additionally there was no evidence of esophagitis, ulcer, or recurrent tumor.     She presents today to discuss her CT of the chest abdomen and pelvis.  She reports that she has been doing well since we have last seen her. She denies any obvious esophageal dysphagia although she did report a few occurrences where she felt that the food bolus that she swallowed would only slowly go down the lower portion of her esophagus.  She reports that she is finally able to get insurance coverage for her antacid.  She also reports that there have been limited occasions to where she has felt some nausea.    Objective   Vital Signs:  /78   Pulse 71   Ht 162.6 cm (64.02\")   Wt 47.8 kg (105 lb 6.4 oz)   SpO2 98%   BMI 18.08 kg/m²   Estimated body mass index is 18.08 kg/m² as calculated from the following:    Height as of this encounter: 162.6 cm (64.02\").    Weight as of this encounter: 47.8 kg (105 lb 6.4 oz).             Physical Exam   Result Review :      Data reviewed: Radiologic studies CT of the chest; CT of the abdomen, and pelvis. (Performed by [employer] on 4/26/2023)    CT of the chest: No new findings to suggest metastatic disease in the chest.  Study degraded by noncontrast technique.  Emphysematous changes with old healed granulomatous disease " scarring and fibrosis.  Faint tree-in-bud opacities present in the anterior inferior right middle lobe.  No new threshold adenopathy.  Mild aneurysmal dilatation of the thoracic aorta, stable to minimally progressed.  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    CT of the abdomen and pelvis with contrast: No new findings to suggest metastatic disease in the abdomen or pelvis.  Hepatic steatosis.  Postoperative changes of esophagectomy and gastric pull-through.  Acute small airways disease in the right middle lobe and background emphysematous changes.          Assessment and Plan   Diagnoses and all orders for this visit:    1. Malignant neoplasm of esophagus, unspecified location (Primary)  -     CT Chest Without Contrast; Future  -     CT abdomen pelvis wo contrast; Future    2. Aftercare following surgery for neoplasm  -     CT Chest Without Contrast; Future  -     CT abdomen pelvis wo contrast; Future    Other orders  -     lansoprazole (PREVACID SOLUTAB) 30 MG Tablet Delayed Release Dispersible disintegrating tablet; Take 1 tablet by mouth Every Morning Before Breakfast.  Dispense: 60 tablet; Refill: 1  -     ondansetron (Zofran) 4 MG tablet; Take 1 tablet by mouth Daily As Needed for Nausea or Vomiting.  Dispense: 15 tablet; Refill: 0    Ms. Bacilio has been doing well s/p esophagectomy for adenocarcinoma of the esophagus.  Her weight has remained relatively stable.  No evidence of local, regional, or distant malignancy on CT imaging.  Plan for repeat CT of the chest, abdomen, and pelvis to be performed in 6 months for continued surveillance.  EGD planned for 5/19/2023 by Dr. Reilly Patricia.  The procedure was discussed in detail with the patient including its indication, risk associated, and alternatives.  All of her questions were answered to her satisfaction. She is agreeable to proceed.  Lansoprazole for dyspepsia, ondansetron as needed for nausea or vomiting.         I  spent 60 minutes caring for Maya on this date of service. This time includes time spent by me in the following activities:preparing for the visit, reviewing tests, performing a medically appropriate examination and/or evaluation , counseling and educating the patient/family/caregiver, ordering medications, tests, or procedures, referring and communicating with other health care professionals , documenting information in the medical record and independently interpreting results and communicating that information with the patient/family/caregiver  Follow Up   Return in about 18 days (around 5/19/2023) for EGD.  Patient was given instructions and counseling regarding her condition or for health maintenance advice. Please see specific information pulled into the AVS if appropriate.

## 2023-05-01 NOTE — H&P (VIEW-ONLY)
"Chief Complaint  Aftercare following surgery for adenocarcinoma of the esophagus.    Subjective        Maya Ribera presents to [facility] THORACIC SURGERY  History of Present Illness     Maya Ribera is a pleasant 62-year-old who presents in follow-up today.  She is known to our service after undergoing a robot-assisted total esophagectomy performed by Dr. Hayder Finch in April 29, 2022 secondary to adenocarcinoma of the esophagus.  She was last seen in follow-up on 10/25/2022 by Dr. Finch following an EGD for continued surveillance.  The anastomosis was noted to be mildly strictured and was dilated up to 16 mm without difficulty.  Additionally there was no evidence of esophagitis, ulcer, or recurrent tumor.     She presents today to discuss her CT of the chest abdomen and pelvis.  She reports that she has been doing well since we have last seen her. She denies any obvious esophageal dysphagia although she did report a few occurrences where she felt that the food bolus that she swallowed would only slowly go down the lower portion of her esophagus.  She reports that she is finally able to get insurance coverage for her antacid.  She also reports that there have been limited occasions to where she has felt some nausea.    Objective   Vital Signs:  /78   Pulse 71   Ht 162.6 cm (64.02\")   Wt 47.8 kg (105 lb 6.4 oz)   SpO2 98%   BMI 18.08 kg/m²   Estimated body mass index is 18.08 kg/m² as calculated from the following:    Height as of this encounter: 162.6 cm (64.02\").    Weight as of this encounter: 47.8 kg (105 lb 6.4 oz).             Physical Exam   Result Review :      Data reviewed: Radiologic studies CT of the chest; CT of the abdomen, and pelvis. (Performed by [employer] on 4/26/2023)    CT of the chest: No new findings to suggest metastatic disease in the chest.  Study degraded by noncontrast technique.  Emphysematous changes with old healed granulomatous disease " scarring and fibrosis.  Faint tree-in-bud opacities present in the anterior inferior right middle lobe.  No new threshold adenopathy.  Mild aneurysmal dilatation of the thoracic aorta, stable to minimally progressed.  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    CT of the abdomen and pelvis with contrast: No new findings to suggest metastatic disease in the abdomen or pelvis.  Hepatic steatosis.  Postoperative changes of esophagectomy and gastric pull-through.  Acute small airways disease in the right middle lobe and background emphysematous changes.          Assessment and Plan   Diagnoses and all orders for this visit:    1. Malignant neoplasm of esophagus, unspecified location (Primary)  -     CT Chest Without Contrast; Future  -     CT abdomen pelvis wo contrast; Future    2. Aftercare following surgery for neoplasm  -     CT Chest Without Contrast; Future  -     CT abdomen pelvis wo contrast; Future    Other orders  -     lansoprazole (PREVACID SOLUTAB) 30 MG Tablet Delayed Release Dispersible disintegrating tablet; Take 1 tablet by mouth Every Morning Before Breakfast.  Dispense: 60 tablet; Refill: 1  -     ondansetron (Zofran) 4 MG tablet; Take 1 tablet by mouth Daily As Needed for Nausea or Vomiting.  Dispense: 15 tablet; Refill: 0    Ms. Bacilio has been doing well s/p esophagectomy for adenocarcinoma of the esophagus.  Her weight has remained relatively stable.  No evidence of local, regional, or distant malignancy on CT imaging.  Plan for repeat CT of the chest, abdomen, and pelvis to be performed in 6 months for continued surveillance.  EGD planned for 5/19/2023 by Dr. Reilly Patricia.  The procedure was discussed in detail with the patient including its indication, risk associated, and alternatives.  All of her questions were answered to her satisfaction. She is agreeable to proceed.  Lansoprazole for dyspepsia, ondansetron as needed for nausea or vomiting.         I  spent 60 minutes caring for Maya on this date of service. This time includes time spent by me in the following activities:preparing for the visit, reviewing tests, performing a medically appropriate examination and/or evaluation , counseling and educating the patient/family/caregiver, ordering medications, tests, or procedures, referring and communicating with other health care professionals , documenting information in the medical record and independently interpreting results and communicating that information with the patient/family/caregiver  Follow Up   Return in about 18 days (around 5/19/2023) for EGD.  Patient was given instructions and counseling regarding her condition or for health maintenance advice. Please see specific information pulled into the AVS if appropriate.

## 2023-05-04 ENCOUNTER — PREP FOR SURGERY (OUTPATIENT)
Dept: OTHER | Facility: HOSPITAL | Age: 63
End: 2023-05-04
Payer: COMMERCIAL

## 2023-05-04 DIAGNOSIS — C15.5 MALIGNANT NEOPLASM OF LOWER THIRD OF ESOPHAGUS: Primary | ICD-10-CM

## 2023-05-04 RX ORDER — SODIUM CHLORIDE 0.9 % (FLUSH) 0.9 %
10 SYRINGE (ML) INJECTION EVERY 12 HOURS SCHEDULED
Status: CANCELLED | OUTPATIENT
Start: 2023-05-04

## 2023-05-04 RX ORDER — SODIUM CHLORIDE 0.9 % (FLUSH) 0.9 %
10 SYRINGE (ML) INJECTION AS NEEDED
OUTPATIENT
Start: 2023-05-04

## 2023-05-04 RX ORDER — SODIUM CHLORIDE 0.9 % (FLUSH) 0.9 %
10 SYRINGE (ML) INJECTION EVERY 12 HOURS SCHEDULED
OUTPATIENT
Start: 2023-05-04

## 2023-05-04 RX ORDER — CEFAZOLIN SODIUM 2 G/100ML
2 INJECTION, SOLUTION INTRAVENOUS ONCE
Status: CANCELLED | OUTPATIENT
Start: 2023-05-04 | End: 2023-05-04

## 2023-05-04 RX ORDER — SODIUM CHLORIDE 9 MG/ML
40 INJECTION, SOLUTION INTRAVENOUS AS NEEDED
Status: CANCELLED | OUTPATIENT
Start: 2023-05-04

## 2023-05-04 RX ORDER — CEFAZOLIN SODIUM 2 G/100ML
2 INJECTION, SOLUTION INTRAVENOUS ONCE
OUTPATIENT
Start: 2023-05-04 | End: 2023-05-04

## 2023-05-04 RX ORDER — SODIUM CHLORIDE 0.9 % (FLUSH) 0.9 %
10 SYRINGE (ML) INJECTION AS NEEDED
Status: CANCELLED | OUTPATIENT
Start: 2023-05-04

## 2023-05-04 RX ORDER — SODIUM CHLORIDE 9 MG/ML
40 INJECTION, SOLUTION INTRAVENOUS AS NEEDED
OUTPATIENT
Start: 2023-05-04

## 2023-05-04 RX ORDER — SODIUM CHLORIDE, SODIUM LACTATE, POTASSIUM CHLORIDE, CALCIUM CHLORIDE 600; 310; 30; 20 MG/100ML; MG/100ML; MG/100ML; MG/100ML
30 INJECTION, SOLUTION INTRAVENOUS CONTINUOUS PRN
Status: CANCELLED | OUTPATIENT
Start: 2023-05-04

## 2023-05-17 NOTE — PROGRESS NOTES
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      History of Present Illness   This is a 62 y.o. female with recent diagnosis of distal esophageal cancer, initiating concurrent chemoradiation with carboplatin/Taxol on 1/5/2022.  Unfortunately, the patient has had frequent delays due to pancytopenia, weight loss, malnutrition and COVID-19.  She is completed only 3 weeks of chemotherapy.  She is due today for her fourth dose of carboplatin Taxol.  She has 5 radiation treatments remaining.    Her case was presented at the multidisciplinary clinic this morning with plans to complete the last week of concurrent chemoradiation followed by PET scan.  It is unclear at this time if the patient would qualify for surgery given her struggles and malnutrition.  She does have a J-tube for which her partner utilizes for nutrition.  She is still able to swallow pills by mouth.  Her weight is stable over the past few weeks.    She has been struggling with her blood pressure.  She is on metoprolol originally 50 mg twice daily for her atrial fibrillation.  She has cut this down to once daily.  She does report she has been holding her Eliquis since her platelets dropped and has not yet resumed.  Her and her partner expressed concerns regarding the timeline of PET scan and possible surgery.  They are very eager to proceed with PET scan.  We did discuss today the possibility for false positives with esophagitis symptoms.  We will plan PET scan 4 weeks after the completion of radiation which is the soonest this should be obtained.    A PET/CT was obtained  3/17/2022 demonstrating significant decrease in FDG avid thickened mid to distal esophagus, decrease in activity in gastrohepatic and left hilar nodes no longer demonstrating that above blood pool, new subcentimeter nodule right apex thought to be an endobronchial filling defect it was indeterminant with a new area of tree-in-bud nodularity medial aspect left upper lobe thought to be reactive.    The patient is seen with her partner 3/23/2022.  She recently had nausea and vomiting for several days thought to be?  Food poisoning which, fortunately, is now resolving.  We have reviewed her scans and she could well be a candidate for surgery which we will asked Dr. Finch to review her for next available.    The patient is next reviewed 4/20/2022.  She is anxious for surgery 4/29/2022 but ready to proceed.    The patient was admitted 4/29 - 5/9/2022 undergoing bronchoscopy with right thoracoscopy, partial decortication, robot-assisted total esophagectomy, jejunostomy tube placement and intercostal nerve block.  She had a satisfactory postoperative course and was able to be discharged 5/9/2022 though was briefly readmitted requiring replacement of her J-tube at bedside.    Pathology demonstrated rare focal metaplasia at the GE junction consistent with Cotto's esophagus, focal submucosal scar, chronic esophagitis, no evidence of dysplasia or malignancy identified by staining, focal active chronic gastritis, 13 lymph nodes negative, paraesophageal soft tissue margins negative, level 7 lymph node negative x3, final esophageal margin negative, final gastric margin negative-patient's final pathologic staging could be considered ypT0, N0.     The patient is seen with significant other 5/25/2022 and reviewed her findings recognizing that she could benefit substantially from immunotherapy  considering nivolumab every 4 weeks x1 year as adjuvant therapy.  This is discussed extensively as to potential side effect profile and  potential benefit.  The patient was seen by thoracic surgery 6/1/2022 and felt to be making good progress.      We made plans for her to undergo chemo education for the possible use of nivolumab but this was held into the patient was to return and she is next in 6/29/2022.        We discussed availability of information Checkmate 577 trial in patients who had residual pathologic disease at the time of surgery to nivolumab with median disease-free survival nearly twice as long 22 months versus 11 months across follow-up routine regardless of PD-L1 overexpression.     As result we reviewed the records available including biopsies done at more than 1 location with PD-L1 never obtained.     At some point this may be an issue with the patient does have recurrence.     She is seen back 6/29/2022 indicating that she does not wish to take immunotherapy at this point and we have discussed the above rationale.    It was elected to follow the patient and she is reviewed 8/24/2022.  She has gained approximately 4 pounds, CBC normalized except for mild leukocytosis of 14,560.  She is feeling reasonably well with her partner echoing her continued improvement.  She has a follow-up CT series scheduled in September, follow-up thereafter with thoracic surgery.    The patient is next assessed 11/30/2022 having undergone outpatient CT at another location which, fortunately, revealed no evidence of disease.  She is doing fairly well though slow to gain weight and requiring multiple small meals per day with occasional abdominal pain and reflux discomfort.  We have had a lengthy discussion about her eating habits and expected side effects after her particular surgery.  She is also been very concerned about continuing anticoagulation with an assessment for of her cardiac rhythm by long-term monitor anticipated initially.  She is urged to complete this and possibly come off anticoagulation.      ONCOLOGY HISTORY     The patient is a  62-year-old female who had been seen in multidisciplinary thoracic oncology conference with complaints of painful swallowing and dysphagia over the previous several months with 10 to 15 pound weight loss.  This led to EGD and colonoscopy with normal colonoscopy but EGD demonstrated tumor at the GE junction with biopsies consistent with severe dysplasia including carcinoma in situ.  The patient states that she saw a number of physicians and attempting to have a diagnosis completed.  CT of chest demonstrated thickening of the mid esophagus with no lymphadenopathy and CT PET demonstrated uptake in the esophagus gastropathic lymph nodes it is felt that she likely had early stage carcinoma of the esophagus and that we could present to record with surgical resection.  Endoscopic ultrasound, however, was requested and we could not have this done any sooner than GI physicians available at [facility].     This was performed 12/7/2021 revealing a 5 cm esophageal mass present in the distal third esophagus without extension into the GE junction into the cardia, the mass extended into into the muscularis propria into the adventitia not penetrating through the adventitia with a single 1 cm gastrohepatic/celiac axis lymph node and FNB x1 performed-?  T2 N1 M0 distal esophageal cancer-clinical stage III, pathologic stage IIIa  In contacting the [facility] pathology department the results of this FNB are not yet available and will be by 12/9/2021.      The patient is seen with her sister and son all indicating that she has had difficulty with swallowing since late summer likely July 2021 and has been attempting to have a diagnosis made since that time.  They are all somewhat frustrated about the length of time to obtain an answer for this problem and we have spent considerable time discussing her findings thus far and how we might proceed to treat what appears to be a contained malignancy.  This has, additionally, been  "discussed with Dr. Stef cruz who feels that she is not immediately and operative candidate and should proceed with chemo radiation therapy initially-neoadjuvant treatment before consideration for subsequent surgery.  This has been discussed with the patient, her sister and her son again in detail 12/8/2021.  We went on to initiate pain control with liquid hydrocodone, referred the patient to general surgery and oncology nutrition.    The patient was seen by  who proceeded 12/15/2021 to PowerPort placement and open jejunostomy.  She was seen during her hospital stay by oncology nutrition required hospitalization up to including 12/19/2021.    The patient required hospitalization to 12/20/2021 and after discharge along with her partner's health was able to gradually improve her caloric intake and was seen back 12/27 by Dr. Hollis who felt she had improved enough to initiate chemotherapy.  The patient was seen by radiation therapy 12/21 with plans to proceed with 40 CT simulation and planning-IMRT/IGR T-4140 cGy in 23 fractions with pleased to be considered.    The patient is seen back with her partner Cristy 12/29/2021 now able to \"manage\" and we have discussed extensively concurrent chemotherapy radiation therapy using Carboplatin/Taxol weekly.    Currently the patient is scheduled to begin radiation therapy 1/3/2021, undergoing teaching 1/4/2022 and will begin concurrent chemotherapy 1/5/2022    The patient had difficulty tolerating this treatment but was eventually able to complete it though modified for toxicity-1/3/2022-2/18/2022 4680 with 26 total treatments, carboplatinum/Taxol weekly 1/5, 1/12, 1/19 and delayed until 2/10/2022.    A PET/CT was obtained 3/17/2022 demonstrating significant decrease in FDG avid thickened mid to distal esophagus, decrease in activity in gastrohepatic and left hilar nodes no longer demonstrating that above blood pool, new subcentimeter nodule right apex thought to " be an endobronchial filling defect it was indeterminant with a new area of tree-in-bud nodularity medial aspect left upper lobe thought to be reactive.    Patient had been presented at thoracic conference and upon completion of therapy and repeat PET/CT was to be reevaluated for surgery.  Patient seen in office 3/23/2022 referred back to thoracic surgery.            The patient was briefly admitted 5//2022 with dislodged J-tube.  G-tube was placed and the patient was able to be discharged.     The patient's final pathologic staging could be considered ypT0, N0.     The patient is seen with significant other 5/25/2022 and reviewed her findings recognizing that she could benefit substantially from immunotherapy  considering nivolumab every 4 weeks x1 year as adjuvant therapy.  This is discussed extensively as to potential side effect profile and potential benefit.    We discussed availability of information Checkmate 577 trial in patients who had residual pathologic disease at the time of surgery to nivolumab with median disease-free survival nearly twice as long 22 months versus 11 months across follow-up routine regardless of PD-L1 overexpression.     As result we reviewed the records available including biopsies done at more than 1 location with PD-L1 never obtained.     At some point this may be an issue with the patient does have recurrence.     She is seen back 6/29/2022 indicating that she does not wish to take immunotherapy at this point and we have discussed the above rationale.    Repeat scan scheduled September 2022.  The patient is seen 11/30/2022 stable clinically.  Repeat scans done outside location failed to show any evidence of recurrent disease and the patient is now followed at 6-month intervals.    The patient is next seen 5/18/2023 having undergone recent imaging at [facility] chest abdomen pelvis.  The studies demonstrate no metastatic disease, emphysematous changes bilaterally, acute small  airways disease in the right middle lobe, no additional adenopathy, mild aneurysmal dilatation of thoracic aorta, thyromegaly and postoperative changes.  Her performance status remains reasonably good though her weight has not changed substantially.    Past Medical History:   Diagnosis Date   • Boil, breast 12/13/2021    HEALING UNDER LEFT BREAST    • Cervical cancer    • Dysphagia    • Esophageal cancer    • Esophagitis 9/21/2021   • Gastric ulcer    • GERD (gastroesophageal reflux disease)    • Hyperlipidemia    • Hypertension    • Hyperthyroidism    • Irritable bowel    • Mitral valve prolapse     FOLLOWED BY     • PAF (paroxysmal atrial fibrillation)    • Uses feeding tube         Past Surgical History:   Procedure Laterality Date   • CHOLECYSTECTOMY     • COLONOSCOPY  08/27/2021    [facility]   • ENDOSCOPY N/A 10/13/2022    Procedure: ESOPHAGOGASTRODUODENOSCOPY WITH  SAVARY DILATATION (12.8;14;15;16) WITH FLUOROSCOPY;  Surgeon: Hayder Finch III, MD;  Location: [facility] ENDOSCOPY;  Service: Gastroenterology;  Laterality: N/A;  FOLLOW UP S/P ESOPHAGECTOMY   • ESOPHAGOGASTRECTOMY Right 4/29/2022    Procedure: BRONCHOSCOPY, RIGHT THORACOSCOPY WITH DAVINCI ROBOT WITH TOTAL ESOPHAGECTOMY, INTERCOSTAL NERVE BLOCK, JEJUNOSTOMY TUBE REPLACEMENT;  Surgeon: Hayder Finch III, MD;  Location: [facility] OR;  Service: Robotics - DaVinci;  Laterality: Right;   • EXTERNAL EAR SURGERY     • JEJUNOSTOMY N/A 12/15/2021    Procedure: open JEJUNOSTOMY tube placement;  Surgeon: Bhanu Hollis MD;  Location: [facility] OR;  Service: General;  Laterality: N/A;   • KNEE SURGERY Left    • REPLACEMENT TOTAL KNEE Left    • UPPER ENDOSCOPIC ULTRASOUND W/ FNA N/A 12/7/2021    Procedure: Esophagogastroduodenoscopy with endoscopic ultrasound  WITH STAGING AND FINE NEEDLE BIOPSY;  Surgeon: Amrit Green MD;  Location: [facility] ENDOSCOPY;  Service: Gastroenterology;  Laterality: N/A;  post op: inlet patch, esophageal  mass, T2   • VENOUS ACCESS DEVICE (PORT) INSERTION Left 12/15/2021    Procedure: INSERTION OF PORTACATH;  Surgeon: Bhanu Hollis MD;  Location: [facility];  Service: General;  Laterality: Left;        Current Outpatient Medications on File Prior to Visit   Medication Sig Dispense Refill   • Eliquis 5 MG tablet tablet Take 1 tablet by mouth Every 12 (Twelve) Hours. LD   PT STATED TO HOLD 72 HOURS PRIOR TO SURGERY     • lansoprazole (PREVACID SOLUTAB) 30 MG Tablet Delayed Release Dispersible disintegrating tablet Take 1 tablet by mouth Every Morning Before Breakfast. 60 tablet 1   • methIMAzole (TAPAZOLE) 10 MG tablet Take 10 mg by mouth Daily.     • methIMAzole (TAPAZOLE) 10 MG tablet Take 1 tab daily ( 5 days per week) . Do not take on Sat ,and .     • metoprolol tartrate (LOPRESSOR) 25 MG tablet Take 1 tablet by mouth 2 (Two) Times a Day.     • ondansetron (Zofran) 4 MG tablet Take 1 tablet by mouth Daily As Needed for Nausea or Vomiting. 15 tablet 0   • pravastatin (PRAVACHOL) 20 MG tablet Take 1 tablet by mouth Daily.       Current Facility-Administered Medications on File Prior to Visit   Medication Dose Route Frequency Provider Last Rate Last Admin   • heparin injection 500 Units  500 Units Intravenous PRN Iggy Harrell MD   500 Units at 23 1452   • sodium chloride 0.9 % flush 10 mL  10 mL Intravenous PRN Iggy Harrell MD   10 mL at 23 1452        ALLERGIES:    Allergies   Allergen Reactions   • Codeine Hives     Patient states this is when she was very young.         Social History     Socioeconomic History   • Marital status: Significant Other   • Number of children: 1   • Years of education: High school   Tobacco Use   • Smoking status: Former     Packs/day: 1.00     Years: 40.00     Pack years: 40.00     Types: Cigarettes     Start date:      Quit date: 2022     Years since quittin.0   • Smokeless tobacco: Never   • Tobacco comments:     2 per day    Vaping Use   • Vaping Use: Never used   Substance and Sexual Activity   • Alcohol use: Not Currently   • Drug use: Never   • Sexual activity: Defer        Family History   Problem Relation Age of Onset   • Breast cancer Mother    • Diabetes Mother    • Bipolar disorder Father    • Diabetes Father    • Hypertension Father    • Breast cancer Sister    • COPD Sister    • Diabetes Sister    • Hypertension Sister    • Alcohol abuse Brother    • Asthma Brother    • Bipolar disorder Brother    • Diabetes Brother    • Seizures Brother    • Breast cancer Maternal Grandmother    • Diabetes Maternal Grandmother    • Diabetes Maternal Grandfather    • Diabetes Paternal Grandmother    • Diabetes Paternal Grandfather    • Hypertension Son    • Kidney cancer Sister    • Hypertension Sister    • Diabetes Sister    • Malig Hyperthermia Neg Hx           Objective     There were no vitals filed for this visit.      11/30/2022     1:48 PM   Current Status   ECOG score 0       Physical Exam  Constitutional:       Appearance: Normal appearance. She is underweight.   HENT:      Head: Normocephalic and atraumatic.      Nose: Nose normal.   Eyes:      Extraocular Movements: Extraocular movements intact.      Conjunctiva/sclera: Conjunctivae normal.      Pupils: Pupils are equal, round, and reactive to light.   Cardiovascular:      Rate and Rhythm: Normal rate and regular rhythm.      Pulses: Normal pulses.      Heart sounds: Normal heart sounds.   Pulmonary:      Effort: Pulmonary effort is normal.      Breath sounds: Normal breath sounds.   Abdominal:      General: Bowel sounds are normal.      Palpations: Abdomen is soft. There is no mass.      Comments: Status post esophagectomy, wounds well-healed, J-tube removed   Musculoskeletal:         General: Normal range of motion.      Cervical back: Normal range of motion.   Skin:     General: Skin is warm and dry.      Findings: No rash.   Neurological:      General: No focal deficit  present.      Mental Status: She is alert and oriented to person, place, and time.   Psychiatric:         Mood and Affect: Mood normal.     I have reexamined the patient and the results are consistent with the previously documented exam. Iggy Harrell MD     RECENT LABS:                 Assessment & Plan          62-year-old female with a history of hyperthyroidism, previous gastric ulcer, GE reflux, atrial fibrillation, hyperlipidemia, hypertension irritable bowel syndrome,          1.  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  · here has been a number of physicians involved in the patient's case and several months before she was ultimately seen by Dr. Finch at [facility].   · CT scan of the chest demonstrating thickening of the mid esophagus with no lymphadenopathy and PET/CT demonstrated uptake in the mid to distal esophagus with SUV intensely elevated at 16 with a few nonenlarged left hilar and gastrohepatic lymph nodes with mild increased FDG activity, few scattered subcentimeter pulmonary nodules bilaterally indeterminate.  · EUS was felt necessary and ultimately obtained at [facility] performed 12/7/2021 revealing a 5 cm esophageal mass present in the distal third esophagus without extension into the GE junction into the cardia, the mass extended into into the muscularis propria into the adventitia not penetrating through the adventitia with a single 1 cm gastrohepatic/celiac axis lymph node and FNB x1 performed- FNA negative for malignancy  · Discussed with Dr. Finch directly who feels that she is not immediately and operative candidate and should proceed with chemo radiation therapy initially-neoadjuvant treatment  before consideration for subsequent surgery. Discussed extensively concurrent chemotherapy radiation therapy using Carboplatin/Taxol weekly.  ·  who proceeded 12/15/2021 to PowerPort placement and open jejunostomy.  She was seen during her hospital stay by oncology nutrition required hospitalization up to including 12/19/2021.  · seen by radiation therapy 12/21 with plans to proceed with 40 CT simulation and planning-IMRT/IGR T-4140 cGy in 23 fractions with pleased to be considered.   · Radiation therapy initiated 1/3/2022  · 1/5/2021 cycle 1 weekly carboplatin/Taxol.    · Patient status post week 2 on 1/12/2022  · Patient treated 1/19/2022-week 3  · 1/26/2022, hold treatment today due to platelet counts 42,000 and feeling unwell.  Covid positive at that time  · Chemotherapy held 2/2/2022 due to ongoing normal cytopenia, platelets of 40,000  · Presentation at multidisciplinary clinic 2/10/2022 with plans to complete fourth dose of carboplatin Taxol followed by PET scan.  It is unclear at this time if the patient will be a surgical candidate pending her response and nutritional state post treatment  · We will proceed today with her fourth dose of carboplatin Taxol  · Upon completion of chemotherapy patient reevaluated with PET/CT 3/17/2022 with significant response for esophageal lesion, gastrohepatic left hilar lymphadenopathy no longer FDG avid, indeterminate subcentimeter nodule right apex thought to be endobronchial filling defect, tree-in-bud nodularity felt to be reactive.  · Patient seen in office 3/23/2022 referred back to thoracic surgery.  Discussed potential adjuvant nivolumab therapy post surgery.  · Patient seen 4/20/2022 with surgery planned 4/29/2022.  · The patient was admitted 4/29 - 5/9/2022 undergoing bronchoscopy with right thoracoscopy, partial decortication, robot-assisted total esophagectomy, jejunostomy tube placement and intercostal nerve block.  She had a satisfactory postoperative  course and was able to be discharged 5/9/2022 though was briefly readmitted requiring replacement of her J-tube at bedside.  · Pathology demonstrated rare focal metaplasia at the GE junction consistent with Cotto's esophagus, focal submucosal scar, chronic esophagitis, no evidence of dysplasia or malignancy identified by staining, focal active chronic gastritis, 13 lymph nodes negative, paraesophageal soft tissue margins negative, level 7 lymph node negative x3, final esophageal margin negative, final gastric margin negative-patient's final pathologic staging could be considered ypT0, N0.  · The patient is seen with significant other 5/25/2022 and reviewed her findings recognizing that she could benefit substantially from immunotherapy  considering nivolumab every 4 weeks x1 year as adjuvant therapy.  This is discussed extensively as to potential side effect profile and potential benefit.  · Patient seen 6/29/2022,Checkmate 577 trial in patients who had residual pathologic disease at the time of surgery to nivolumab with median disease-free survival nearly twice as long 22 months versus 11 months across follow-up routine regardless of PD-L1 overexpression.  ·  As result we reviewed the records available including biopsies done at more than 1 location with PD-L1 never obtained.  ·  At some point this may be an issue with the patient does have recurrence.  ·  She is seen back 6/29/2022 indicating that she does not wish to take immunotherapy at this point and we have discussed the above rationale.Consider guardant 360 analysis  · Patient assessed 8/24/22 clinically improved, repeat CT scan scheduled September 2022  · Repeat scans obtained and reviewed by thoracic surgery 10/26/2022 negative for recurrence  · Follow-up assessment every 6 months.  This includes repeat CT scan in 4/26/2023 which show postoperative changes only.  The patient is seen 5/18/2023 and we decided to have her assessed again in 6 months though  it is also reasonable not to have her port removed.  We will contact  concerning this.        2.  Nutrition  · Use of Jejunostomy tube. Continuous feed currently 12 hours a night, slowly working up to recommended 16 hours/day at 90 cc/h  · She is still trying to drink at least 1 boost a day and able to take pills by mouth  · She continues to take pills by mouth.  She does utilize her J-tube for 16 hours a day.  Her weight is stable over the past 3 weeks.  · Patient seen 3/23/2022 with her J-tube no longer anchored properly, thoracic surgery request for evaluation  · J-tube postoperative required replacement 5//22  · Discussed additional nutrition, Enterade sample, prednisone 10 mg p.o. daily E scribed to pharmacy as trial appetite stimulant.  · Assessed 8/24/2022 with associated leukocytosis, 1 additional month of prednisone, then discontinued  · Patient seen 11/3/2022 continuing small meals routinely, medications assessed, Tums discontinued    3.  Atrial fibrillation  · Patient continues Eliquis 5 mg twice daily  · Toprol 25 mg XL once daily.  · Patient offered assessment by cardiology for atrial fibrillation and she is urged to complete this possibly coming off Eliquis.    Plan:  *Contact surgery about port removal    *23 weeks CT chest and pelvis, CBC, CMP    *24 weeks MD

## 2023-05-18 ENCOUNTER — OFFICE VISIT (OUTPATIENT)
Dept: ONCOLOGY | Facility: CLINIC | Age: 63
End: 2023-05-18
Payer: COMMERCIAL

## 2023-05-18 ENCOUNTER — INFUSION (OUTPATIENT)
Dept: ONCOLOGY | Facility: HOSPITAL | Age: 63
End: 2023-05-18
Payer: COMMERCIAL

## 2023-05-18 VITALS
RESPIRATION RATE: 16 BRPM | HEART RATE: 84 BPM | SYSTOLIC BLOOD PRESSURE: 119 MMHG | BODY MASS INDEX: 17.69 KG/M2 | OXYGEN SATURATION: 97 % | WEIGHT: 103.6 LBS | DIASTOLIC BLOOD PRESSURE: 77 MMHG | HEIGHT: 64 IN | TEMPERATURE: 98.3 F

## 2023-05-18 DIAGNOSIS — C15.5 MALIGNANT NEOPLASM OF LOWER THIRD OF ESOPHAGUS: ICD-10-CM

## 2023-05-18 DIAGNOSIS — C15.9 MALIGNANT NEOPLASM OF ESOPHAGUS, UNSPECIFIED LOCATION: Primary | ICD-10-CM

## 2023-05-18 DIAGNOSIS — Z45.9 ENCOUNTER FOR MANAGEMENT OF IMPLANTED DEVICE: Primary | ICD-10-CM

## 2023-05-18 LAB
ALBUMIN SERPL-MCNC: 4.2 G/DL (ref 3.5–5.2)
ALBUMIN/GLOB SERPL: 1.4 G/DL (ref 1.1–2.4)
ALP SERPL-CCNC: 101 U/L (ref 38–116)
ALT SERPL W P-5'-P-CCNC: <5 U/L (ref 0–33)
ANION GAP SERPL CALCULATED.3IONS-SCNC: 11.7 MMOL/L (ref 5–15)
AST SERPL-CCNC: 17 U/L (ref 0–32)
BASOPHILS # BLD AUTO: 0.03 10*3/MM3 (ref 0–0.2)
BASOPHILS NFR BLD AUTO: 0.4 % (ref 0–1.5)
BILIRUB SERPL-MCNC: 0.2 MG/DL (ref 0.2–1.2)
BUN SERPL-MCNC: 12 MG/DL (ref 6–20)
BUN/CREAT SERPL: 16.9 (ref 7.3–30)
CALCIUM SPEC-SCNC: 9.8 MG/DL (ref 8.5–10.2)
CHLORIDE SERPL-SCNC: 104 MMOL/L (ref 98–107)
CO2 SERPL-SCNC: 25.3 MMOL/L (ref 22–29)
CREAT SERPL-MCNC: 0.71 MG/DL (ref 0.6–1.1)
DEPRECATED RDW RBC AUTO: 52.4 FL (ref 37–54)
EGFRCR SERPLBLD CKD-EPI 2021: 96.3 ML/MIN/1.73
EOSINOPHIL # BLD AUTO: 0.14 10*3/MM3 (ref 0–0.4)
EOSINOPHIL NFR BLD AUTO: 2.1 % (ref 0.3–6.2)
ERYTHROCYTE [DISTWIDTH] IN BLOOD BY AUTOMATED COUNT: 14.6 % (ref 12.3–15.4)
GLOBULIN UR ELPH-MCNC: 3.1 GM/DL (ref 1.8–3.5)
GLUCOSE SERPL-MCNC: 99 MG/DL (ref 74–124)
HCT VFR BLD AUTO: 43.9 % (ref 34–46.6)
HGB BLD-MCNC: 13.9 G/DL (ref 12–15.9)
IMM GRANULOCYTES # BLD AUTO: 0.01 10*3/MM3 (ref 0–0.05)
IMM GRANULOCYTES NFR BLD AUTO: 0.1 % (ref 0–0.5)
LYMPHOCYTES # BLD AUTO: 1.66 10*3/MM3 (ref 0.7–3.1)
LYMPHOCYTES NFR BLD AUTO: 24.9 % (ref 19.6–45.3)
MCH RBC QN AUTO: 30.8 PG (ref 26.6–33)
MCHC RBC AUTO-ENTMCNC: 31.7 G/DL (ref 31.5–35.7)
MCV RBC AUTO: 97.1 FL (ref 79–97)
MONOCYTES # BLD AUTO: 0.69 10*3/MM3 (ref 0.1–0.9)
MONOCYTES NFR BLD AUTO: 10.3 % (ref 5–12)
NEUTROPHILS NFR BLD AUTO: 4.14 10*3/MM3 (ref 1.7–7)
NEUTROPHILS NFR BLD AUTO: 62.2 % (ref 42.7–76)
NRBC BLD AUTO-RTO: 0 /100 WBC (ref 0–0.2)
PLATELET # BLD AUTO: 215 10*3/MM3 (ref 140–450)
PMV BLD AUTO: 9.4 FL (ref 6–12)
POTASSIUM SERPL-SCNC: 4.1 MMOL/L (ref 3.5–4.7)
PROT SERPL-MCNC: 7.3 G/DL (ref 6.3–8)
RBC # BLD AUTO: 4.52 10*6/MM3 (ref 3.77–5.28)
SODIUM SERPL-SCNC: 141 MMOL/L (ref 134–145)
WBC NRBC COR # BLD: 6.67 10*3/MM3 (ref 3.4–10.8)

## 2023-05-18 PROCEDURE — 25010000002 HEPARIN LOCK FLUSH PER 10 UNITS: Performed by: INTERNAL MEDICINE

## 2023-05-18 PROCEDURE — 80053 COMPREHEN METABOLIC PANEL: CPT

## 2023-05-18 PROCEDURE — 99214 OFFICE O/P EST MOD 30 MIN: CPT | Performed by: INTERNAL MEDICINE

## 2023-05-18 PROCEDURE — 36591 DRAW BLOOD OFF VENOUS DEVICE: CPT

## 2023-05-18 PROCEDURE — 85025 COMPLETE CBC W/AUTO DIFF WBC: CPT

## 2023-05-18 RX ORDER — HEPARIN SODIUM (PORCINE) LOCK FLUSH IV SOLN 100 UNIT/ML 100 UNIT/ML
500 SOLUTION INTRAVENOUS AS NEEDED
OUTPATIENT
Start: 2023-05-18

## 2023-05-18 RX ORDER — SODIUM CHLORIDE 0.9 % (FLUSH) 0.9 %
10 SYRINGE (ML) INJECTION AS NEEDED
OUTPATIENT
Start: 2023-05-18

## 2023-05-18 RX ORDER — HEPARIN SODIUM (PORCINE) LOCK FLUSH IV SOLN 100 UNIT/ML 100 UNIT/ML
500 SOLUTION INTRAVENOUS AS NEEDED
Status: DISCONTINUED | OUTPATIENT
Start: 2023-05-18 | End: 2023-05-18 | Stop reason: HOSPADM

## 2023-05-18 RX ORDER — SODIUM CHLORIDE 0.9 % (FLUSH) 0.9 %
10 SYRINGE (ML) INJECTION AS NEEDED
Status: DISCONTINUED | OUTPATIENT
Start: 2023-05-18 | End: 2023-05-18 | Stop reason: HOSPADM

## 2023-05-18 RX ADMIN — Medication 10 ML: at 13:43

## 2023-05-18 RX ADMIN — Medication 500 UNITS: at 13:43

## 2023-05-19 ENCOUNTER — HOSPITAL ENCOUNTER (OUTPATIENT)
Facility: HOSPITAL | Age: 63
Setting detail: HOSPITAL OUTPATIENT SURGERY
Discharge: HOME | End: 2023-05-19
Attending: SURGERY | Admitting: SURGERY
Payer: COMMERCIAL

## 2023-05-19 ENCOUNTER — APPOINTMENT (OUTPATIENT)
Dept: GENERAL RADIOLOGY | Facility: HOSPITAL | Age: 63
End: 2023-05-19
Payer: COMMERCIAL

## 2023-05-19 ENCOUNTER — ANESTHESIA EVENT (OUTPATIENT)
Dept: GASTROENTEROLOGY | Facility: HOSPITAL | Age: 63
End: 2023-05-19
Payer: COMMERCIAL

## 2023-05-19 ENCOUNTER — ANESTHESIA (OUTPATIENT)
Dept: GASTROENTEROLOGY | Facility: HOSPITAL | Age: 63
End: 2023-05-19
Payer: COMMERCIAL

## 2023-05-19 VITALS
HEIGHT: 64 IN | RESPIRATION RATE: 18 BRPM | BODY MASS INDEX: 17.89 KG/M2 | HEART RATE: 76 BPM | OXYGEN SATURATION: 97 % | SYSTOLIC BLOOD PRESSURE: 106 MMHG | DIASTOLIC BLOOD PRESSURE: 65 MMHG | WEIGHT: 104.8 LBS

## 2023-05-19 DIAGNOSIS — C15.5 MALIGNANT NEOPLASM OF LOWER THIRD OF ESOPHAGUS: ICD-10-CM

## 2023-05-19 LAB
ALBUMIN SERPL-MCNC: 4.2 G/DL (ref 3.5–5.2)
ALBUMIN/GLOB SERPL: 1.5 G/DL
ALP SERPL-CCNC: 96 U/L (ref 39–117)
ALT SERPL W P-5'-P-CCNC: 7 U/L (ref 1–33)
ANION GAP SERPL CALCULATED.3IONS-SCNC: 7.3 MMOL/L (ref 5–15)
AST SERPL-CCNC: 12 U/L (ref 1–32)
BILIRUB SERPL-MCNC: 0.3 MG/DL (ref 0–1.2)
BUN SERPL-MCNC: 13 MG/DL (ref 8–23)
BUN/CREAT SERPL: 17.6 (ref 7–25)
CALCIUM SPEC-SCNC: 10 MG/DL (ref 8.6–10.5)
CHLORIDE SERPL-SCNC: 105 MMOL/L (ref 98–107)
CO2 SERPL-SCNC: 30.7 MMOL/L (ref 22–29)
CREAT SERPL-MCNC: 0.74 MG/DL (ref 0.57–1)
DEPRECATED RDW RBC AUTO: 45.1 FL (ref 37–54)
EGFRCR SERPLBLD CKD-EPI 2021: 91.6 ML/MIN/1.73
ERYTHROCYTE [DISTWIDTH] IN BLOOD BY AUTOMATED COUNT: 13.3 % (ref 12.3–15.4)
GLOBULIN UR ELPH-MCNC: 2.8 GM/DL
GLUCOSE SERPL-MCNC: 104 MG/DL (ref 65–99)
HCT VFR BLD AUTO: 45.1 % (ref 34–46.6)
HGB BLD-MCNC: 14.9 G/DL (ref 12–15.9)
MCH RBC QN AUTO: 31 PG (ref 26.6–33)
MCHC RBC AUTO-ENTMCNC: 33 G/DL (ref 31.5–35.7)
MCV RBC AUTO: 94 FL (ref 79–97)
PLATELET # BLD AUTO: 203 10*3/MM3 (ref 140–450)
PMV BLD AUTO: 9.2 FL (ref 6–12)
POTASSIUM SERPL-SCNC: 4 MMOL/L (ref 3.5–5.2)
PROT SERPL-MCNC: 7 G/DL (ref 6–8.5)
RBC # BLD AUTO: 4.8 10*6/MM3 (ref 3.77–5.28)
SODIUM SERPL-SCNC: 143 MMOL/L (ref 136–145)
WBC NRBC COR # BLD: 7.17 10*3/MM3 (ref 3.4–10.8)

## 2023-05-19 PROCEDURE — 85027 COMPLETE CBC AUTOMATED: CPT

## 2023-05-19 PROCEDURE — C1769 GUIDE WIRE: HCPCS | Performed by: SURGERY

## 2023-05-19 PROCEDURE — 25010000002 PROPOFOL 10 MG/ML EMULSION: Performed by: ANESTHESIOLOGY

## 2023-05-19 PROCEDURE — 43249 ESOPH EGD DILATION <30 MM: CPT | Performed by: SURGERY

## 2023-05-19 PROCEDURE — 74360 X-RAY GUIDE GI DILATION: CPT

## 2023-05-19 PROCEDURE — 80053 COMPREHEN METABOLIC PANEL: CPT

## 2023-05-19 RX ORDER — SODIUM CHLORIDE 0.9 % (FLUSH) 0.9 %
10 SYRINGE (ML) INJECTION AS NEEDED
Status: DISCONTINUED | OUTPATIENT
Start: 2023-05-19 | End: 2023-05-19 | Stop reason: HOSPADM

## 2023-05-19 RX ORDER — SODIUM CHLORIDE 9 MG/ML
40 INJECTION, SOLUTION INTRAVENOUS AS NEEDED
Status: DISCONTINUED | OUTPATIENT
Start: 2023-05-19 | End: 2023-05-19 | Stop reason: HOSPADM

## 2023-05-19 RX ORDER — LIDOCAINE HYDROCHLORIDE 20 MG/ML
INJECTION, SOLUTION INFILTRATION; PERINEURAL AS NEEDED
Status: DISCONTINUED | OUTPATIENT
Start: 2023-05-19 | End: 2023-05-19 | Stop reason: SURG

## 2023-05-19 RX ORDER — PROPOFOL 10 MG/ML
VIAL (ML) INTRAVENOUS AS NEEDED
Status: DISCONTINUED | OUTPATIENT
Start: 2023-05-19 | End: 2023-05-19 | Stop reason: SURG

## 2023-05-19 RX ORDER — SODIUM CHLORIDE 0.9 % (FLUSH) 0.9 %
10 SYRINGE (ML) INJECTION EVERY 12 HOURS SCHEDULED
Status: DISCONTINUED | OUTPATIENT
Start: 2023-05-19 | End: 2023-05-19 | Stop reason: HOSPADM

## 2023-05-19 RX ORDER — EPHEDRINE SULFATE 50 MG/ML
INJECTION, SOLUTION INTRAVENOUS AS NEEDED
Status: DISCONTINUED | OUTPATIENT
Start: 2023-05-19 | End: 2023-05-19 | Stop reason: SURG

## 2023-05-19 RX ORDER — SODIUM CHLORIDE, SODIUM LACTATE, POTASSIUM CHLORIDE, CALCIUM CHLORIDE 600; 310; 30; 20 MG/100ML; MG/100ML; MG/100ML; MG/100ML
30 INJECTION, SOLUTION INTRAVENOUS CONTINUOUS PRN
Status: DISCONTINUED | OUTPATIENT
Start: 2023-05-19 | End: 2023-05-19 | Stop reason: HOSPADM

## 2023-05-19 RX ADMIN — SODIUM CHLORIDE, POTASSIUM CHLORIDE, SODIUM LACTATE AND CALCIUM CHLORIDE 30 ML/HR: 600; 310; 30; 20 INJECTION, SOLUTION INTRAVENOUS at 07:14

## 2023-05-19 RX ADMIN — EPHEDRINE SULFATE 10 MG: 50 INJECTION INTRAVENOUS at 08:02

## 2023-05-19 RX ADMIN — PROPOFOL 20 MG: 10 INJECTION, EMULSION INTRAVENOUS at 08:14

## 2023-05-19 RX ADMIN — PROPOFOL 100 MG: 10 INJECTION, EMULSION INTRAVENOUS at 07:53

## 2023-05-19 RX ADMIN — PROPOFOL 300 MCG/KG/MIN: 10 INJECTION, EMULSION INTRAVENOUS at 07:53

## 2023-05-19 RX ADMIN — LIDOCAINE HYDROCHLORIDE 50 MG: 20 INJECTION, SOLUTION INFILTRATION; PERINEURAL at 07:53

## 2023-05-19 NOTE — OP NOTE
Operative Note     Date of procedure: 5/19/2023     Patient name: Maya Ribera  MRN: [id number]    Pre OP diagnosis:  Patient Active Problem List   Diagnosis   • Esophageal cancer   • Asymptomatic varicose veins of unspecified lower extremity   • Eczema   • Encounter for screening for malignant neoplasm of cervix   • Screening for malignant neoplasm of colon   • Arthritis   • Esophagitis   • Gastric ulcer   • Female cystocele   • Flat foot(734)   • Hiatal hernia   • Hyperlipidemia   • Hypertension   • Hyperthyroidism   • Irritable bowel syndrome   • Mitral regurgitation   • Paroxysmal atrial fibrillation   • Severe esophageal dysplasia   • Mitral valve prolapse   • Encounter for management of implanted device   • Dislodged jejunostomy tube   • Chronic pain   • Aftercare following surgery for neoplasm     Post OP diagnosis:  Patient Active Problem List   Diagnosis   • Esophageal cancer   • Asymptomatic varicose veins of unspecified lower extremity   • Eczema   • Encounter for screening for malignant neoplasm of cervix   • Screening for malignant neoplasm of colon   • Arthritis   • Esophagitis   • Gastric ulcer   • Female cystocele   • Flat foot(734)   • Hiatal hernia   • Hyperlipidemia   • Hypertension   • Hyperthyroidism   • Irritable bowel syndrome   • Mitral regurgitation   • Paroxysmal atrial fibrillation   • Severe esophageal dysplasia   • Mitral valve prolapse   • Encounter for management of implanted device   • Dislodged jejunostomy tube   • Chronic pain   • Aftercare following surgery for neoplasm     Procedure performed:   1. Flexible esophagogastroduodenoscopy.  2. Savory dilation of the esophagogastric anastomosis, conduit and pylorus up to 16 mm.    Indications:   This is a 62-year-old female who has history of esophageal carcinoma and underwent esophagectomy.  She had mild dysphagia and underwent dilation of the anastomosis 6 months ago.  She was scheduled for EGD for cancer surveillance and  anastomotic dilation.  The risks and benefit of the procedure were discussed and she agreed to proceed.       Surgeon: Reilly Patricia MD     Anesthesia: Monitored Local Anesthesia with Sedation    ASA Class: 3    Procedure Details   On 5/19/2023, the patient was brought to the endoscopy suite on a fluoroscopy bed. Maya Ribera was positioned in the left lateral decubitus position.  A bite-block was placed.  Prophylactic intravenous antibiotics were not indicated.  Prior to beginning the procedure, a time-out was conducted during which all members of the surgical team were present.      Following the administration of adequate intravenous sedation, I began by performing a flexible esophagogastroduodenoscopy.   A flexible adult endoscope was placed into the oropharynx and advanced down the proximal esophagus under direct vision.  The cricopharyngeus was located 16 cm.  There was mild esophagitis in the upper esophagus.  The esophagogastric anastomosis was located at 20 cm which was patent.  The conduit appeared normal and had normal orientation.  The diaphragmatic pinch was at 40 cm.  The gastric antrum appeared normal.  The pylorus was closed shut and I was able to intubate with gentle maneuvering.  The proximal duodenum appeared normal.    A 0.035-mm guidewire was advanced down the endoscope and into the duodenum under direct vision.  The endoscope was removed.  Wire placement was confirmed by fluoroscopy.  The esophagus, esophagogastric anastomosis, gastric conduit and pylorus were serially dilated from 12 mm to 16 mm using Savory dilators over the guidewire under real-time fluoroscopic guidance. After dilatating to 16 mm, repeat endoscopy was performed.  There was no evidence of deep injury or perforation.  The endoscope was advanced.  The duodenum, stomach and esophagus were evacuated free of air and debris.  The endoscope was removed.    The patient was awakened from sedation and was transported to the  post-anesthesia care unit in stable condition.    Findings:  1. Mild narrowing of the esophagogastric anastomosis.  2. Mild narrowing of the pylorus.  3. No evidence of deep tissue injury after dilating up to 16 mm.    Estimated Blood Loss:  none           Specimens: None           Complications: None           Disposition: PACU - hemodynamically stable.           Condition: stable    Post-procedure recommendations:   1. Resume Eliquis tomorrow.  2. Stay on liquid diet today.  3. Can resume regular diet tomorrow.  4. Follow-up in clinic in 2 weeks.     Reilly Patricia MD   Thoracic Surgeon  [facility]

## 2023-05-19 NOTE — ANESTHESIA PREPROCEDURE EVALUATION
Anesthesia Evaluation     Patient summary reviewed and Nursing notes reviewed   NPO Solid Status: > 8 hours  NPO Liquid Status: > 4 hours           Airway   Mallampati: II  TM distance: >3 FB  Neck ROM: full  No difficulty expected  Dental - normal exam     Pulmonary - normal exam    breath sounds clear to auscultation  (+) a smoker Current Abstained day of surgery,   Cardiovascular - normal exam    ECG reviewed  Rhythm: regular  Rate: normal    (+) hypertension less than 2 medications, valvular problems/murmurs MR and MVP, dysrhythmias Paroxysmal Atrial Fib, hyperlipidemia,       Neuro/Psych  GI/Hepatic/Renal/Endo    (+)  hiatal hernia, GERD, PUD,  thyroid problem hypothyroidism    ROS Comment: Hx esophageal CA, s/p total esophagectomy in 4/22 and dilatation in 10/22    Musculoskeletal     (+) chronic pain,   Abdominal   (+) scaphoid   Substance History      OB/GYN          Other   arthritis,    history of cancer      Other Comment: Hx esophageal CA/cervical CA                Anesthesia Plan    ASA 3     MAC     intravenous induction     Anesthetic plan, risks, benefits, and alternatives have been provided, discussed and informed consent has been obtained with: patient.        CODE STATUS:

## 2023-05-19 NOTE — ANESTHESIA POSTPROCEDURE EVALUATION
Patient: Maya Ribera    Procedure Summary     Date: 05/19/23 Room / Location: [facility] ENDOSCOPY 7 / [facility] ENDOSCOPY    Anesthesia Start: 0743 Anesthesia Stop: 0830    Procedure: ESOPHAGOGASTRODUODENOSCOPY WITH SAVORY DILITATION 12/12.8,14,15,16MM (Esophagus) Diagnosis:       Malignant neoplasm of lower third of esophagus      (Malignant neoplasm of lower third of esophagus [C15.5])    Surgeons: Reilly Patricia MD Provider: Baljinder Francis MD    Anesthesia Type: MAC ASA Status: 3          Anesthesia Type: MAC    Vitals  Vitals Value Taken Time   /65 05/19/23 0850   Temp     Pulse 76 05/19/23 0850   Resp 18 05/19/23 0850   SpO2 97 % 05/19/23 0850           Post Anesthesia Care and Evaluation    Patient location during evaluation: PHASE II  Patient participation: complete - patient participated  Level of consciousness: awake and alert  Pain management: adequate    Airway patency: patent  Anesthetic complications: No anesthetic complications  PONV Status: none  Cardiovascular status: acceptable and hemodynamically stable  Respiratory status: acceptable, nonlabored ventilation and spontaneous ventilation  Hydration status: acceptable

## 2023-05-19 NOTE — DISCHARGE INSTRUCTIONS
For the next 24 hours patient needs to be with a responsible adult.    For 24 hours DO NOT drive, operate machinery, appliances, drink alcohol, make important decisions or sign legal documents.    Start with a light or bland diet if you are feeling sick to your stomach otherwise advance to regular diet as tolerated.    Follow recommendations on procedure report if provided by your doctor.    Call Dr Patricia for problems [phone]    Problems may include but not limited to: large amounts of bleeding, trouble breathing, repeated vomiting, severe unrelieved pain, fever or chills.

## 2023-06-01 ENCOUNTER — PREP FOR SURGERY (OUTPATIENT)
Dept: OTHER | Facility: HOSPITAL | Age: 63
End: 2023-06-01

## 2023-06-01 ENCOUNTER — OFFICE VISIT (OUTPATIENT)
Dept: SURGERY | Facility: CLINIC | Age: 63
End: 2023-06-01
Payer: COMMERCIAL

## 2023-06-01 VITALS
WEIGHT: 103.4 LBS | HEART RATE: 80 BPM | HEIGHT: 64 IN | OXYGEN SATURATION: 95 % | DIASTOLIC BLOOD PRESSURE: 64 MMHG | SYSTOLIC BLOOD PRESSURE: 112 MMHG | BODY MASS INDEX: 17.65 KG/M2

## 2023-06-01 DIAGNOSIS — C15.9 MALIGNANT NEOPLASM OF ESOPHAGUS, UNSPECIFIED LOCATION: Primary | ICD-10-CM

## 2023-06-01 PROCEDURE — 99214 OFFICE O/P EST MOD 30 MIN: CPT | Performed by: SURGERY

## 2023-06-01 NOTE — PROGRESS NOTES
THORACIC SURGERY CLINIC FOLLOW  UP NOTE    REASON FOR VISIT: History of esophageal cancer s/p esophagectomy developed esophageal dysphagia s/p esophageal dilation.    Subjective   HISTORY OF PRESENTING ILLNESS:   Maya Ribera is a 62 y.o. female has significant medical problems as mentioned below.  She also has history of esophageal carcinoma and underwent esophagectomy.  She had mild dysphagia and underwent dilation of the anastomosis and pylorus on 5/19/2023. There was mild narrowing of the esophagogastric anastomosis and pylorus which were dilated up to 16 mm. There was no evidence of recurrent malignancy.  She came to clinic for follow-up visit.    She is doing well since her endoscopy. She denies any difficulty swallowing. She has noticed an improvement in her swallowing since her endoscopy. She denies feeling any bolus sticking in her neck or chest. She still has her port in situ and inquires about when this can be removed.    Past Medical History:   Diagnosis Date   • Boil, breast 12/13/2021    HEALING UNDER LEFT BREAST    • Cervical cancer    • Dysphagia    • Esophageal cancer    • Esophagitis 9/21/2021   • Gastric ulcer    • GERD (gastroesophageal reflux disease)    • Hyperlipidemia    • Hypertension    • Hyperthyroidism    • Irritable bowel    • Mitral valve prolapse     FOLLOWED BY     • PAF (paroxysmal atrial fibrillation)    • Uses feeding tube        Past Surgical History:   Procedure Laterality Date   • CHOLECYSTECTOMY     • COLONOSCOPY  08/27/2021    [facility]   • ENDOSCOPY N/A 10/13/2022    Procedure: ESOPHAGOGASTRODUODENOSCOPY WITH  SAVARY DILATATION (12.8;14;15;16) WITH FLUOROSCOPY;  Surgeon: Hayder Finch III, MD;  Location: [facility] ENDOSCOPY;  Service: Gastroenterology;  Laterality: N/A;  FOLLOW UP S/P ESOPHAGECTOMY   • ENDOSCOPY N/A 5/19/2023    Procedure: ESOPHAGOGASTRODUODENOSCOPY WITH SAVORY DILITATION 12/12.8,14,15,16MM;  Surgeon: Reilly Patricia MD;  Location:   [facility] ENDOSCOPY;  Service: Gastroenterology;  Laterality: N/A;  PRE- DYSPHAGIA  POST-SAME   • ESOPHAGOGASTRECTOMY Right 4/29/2022    Procedure: BRONCHOSCOPY, RIGHT THORACOSCOPY WITH DAVINCI ROBOT WITH TOTAL ESOPHAGECTOMY, INTERCOSTAL NERVE BLOCK, JEJUNOSTOMY TUBE REPLACEMENT;  Surgeon: Hayder Finch III, MD;  Location: [facility] MAIN OR;  Service: Robotics - DaVinci;  Laterality: Right;   • EXTERNAL EAR SURGERY     • JEJUNOSTOMY N/A 12/15/2021    Procedure: open JEJUNOSTOMY tube placement;  Surgeon: Bhanu Hollis MD;  Location: [facility] MAIN OR;  Service: General;  Laterality: N/A;   • KNEE SURGERY Left    • REPLACEMENT TOTAL KNEE Left    • UPPER ENDOSCOPIC ULTRASOUND W/ FNA N/A 12/7/2021    Procedure: Esophagogastroduodenoscopy with endoscopic ultrasound  WITH STAGING AND FINE NEEDLE BIOPSY;  Surgeon: Amrit Green MD;  Location: [facility] ENDOSCOPY;  Service: Gastroenterology;  Laterality: N/A;  post op: inlet patch, esophageal mass, T2   • VENOUS ACCESS DEVICE (PORT) INSERTION Left 12/15/2021    Procedure: INSERTION OF PORTACATH;  Surgeon: Bhanu Hollis MD;  Location: [facility] MAIN OR;  Service: General;  Laterality: Left;       Family History   Problem Relation Age of Onset   • Breast cancer Mother    • Diabetes Mother    • Bipolar disorder Father    • Diabetes Father    • Hypertension Father    • Breast cancer Sister    • COPD Sister    • Diabetes Sister    • Hypertension Sister    • Alcohol abuse Brother    • Asthma Brother    • Bipolar disorder Brother    • Diabetes Brother    • Seizures Brother    • Breast cancer Maternal Grandmother    • Diabetes Maternal Grandmother    • Diabetes Maternal Grandfather    • Diabetes Paternal Grandmother    • Diabetes Paternal Grandfather    • Hypertension Son    • Kidney cancer Sister    • Hypertension Sister    • Diabetes Sister    • Malig Hyperthermia Neg Hx        Social History     Socioeconomic History   • Marital status: Significant Other   • Number of children: 1    • Years of education: High school   Tobacco Use   • Smoking status: Every Day     Packs/day: 1.00     Years: 40.00     Pack years: 40.00     Types: Cigarettes     Start date:      Last attempt to quit: 2022     Years since quittin.0   • Smokeless tobacco: Never   • Tobacco comments:     2 per day   Vaping Use   • Vaping Use: Never used   Substance and Sexual Activity   • Alcohol use: Not Currently   • Drug use: Never   • Sexual activity: Defer         Current Outpatient Medications:   •  Eliquis 5 MG tablet tablet, Take 1 tablet by mouth Every 12 (Twelve) Hours. LD  PT STATED TO HOLD 72 HOURS PRIOR TO SURGERY, Disp: , Rfl:   •  lansoprazole (PREVACID SOLUTAB) 30 MG Tablet Delayed Release Dispersible disintegrating tablet, Take 1 tablet by mouth Every Morning Before Breakfast., Disp: 60 tablet, Rfl: 1  •  methIMAzole (TAPAZOLE) 10 MG tablet, Take 10 mg by mouth Daily., Disp: , Rfl:   •  methIMAzole (TAPAZOLE) 10 MG tablet, Take 1 tab daily ( 5 days per week) . Do not take on Sat ,and ., Disp: , Rfl:   •  metoprolol tartrate (LOPRESSOR) 25 MG tablet, Take 1 tablet by mouth 2 (Two) Times a Day., Disp: , Rfl:   •  ondansetron (Zofran) 4 MG tablet, Take 1 tablet by mouth Daily As Needed for Nausea or Vomiting., Disp: 15 tablet, Rfl: 0  •  pravastatin (PRAVACHOL) 20 MG tablet, Take 1 tablet by mouth Daily., Disp: , Rfl:   No current facility-administered medications for this visit.    Facility-Administered Medications Ordered in Other Visits:   •  heparin injection 500 Units, 500 Units, Intravenous, PRN, Iggy Harrell MD, 500 Units at 23 1452  •  sodium chloride 0.9 % flush 10 mL, 10 mL, Intravenous, PRN, Iggy Harrell MD, 10 mL at 23 1452     Allergies   Allergen Reactions   • Codeine Hives     Patient states this is when she was very young.              Objective    OBJECTIVE:     VITAL SIGNS:  /64 (BP Location: Left arm, Patient Position: Sitting, Cuff Size: Adult)  "  Pulse 80   Ht 162.6 cm (64\")   Wt 46.9 kg (103 lb 6.4 oz)   SpO2 95%   BMI 17.75 kg/m²     PHYSICAL EXAM:  Constitutional:       Appearance: Normal appearance.   HENT:      Head: Normocephalic.      Right Ear: External ear normal.      Left Ear: External ear normal.      Nose: Nose normal.      Mouth/Throat: No obvious deformity     Pharynx: Oropharynx is clear.   Eyes:      Conjunctiva/sclera: Conjunctivae normal.   Cardiovascular:      Rate and Rhythm: Normal rate.      Pulses: Normal pulses.   Pulmonary:      Effort: Pulmonary effort is normal.  Abdominal:      Palpations: Abdomen is soft.   Musculoskeletal:         General: Normal range of motion.      Cervical back: Normal range of motion.   Skin:     General: Skin is warm.   Neurological:      General: No focal deficit present.      Mental Status: He is alert and oriented to person, place, and time.     LAB RESULTS:  I have reviewed all the available laboratory results in the chart.    RESULTS REVIEW:  I have reviewed the patient's all relevant laboratory and imaging findings.           ASSESSMENT & PLAN:  Maya Ribera is a 62 y.o. female with significant medical conditions as mentioned above presented to my clinic.    We discussed her EGD results. There was no evidence of cancer.  She will return to clinic for follow-up after a CT abdomen and pelvis in 6 months.  I recommended repeating EGD in a year for cancer surveillance.    I discussed the patients findings and my recommendations with the patient. The patient was given adequate time to ask questions and all questions were answered to patient satisfaction. Thank you for this consult and allowing us to participate in the care of your patient.      Reilly Patricia MD  Thoracic Surgeon  [facility] [place]        Dictated utilizing Dragon dictation    I spent 30 minutes caring for Maya on this date of service. This time includes time spent by me in the following " activities:preparing for the visit, reviewing tests, obtaining and/or reviewing a separately obtained history, performing a medically appropriate examination and/or evaluation , counseling and educating the patient/family/caregiver, ordering medications, tests, or procedures, referring and communicating with other health care professionals , documenting information in the medical record, independently interpreting results and communicating that information with the patient/family/caregiver, and care coordination and more than half the time was spent in direct face to face evaluation and decision making.     Transcribed from ambient dictation for Reilly Patricia MD by Erin Cummins, Quality .    Patient verbalized consent to the visit recording.  I have personally performed the services described in this document as transcribed by the above individual, and it is both accurate and complete. Reilly Patricia MD   6/1/2023

## 2023-06-07 ENCOUNTER — TELEPHONE (OUTPATIENT)
Dept: ONCOLOGY | Facility: CLINIC | Age: 63
End: 2023-06-07
Payer: COMMERCIAL

## 2023-06-07 NOTE — TELEPHONE ENCOUNTER
"    Caller: Maya Ribera \"COLLEEN\"    Relationship to patient: Self    Best call back number: [phone]    Type of visit: PORT FLUSH     Requested date: END OF JUNE AROUND 10 OR 11, [facility]     If rescheduling, when is the original appointment: N/A   "
toe

## 2023-08-10 ENCOUNTER — INFUSION (OUTPATIENT)
Dept: ONCOLOGY | Facility: HOSPITAL | Age: 63
End: 2023-08-10
Payer: COMMERCIAL

## 2023-08-10 DIAGNOSIS — Z45.9 ENCOUNTER FOR MANAGEMENT OF IMPLANTED DEVICE: Primary | ICD-10-CM

## 2023-08-10 PROCEDURE — 25010000002 HEPARIN LOCK FLUSH PER 10 UNITS: Performed by: INTERNAL MEDICINE

## 2023-08-10 PROCEDURE — 96523 IRRIG DRUG DELIVERY DEVICE: CPT

## 2023-08-10 RX ORDER — HEPARIN SODIUM (PORCINE) LOCK FLUSH IV SOLN 100 UNIT/ML 100 UNIT/ML
500 SOLUTION INTRAVENOUS AS NEEDED
Status: DISCONTINUED | OUTPATIENT
Start: 2023-08-10 | End: 2023-08-10 | Stop reason: HOSPADM

## 2023-08-10 RX ORDER — SODIUM CHLORIDE 0.9 % (FLUSH) 0.9 %
10 SYRINGE (ML) INJECTION AS NEEDED
OUTPATIENT
Start: 2023-08-10

## 2023-08-10 RX ORDER — SODIUM CHLORIDE 0.9 % (FLUSH) 0.9 %
10 SYRINGE (ML) INJECTION AS NEEDED
Status: DISCONTINUED | OUTPATIENT
Start: 2023-08-10 | End: 2023-08-10 | Stop reason: HOSPADM

## 2023-08-10 RX ORDER — HEPARIN SODIUM (PORCINE) LOCK FLUSH IV SOLN 100 UNIT/ML 100 UNIT/ML
500 SOLUTION INTRAVENOUS AS NEEDED
OUTPATIENT
Start: 2023-08-10

## 2023-08-10 RX ADMIN — Medication 500 UNITS: at 12:20

## 2023-08-10 RX ADMIN — Medication 10 ML: at 12:20

## 2023-08-21 RX ORDER — LANSOPRAZOLE 30 MG/1
TABLET, ORALLY DISINTEGRATING, DELAYED RELEASE ORAL
Qty: 60 TABLET | Refills: 1 | Status: SHIPPED | OUTPATIENT
Start: 2023-08-21

## 2023-10-25 ENCOUNTER — TELEPHONE (OUTPATIENT)
Dept: ONCOLOGY | Facility: CLINIC | Age: 63
End: 2023-10-25
Payer: COMMERCIAL

## 2023-10-25 NOTE — TELEPHONE ENCOUNTER
Caller: RIP    Relationship: [facility]     Best call back number: [phone]    Who are you requesting to speak with (clinical staff, provider,  specific staff member): CLINICAL     What was the call regarding: PLEASE CALL NIDIA WITH AN AUTH NUMBER FOR CT SCAN TOMORROW    PLEASE ADVISE

## 2023-11-01 NOTE — PROGRESS NOTES
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      History of Present Illness   This is a 63 y.o. female with recent diagnosis of distal esophageal cancer, initiating concurrent chemoradiation with carboplatin/Taxol on 1/5/2022.  Unfortunately, the patient has had frequent delays due to pancytopenia, weight loss, malnutrition and COVID-19.  She is completed only 3 weeks of chemotherapy.  She is due today for her fourth dose of carboplatin Taxol.  She has 5 radiation treatments remaining.    Her case was presented at the multidisciplinary clinic this morning with plans to complete the last week of concurrent chemoradiation followed by PET scan.  It is unclear at this time if the patient would qualify for surgery given her struggles and malnutrition.  She does have a J-tube for which her partner utilizes for nutrition.  She is still able to swallow pills by mouth.  Her weight is stable over the past few weeks.    She has been struggling with her blood pressure.  She is on metoprolol originally 50 mg twice daily for her atrial fibrillation.  She has cut this down to once daily.  She does report she has been holding her Eliquis since her platelets dropped and has not yet resumed.  Her and her partner expressed concerns regarding the timeline of PET scan and possible surgery.  They are very eager to proceed with PET scan.  We did discuss today the possibility for false positives with esophagitis symptoms.  We will plan PET scan 4 weeks after the completion of radiation which is the soonest this should be obtained.    A  PET/CT was obtained 3/17/2022 demonstrating significant decrease in FDG avid thickened mid to distal esophagus, decrease in activity in gastrohepatic and left hilar nodes no longer demonstrating that above blood pool, new subcentimeter nodule right apex thought to be an endobronchial filling defect it was indeterminant with a new area of tree-in-bud nodularity medial aspect left upper lobe thought to be reactive.    The patient is seen with her partner 3/23/2022.  She recently had nausea and vomiting for several days thought to be?  Food poisoning which, fortunately, is now resolving.  We have reviewed her scans and she could well be a candidate for surgery which we will asked Dr. Finch to review her for next available.    The patient is next reviewed 4/20/2022.  She is anxious for surgery 4/29/2022 but ready to proceed.    The patient was admitted 4/29 - 5/9/2022 undergoing bronchoscopy with right thoracoscopy, partial decortication, robot-assisted total esophagectomy, jejunostomy tube placement and intercostal nerve block.  She had a satisfactory postoperative course and was able to be discharged 5/9/2022 though was briefly readmitted requiring replacement of her J-tube at bedside.    Pathology demonstrated rare focal metaplasia at the GE junction consistent with Cotto's esophagus, focal submucosal scar, chronic esophagitis, no evidence of dysplasia or malignancy identified by staining, focal active chronic gastritis, 13 lymph nodes negative, paraesophageal soft tissue margins negative, level 7 lymph node negative x3, final esophageal margin negative, final gastric margin negative-patient's final pathologic staging could be considered ypT0, N0.     The patient is seen with significant other 5/25/2022 and reviewed her findings recognizing that she could benefit substantially from immunotherapy  considering nivolumab every 4 weeks x1 year as adjuvant therapy.  This is discussed extensively as to potential side  effect profile and potential benefit.  The patient was seen by thoracic surgery 6/1/2022 and felt to be making good progress.      We made plans for her to undergo chemo education for the possible use of nivolumab but this was held into the patient was to return and she is next in 6/29/2022.        We discussed availability of information Checkmate 577 trial in patients who had residual pathologic disease at the time of surgery to nivolumab with median disease-free survival nearly twice as long 22 months versus 11 months across follow-up routine regardless of PD-L1 overexpression.     As result we reviewed the records available including biopsies done at more than 1 location with PD-L1 never obtained.     At some point this may be an issue with the patient does have recurrence.     She is seen back 6/29/2022 indicating that she does not wish to take immunotherapy at this point and we have discussed the above rationale.    It was elected to follow the patient and she is reviewed 8/24/2022.  She has gained approximately 4 pounds, CBC normalized except for mild leukocytosis of 14,560.  She is feeling reasonably well with her partner echoing her continued improvement.  She has a follow-up CT series scheduled in September, follow-up thereafter with thoracic surgery.    The patient is next assessed 11/30/2022 having undergone outpatient CT at another location which, fortunately, revealed no evidence of disease.  She is doing fairly well though slow to gain weight and requiring multiple small meals per day with occasional abdominal pain and reflux discomfort.  We have had a lengthy discussion about her eating habits and expected side effects after her particular surgery.  She is also been very concerned about continuing anticoagulation with an assessment for of her cardiac rhythm by long-term monitor anticipated initially.  She is urged to complete this and possibly come off anticoagulation.    The patient is next seen  11/2/2023.[place] imaging available includes CT abdomen pelvis compared to April with findings of previous esophagectomy, mild enlargement infrarenal abdominal aorta measuring 2.3 cm, pessary present, uterus removed no evidence of metastatic disease.  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  The patient was assessed in June by thoracic surgery with no additional recommendations as well.    Now, with the above findings, the patient's port could be removed.      ONCOLOGY HISTORY    The patient is a 63-year-old female who had been seen in multidisciplinary thoracic oncology conference with complaints of painful swallowing and dysphagia over the previous several months with 10 to 15 pound weight loss.  This led to EGD and colonoscopy with normal colonoscopy but EGD demonstrated tumor at the GE junction with biopsies consistent with severe dysplasia including carcinoma in situ.  The patient states that she saw a number of physicians and attempting to have a diagnosis completed.  CT of chest demonstrated thickening of the mid esophagus with no lymphadenopathy and CT PET demonstrated uptake in the esophagus gastropathic lymph nodes it is felt that she likely had early stage carcinoma of the esophagus and that we could present to record with surgical resection.  Endoscopic ultrasound, however, was requested and we could not have this done any sooner than GI physicians available at [facility].     This was performed 12/7/2021 revealing a 5 cm esophageal mass present in the distal third esophagus without extension into the GE junction into the cardia, the mass extended into into the muscularis propria into the adventitia not penetrating through the adventitia with a single 1 cm gastrohepatic/celiac axis lymph node and FNB x1 performed-?  T2 N1 M0 distal esophageal cancer-clinical stage III, pathologic stage IIIa  In contacting the [facility]  [facility] pathology department the results of this FNB are not yet available and will be by 12/9/2021.      The patient is seen with her sister and son all indicating that she has had difficulty with swallowing since late summer likely July 2021 and has been attempting to have a diagnosis made since that time.  They are all somewhat frustrated about the length of time to obtain an answer for this problem and we have spent considerable time discussing her findings thus far and how we might proceed to treat what appears to be a contained malignancy.  This has, additionally, been discussed with Dr. Stef cruz who feels that she is not immediately and operative candidate and should proceed with chemo radiation therapy initially-neoadjuvant treatment before consideration for subsequent surgery.  This has been discussed with the patient, her sister and her son again in detail 12/8/2021.  We went on to initiate pain control with liquid hydrocodone, referred the patient to general surgery and oncology nutrition.    The patient was seen by  who proceeded 12/15/2021 to PowerPort placement and open jejunostomy.  She was seen during her hospital stay by oncology nutrition required hospitalization up to including 12/19/2021.    The patient required hospitalization to 12/20/2021 and after discharge along with her partner's health was able to gradually improve her caloric intake and was seen back 12/27 by Dr. Hollis who felt she had improved enough to initiate chemotherapy.  The patient was seen by radiation therapy 12/21 with plans to proceed with 40 CT simulation and planning-IMRT/IGR T-4140 cGy in 23 fractions with pleased to be considered.    The patient is seen back with her partner Cristy 12/29/2021 now able to \"manage\" and we have discussed extensively concurrent chemotherapy radiation therapy using Carboplatin/Taxol weekly.    Currently the patient is scheduled to begin radiation therapy 1/3/2021, undergoing " teaching 1/4/2022 and will begin concurrent chemotherapy 1/5/2022    The patient had difficulty tolerating this treatment but was eventually able to complete it though modified for toxicity-1/3/2022-2/18/2022 4680 with 26 total treatments, carboplatinum/Taxol weekly 1/5, 1/12, 1/19 and delayed until 2/10/2022.    A PET/CT was obtained 3/17/2022 demonstrating significant decrease in FDG avid thickened mid to distal esophagus, decrease in activity in gastrohepatic and left hilar nodes no longer demonstrating that above blood pool, new subcentimeter nodule right apex thought to be an endobronchial filling defect it was indeterminant with a new area of tree-in-bud nodularity medial aspect left upper lobe thought to be reactive.    Patient had been presented at thoracic conference and upon completion of therapy and repeat PET/CT was to be reevaluated for surgery.  Patient seen in office 3/23/2022 referred back to thoracic surgery.            The patient was briefly admitted 5//2022 with dislodged J-tube.  G-tube was placed and the patient was able to be discharged.     The patient's final pathologic staging could be considered ypT0, N0.     The patient is seen with significant other 5/25/2022 and reviewed her findings recognizing that she could benefit substantially from immunotherapy  considering nivolumab every 4 weeks x1 year as adjuvant therapy.  This is discussed extensively as to potential side effect profile and potential benefit.    We discussed availability of information Checkmate 577 trial in patients who had residual pathologic disease at the time of surgery to nivolumab with median disease-free survival nearly twice as long 22 months versus 11 months across follow-up routine regardless of PD-L1 overexpression.     As result we reviewed the records available including biopsies done at more than 1 location with PD-L1 never obtained.     At some point this may be an issue with the patient does have  recurrence.     She is seen back 6/29/2022 indicating that she does not wish to take immunotherapy at this point and we have discussed the above rationale.    Repeat scan scheduled September 2022.  The patient is seen 11/30/2022 stable clinically.  Repeat scans done outside location failed to show any evidence of recurrent disease and the patient is now followed at 6-month intervals.    The patient is next seen 5/18/2023 having undergone recent imaging at [facility] chest abdomen pelvis.  The studies demonstrate no metastatic disease, emphysematous changes bilaterally, acute small airways disease in the right middle lobe, no additional adenopathy, mild aneurysmal dilatation of thoracic aorta, thyromegaly and postoperative changes.  Her performance status remains reasonably good though her weight has not changed substantially.    Follow-up testing 10/26/2023 with CT chest, abdomen, pelvis demonstrates no evidence of recurrent disease.  The patient was to be seen by thoracic surgery, subsequent removal of her PowerPort anticipated.    Past Medical History:   Diagnosis Date    Boil, breast 12/13/2021    HEALING UNDER LEFT BREAST     Cervical cancer     Dysphagia     Esophageal cancer     Esophagitis 9/21/2021    Gastric ulcer     GERD (gastroesophageal reflux disease)     Hyperlipidemia     Hypertension     Hyperthyroidism     Irritable bowel     Mitral valve prolapse     FOLLOWED BY DR.SHARMA LESLIE (paroxysmal atrial fibrillation)     Uses feeding tube         Past Surgical History:   Procedure Laterality Date    CHOLECYSTECTOMY      COLONOSCOPY  08/27/2021    [facility]    ENDOSCOPY N/A 10/13/2022    Procedure: ESOPHAGOGASTRODUODENOSCOPY WITH  SAVARY DILATATION (12.8;14;15;16) WITH FLUOROSCOPY;  Surgeon: Hayder Finch III, MD;  Location: [facility] ENDOSCOPY;  Service: Gastroenterology;  Laterality: N/A;  FOLLOW UP S/P ESOPHAGECTOMY    ENDOSCOPY N/A 5/19/2023    Procedure: ESOPHAGOGASTRODUODENOSCOPY WITH SAVORY  DILITATION 12/12.8,14,15,16MM;  Surgeon: Reilly Patricia MD;  Location: [facility] ENDOSCOPY;  Service: Gastroenterology;  Laterality: N/A;  PRE- DYSPHAGIA  POST-SAME    ESOPHAGOGASTRECTOMY Right 4/29/2022    Procedure: BRONCHOSCOPY, RIGHT THORACOSCOPY WITH DAVINCI ROBOT WITH TOTAL ESOPHAGECTOMY, INTERCOSTAL NERVE BLOCK, JEJUNOSTOMY TUBE REPLACEMENT;  Surgeon: Hayder Finch III, MD;  Location: [facility] MAIN OR;  Service: Robotics - DaVinci;  Laterality: Right;    EXTERNAL EAR SURGERY      JEJUNOSTOMY N/A 12/15/2021    Procedure: open JEJUNOSTOMY tube placement;  Surgeon: Bhanu Hollis MD;  Location: [facility] MAIN OR;  Service: General;  Laterality: N/A;    KNEE SURGERY Left     REPLACEMENT TOTAL KNEE Left     UPPER ENDOSCOPIC ULTRASOUND W/ FNA N/A 12/7/2021    Procedure: Esophagogastroduodenoscopy with endoscopic ultrasound  WITH STAGING AND FINE NEEDLE BIOPSY;  Surgeon: Amrit Green MD;  Location: [facility] ENDOSCOPY;  Service: Gastroenterology;  Laterality: N/A;  post op: inlet patch, esophageal mass, T2    VENOUS ACCESS DEVICE (PORT) INSERTION Left 12/15/2021    Procedure: INSERTION OF PORTACATH;  Surgeon: Bhanu Hollis MD;  Location: [facility] OR;  Service: General;  Laterality: Left;        Current Outpatient Medications on File Prior to Visit   Medication Sig Dispense Refill    Eliquis 5 MG tablet tablet Take 1 tablet by mouth Every 12 (Twelve) Hours. LD 12/4  PT STATED TO HOLD 72 HOURS PRIOR TO SURGERY      lansoprazole (PREVACID SOLUTAB) 30 MG Tablet Delayed Release Dispersible disintegrating tablet DISSOLVE ONE TABLET BY MOUTH EVERY MORNING BEFORE BREAKFAST 60 tablet 1    methIMAzole (TAPAZOLE) 10 MG tablet Take 1 tab daily ( 5 days per week) . Do not take on Sat ,and Sunday.      metoprolol tartrate (LOPRESSOR) 25 MG tablet Take 1 tablet by mouth 2 (Two) Times a Day.      ondansetron (Zofran) 4 MG tablet Take 1 tablet by mouth Daily As Needed for Nausea or Vomiting. 15 tablet 0    pravastatin  (PRAVACHOL) 20 MG tablet Take 1 tablet by mouth Daily.      methIMAzole (TAPAZOLE) 10 MG tablet Take 10 mg by mouth Daily. (Patient not taking: Reported on 2023)       Current Facility-Administered Medications on File Prior to Visit   Medication Dose Route Frequency Provider Last Rate Last Admin    heparin injection 500 Units  500 Units Intravenous PRIggy Henry MD   500 Units at 23 1452    sodium chloride 0.9 % flush 10 mL  10 mL Intravenous PRN Iggy Harrell MD   10 mL at 23 1452        ALLERGIES:    Allergies   Allergen Reactions    Codeine Hives     Patient states this is when she was very young.         Social History     Socioeconomic History    Marital status: Significant Other    Number of children: 1    Years of education: High school   Tobacco Use    Smoking status: Every Day     Packs/day: 1.00     Years: 40.00     Additional pack years: 0.00     Total pack years: 40.00     Types: Cigarettes     Start date:      Last attempt to quit: 2022     Years since quittin.5     Passive exposure: Current    Smokeless tobacco: Never    Tobacco comments:     2 per day   Vaping Use    Vaping Use: Never used   Substance and Sexual Activity    Alcohol use: Not Currently    Drug use: Never    Sexual activity: Defer        Family History   Problem Relation Age of Onset    Breast cancer Mother     Diabetes Mother     Bipolar disorder Father     Diabetes Father     Hypertension Father     Breast cancer Sister     COPD Sister     Diabetes Sister     Hypertension Sister     Alcohol abuse Brother     Asthma Brother     Bipolar disorder Brother     Diabetes Brother     Seizures Brother     Breast cancer Maternal Grandmother     Diabetes Maternal Grandmother     Diabetes Maternal Grandfather     Diabetes Paternal Grandmother     Diabetes Paternal Grandfather     Hypertension Son     Kidney cancer Sister     Hypertension Sister     Diabetes Sister     Malig Hyperthermia Neg Hx      "      Objective     Vitals:    11/02/23 1400   BP: 105/61   Pulse: 69   Resp: 18   Temp: 98 °F (36.7 °C)   TempSrc: Temporal   SpO2: 97%   Weight: 46.5 kg (102 lb 9.6 oz)   Height: 162.6 cm (64.02\")   PainSc: 0-No pain   PainLoc: Comment: pain in stomach when eats         11/2/2023     2:03 PM   Current Status   ECOG score 0       Physical Exam  Constitutional:       Appearance: Normal appearance. She is underweight.   HENT:      Head: Normocephalic and atraumatic.      Nose: Nose normal.   Eyes:      Extraocular Movements: Extraocular movements intact.      Conjunctiva/sclera: Conjunctivae normal.      Pupils: Pupils are equal, round, and reactive to light.   Cardiovascular:      Rate and Rhythm: Normal rate and regular rhythm.      Pulses: Normal pulses.      Heart sounds: Normal heart sounds.   Pulmonary:      Effort: Pulmonary effort is normal.      Breath sounds: Normal breath sounds.   Abdominal:      General: Bowel sounds are normal.      Palpations: Abdomen is soft. There is no mass.      Comments: Status post esophagectomy, wounds well-healed, J-tube removed   Musculoskeletal:         General: Normal range of motion.      Cervical back: Normal range of motion.   Skin:     General: Skin is warm and dry.      Findings: No rash.   Neurological:      General: No focal deficit present.      Mental Status: She is alert and oriented to person, place, and time.   Psychiatric:         Mood and Affect: Mood normal.     I have reexamined the patient and the results are consistent with the previously documented exam. Iggy Harrell MD     RECENT LABS:  Results from last 7 days   Lab Units 11/02/23  1331   WBC 10*3/mm3 8.19   NEUTROS ABS 10*3/mm3 5.84   HEMOGLOBIN g/dL 13.4   HEMATOCRIT % 42.5   PLATELETS 10*3/mm3 215     Results from last 7 days   Lab Units 11/02/23  1331   SODIUM mmol/L 140   POTASSIUM mmol/L 4.5   CHLORIDE mmol/L 104   CO2 mmol/L 22.8   BUN mg/dL 12   CREATININE mg/dL 0.66   CALCIUM mg/dL 9.2 "   ALBUMIN g/dL 3.8   BILIRUBIN mg/dL 0.2   ALK PHOS U/L 91   ALT (SGPT) U/L <5   AST (SGOT) U/L 18   GLUCOSE mg/dL 122*            Assessment & Plan          63-year-old female with a history of hyperthyroidism, previous gastric ulcer, GE reflux, atrial fibrillation, hyperlipidemia, hypertension irritable bowel syndrome,          1.  T2 N1 M0 distal esophageal cancer-clinical stage III, pathologic stage IIIa  60-pack-year history of smoking with dysphagia and odynophagia developing over several months associated 10 to 15 pound weight loss  EGD and colonoscopy in June 2021 demonstrating severe dysplasia including carcinoma in situ.  here has been a number of physicians involved in the patient's case and several months before she was ultimately seen by Dr. Finch at [facility].   CT scan of the chest demonstrating thickening of the mid esophagus with no lymphadenopathy and PET/CT demonstrated uptake in the mid to distal esophagus with SUV intensely elevated at 16 with a few nonenlarged left hilar and gastrohepatic lymph nodes with mild increased FDG activity, few scattered subcentimeter pulmonary nodules bilaterally indeterminate.  EUS was felt necessary and ultimately obtained at [facility] performed 12/7/2021 revealing a 5 cm esophageal mass present in the distal third esophagus without extension into the GE junction into the cardia, the mass extended into into the muscularis propria into the adventitia not penetrating through the adventitia with a single 1 cm gastrohepatic/celiac axis lymph node and FNB x1 performed- FNA negative for malignancy  Discussed with Dr. Finch directly who feels that she is not immediately and operative candidate and should proceed with chemo radiation therapy initially-neoadjuvant treatment before consideration for subsequent surgery. Discussed extensively concurrent chemotherapy radiation therapy using Carboplatin/Taxol weekly.   who proceeded  12/15/2021 to PowerPort placement and open jejunostomy.  She was seen during her hospital stay by oncology nutrition required hospitalization up to including 12/19/2021.  seen by radiation therapy 12/21 with plans to proceed with 40 CT simulation and planning-IMRT/IGR T-4140 cGy in 23 fractions with pleased to be considered.   Radiation therapy initiated 1/3/2022  1/5/2021 cycle 1 weekly carboplatin/Taxol.    Patient status post week 2 on 1/12/2022  Patient treated 1/19/2022-week 3  1/26/2022, hold treatment today due to platelet counts 42,000 and feeling unwell.  Covid positive at that time  Chemotherapy held 2/2/2022 due to ongoing normal cytopenia, platelets of 40,000  Presentation at multidisciplinary clinic 2/10/2022 with plans to complete fourth dose of carboplatin Taxol followed by PET scan.  It is unclear at this time if the patient will be a surgical candidate pending her response and nutritional state post treatment  We will proceed today with her fourth dose of carboplatin Taxol  Upon completion of chemotherapy patient reevaluated with PET/CT 3/17/2022 with significant response for esophageal lesion, gastrohepatic left hilar lymphadenopathy no longer FDG avid, indeterminate subcentimeter nodule right apex thought to be endobronchial filling defect, tree-in-bud nodularity felt to be reactive.  Patient seen in office 3/23/2022 referred back to thoracic surgery.  Discussed potential adjuvant nivolumab therapy post surgery.  Patient seen 4/20/2022 with surgery planned 4/29/2022.  The patient was admitted 4/29 - 5/9/2022 undergoing bronchoscopy with right thoracoscopy, partial decortication, robot-assisted total esophagectomy, jejunostomy tube placement and intercostal nerve block.  She had a satisfactory postoperative course and was able to be discharged 5/9/2022 though was briefly readmitted requiring replacement of her J-tube at bedside.  Pathology demonstrated rare focal metaplasia at the GE junction  consistent with Cotto's esophagus, focal submucosal scar, chronic esophagitis, no evidence of dysplasia or malignancy identified by staining, focal active chronic gastritis, 13 lymph nodes negative, paraesophageal soft tissue margins negative, level 7 lymph node negative x3, final esophageal margin negative, final gastric margin negative-patient's final pathologic staging could be considered ypT0, N0.  The patient is seen with significant other 5/25/2022 and reviewed her findings recognizing that she could benefit substantially from immunotherapy  considering nivolumab every 4 weeks x1 year as adjuvant therapy.  This is discussed extensively as to potential side effect profile and potential benefit.  Patient seen 6/29/2022,Checkmate 577 trial in patients who had residual pathologic disease at the time of surgery to nivolumab with median disease-free survival nearly twice as long 22 months versus 11 months across follow-up routine regardless of PD-L1 overexpression.   As result we reviewed the records available including biopsies done at more than 1 location with PD-L1 never obtained.   At some point this may be an issue with the patient does have recurrence.   She is seen back 6/29/2022 indicating that she does not wish to take immunotherapy at this point and we have discussed the above rationale.Consider guardant 360 analysis  Patient assessed 8/24/22 clinically improved, repeat CT scan scheduled September 2022  Repeat scans obtained and reviewed by thoracic surgery 10/26/2022 negative for recurrence  Follow-up assessment every 6 months.  This includes repeat CT scan in 4/26/2023 which show postoperative changes only.  The patient is seen 5/18/2023 and we decided to have her assessed again in 6 months.  The patient is next seen 11/2/2023.[place] imaging available includes CT abdomen pelvis compared to April with findings of previous esophagectomy, mild enlargement infrarenal abdominal aorta measuring 2.3 cm,  pessary present, uterus removed no evidence of metastatic disease.  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  The patient was assessed in June by thoracic surgery with no additional recommendations as well.  Now, with the above findings, the patient's port could be removed        2.  Nutrition  Use of Jejunostomy tube. Continuous feed currently 12 hours a night, slowly working up to recommended 16 hours/day at 90 cc/h  She is still trying to drink at least 1 boost a day and able to take pills by mouth  She continues to take pills by mouth.  She does utilize her J-tube for 16 hours a day.  Her weight is stable over the past 3 weeks.  Patient seen 3/23/2022 with her J-tube no longer anchored properly, thoracic surgery request for evaluation  J-tube postoperative required replacement 5//22  Discussed additional nutrition, Enterade sample, prednisone 10 mg p.o. daily E scribed to pharmacy as trial appetite stimulant.  Assessed 8/24/2022 with associated leukocytosis, 1 additional month of prednisone, then discontinued  Patient seen 11/3/2022 continuing small meals routinely, medications assessed, Tums discontinued  Patient assessed 11/2/2023 with periodic discomfort with meals, thoracic assessment anticipated 11/6/2023    3.  Atrial fibrillation  Patient continues Eliquis 5 mg twice daily  Toprol 25 mg XL once daily.  Patient offered assessment by cardiology for atrial fibrillation and she is urged to complete this possibly coming off Eliquis.    Plan:    *51 weeks CBC, chest x-ray, CMP    *52 weeks MD    *Copy to thoracic surgery for port removal

## 2023-11-02 ENCOUNTER — INFUSION (OUTPATIENT)
Dept: ONCOLOGY | Facility: HOSPITAL | Age: 63
End: 2023-11-02
Payer: COMMERCIAL

## 2023-11-02 ENCOUNTER — OFFICE VISIT (OUTPATIENT)
Dept: ONCOLOGY | Facility: CLINIC | Age: 63
End: 2023-11-02
Payer: COMMERCIAL

## 2023-11-02 VITALS
RESPIRATION RATE: 18 BRPM | TEMPERATURE: 98 F | WEIGHT: 102.6 LBS | OXYGEN SATURATION: 97 % | BODY MASS INDEX: 17.52 KG/M2 | HEART RATE: 69 BPM | DIASTOLIC BLOOD PRESSURE: 61 MMHG | SYSTOLIC BLOOD PRESSURE: 105 MMHG | HEIGHT: 64 IN

## 2023-11-02 DIAGNOSIS — C15.9 MALIGNANT NEOPLASM OF ESOPHAGUS, UNSPECIFIED LOCATION: Primary | ICD-10-CM

## 2023-11-02 DIAGNOSIS — C15.9 MALIGNANT NEOPLASM OF ESOPHAGUS, UNSPECIFIED LOCATION: ICD-10-CM

## 2023-11-02 DIAGNOSIS — Z45.9 ENCOUNTER FOR MANAGEMENT OF IMPLANTED DEVICE: Primary | ICD-10-CM

## 2023-11-02 LAB
ALBUMIN SERPL-MCNC: 3.8 G/DL (ref 3.5–5.2)
ALBUMIN/GLOB SERPL: 1.4 G/DL
ALP SERPL-CCNC: 91 U/L (ref 39–117)
ALT SERPL W P-5'-P-CCNC: <5 U/L (ref 1–33)
ANION GAP SERPL CALCULATED.3IONS-SCNC: 13.2 MMOL/L (ref 5–15)
AST SERPL-CCNC: 18 U/L (ref 1–32)
BASOPHILS # BLD AUTO: 0.04 10*3/MM3 (ref 0–0.2)
BASOPHILS NFR BLD AUTO: 0.5 % (ref 0–1.5)
BILIRUB SERPL-MCNC: 0.2 MG/DL (ref 0–1.2)
BUN SERPL-MCNC: 12 MG/DL (ref 8–23)
BUN/CREAT SERPL: 18.2 (ref 7–25)
CALCIUM SPEC-SCNC: 9.2 MG/DL (ref 8.6–10.5)
CHLORIDE SERPL-SCNC: 104 MMOL/L (ref 98–107)
CO2 SERPL-SCNC: 22.8 MMOL/L (ref 22–29)
CREAT SERPL-MCNC: 0.66 MG/DL (ref 0.6–1.1)
DEPRECATED RDW RBC AUTO: 51.6 FL (ref 37–54)
EGFRCR SERPLBLD CKD-EPI 2021: 98.7 ML/MIN/1.73
EOSINOPHIL # BLD AUTO: 0.11 10*3/MM3 (ref 0–0.4)
EOSINOPHIL NFR BLD AUTO: 1.3 % (ref 0.3–6.2)
ERYTHROCYTE [DISTWIDTH] IN BLOOD BY AUTOMATED COUNT: 14.6 % (ref 12.3–15.4)
GLOBULIN UR ELPH-MCNC: 2.7 GM/DL
GLUCOSE SERPL-MCNC: 122 MG/DL (ref 65–99)
HCT VFR BLD AUTO: 42.5 % (ref 34–46.6)
HGB BLD-MCNC: 13.4 G/DL (ref 12–15.9)
IMM GRANULOCYTES # BLD AUTO: 0.03 10*3/MM3 (ref 0–0.05)
IMM GRANULOCYTES NFR BLD AUTO: 0.4 % (ref 0–0.5)
LYMPHOCYTES # BLD AUTO: 1.59 10*3/MM3 (ref 0.7–3.1)
LYMPHOCYTES NFR BLD AUTO: 19.4 % (ref 19.6–45.3)
MCH RBC QN AUTO: 30.4 PG (ref 26.6–33)
MCHC RBC AUTO-ENTMCNC: 31.5 G/DL (ref 31.5–35.7)
MCV RBC AUTO: 96.4 FL (ref 79–97)
MONOCYTES # BLD AUTO: 0.58 10*3/MM3 (ref 0.1–0.9)
MONOCYTES NFR BLD AUTO: 7.1 % (ref 5–12)
NEUTROPHILS NFR BLD AUTO: 5.84 10*3/MM3 (ref 1.7–7)
NEUTROPHILS NFR BLD AUTO: 71.3 % (ref 42.7–76)
NRBC BLD AUTO-RTO: 0 /100 WBC (ref 0–0.2)
PLATELET # BLD AUTO: 215 10*3/MM3 (ref 140–450)
PMV BLD AUTO: 9.4 FL (ref 6–12)
POTASSIUM SERPL-SCNC: 4.5 MMOL/L (ref 3.5–5.2)
PROT SERPL-MCNC: 6.5 G/DL (ref 6–8.5)
RBC # BLD AUTO: 4.41 10*6/MM3 (ref 3.77–5.28)
SODIUM SERPL-SCNC: 140 MMOL/L (ref 136–145)
WBC NRBC COR # BLD: 8.19 10*3/MM3 (ref 3.4–10.8)

## 2023-11-02 PROCEDURE — 36591 DRAW BLOOD OFF VENOUS DEVICE: CPT

## 2023-11-02 PROCEDURE — 85025 COMPLETE CBC W/AUTO DIFF WBC: CPT

## 2023-11-02 PROCEDURE — 80053 COMPREHEN METABOLIC PANEL: CPT

## 2023-11-02 PROCEDURE — 99213 OFFICE O/P EST LOW 20 MIN: CPT | Performed by: INTERNAL MEDICINE

## 2023-11-02 PROCEDURE — 25010000002 HEPARIN LOCK FLUSH PER 10 UNITS: Performed by: INTERNAL MEDICINE

## 2023-11-02 RX ORDER — HEPARIN SODIUM (PORCINE) LOCK FLUSH IV SOLN 100 UNIT/ML 100 UNIT/ML
500 SOLUTION INTRAVENOUS AS NEEDED
Status: DISCONTINUED | OUTPATIENT
Start: 2023-11-02 | End: 2023-11-02 | Stop reason: HOSPADM

## 2023-11-02 RX ORDER — SODIUM CHLORIDE 0.9 % (FLUSH) 0.9 %
10 SYRINGE (ML) INJECTION AS NEEDED
Status: DISCONTINUED | OUTPATIENT
Start: 2023-11-02 | End: 2023-11-02 | Stop reason: HOSPADM

## 2023-11-02 RX ORDER — HEPARIN SODIUM (PORCINE) LOCK FLUSH IV SOLN 100 UNIT/ML 100 UNIT/ML
500 SOLUTION INTRAVENOUS AS NEEDED
OUTPATIENT
Start: 2023-11-02

## 2023-11-02 RX ORDER — SODIUM CHLORIDE 0.9 % (FLUSH) 0.9 %
10 SYRINGE (ML) INJECTION AS NEEDED
OUTPATIENT
Start: 2023-11-02

## 2023-11-02 RX ADMIN — Medication 500 UNITS: at 13:31

## 2023-11-02 RX ADMIN — Medication 10 ML: at 13:31

## 2023-11-02 NOTE — LETTER
November 2, 2023     BRANDI Lopez    Patient: Maya Ribera   YOB: 1960   Date of Visit: 11/2/2023     Dear BRANDI Lopez:       Thank you for referring Maya Ribera to me for evaluation. Below are the relevant portions of my assessment and plan of care.    If you have questions, please do not hesitate to call me. I look forward to following Maya along with you.         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      History of Present Illness   This is a 63 y.o. female with recent diagnosis of distal esophageal cancer, initiating concurrent chemoradiation with carboplatin/Taxol on 1/5/2022.  Unfortunately, the patient has had frequent delays due to pancytopenia, weight loss, malnutrition and COVID-19.  She is completed only 3 weeks of chemotherapy.  She is due today for her fourth dose of carboplatin Taxol.  She has 5 radiation treatments remaining.    Her case was presented at the multidisciplinary clinic this morning with plans to complete the last week of concurrent chemoradiation followed by PET scan.  It is unclear at this time if the patient would qualify for surgery given her struggles and malnutrition.  She does have a J-tube for which her partner utilizes for nutrition.  She is still able to swallow pills by mouth.  Her weight is stable over the past few weeks.    She has been struggling with her blood  pressure.  She is on metoprolol originally 50 mg twice daily for her atrial fibrillation.  She has cut this down to once daily.  She does report she has been holding her Eliquis since her platelets dropped and has not yet resumed.  Her and her partner expressed concerns regarding the timeline of PET scan and possible surgery.  They are very eager to proceed with PET scan.  We did discuss today the possibility for false positives with esophagitis symptoms.  We will plan PET scan 4 weeks after the completion of radiation which is the soonest this should be obtained.    A PET/CT was obtained 3/17/2022 demonstrating significant decrease in FDG avid thickened mid to distal esophagus, decrease in activity in gastrohepatic and left hilar nodes no longer demonstrating that above blood pool, new subcentimeter nodule right apex thought to be an endobronchial filling defect it was indeterminant with a new area of tree-in-bud nodularity medial aspect left upper lobe thought to be reactive.    The patient is seen with her partner 3/23/2022.  She recently had nausea and vomiting for several days thought to be?  Food poisoning which, fortunately, is now resolving.  We have reviewed her scans and she could well be a candidate for surgery which we will asked Dr. Finch to review her for next available.    The patient is next reviewed 4/20/2022.  She is anxious for surgery 4/29/2022 but ready to proceed.    The patient was admitted 4/29 - 5/9/2022 undergoing bronchoscopy with right thoracoscopy, partial decortication, robot-assisted total esophagectomy, jejunostomy tube placement and intercostal nerve block.  She had a satisfactory postoperative course and was able to be discharged 5/9/2022 though was briefly readmitted requiring replacement of her J-tube at bedside.    Pathology demonstrated rare focal metaplasia at the GE junction consistent with Cotto's esophagus, focal submucosal scar, chronic esophagitis, no evidence of  dysplasia or malignancy identified by staining, focal active chronic gastritis, 13 lymph nodes negative, paraesophageal soft tissue margins negative, level 7 lymph node negative x3, final esophageal margin negative, final gastric margin negative-patient's final pathologic staging could be considered ypT0, N0.     The patient is seen with significant other 5/25/2022 and reviewed her findings recognizing that she could benefit substantially from immunotherapy  considering nivolumab every 4 weeks x1 year as adjuvant therapy.  This is discussed extensively as to potential side effect profile and potential benefit.  The patient was seen by thoracic surgery 6/1/2022 and felt to be making good progress.      We made plans for her to undergo chemo education for the possible use of nivolumab but this was held into the patient was to return and she is next in 6/29/2022.        We discussed availability of information Checkmate 577 trial in patients who had residual pathologic disease at the time of surgery to nivolumab with median disease-free survival nearly twice as long 22 months versus 11 months across follow-up routine regardless of PD-L1 overexpression.     As result we reviewed the records available including biopsies done at more than 1 location with PD-L1 never obtained.     At some point this may be an issue with the patient does have recurrence.     She is seen back 6/29/2022 indicating that she does not wish to take immunotherapy at this point and we have discussed the above rationale.    It was elected to follow the patient and she is reviewed 8/24/2022.  She has gained approximately 4 pounds, CBC normalized except for mild leukocytosis of 14,560.  She is feeling reasonably well with her partner echoing her continued improvement.  She has a follow-up CT series scheduled in September, follow-up thereafter with thoracic surgery.    The patient is next assessed 11/30/2022 having undergone outpatient CT at another  location which, fortunately, revealed no evidence of disease.  She is doing fairly well though slow to gain weight and requiring multiple small meals per day with occasional abdominal pain and reflux discomfort.  We have had a lengthy discussion about her eating habits and expected side effects after her particular surgery.  She is also been very concerned about continuing anticoagulation with an assessment for of her cardiac rhythm by long-term monitor anticipated initially.  She is urged to complete this and possibly come off anticoagulation.    The patient is next seen 11/2/2023.[facility] imaging available includes CT abdomen pelvis compared to April with findings of previous esophagectomy, mild enlargement infrarenal abdominal aorta measuring 2.3 cm, pessary present, uterus removed no evidence of metastatic disease.  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  The patient was assessed in June by thoracic surgery with no additional recommendations as well.    Now, with the above findings, the patient's port could be removed.      ONCOLOGY HISTORY    The patient is a 63-year-old female who had been seen in multidisciplinary thoracic oncology conference with complaints of painful swallowing and dysphagia over the previous several months with 10 to 15 pound weight loss.  This led to EGD and colonoscopy with normal colonoscopy but EGD demonstrated tumor at the GE junction with biopsies consistent with severe dysplasia including carcinoma in situ.  The patient states that she saw a number of physicians and attempting to have a diagnosis completed.  CT of chest demonstrated thickening of the mid esophagus with no lymphadenopathy and CT PET demonstrated uptake in the esophagus gastropathic lymph nodes it is felt that she likely had early stage carcinoma of the esophagus and that we could present to record with surgical resection.  Endoscopic  ultrasound, however, was requested and we could not have this done any sooner than GI physicians available at [facility].     This was performed 12/7/2021 revealing a 5 cm esophageal mass present in the distal third esophagus without extension into the GE junction into the cardia, the mass extended into into the muscularis propria into the adventitia not penetrating through the adventitia with a single 1 cm gastrohepatic/celiac axis lymph node and FNB x1 performed-?  T2 N1 M0 distal esophageal cancer-clinical stage III, pathologic stage IIIa  In contacting the [facility] pathology department the results of this FNB are not yet available and will be by 12/9/2021.      The patient is seen with her sister and son all indicating that she has had difficulty with swallowing since late summer likely July 2021 and has been attempting to have a diagnosis made since that time.  They are all somewhat frustrated about the length of time to obtain an answer for this problem and we have spent considerable time discussing her findings thus far and how we might proceed to treat what appears to be a contained malignancy.  This has, additionally, been discussed with Dr. Stef cruz who feels that she is not immediately and operative candidate and should proceed with chemo radiation therapy initially-neoadjuvant treatment before consideration for subsequent surgery.  This has been discussed with the patient, her sister and her son again in detail 12/8/2021.  We went on to initiate pain control with liquid hydrocodone, referred the patient to general surgery and oncology nutrition.    The patient was seen by  who proceeded 12/15/2021 to PowerPort placement and open jejunostomy.  She was seen during her hospital stay by oncology nutrition required hospitalization up to including 12/19/2021.    The patient required hospitalization to 12/20/2021 and after discharge along with her partner's health was able to  "gradually improve her caloric intake and was seen back 12/27 by Dr. Hollis who felt she had improved enough to initiate chemotherapy.  The patient was seen by radiation therapy 12/21 with plans to proceed with 40 CT simulation and planning-IMRT/IGR T-4140 cGy in 23 fractions with pleased to be considered.    The patient is seen back with her partner Cristy 12/29/2021 now able to \"manage\" and we have discussed extensively concurrent chemotherapy radiation therapy using Carboplatin/Taxol weekly.    Currently the patient is scheduled to begin radiation therapy 1/3/2021, undergoing teaching 1/4/2022 and will begin concurrent chemotherapy 1/5/2022    The patient had difficulty tolerating this treatment but was eventually able to complete it though modified for toxicity-1/3/2022-2/18/2022 4680 with 26 total treatments, carboplatinum/Taxol weekly 1/5, 1/12, 1/19 and delayed until 2/10/2022.    A PET/CT was obtained 3/17/2022 demonstrating significant decrease in FDG avid thickened mid to distal esophagus, decrease in activity in gastrohepatic and left hilar nodes no longer demonstrating that above blood pool, new subcentimeter nodule right apex thought to be an endobronchial filling defect it was indeterminant with a new area of tree-in-bud nodularity medial aspect left upper lobe thought to be reactive.    Patient had been presented at thoracic conference and upon completion of therapy and repeat PET/CT was to be reevaluated for surgery.  Patient seen in office 3/23/2022 referred back to thoracic surgery.            The patient was briefly admitted 5//2022 with dislodged J-tube.  G-tube was placed and the patient was able to be discharged.     The patient's final pathologic staging could be considered ypT0, N0.     The patient is seen with significant other 5/25/2022 and reviewed her findings recognizing that she could benefit substantially from immunotherapy  considering nivolumab every 4 weeks x1 year as " adjuvant therapy.  This is discussed extensively as to potential side effect profile and potential benefit.    We discussed availability of information Checkmate 577 trial in patients who had residual pathologic disease at the time of surgery to nivolumab with median disease-free survival nearly twice as long 22 months versus 11 months across follow-up routine regardless of PD-L1 overexpression.     As result we reviewed the records available including biopsies done at more than 1 location with PD-L1 never obtained.     At some point this may be an issue with the patient does have recurrence.     She is seen back 6/29/2022 indicating that she does not wish to take immunotherapy at this point and we have discussed the above rationale.    Repeat scan scheduled September 2022.  The patient is seen 11/30/2022 stable clinically.  Repeat scans done outside location failed to show any evidence of recurrent disease and the patient is now followed at 6-month intervals.    The patient is next seen 5/18/2023 having undergone recent imaging at [facility] chest abdomen pelvis.  The studies demonstrate no metastatic disease, emphysematous changes bilaterally, acute small airways disease in the right middle lobe, no additional adenopathy, mild aneurysmal dilatation of thoracic aorta, thyromegaly and postoperative changes.  Her performance status remains reasonably good though her weight has not changed substantially.    Follow-up testing 10/26/2023 with CT chest, abdomen, pelvis demonstrates no evidence of recurrent disease.  The patient was to be seen by thoracic surgery, subsequent removal of her PowerPort anticipated.    Past Medical History:   Diagnosis Date    Boil, breast 12/13/2021    HEALING UNDER LEFT BREAST     Cervical cancer     Dysphagia     Esophageal cancer     Esophagitis 9/21/2021    Gastric ulcer     GERD (gastroesophageal reflux disease)     Hyperlipidemia     Hypertension     Hyperthyroidism     Irritable bowel      Mitral valve prolapse     FOLLOWED BY DR.SHARMA LESLIE (paroxysmal atrial fibrillation)     Uses feeding tube         Past Surgical History:   Procedure Laterality Date    CHOLECYSTECTOMY      COLONOSCOPY  08/27/2021    [place] [facility] [facility]    ENDOSCOPY N/A 10/13/2022    Procedure: ESOPHAGOGASTRODUODENOSCOPY WITH  SAVARY DILATATION (12.8;14;15;16) WITH FLUOROSCOPY;  Surgeon: Hayder Finch III, MD;  Location: [facility] ENDOSCOPY;  Service: Gastroenterology;  Laterality: N/A;  FOLLOW UP S/P ESOPHAGECTOMY    ENDOSCOPY N/A 5/19/2023    Procedure: ESOPHAGOGASTRODUODENOSCOPY WITH SAVORY DILITATION 12/12.8,14,15,16MM;  Surgeon: Reilly Patricia MD;  Location: [facility] ENDOSCOPY;  Service: Gastroenterology;  Laterality: N/A;  PRE- DYSPHAGIA  POST-SAME    ESOPHAGOGASTRECTOMY Right 4/29/2022    Procedure: BRONCHOSCOPY, RIGHT THORACOSCOPY WITH DormzyINCI ROBOT WITH TOTAL ESOPHAGECTOMY, INTERCOSTAL NERVE BLOCK, JEJUNOSTOMY TUBE REPLACEMENT;  Surgeon: Hayder Finch III, MD;  Location: [facility] OR;  Service: Robotics - DaVinci;  Laterality: Right;    EXTERNAL EAR SURGERY      JEJUNOSTOMY N/A 12/15/2021    Procedure: open JEJUNOSTOMY tube placement;  Surgeon: Bhanu Hollis MD;  Location: [facility] OR;  Service: General;  Laterality: N/A;    KNEE SURGERY Left     REPLACEMENT TOTAL KNEE Left     UPPER ENDOSCOPIC ULTRASOUND W/ FNA N/A 12/7/2021    Procedure: Esophagogastroduodenoscopy with endoscopic ultrasound  WITH STAGING AND FINE NEEDLE BIOPSY;  Surgeon: Amrit Green MD;  Location: [facility] ENDOSCOPY;  Service: Gastroenterology;  Laterality: N/A;  post op: inlet patch, esophageal mass, T2    VENOUS ACCESS DEVICE (PORT) INSERTION Left 12/15/2021    Procedure: INSERTION OF PORTACATH;  Surgeon: Bhanu Hollis MD;  Location: [facility] OR;  Service: General;  Laterality: Left;        Current Outpatient Medications on File Prior to Visit   Medication Sig Dispense Refill    Eliquis 5 MG tablet tablet Take 1 tablet by mouth  Every 12 (Twelve) Hours. LD   PT STATED TO HOLD 72 HOURS PRIOR TO SURGERY      lansoprazole (PREVACID SOLUTAB) 30 MG Tablet Delayed Release Dispersible disintegrating tablet DISSOLVE ONE TABLET BY MOUTH EVERY MORNING BEFORE BREAKFAST 60 tablet 1    methIMAzole (TAPAZOLE) 10 MG tablet Take 1 tab daily ( 5 days per week) . Do not take on Sat ,and .      metoprolol tartrate (LOPRESSOR) 25 MG tablet Take 1 tablet by mouth 2 (Two) Times a Day.      ondansetron (Zofran) 4 MG tablet Take 1 tablet by mouth Daily As Needed for Nausea or Vomiting. 15 tablet 0    pravastatin (PRAVACHOL) 20 MG tablet Take 1 tablet by mouth Daily.      methIMAzole (TAPAZOLE) 10 MG tablet Take 10 mg by mouth Daily. (Patient not taking: Reported on 2023)       Current Facility-Administered Medications on File Prior to Visit   Medication Dose Route Frequency Provider Last Rate Last Admin    heparin injection 500 Units  500 Units Intravenous PRIggy Henry MD   500 Units at 23 1452    sodium chloride 0.9 % flush 10 mL  10 mL Intravenous PRN Iggy Harrell MD   10 mL at 23 1452        ALLERGIES:    Allergies   Allergen Reactions    Codeine Hives     Patient states this is when she was very young.         Social History     Socioeconomic History    Marital status: Significant Other    Number of children: 1    Years of education: High school   Tobacco Use    Smoking status: Every Day     Packs/day: 1.00     Years: 40.00     Additional pack years: 0.00     Total pack years: 40.00     Types: Cigarettes     Start date:      Last attempt to quit: 2022     Years since quittin.5     Passive exposure: Current    Smokeless tobacco: Never    Tobacco comments:     2 per day   Vaping Use    Vaping Use: Never used   Substance and Sexual Activity    Alcohol use: Not Currently    Drug use: Never    Sexual activity: Defer        Family History   Problem Relation Age of Onset    Breast cancer Mother     Diabetes  "Mother     Bipolar disorder Father     Diabetes Father     Hypertension Father     Breast cancer Sister     COPD Sister     Diabetes Sister     Hypertension Sister     Alcohol abuse Brother     Asthma Brother     Bipolar disorder Brother     Diabetes Brother     Seizures Brother     Breast cancer Maternal Grandmother     Diabetes Maternal Grandmother     Diabetes Maternal Grandfather     Diabetes Paternal Grandmother     Diabetes Paternal Grandfather     Hypertension Son     Kidney cancer Sister     Hypertension Sister     Diabetes Sister     Malig Hyperthermia Neg Hx           Objective    Vitals:    11/02/23 1400   BP: 105/61   Pulse: 69   Resp: 18   Temp: 98 °F (36.7 °C)   TempSrc: Temporal   SpO2: 97%   Weight: 46.5 kg (102 lb 9.6 oz)   Height: 162.6 cm (64.02\")   PainSc: 0-No pain   PainLoc: Comment: pain in stomach when eats         11/2/2023     2:03 PM   Current Status   ECOG score 0       Physical Exam  Constitutional:       Appearance: Normal appearance. She is underweight.   HENT:      Head: Normocephalic and atraumatic.      Nose: Nose normal.   Eyes:      Extraocular Movements: Extraocular movements intact.      Conjunctiva/sclera: Conjunctivae normal.      Pupils: Pupils are equal, round, and reactive to light.   Cardiovascular:      Rate and Rhythm: Normal rate and regular rhythm.      Pulses: Normal pulses.      Heart sounds: Normal heart sounds.   Pulmonary:      Effort: Pulmonary effort is normal.      Breath sounds: Normal breath sounds.   Abdominal:      General: Bowel sounds are normal.      Palpations: Abdomen is soft. There is no mass.      Comments: Status post esophagectomy, wounds well-healed, J-tube removed   Musculoskeletal:         General: Normal range of motion.      Cervical back: Normal range of motion.   Skin:     General: Skin is warm and dry.      Findings: No rash.   Neurological:      General: No focal deficit present.      Mental Status: She is alert and oriented to person, " place, and time.   Psychiatric:         Mood and Affect: Mood normal.     I have reexamined the patient and the results are consistent with the previously documented exam. Iggy Harrell MD     RECENT LABS:  Results from last 7 days   Lab Units 11/02/23  1331   WBC 10*3/mm3 8.19   NEUTROS ABS 10*3/mm3 5.84   HEMOGLOBIN g/dL 13.4   HEMATOCRIT % 42.5   PLATELETS 10*3/mm3 215     Results from last 7 days   Lab Units 11/02/23  1331   SODIUM mmol/L 140   POTASSIUM mmol/L 4.5   CHLORIDE mmol/L 104   CO2 mmol/L 22.8   BUN mg/dL 12   CREATININE mg/dL 0.66   CALCIUM mg/dL 9.2   ALBUMIN g/dL 3.8   BILIRUBIN mg/dL 0.2   ALK PHOS U/L 91   ALT (SGPT) U/L <5   AST (SGOT) U/L 18   GLUCOSE mg/dL 122*            Assessment & Plan         63-year-old female with a history of hyperthyroidism, previous gastric ulcer, GE reflux, atrial fibrillation, hyperlipidemia, hypertension irritable bowel syndrome,          1.  T2 N1 M0 distal esophageal cancer-clinical stage III, pathologic stage IIIa  60-pack-year history of smoking with dysphagia and odynophagia developing over several months associated 10 to 15 pound weight loss  EGD and colonoscopy in June 2021 demonstrating severe dysplasia including carcinoma in situ.  here has been a number of physicians involved in the patient's case and several months before she was ultimately seen by Dr. Finch at [facility].   CT scan of the chest demonstrating thickening of the mid esophagus with no lymphadenopathy and PET/CT demonstrated uptake in the mid to distal esophagus with SUV intensely elevated at 16 with a few nonenlarged left hilar and gastrohepatic lymph nodes with mild increased FDG activity, few scattered subcentimeter pulmonary nodules bilaterally indeterminate.  EUS was felt necessary and ultimately obtained at [facility] performed 12/7/2021 revealing a 5 cm esophageal mass present in the distal third esophagus without extension into the GE junction into the  cardia, the mass extended into into the muscularis propria into the adventitia not penetrating through the adventitia with a single 1 cm gastrohepatic/celiac axis lymph node and FNB x1 performed- FNA negative for malignancy  Discussed with Dr. Stef cruz who feels that she is not immediately and operative candidate and should proceed with chemo radiation therapy initially-neoadjuvant treatment before consideration for subsequent surgery. Discussed extensively concurrent chemotherapy radiation therapy using Carboplatin/Taxol weekly.   who proceeded 12/15/2021 to PowerPort placement and open jejunostomy.  She was seen during her hospital stay by oncology nutrition required hospitalization up to including 12/19/2021.  seen by radiation therapy 12/21 with plans to proceed with 40 CT simulation and planning-IMRT/IGR T-4140 cGy in 23 fractions with pleased to be considered.   Radiation therapy initiated 1/3/2022  1/5/2021 cycle 1 weekly carboplatin/Taxol.    Patient status post week 2 on 1/12/2022  Patient treated 1/19/2022-week 3  1/26/2022, hold treatment today due to platelet counts 42,000 and feeling unwell.  Covid positive at that time  Chemotherapy held 2/2/2022 due to ongoing normal cytopenia, platelets of 40,000  Presentation at multidisciplinary clinic 2/10/2022 with plans to complete fourth dose of carboplatin Taxol followed by PET scan.  It is unclear at this time if the patient will be a surgical candidate pending her response and nutritional state post treatment  We will proceed today with her fourth dose of carboplatin Taxol  Upon completion of chemotherapy patient reevaluated with PET/CT 3/17/2022 with significant response for esophageal lesion, gastrohepatic left hilar lymphadenopathy no longer FDG avid, indeterminate subcentimeter nodule right apex thought to be endobronchial filling defect, tree-in-bud nodularity felt to be reactive.  Patient seen in office 3/23/2022 referred back to  thoracic surgery.  Discussed potential adjuvant nivolumab therapy post surgery.  Patient seen 4/20/2022 with surgery planned 4/29/2022.  The patient was admitted 4/29 - 5/9/2022 undergoing bronchoscopy with right thoracoscopy, partial decortication, robot-assisted total esophagectomy, jejunostomy tube placement and intercostal nerve block.  She had a satisfactory postoperative course and was able to be discharged 5/9/2022 though was briefly readmitted requiring replacement of her J-tube at bedside.  Pathology demonstrated rare focal metaplasia at the GE junction consistent with Cotto's esophagus, focal submucosal scar, chronic esophagitis, no evidence of dysplasia or malignancy identified by staining, focal active chronic gastritis, 13 lymph nodes negative, paraesophageal soft tissue margins negative, level 7 lymph node negative x3, final esophageal margin negative, final gastric margin negative-patient's final pathologic staging could be considered ypT0, N0.  The patient is seen with significant other 5/25/2022 and reviewed her findings recognizing that she could benefit substantially from immunotherapy  considering nivolumab every 4 weeks x1 year as adjuvant therapy.  This is discussed extensively as to potential side effect profile and potential benefit.  Patient seen 6/29/2022,Checkmate 577 trial in patients who had residual pathologic disease at the time of surgery to nivolumab with median disease-free survival nearly twice as long 22 months versus 11 months across follow-up routine regardless of PD-L1 overexpression.   As result we reviewed the records available including biopsies done at more than 1 location with PD-L1 never obtained.   At some point this may be an issue with the patient does have recurrence.   She is seen back 6/29/2022 indicating that she does not wish to take immunotherapy at this point and we have discussed the above rationale.Consider guardant 360 analysis  Patient assessed 8/24/22  clinically improved, repeat CT scan scheduled September 2022  Repeat scans obtained and reviewed by thoracic surgery 10/26/2022 negative for recurrence  Follow-up assessment every 6 months.  This includes repeat CT scan in 4/26/2023 which show postoperative changes only.  The patient is seen 5/18/2023 and we decided to have her assessed again in 6 months.  The patient is next seen 11/2/2023.[place] imaging available includes CT abdomen pelvis compared to April with findings of previous esophagectomy, mild enlargement infrarenal abdominal aorta measuring 2.3 cm, pessary present, uterus removed no evidence of metastatic disease.  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  The patient was assessed in June by thoracic surgery with no additional recommendations as well.  Now, with the above findings, the patient's port could be removed        2.  Nutrition  Use of Jejunostomy tube. Continuous feed currently 12 hours a night, slowly working up to recommended 16 hours/day at 90 cc/h  She is still trying to drink at least 1 boost a day and able to take pills by mouth  She continues to take pills by mouth.  She does utilize her J-tube for 16 hours a day.  Her weight is stable over the past 3 weeks.  Patient seen 3/23/2022 with her J-tube no longer anchored properly, thoracic surgery request for evaluation  J-tube postoperative required replacement 5//22  Discussed additional nutrition, Enterade sample, prednisone 10 mg p.o. daily E scribed to pharmacy as trial appetite stimulant.  Assessed 8/24/2022 with associated leukocytosis, 1 additional month of prednisone, then discontinued  Patient seen 11/3/2022 continuing small meals routinely, medications assessed, Tums discontinued  Patient assessed 11/2/2023 with periodic discomfort with meals, thoracic assessment anticipated 11/6/2023    3.  Atrial fibrillation  Patient continues Eliquis 5 mg twice  daily  Toprol 25 mg XL once daily.  Patient offered assessment by cardiology for atrial fibrillation and she is urged to complete this possibly coming off Eliquis.    Plan:    *51 weeks CBC, chest x-ray, CMP    *52 weeks MD    *Copy to thoracic surgery for port removal

## 2023-11-02 NOTE — LETTER
November 2, 2023     Artie Sánchez MD  [id number]  [place] [id number]    Patient: Maya Ribera   YOB: 1960   Date of Visit: 11/2/2023     Dear Artie Sánchez MD:       Thank you for referring Maya Ribera to me for evaluation. Below are the relevant portions of my assessment and plan of care.    If you have questions, please do not hesitate to call me. I look forward to following Maya along with you.         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      History of Present Illness   This is a 63 y.o. female with recent diagnosis of distal esophageal cancer, initiating concurrent chemoradiation with carboplatin/Taxol on 1/5/2022.  Unfortunately, the patient has had frequent delays due to pancytopenia, weight loss, malnutrition and COVID-19.  She is completed only 3 weeks of chemotherapy.  She is due today for her fourth dose of carboplatin Taxol.  She has 5 radiation treatments remaining.    Her case was presented at the multidisciplinary clinic this morning with plans to complete the last week of concurrent chemoradiation followed by PET scan.  It is unclear at this time if the patient would qualify for surgery given her struggles and malnutrition.  She does have a J-tube for which her partner utilizes for nutrition.  She is still able to swallow pills by mouth.  Her weight is stable over the past few weeks.    She has  been struggling with her blood pressure.  She is on metoprolol originally 50 mg twice daily for her atrial fibrillation.  She has cut this down to once daily.  She does report she has been holding her Eliquis since her platelets dropped and has not yet resumed.  Her and her partner expressed concerns regarding the timeline of PET scan and possible surgery.  They are very eager to proceed with PET scan.  We did discuss today the possibility for false positives with esophagitis symptoms.  We will plan PET scan 4 weeks after the completion of radiation which is the soonest this should be obtained.    A PET/CT was obtained 3/17/2022 demonstrating significant decrease in FDG avid thickened mid to distal esophagus, decrease in activity in gastrohepatic and left hilar nodes no longer demonstrating that above blood pool, new subcentimeter nodule right apex thought to be an endobronchial filling defect it was indeterminant with a new area of tree-in-bud nodularity medial aspect left upper lobe thought to be reactive.    The patient is seen with her partner 3/23/2022.  She recently had nausea and vomiting for several days thought to be?  Food poisoning which, fortunately, is now resolving.  We have reviewed her scans and she could well be a candidate for surgery which we will asked Dr. Finch to review her for next available.    The patient is next reviewed 4/20/2022.  She is anxious for surgery 4/29/2022 but ready to proceed.    The patient was admitted 4/29 - 5/9/2022 undergoing bronchoscopy with right thoracoscopy, partial decortication, robot-assisted total esophagectomy, jejunostomy tube placement and intercostal nerve block.  She had a satisfactory postoperative course and was able to be discharged 5/9/2022 though was briefly readmitted requiring replacement of her J-tube at bedside.    Pathology demonstrated rare focal metaplasia at the GE junction consistent with Cotto's esophagus, focal submucosal scar, chronic  esophagitis, no evidence of dysplasia or malignancy identified by staining, focal active chronic gastritis, 13 lymph nodes negative, paraesophageal soft tissue margins negative, level 7 lymph node negative x3, final esophageal margin negative, final gastric margin negative-patient's final pathologic staging could be considered ypT0, N0.     The patient is seen with significant other 5/25/2022 and reviewed her findings recognizing that she could benefit substantially from immunotherapy  considering nivolumab every 4 weeks x1 year as adjuvant therapy.  This is discussed extensively as to potential side effect profile and potential benefit.  The patient was seen by thoracic surgery 6/1/2022 and felt to be making good progress.      We made plans for her to undergo chemo education for the possible use of nivolumab but this was held into the patient was to return and she is next in 6/29/2022.        We discussed availability of information Checkmate 577 trial in patients who had residual pathologic disease at the time of surgery to nivolumab with median disease-free survival nearly twice as long 22 months versus 11 months across follow-up routine regardless of PD-L1 overexpression.     As result we reviewed the records available including biopsies done at more than 1 location with PD-L1 never obtained.     At some point this may be an issue with the patient does have recurrence.     She is seen back 6/29/2022 indicating that she does not wish to take immunotherapy at this point and we have discussed the above rationale.    It was elected to follow the patient and she is reviewed 8/24/2022.  She has gained approximately 4 pounds, CBC normalized except for mild leukocytosis of 14,560.  She is feeling reasonably well with her partner echoing her continued improvement.  She has a follow-up CT series scheduled in September, follow-up thereafter with thoracic surgery.    The patient is next assessed 11/30/2022 having undergone  outpatient CT at another location which, fortunately, revealed no evidence of disease.  She is doing fairly well though slow to gain weight and requiring multiple small meals per day with occasional abdominal pain and reflux discomfort.  We have had a lengthy discussion about her eating habits and expected side effects after her particular surgery.  She is also been very concerned about continuing anticoagulation with an assessment for of her cardiac rhythm by long-term monitor anticipated initially.  She is urged to complete this and possibly come off anticoagulation.    The patient is next seen 11/2/2023.[facility] imaging available includes CT abdomen pelvis compared to April with findings of previous esophagectomy, mild enlargement infrarenal abdominal aorta measuring 2.3 cm, pessary present, uterus removed no evidence of metastatic disease.  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  The patient was assessed in June by thoracic surgery with no additional recommendations as well.    Now, with the above findings, the patient's port could be removed.      ONCOLOGY HISTORY    The patient is a 63-year-old female who had been seen in multidisciplinary thoracic oncology conference with complaints of painful swallowing and dysphagia over the previous several months with 10 to 15 pound weight loss.  This led to EGD and colonoscopy with normal colonoscopy but EGD demonstrated tumor at the GE junction with biopsies consistent with severe dysplasia including carcinoma in situ.  The patient states that she saw a number of physicians and attempting to have a diagnosis completed.  CT of chest demonstrated thickening of the mid esophagus with no lymphadenopathy and CT PET demonstrated uptake in the esophagus gastropathic lymph nodes it is felt that she likely had early stage carcinoma of the esophagus and that we could present to record with surgical  resection.  Endoscopic ultrasound, however, was requested and we could not have this done any sooner than GI physicians available at [facility].     This was performed 12/7/2021 revealing a 5 cm esophageal mass present in the distal third esophagus without extension into the GE junction into the cardia, the mass extended into into the muscularis propria into the adventitia not penetrating through the adventitia with a single 1 cm gastrohepatic/celiac axis lymph node and FNB x1 performed-?  T2 N1 M0 distal esophageal cancer-clinical stage III, pathologic stage IIIa  In contacting the [facility] pathology department the results of this FNB are not yet available and will be by 12/9/2021.      The patient is seen with her sister and son all indicating that she has had difficulty with swallowing since late summer likely July 2021 and has been attempting to have a diagnosis made since that time.  They are all somewhat frustrated about the length of time to obtain an answer for this problem and we have spent considerable time discussing her findings thus far and how we might proceed to treat what appears to be a contained malignancy.  This has, additionally, been discussed with Dr. Stef cruz who feels that she is not immediately and operative candidate and should proceed with chemo radiation therapy initially-neoadjuvant treatment before consideration for subsequent surgery.  This has been discussed with the patient, her sister and her son again in detail 12/8/2021.  We went on to initiate pain control with liquid hydrocodone, referred the patient to general surgery and oncology nutrition.    The patient was seen by  who proceeded 12/15/2021 to PowerPort placement and open jejunostomy.  She was seen during her hospital stay by oncology nutrition required hospitalization up to including 12/19/2021.    The patient required hospitalization to 12/20/2021 and after discharge along with her  "partner's health was able to gradually improve her caloric intake and was seen back 12/27 by Dr. Hollis who felt she had improved enough to initiate chemotherapy.  The patient was seen by radiation therapy 12/21 with plans to proceed with 40 CT simulation and planning-IMRT/IGR T-4140 cGy in 23 fractions with pleased to be considered.    The patient is seen back with her partner Cristy 12/29/2021 now able to \"manage\" and we have discussed extensively concurrent chemotherapy radiation therapy using Carboplatin/Taxol weekly.    Currently the patient is scheduled to begin radiation therapy 1/3/2021, undergoing teaching 1/4/2022 and will begin concurrent chemotherapy 1/5/2022    The patient had difficulty tolerating this treatment but was eventually able to complete it though modified for toxicity-1/3/2022-2/18/2022 4680 with 26 total treatments, carboplatinum/Taxol weekly 1/5, 1/12, 1/19 and delayed until 2/10/2022.    A PET/CT was obtained 3/17/2022 demonstrating significant decrease in FDG avid thickened mid to distal esophagus, decrease in activity in gastrohepatic and left hilar nodes no longer demonstrating that above blood pool, new subcentimeter nodule right apex thought to be an endobronchial filling defect it was indeterminant with a new area of tree-in-bud nodularity medial aspect left upper lobe thought to be reactive.    Patient had been presented at thoracic conference and upon completion of therapy and repeat PET/CT was to be reevaluated for surgery.  Patient seen in office 3/23/2022 referred back to thoracic surgery.            The patient was briefly admitted 5//2022 with dislodged J-tube.  G-tube was placed and the patient was able to be discharged.     The patient's final pathologic staging could be considered ypT0, N0.     The patient is seen with significant other 5/25/2022 and reviewed her findings recognizing that she could benefit substantially from immunotherapy  considering nivolumab " every 4 weeks x1 year as adjuvant therapy.  This is discussed extensively as to potential side effect profile and potential benefit.    We discussed availability of information Checkmate 577 trial in patients who had residual pathologic disease at the time of surgery to nivolumab with median disease-free survival nearly twice as long 22 months versus 11 months across follow-up routine regardless of PD-L1 overexpression.     As result we reviewed the records available including biopsies done at more than 1 location with PD-L1 never obtained.     At some point this may be an issue with the patient does have recurrence.     She is seen back 6/29/2022 indicating that she does not wish to take immunotherapy at this point and we have discussed the above rationale.    Repeat scan scheduled September 2022.  The patient is seen 11/30/2022 stable clinically.  Repeat scans done outside location failed to show any evidence of recurrent disease and the patient is now followed at 6-month intervals.    The patient is next seen 5/18/2023 having undergone recent imaging at [facility] chest abdomen pelvis.  The studies demonstrate no metastatic disease, emphysematous changes bilaterally, acute small airways disease in the right middle lobe, no additional adenopathy, mild aneurysmal dilatation of thoracic aorta, thyromegaly and postoperative changes.  Her performance status remains reasonably good though her weight has not changed substantially.    Follow-up testing 10/26/2023 with CT chest, abdomen, pelvis demonstrates no evidence of recurrent disease.  The patient was to be seen by thoracic surgery, subsequent removal of her PowerPort anticipated.    Past Medical History:   Diagnosis Date   • Boil, breast 12/13/2021    HEALING UNDER LEFT BREAST    • Cervical cancer    • Dysphagia    • Esophageal cancer    • Esophagitis 9/21/2021   • Gastric ulcer    • GERD (gastroesophageal reflux disease)    • Hyperlipidemia    • Hypertension    •  Hyperthyroidism    • Irritable bowel    • Mitral valve prolapse     FOLLOWED BY     • PAF (paroxysmal atrial fibrillation)    • Uses feeding tube         Past Surgical History:   Procedure Laterality Date   • CHOLECYSTECTOMY     • COLONOSCOPY  08/27/2021    [facility]   • ENDOSCOPY N/A 10/13/2022    Procedure: ESOPHAGOGASTRODUODENOSCOPY WITH  SAVARY DILATATION (12.8;14;15;16) WITH FLUOROSCOPY;  Surgeon: Hayder Finch III, MD;  Location: [facility] ENDOSCOPY;  Service: Gastroenterology;  Laterality: N/A;  FOLLOW UP S/P ESOPHAGECTOMY   • ENDOSCOPY N/A 5/19/2023    Procedure: ESOPHAGOGASTRODUODENOSCOPY WITH SAVORY DILITATION 12/12.8,14,15,16MM;  Surgeon: Reilly Patricia MD;  Location: [facility] ENDOSCOPY;  Service: Gastroenterology;  Laterality: N/A;  PRE- DYSPHAGIA  POST-SAME   • ESOPHAGOGASTRECTOMY Right 4/29/2022    Procedure: BRONCHOSCOPY, RIGHT THORACOSCOPY WITH DAVINCI ROBOT WITH TOTAL ESOPHAGECTOMY, INTERCOSTAL NERVE BLOCK, JEJUNOSTOMY TUBE REPLACEMENT;  Surgeon: Hayder Finch III, MD;  Location: [facility];  Service: Robotics - DaVinci;  Laterality: Right;   • EXTERNAL EAR SURGERY     • JEJUNOSTOMY N/A 12/15/2021    Procedure: open JEJUNOSTOMY tube placement;  Surgeon: Bhanu Hollis MD;  Location: [facility];  Service: General;  Laterality: N/A;   • KNEE SURGERY Left    • REPLACEMENT TOTAL KNEE Left    • UPPER ENDOSCOPIC ULTRASOUND W/ FNA N/A 12/7/2021    Procedure: Esophagogastroduodenoscopy with endoscopic ultrasound  WITH STAGING AND FINE NEEDLE BIOPSY;  Surgeon: Amrit Green MD;  Location: [facility] ENDOSCOPY;  Service: Gastroenterology;  Laterality: N/A;  post op: inlet patch, esophageal mass, T2   • VENOUS ACCESS DEVICE (PORT) INSERTION Left 12/15/2021    Procedure: INSERTION OF PORTACATH;  Surgeon: Bhanu Hollis MD;  Location: [facility];  Service: General;  Laterality: Left;        Current Outpatient Medications on File Prior to Visit   Medication Sig Dispense Refill    • Eliquis 5 MG tablet tablet Take 1 tablet by mouth Every 12 (Twelve) Hours. LD   PT STATED TO HOLD 72 HOURS PRIOR TO SURGERY     • lansoprazole (PREVACID SOLUTAB) 30 MG Tablet Delayed Release Dispersible disintegrating tablet DISSOLVE ONE TABLET BY MOUTH EVERY MORNING BEFORE BREAKFAST 60 tablet 1   • methIMAzole (TAPAZOLE) 10 MG tablet Take 1 tab daily ( 5 days per week) . Do not take on Sat ,and .     • metoprolol tartrate (LOPRESSOR) 25 MG tablet Take 1 tablet by mouth 2 (Two) Times a Day.     • ondansetron (Zofran) 4 MG tablet Take 1 tablet by mouth Daily As Needed for Nausea or Vomiting. 15 tablet 0   • pravastatin (PRAVACHOL) 20 MG tablet Take 1 tablet by mouth Daily.     • methIMAzole (TAPAZOLE) 10 MG tablet Take 10 mg by mouth Daily. (Patient not taking: Reported on 2023)       Current Facility-Administered Medications on File Prior to Visit   Medication Dose Route Frequency Provider Last Rate Last Admin   • heparin injection 500 Units  500 Units Intravenous PRN Iggy Harrell MD   500 Units at 23 1452   • sodium chloride 0.9 % flush 10 mL  10 mL Intravenous PRN Iggy Harrell MD   10 mL at 23 1452        ALLERGIES:    Allergies   Allergen Reactions   • Codeine Hives     Patient states this is when she was very young.         Social History     Socioeconomic History   • Marital status: Significant Other   • Number of children: 1   • Years of education: High school   Tobacco Use   • Smoking status: Every Day     Packs/day: 1.00     Years: 40.00     Additional pack years: 0.00     Total pack years: 40.00     Types: Cigarettes     Start date:      Last attempt to quit: 2022     Years since quittin.5     Passive exposure: Current   • Smokeless tobacco: Never   • Tobacco comments:     2 per day   Vaping Use   • Vaping Use: Never used   Substance and Sexual Activity   • Alcohol use: Not Currently   • Drug use: Never   • Sexual activity: Defer        Family History  "  Problem Relation Age of Onset   • Breast cancer Mother    • Diabetes Mother    • Bipolar disorder Father    • Diabetes Father    • Hypertension Father    • Breast cancer Sister    • COPD Sister    • Diabetes Sister    • Hypertension Sister    • Alcohol abuse Brother    • Asthma Brother    • Bipolar disorder Brother    • Diabetes Brother    • Seizures Brother    • Breast cancer Maternal Grandmother    • Diabetes Maternal Grandmother    • Diabetes Maternal Grandfather    • Diabetes Paternal Grandmother    • Diabetes Paternal Grandfather    • Hypertension Son    • Kidney cancer Sister    • Hypertension Sister    • Diabetes Sister    • Malig Hyperthermia Neg Hx           Objective    Vitals:    11/02/23 1400   BP: 105/61   Pulse: 69   Resp: 18   Temp: 98 °F (36.7 °C)   TempSrc: Temporal   SpO2: 97%   Weight: 46.5 kg (102 lb 9.6 oz)   Height: 162.6 cm (64.02\")   PainSc: 0-No pain   PainLoc: Comment: pain in stomach when eats         11/2/2023     2:03 PM   Current Status   ECOG score 0       Physical Exam  Constitutional:       Appearance: Normal appearance. She is underweight.   HENT:      Head: Normocephalic and atraumatic.      Nose: Nose normal.   Eyes:      Extraocular Movements: Extraocular movements intact.      Conjunctiva/sclera: Conjunctivae normal.      Pupils: Pupils are equal, round, and reactive to light.   Cardiovascular:      Rate and Rhythm: Normal rate and regular rhythm.      Pulses: Normal pulses.      Heart sounds: Normal heart sounds.   Pulmonary:      Effort: Pulmonary effort is normal.      Breath sounds: Normal breath sounds.   Abdominal:      General: Bowel sounds are normal.      Palpations: Abdomen is soft. There is no mass.      Comments: Status post esophagectomy, wounds well-healed, J-tube removed   Musculoskeletal:         General: Normal range of motion.      Cervical back: Normal range of motion.   Skin:     General: Skin is warm and dry.      Findings: No rash.   Neurological:      " General: No focal deficit present.      Mental Status: She is alert and oriented to person, place, and time.   Psychiatric:         Mood and Affect: Mood normal.     I have reexamined the patient and the results are consistent with the previously documented exam. Iggy Harrell MD     RECENT LABS:  Results from last 7 days   Lab Units 11/02/23  1331   WBC 10*3/mm3 8.19   NEUTROS ABS 10*3/mm3 5.84   HEMOGLOBIN g/dL 13.4   HEMATOCRIT % 42.5   PLATELETS 10*3/mm3 215     Results from last 7 days   Lab Units 11/02/23  1331   SODIUM mmol/L 140   POTASSIUM mmol/L 4.5   CHLORIDE mmol/L 104   CO2 mmol/L 22.8   BUN mg/dL 12   CREATININE mg/dL 0.66   CALCIUM mg/dL 9.2   ALBUMIN g/dL 3.8   BILIRUBIN mg/dL 0.2   ALK PHOS U/L 91   ALT (SGPT) U/L <5   AST (SGOT) U/L 18   GLUCOSE mg/dL 122*            Assessment & Plan         63-year-old female with a history of hyperthyroidism, previous gastric ulcer, GE reflux, atrial fibrillation, hyperlipidemia, hypertension irritable bowel syndrome,          1.  T2 N1 M0 distal esophageal cancer-clinical stage III, pathologic stage IIIa  60-pack-year history of smoking with dysphagia and odynophagia developing over several months associated 10 to 15 pound weight loss  EGD and colonoscopy in June 2021 demonstrating severe dysplasia including carcinoma in situ.  here has been a number of physicians involved in the patient's case and several months before she was ultimately seen by Dr. Finch at [facility].   CT scan of the chest demonstrating thickening of the mid esophagus with no lymphadenopathy and PET/CT demonstrated uptake in the mid to distal esophagus with SUV intensely elevated at 16 with a few nonenlarged left hilar and gastrohepatic lymph nodes with mild increased FDG activity, few scattered subcentimeter pulmonary nodules bilaterally indeterminate.  EUS was felt necessary and ultimately obtained at [facility] performed 12/7/2021 revealing a 5 cm  esophageal mass present in the distal third esophagus without extension into the GE junction into the cardia, the mass extended into into the muscularis propria into the adventitia not penetrating through the adventitia with a single 1 cm gastrohepatic/celiac axis lymph node and FNB x1 performed- FNA negative for malignancy  Discussed with Dr. Stef cruz who feels that she is not immediately and operative candidate and should proceed with chemo radiation therapy initially-neoadjuvant treatment before consideration for subsequent surgery. Discussed extensively concurrent chemotherapy radiation therapy using Carboplatin/Taxol weekly.   who proceeded 12/15/2021 to PowerPort placement and open jejunostomy.  She was seen during her hospital stay by oncology nutrition required hospitalization up to including 12/19/2021.  seen by radiation therapy 12/21 with plans to proceed with 40 CT simulation and planning-IMRT/IGR T-4140 cGy in 23 fractions with pleased to be considered.   Radiation therapy initiated 1/3/2022  1/5/2021 cycle 1 weekly carboplatin/Taxol.    Patient status post week 2 on 1/12/2022  Patient treated 1/19/2022-week 3  1/26/2022, hold treatment today due to platelet counts 42,000 and feeling unwell.  Covid positive at that time  Chemotherapy held 2/2/2022 due to ongoing normal cytopenia, platelets of 40,000  Presentation at multidisciplinary clinic 2/10/2022 with plans to complete fourth dose of carboplatin Taxol followed by PET scan.  It is unclear at this time if the patient will be a surgical candidate pending her response and nutritional state post treatment  We will proceed today with her fourth dose of carboplatin Taxol  Upon completion of chemotherapy patient reevaluated with PET/CT 3/17/2022 with significant response for esophageal lesion, gastrohepatic left hilar lymphadenopathy no longer FDG avid, indeterminate subcentimeter nodule right apex thought to be endobronchial filling  defect, tree-in-bud nodularity felt to be reactive.  Patient seen in office 3/23/2022 referred back to thoracic surgery.  Discussed potential adjuvant nivolumab therapy post surgery.  Patient seen 4/20/2022 with surgery planned 4/29/2022.  The patient was admitted 4/29 - 5/9/2022 undergoing bronchoscopy with right thoracoscopy, partial decortication, robot-assisted total esophagectomy, jejunostomy tube placement and intercostal nerve block.  She had a satisfactory postoperative course and was able to be discharged 5/9/2022 though was briefly readmitted requiring replacement of her J-tube at bedside.  Pathology demonstrated rare focal metaplasia at the GE junction consistent with Cotto's esophagus, focal submucosal scar, chronic esophagitis, no evidence of dysplasia or malignancy identified by staining, focal active chronic gastritis, 13 lymph nodes negative, paraesophageal soft tissue margins negative, level 7 lymph node negative x3, final esophageal margin negative, final gastric margin negative-patient's final pathologic staging could be considered ypT0, N0.  The patient is seen with significant other 5/25/2022 and reviewed her findings recognizing that she could benefit substantially from immunotherapy  considering nivolumab every 4 weeks x1 year as adjuvant therapy.  This is discussed extensively as to potential side effect profile and potential benefit.  Patient seen 6/29/2022,Checkmate 577 trial in patients who had residual pathologic disease at the time of surgery to nivolumab with median disease-free survival nearly twice as long 22 months versus 11 months across follow-up routine regardless of PD-L1 overexpression.   As result we reviewed the records available including biopsies done at more than 1 location with PD-L1 never obtained.   At some point this may be an issue with the patient does have recurrence.   She is seen back 6/29/2022 indicating that she does not wish to take immunotherapy at this point  and we have discussed the above rationale.Consider guardant 360 analysis  Patient assessed 8/24/22 clinically improved, repeat CT scan scheduled September 2022  Repeat scans obtained and reviewed by thoracic surgery 10/26/2022 negative for recurrence  Follow-up assessment every 6 months.  This includes repeat CT scan in 4/26/2023 which show postoperative changes only.  The patient is seen 5/18/2023 and we decided to have her assessed again in 6 months.  The patient is next seen 11/2/2023.[facility] imaging available includes CT abdomen pelvis compared to April with findings of previous esophagectomy, mild enlargement infrarenal abdominal aorta measuring 2.3 cm, pessary present, uterus removed no evidence of metastatic disease.  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  The patient was assessed in June by thoracic surgery with no additional recommendations as well.  Now, with the above findings, the patient's port could be removed        2.  Nutrition  Use of Jejunostomy tube. Continuous feed currently 12 hours a night, slowly working up to recommended 16 hours/day at 90 cc/h  She is still trying to drink at least 1 boost a day and able to take pills by mouth  She continues to take pills by mouth.  She does utilize her J-tube for 16 hours a day.  Her weight is stable over the past 3 weeks.  Patient seen 3/23/2022 with her J-tube no longer anchored properly, thoracic surgery request for evaluation  J-tube postoperative required replacement 5//22  Discussed additional nutrition, Enterade sample, prednisone 10 mg p.o. daily E scribed to pharmacy as trial appetite stimulant.  Assessed 8/24/2022 with associated leukocytosis, 1 additional month of prednisone, then discontinued  Patient seen 11/3/2022 continuing small meals routinely, medications assessed, Tums discontinued  Patient assessed 11/2/2023 with periodic discomfort with meals,  thoracic assessment anticipated 11/6/2023    3.  Atrial fibrillation  Patient continues Eliquis 5 mg twice daily  Toprol 25 mg XL once daily.  Patient offered assessment by cardiology for atrial fibrillation and she is urged to complete this possibly coming off Eliquis.    Plan:    *51 weeks CBC, chest x-ray, CMP    *52 weeks MD    *Copy to thoracic surgery for port removal

## 2023-11-03 ENCOUNTER — TELEPHONE (OUTPATIENT)
Dept: SURGERY | Facility: CLINIC | Age: 63
End: 2023-11-03
Payer: COMMERCIAL

## 2023-11-03 NOTE — TELEPHONE ENCOUNTER
Spoke with pt regarding upcoming appt. Reminded pt to bring in disc imaging. Informed pt of office location.

## 2023-11-06 ENCOUNTER — OFFICE VISIT (OUTPATIENT)
Dept: SURGERY | Facility: CLINIC | Age: 63
End: 2023-11-06
Payer: COMMERCIAL

## 2023-11-06 VITALS
BODY MASS INDEX: 17.6 KG/M2 | HEIGHT: 64 IN | DIASTOLIC BLOOD PRESSURE: 70 MMHG | OXYGEN SATURATION: 99 % | SYSTOLIC BLOOD PRESSURE: 126 MMHG | HEART RATE: 68 BPM

## 2023-11-06 DIAGNOSIS — C15.9 MALIGNANT NEOPLASM OF ESOPHAGUS, UNSPECIFIED LOCATION: Primary | ICD-10-CM

## 2023-11-06 DIAGNOSIS — Z95.828 PORT-A-CATH IN PLACE: ICD-10-CM

## 2023-11-06 DIAGNOSIS — Z48.3 AFTERCARE FOLLOWING SURGERY FOR NEOPLASM: ICD-10-CM

## 2023-11-06 NOTE — H&P (VIEW-ONLY)
"Chief Complaint  History of esophageal cancer s/p esophagectomy on 4/29/2022    Nazanin Ribera presents to [facility] THORACIC SURGERY  History of Present Illness  Ms. Ribera is a very pleasant 63-year-old female with a history significant for esophageal carcinoma and underwent esophagectomy on 4/29/2022 by Dr. Hayder Finch.  She had mild dysphagia and most recently underwent dilation of her anastomosis and pylorus on 5/19/2023 by Dr. Reilly Patricia.  There is no evidence of recurrent malignancy at this time. A CT of the chest in 6 months was recommended for continued postoperative surveillance which she presents to discuss today.  Patient is followed by Dr. Cooley, it was recommended she receive nivolumab every 4 weeks x 1 year as adjuvant therapy although she declined.     She denies any residual esophageal dysphagia following her EGD with dilation in May 2023. She is tolerating her p.o. nutrition without any difficulties.  Her primary complaint is lower abdominal discomfort and cramping.  She denies any constipation or diarrhea.  She presently weighs 101lbs.  She is accompanied by her significant other.  The patient is requesting port removal as she received confirmation from her oncologist that she may proceed with removal of her port.    Objective   Vital Signs:  /70 (BP Location: Left arm, Patient Position: Sitting, Cuff Size: Adult)   Pulse 68   Ht 162.6 cm (64.02\")   SpO2 99%   BMI 17.60 kg/m²   Estimated body mass index is 17.6 kg/m² as calculated from the following:    Height as of this encounter: 162.6 cm (64.02\").    Weight as of 11/2/23: 46.5 kg (102 lb 9.6 oz).             Physical Exam   Result Review :      Data reviewed : Radiologic studies CT of the chest performed 10/26/2023, CT of the abdomen pelvis performed 10/26/2023.  CT of the chest performed 10/26/2023: Lungs are clear.  Thyroid tissue appears enlarged although otherwise normal.  " Postoperative changes following esophagectomy.  Stable mild enlargement of the ascending aorta, 3.6 cm in transverse dimension.  No mediastinal hilar adenopathy.  The upper sternum is enlarged and sclerotic probably representing Paget's disease, unchanged compared to older studies.  No acute findings or evidence of metastatic disease.    CT of the abdomen and pelvis: Postoperative changes following esophagectomy with gastric pull-through.  Gallbladder has been removed.  Liver, spleen, pancreas, adrenal glands and kidneys normal in appearance.  Mild enlargement of the infrarenal abdominal aorta measuring 2.3 cm.  Right common iliac artery is 14 mm.  Bowel is normal.  Bladder is normal.  Pessary is present.  The uterus has been removed.  No acute findings or evidence of metastatic disease.         Assessment and Plan   Diagnoses and all orders for this visit:    1. Malignant neoplasm of esophagus, unspecified location (Primary)  -     CT Chest Without Contrast; Future  -     CT abdomen pelvis wo contrast; Future  -     Case Request; Standing  -     ECG 12 Lead; Future  -     sodium chloride 0.9 % flush 3 mL  -     sodium chloride 0.9 % flush 3-10 mL  -     sodium chloride 0.9 % infusion 40 mL  -     aPTT; Future  -     Protime-INR; Future  -     CBC (No Diff); Future  -     Comprehensive Metabolic Panel; Future  -     XR Chest 1 View; Future  -     sodium chloride 0.9 % infusion  -     Case Request    2. Aftercare following surgery for neoplasm  -     CT Chest Without Contrast; Future  -     CT abdomen pelvis wo contrast; Future    3. Port-A-Cath in place  -     Case Request; Standing  -     ECG 12 Lead; Future  -     sodium chloride 0.9 % flush 3 mL  -     sodium chloride 0.9 % flush 3-10 mL  -     sodium chloride 0.9 % infusion 40 mL  -     aPTT; Future  -     Protime-INR; Future  -     CBC (No Diff); Future  -     Comprehensive Metabolic Panel; Future  -     XR Chest 1 View; Future  -     sodium chloride 0.9 %  infusion  -     Case Request    Other orders  -     Follow Anesthesia Guidelines / Protocol; Future  -     Follow Anesthesia Guidelines / Protocol; Standing  -     Verify / Perform Chlorhexidine Skin Prep; Standing  -     Verify / Perform Chlorhexidine Skin Prep if Indicated (If Not Already Completed); Standing  -     Obtain Informed Consent; Future  -     Provide NPO Instructions to Patient; Future  -     Chlorhexidine Skin Prep; Future  -     Insert Peripheral IV; Standing  -     Saline Lock & Maintain IV Access; Standing  -     Oxygen Therapy- Nasal Cannula; Titrate 1-6 LPM Per SpO2; 90 - 95%; Standing      I have reviewed the reports of CT of the chest, CT of the abdomen pelvis performed on 10/26/2023 which demonstrates no acute findings or evidence of metastatic disease.  Patient underwent EGD with dilation on 5/19/2023, no findings to suggest recurrence of her malignancy during this time.  Discussed the need for a repeat EGD with biopsy in 6 months for continued postoperative cancer surveillance along with a repeat CT of the chest, abdomen, and pelvis in 6 months time as well.    Her primary complaint is vague lower abdominal discomfort and intermittent and sporadic cramping.  Patient reports that it most often occurs after she eats a large meal. Discussed diet modifications given she is post-op esophagectomy and will require smaller but frequent nutrient dense meals. Nutrition shakes to supplement her nutrition, her weight has remained stable. Discussed referral to gastroenterology as she reports no one is managing her IBS, she is declining at this time and would like to wait a few weeks to see if her abdominal discomfort resolves.  I have instructed her to reach out should it persist or worsen.    Patient is requesting port removal as she received confirmation from her oncologist that she may proceed with removal of her port.  Patient is on Eliquis for atrial fibrillation which I discussed with her will need  to be held prior to her port removal.  Discussed with Dr. Patricia. Will schedule date for port removal.     I spent 40 minutes caring for Maya on this date of service. This time includes time spent by me in the following activities:preparing for the visit, reviewing tests, obtaining and/or reviewing a separately obtained history, counseling and educating the patient/family/caregiver, ordering medications, tests, or procedures, documenting information in the medical record, and independently interpreting results and communicating that information with the patient/family/caregiver  Follow Up   Return in about 6 months (around 5/6/2024), or if symptoms worsen or fail to improve.  Patient was given instructions and counseling regarding her condition or for health maintenance advice. Please see specific information pulled into the AVS if appropriate.

## 2023-11-06 NOTE — PROGRESS NOTES
"Chief Complaint  History of esophageal cancer s/p esophagectomy on 4/29/2022    Nazanin Ribera presents to [facility] THORACIC SURGERY  History of Present Illness  Ms. Ribera is a very pleasant 63-year-old female with a history significant for esophageal carcinoma and underwent esophagectomy on 4/29/2022 by Dr. Hayder Finch.  She had mild dysphagia and most recently underwent dilation of her anastomosis and pylorus on 5/19/2023 by Dr. Reilly Patricia.  There is no evidence of recurrent malignancy at this time. A CT of the chest in 6 months was recommended for continued postoperative surveillance which she presents to discuss today.  Patient is followed by Dr. Cooley, it was recommended she receive nivolumab every 4 weeks x 1 year as adjuvant therapy although she declined.     She denies any residual esophageal dysphagia following her EGD with dilation in May 2023. She is tolerating her p.o. nutrition without any difficulties.  Her primary complaint is lower abdominal discomfort and cramping.  She denies any constipation or diarrhea.  She presently weighs 101lbs.  She is accompanied by her significant other.  The patient is requesting port removal as she received confirmation from her oncologist that she may proceed with removal of her port.    Objective   Vital Signs:  /70 (BP Location: Left arm, Patient Position: Sitting, Cuff Size: Adult)   Pulse 68   Ht 162.6 cm (64.02\")   SpO2 99%   BMI 17.60 kg/m²   Estimated body mass index is 17.6 kg/m² as calculated from the following:    Height as of this encounter: 162.6 cm (64.02\").    Weight as of 11/2/23: 46.5 kg (102 lb 9.6 oz).             Physical Exam   Result Review :      Data reviewed : Radiologic studies CT of the chest performed 10/26/2023, CT of the abdomen pelvis performed 10/26/2023.  CT of the chest performed 10/26/2023: Lungs are clear.  Thyroid tissue appears enlarged although otherwise normal.  " Postoperative changes following esophagectomy.  Stable mild enlargement of the ascending aorta, 3.6 cm in transverse dimension.  No mediastinal hilar adenopathy.  The upper sternum is enlarged and sclerotic probably representing Paget's disease, unchanged compared to older studies.  No acute findings or evidence of metastatic disease.    CT of the abdomen and pelvis: Postoperative changes following esophagectomy with gastric pull-through.  Gallbladder has been removed.  Liver, spleen, pancreas, adrenal glands and kidneys normal in appearance.  Mild enlargement of the infrarenal abdominal aorta measuring 2.3 cm.  Right common iliac artery is 14 mm.  Bowel is normal.  Bladder is normal.  Pessary is present.  The uterus has been removed.  No acute findings or evidence of metastatic disease.         Assessment and Plan   Diagnoses and all orders for this visit:    1. Malignant neoplasm of esophagus, unspecified location (Primary)  -     CT Chest Without Contrast; Future  -     CT abdomen pelvis wo contrast; Future  -     Case Request; Standing  -     ECG 12 Lead; Future  -     sodium chloride 0.9 % flush 3 mL  -     sodium chloride 0.9 % flush 3-10 mL  -     sodium chloride 0.9 % infusion 40 mL  -     aPTT; Future  -     Protime-INR; Future  -     CBC (No Diff); Future  -     Comprehensive Metabolic Panel; Future  -     XR Chest 1 View; Future  -     sodium chloride 0.9 % infusion  -     Case Request    2. Aftercare following surgery for neoplasm  -     CT Chest Without Contrast; Future  -     CT abdomen pelvis wo contrast; Future    3. Port-A-Cath in place  -     Case Request; Standing  -     ECG 12 Lead; Future  -     sodium chloride 0.9 % flush 3 mL  -     sodium chloride 0.9 % flush 3-10 mL  -     sodium chloride 0.9 % infusion 40 mL  -     aPTT; Future  -     Protime-INR; Future  -     CBC (No Diff); Future  -     Comprehensive Metabolic Panel; Future  -     XR Chest 1 View; Future  -     sodium chloride 0.9 %  infusion  -     Case Request    Other orders  -     Follow Anesthesia Guidelines / Protocol; Future  -     Follow Anesthesia Guidelines / Protocol; Standing  -     Verify / Perform Chlorhexidine Skin Prep; Standing  -     Verify / Perform Chlorhexidine Skin Prep if Indicated (If Not Already Completed); Standing  -     Obtain Informed Consent; Future  -     Provide NPO Instructions to Patient; Future  -     Chlorhexidine Skin Prep; Future  -     Insert Peripheral IV; Standing  -     Saline Lock & Maintain IV Access; Standing  -     Oxygen Therapy- Nasal Cannula; Titrate 1-6 LPM Per SpO2; 90 - 95%; Standing      I have reviewed the reports of CT of the chest, CT of the abdomen pelvis performed on 10/26/2023 which demonstrates no acute findings or evidence of metastatic disease.  Patient underwent EGD with dilation on 5/19/2023, no findings to suggest recurrence of her malignancy during this time.  Discussed the need for a repeat EGD with biopsy in 6 months for continued postoperative cancer surveillance along with a repeat CT of the chest, abdomen, and pelvis in 6 months time as well.    Her primary complaint is vague lower abdominal discomfort and intermittent and sporadic cramping.  Patient reports that it most often occurs after she eats a large meal. Discussed diet modifications given she is post-op esophagectomy and will require smaller but frequent nutrient dense meals. Nutrition shakes to supplement her nutrition, her weight has remained stable. Discussed referral to gastroenterology as she reports no one is managing her IBS, she is declining at this time and would like to wait a few weeks to see if her abdominal discomfort resolves.  I have instructed her to reach out should it persist or worsen.    Patient is requesting port removal as she received confirmation from her oncologist that she may proceed with removal of her port.  Patient is on Eliquis for atrial fibrillation which I discussed with her will need  to be held prior to her port removal.  Discussed with Dr. Patricia. Will schedule date for port removal.     I spent 40 minutes caring for Maya on this date of service. This time includes time spent by me in the following activities:preparing for the visit, reviewing tests, obtaining and/or reviewing a separately obtained history, counseling and educating the patient/family/caregiver, ordering medications, tests, or procedures, documenting information in the medical record, and independently interpreting results and communicating that information with the patient/family/caregiver  Follow Up   Return in about 6 months (around 5/6/2024), or if symptoms worsen or fail to improve.  Patient was given instructions and counseling regarding her condition or for health maintenance advice. Please see specific information pulled into the AVS if appropriate.

## 2023-11-07 PROBLEM — Z95.828 PORT-A-CATH IN PLACE: Status: ACTIVE | Noted: 2023-11-06

## 2023-11-07 RX ORDER — SODIUM CHLORIDE 0.9 % (FLUSH) 0.9 %
3-10 SYRINGE (ML) INJECTION AS NEEDED
OUTPATIENT
Start: 2023-11-07

## 2023-11-07 RX ORDER — SODIUM CHLORIDE 9 MG/ML
40 INJECTION, SOLUTION INTRAVENOUS AS NEEDED
OUTPATIENT
Start: 2023-11-07

## 2023-11-07 RX ORDER — SODIUM CHLORIDE 9 MG/ML
75 INJECTION, SOLUTION INTRAVENOUS CONTINUOUS
OUTPATIENT
Start: 2023-11-07

## 2023-11-07 RX ORDER — SODIUM CHLORIDE 0.9 % (FLUSH) 0.9 %
3 SYRINGE (ML) INJECTION EVERY 12 HOURS SCHEDULED
OUTPATIENT
Start: 2023-11-07

## 2023-11-16 ENCOUNTER — TELEPHONE (OUTPATIENT)
Dept: SURGERY | Facility: CLINIC | Age: 63
End: 2023-11-16
Payer: COMMERCIAL

## 2023-11-17 ENCOUNTER — ANESTHESIA (OUTPATIENT)
Dept: PERIOP | Facility: HOSPITAL | Age: 63
End: 2023-11-17
Payer: COMMERCIAL

## 2023-11-17 ENCOUNTER — HOSPITAL ENCOUNTER (OUTPATIENT)
Facility: HOSPITAL | Age: 63
Setting detail: HOSPITAL OUTPATIENT SURGERY
Discharge: HOME | End: 2023-11-17
Attending: SURGERY | Admitting: SURGERY
Payer: COMMERCIAL

## 2023-11-17 ENCOUNTER — ANESTHESIA EVENT (OUTPATIENT)
Dept: PERIOP | Facility: HOSPITAL | Age: 63
End: 2023-11-17
Payer: COMMERCIAL

## 2023-11-17 VITALS
BODY MASS INDEX: 17.8 KG/M2 | OXYGEN SATURATION: 100 % | HEIGHT: 64 IN | HEART RATE: 74 BPM | TEMPERATURE: 97.7 F | SYSTOLIC BLOOD PRESSURE: 107 MMHG | DIASTOLIC BLOOD PRESSURE: 57 MMHG | RESPIRATION RATE: 16 BRPM | WEIGHT: 104.28 LBS

## 2023-11-17 DIAGNOSIS — Z95.828 PORT-A-CATH IN PLACE: ICD-10-CM

## 2023-11-17 DIAGNOSIS — C15.9 MALIGNANT NEOPLASM OF ESOPHAGUS, UNSPECIFIED LOCATION: ICD-10-CM

## 2023-11-17 LAB
QT INTERVAL: 406 MS
QTC INTERVAL: 409 MS

## 2023-11-17 PROCEDURE — 25810000003 LACTATED RINGERS PER 1000 ML: Performed by: ANESTHESIOLOGY

## 2023-11-17 PROCEDURE — 36590 REMOVAL TUNNELED CV CATH: CPT | Performed by: SURGERY

## 2023-11-17 PROCEDURE — 25010000002 PROPOFOL 10 MG/ML EMULSION: Performed by: NURSE ANESTHETIST, CERTIFIED REGISTERED

## 2023-11-17 PROCEDURE — 93010 ELECTROCARDIOGRAM REPORT: CPT | Performed by: INTERNAL MEDICINE

## 2023-11-17 PROCEDURE — 25010000002 CEFAZOLIN IN DEXTROSE 2-4 GM/100ML-% SOLUTION: Performed by: SURGERY

## 2023-11-17 PROCEDURE — 25010000002 BUPIVACAINE (PF) 0.25 % SOLUTION 30 ML VIAL: Performed by: SURGERY

## 2023-11-17 PROCEDURE — 93005 ELECTROCARDIOGRAM TRACING: CPT | Performed by: SURGERY

## 2023-11-17 RX ORDER — FENTANYL CITRATE 50 UG/ML
25 INJECTION, SOLUTION INTRAMUSCULAR; INTRAVENOUS
Status: DISCONTINUED | OUTPATIENT
Start: 2023-11-17 | End: 2023-11-17 | Stop reason: HOSPADM

## 2023-11-17 RX ORDER — PROMETHAZINE HYDROCHLORIDE 25 MG/1
25 SUPPOSITORY RECTAL ONCE AS NEEDED
Status: DISCONTINUED | OUTPATIENT
Start: 2023-11-17 | End: 2023-11-17 | Stop reason: HOSPADM

## 2023-11-17 RX ORDER — SODIUM CHLORIDE 0.9 % (FLUSH) 0.9 %
3 SYRINGE (ML) INJECTION EVERY 12 HOURS SCHEDULED
Status: DISCONTINUED | OUTPATIENT
Start: 2023-11-17 | End: 2023-11-17 | Stop reason: HOSPADM

## 2023-11-17 RX ORDER — FENTANYL CITRATE 50 UG/ML
50 INJECTION, SOLUTION INTRAMUSCULAR; INTRAVENOUS ONCE AS NEEDED
Status: DISCONTINUED | OUTPATIENT
Start: 2023-11-17 | End: 2023-11-17 | Stop reason: HOSPADM

## 2023-11-17 RX ORDER — NALOXONE HCL 0.4 MG/ML
0.2 VIAL (ML) INJECTION AS NEEDED
Status: DISCONTINUED | OUTPATIENT
Start: 2023-11-17 | End: 2023-11-17 | Stop reason: HOSPADM

## 2023-11-17 RX ORDER — SODIUM CHLORIDE, SODIUM LACTATE, POTASSIUM CHLORIDE, CALCIUM CHLORIDE 600; 310; 30; 20 MG/100ML; MG/100ML; MG/100ML; MG/100ML
9 INJECTION, SOLUTION INTRAVENOUS CONTINUOUS
Status: DISCONTINUED | OUTPATIENT
Start: 2023-11-17 | End: 2023-11-17 | Stop reason: HOSPADM

## 2023-11-17 RX ORDER — FLUMAZENIL 0.1 MG/ML
0.2 INJECTION INTRAVENOUS AS NEEDED
Status: DISCONTINUED | OUTPATIENT
Start: 2023-11-17 | End: 2023-11-17 | Stop reason: HOSPADM

## 2023-11-17 RX ORDER — SODIUM CHLORIDE 9 MG/ML
75 INJECTION, SOLUTION INTRAVENOUS CONTINUOUS
Status: DISCONTINUED | OUTPATIENT
Start: 2023-11-17 | End: 2023-11-17 | Stop reason: HOSPADM

## 2023-11-17 RX ORDER — SODIUM CHLORIDE 0.9 % (FLUSH) 0.9 %
3-10 SYRINGE (ML) INJECTION AS NEEDED
Status: DISCONTINUED | OUTPATIENT
Start: 2023-11-17 | End: 2023-11-17 | Stop reason: HOSPADM

## 2023-11-17 RX ORDER — PROMETHAZINE HYDROCHLORIDE 25 MG/1
25 TABLET ORAL ONCE AS NEEDED
Status: DISCONTINUED | OUTPATIENT
Start: 2023-11-17 | End: 2023-11-17 | Stop reason: HOSPADM

## 2023-11-17 RX ORDER — CEFAZOLIN SODIUM 2 G/100ML
2000 INJECTION, SOLUTION INTRAVENOUS ONCE
Status: COMPLETED | OUTPATIENT
Start: 2023-11-17 | End: 2023-11-17

## 2023-11-17 RX ORDER — LIDOCAINE HYDROCHLORIDE 20 MG/ML
INJECTION, SOLUTION INFILTRATION; PERINEURAL AS NEEDED
Status: DISCONTINUED | OUTPATIENT
Start: 2023-11-17 | End: 2023-11-17 | Stop reason: SURG

## 2023-11-17 RX ORDER — MIDAZOLAM HYDROCHLORIDE 1 MG/ML
1 INJECTION INTRAMUSCULAR; INTRAVENOUS
Status: DISCONTINUED | OUTPATIENT
Start: 2023-11-17 | End: 2023-11-17 | Stop reason: HOSPADM

## 2023-11-17 RX ORDER — SODIUM CHLORIDE 9 MG/ML
40 INJECTION, SOLUTION INTRAVENOUS AS NEEDED
Status: DISCONTINUED | OUTPATIENT
Start: 2023-11-17 | End: 2023-11-17 | Stop reason: HOSPADM

## 2023-11-17 RX ORDER — LABETALOL HYDROCHLORIDE 5 MG/ML
5 INJECTION, SOLUTION INTRAVENOUS
Status: DISCONTINUED | OUTPATIENT
Start: 2023-11-17 | End: 2023-11-17 | Stop reason: HOSPADM

## 2023-11-17 RX ORDER — PROPOFOL 10 MG/ML
VIAL (ML) INTRAVENOUS CONTINUOUS PRN
Status: DISCONTINUED | OUTPATIENT
Start: 2023-11-17 | End: 2023-11-17 | Stop reason: SURG

## 2023-11-17 RX ORDER — LIDOCAINE HYDROCHLORIDE 10 MG/ML
0.5 INJECTION, SOLUTION INFILTRATION; PERINEURAL ONCE AS NEEDED
Status: DISCONTINUED | OUTPATIENT
Start: 2023-11-17 | End: 2023-11-17 | Stop reason: HOSPADM

## 2023-11-17 RX ORDER — DIPHENHYDRAMINE HYDROCHLORIDE 50 MG/ML
12.5 INJECTION INTRAMUSCULAR; INTRAVENOUS
Status: DISCONTINUED | OUTPATIENT
Start: 2023-11-17 | End: 2023-11-17 | Stop reason: HOSPADM

## 2023-11-17 RX ORDER — IPRATROPIUM BROMIDE AND ALBUTEROL SULFATE 2.5; .5 MG/3ML; MG/3ML
3 SOLUTION RESPIRATORY (INHALATION) ONCE AS NEEDED
Status: DISCONTINUED | OUTPATIENT
Start: 2023-11-17 | End: 2023-11-17 | Stop reason: HOSPADM

## 2023-11-17 RX ORDER — ACETAMINOPHEN 500 MG
500 TABLET ORAL EVERY 6 HOURS PRN
Qty: 40 TABLET | Refills: 0 | Status: SHIPPED | OUTPATIENT
Start: 2023-11-17 | End: 2023-11-27

## 2023-11-17 RX ORDER — MAGNESIUM HYDROXIDE 1200 MG/15ML
LIQUID ORAL AS NEEDED
Status: DISCONTINUED | OUTPATIENT
Start: 2023-11-17 | End: 2023-11-17 | Stop reason: HOSPADM

## 2023-11-17 RX ORDER — DROPERIDOL 2.5 MG/ML
0.62 INJECTION, SOLUTION INTRAMUSCULAR; INTRAVENOUS
Status: DISCONTINUED | OUTPATIENT
Start: 2023-11-17 | End: 2023-11-17 | Stop reason: HOSPADM

## 2023-11-17 RX ORDER — FAMOTIDINE 10 MG/ML
20 INJECTION, SOLUTION INTRAVENOUS ONCE
Status: COMPLETED | OUTPATIENT
Start: 2023-11-17 | End: 2023-11-17

## 2023-11-17 RX ORDER — EPHEDRINE SULFATE 50 MG/ML
5 INJECTION, SOLUTION INTRAVENOUS ONCE AS NEEDED
Status: DISCONTINUED | OUTPATIENT
Start: 2023-11-17 | End: 2023-11-17 | Stop reason: HOSPADM

## 2023-11-17 RX ORDER — HYDRALAZINE HYDROCHLORIDE 20 MG/ML
5 INJECTION INTRAMUSCULAR; INTRAVENOUS
Status: DISCONTINUED | OUTPATIENT
Start: 2023-11-17 | End: 2023-11-17 | Stop reason: HOSPADM

## 2023-11-17 RX ORDER — ONDANSETRON 2 MG/ML
4 INJECTION INTRAMUSCULAR; INTRAVENOUS ONCE AS NEEDED
Status: DISCONTINUED | OUTPATIENT
Start: 2023-11-17 | End: 2023-11-17 | Stop reason: HOSPADM

## 2023-11-17 RX ADMIN — Medication 3 ML: at 09:55

## 2023-11-17 RX ADMIN — LIDOCAINE HYDROCHLORIDE 100 MG: 20 INJECTION, SOLUTION INFILTRATION; PERINEURAL at 11:00

## 2023-11-17 RX ADMIN — SODIUM CHLORIDE, POTASSIUM CHLORIDE, SODIUM LACTATE AND CALCIUM CHLORIDE 9 ML/HR: 600; 310; 30; 20 INJECTION, SOLUTION INTRAVENOUS at 09:53

## 2023-11-17 RX ADMIN — PROPOFOL 140 MCG/KG/MIN: 10 INJECTION, EMULSION INTRAVENOUS at 11:00

## 2023-11-17 RX ADMIN — CEFAZOLIN SODIUM 2000 MG: 2 INJECTION, SOLUTION INTRAVENOUS at 10:51

## 2023-11-17 RX ADMIN — FAMOTIDINE 20 MG: 10 INJECTION INTRAVENOUS at 09:53

## 2023-11-17 NOTE — DISCHARGE INSTRUCTIONS
Discharge Instructions    1. Activity:  Climb stairs as tolerated  May drive car tomorrow.  Walk at least 3-4 times a day    2. Nutrition:  Resume previous diet  Eat well balanced meals    3. Incision (wound) Care:  May shower after discharge.  Wash around incision area with soap and water daily.  No lotions or creams on incision area.  Take temperature daily for the first week after discharge.     4. When to call for Medical Advise:  Fever greater than 101 degrees  Unusual or severe pain  Difficulty breathing  Incision changes (redness, swelling, or increased drainage)  Any questions or problems    5. Medication Instructions:  Take Pain medications as directed to stay comfortable.   Laxative of choice if needed for constipation.    6. Medication and dosages:  See discharge medication instruction form    RESUME ELIQUIS ON 11/20/23

## 2023-11-17 NOTE — ANESTHESIA POSTPROCEDURE EVALUATION
"Patient: Maya Ribera    Procedure Summary       Date: 11/17/23 Room / Location: [facility] OR 09 / [facility] MAIN OR    Anesthesia Start: 1055 Anesthesia Stop: 1132    Procedure: REMOVAL VENOUS ACCESS DEVICE (Left) Diagnosis:       Malignant neoplasm of esophagus, unspecified location      Port-A-Cath in place      (Malignant neoplasm of esophagus, unspecified location [C15.9])      (Port-A-Cath in place [Z95.828])    Surgeons: Reilly Patricia MD Provider: Jenniffer Tony MD    Anesthesia Type: MAC ASA Status: 3            Anesthesia Type: MAC    Vitals  Vitals Value Taken Time   /63 11/17/23 1200   Temp 36.5 °C (97.7 °F) 11/17/23 1128   Pulse 80 11/17/23 1201   Resp 16 11/17/23 1130   SpO2 100 % 11/17/23 1201   Vitals shown include unfiled device data.        Post Anesthesia Care and Evaluation    Patient location during evaluation: bedside  Patient participation: complete - patient participated  Level of consciousness: awake  Pain management: adequate    Airway patency: patent  Anesthetic complications: No anesthetic complications    Cardiovascular status: acceptable  Respiratory status: acceptable  Hydration status: acceptable    Comments: /57   Pulse 74   Temp 36.5 °C (97.7 °F) (Oral)   Resp 16   Ht 162.6 cm (64\")   Wt 47.3 kg (104 lb 4.4 oz)   SpO2 100%   BMI 17.90 kg/m²         "

## 2023-11-17 NOTE — ANESTHESIA PREPROCEDURE EVALUATION
Anesthesia Evaluation     Patient summary reviewed and Nursing notes reviewed   NPO Solid Status: > 8 hours  NPO Liquid Status: > 4 hours           Airway   Mallampati: II  TM distance: >3 FB  Neck ROM: full  No difficulty expected  Dental - normal exam     Pulmonary - normal exam    breath sounds clear to auscultation  (+) a smoker Current,  Cardiovascular - normal exam    ECG reviewed  Rhythm: regular  Rate: normal    (+) hypertension less than 2 medications, valvular problems/murmurs MR and MVP, dysrhythmias Paroxysmal Atrial Fib, hyperlipidemia      Neuro/Psych  GI/Hepatic/Renal/Endo    (+) hiatal hernia, GERD, PUD, thyroid problem hypothyroidism    ROS Comment: Hx esophageal CA, s/p total esophagectomy in 4/22 and dilatation in 10/22    Musculoskeletal     (+) chronic pain  Abdominal   (+) scaphoid   Substance History      OB/GYN          Other   arthritis,   history of cancer      Other Comment: Hx esophageal CA/cervical CA                Anesthesia Plan    ASA 3     MAC     intravenous induction     Anesthetic plan, risks, benefits, and alternatives have been provided, discussed and informed consent has been obtained with: patient.      CODE STATUS:

## 2023-12-18 RX ORDER — LANSOPRAZOLE 30 MG/1
TABLET, ORALLY DISINTEGRATING, DELAYED RELEASE ORAL
Qty: 60 TABLET | Refills: 1 | Status: SHIPPED | OUTPATIENT
Start: 2023-12-18

## 2024-02-01 RX ORDER — LANSOPRAZOLE 30 MG/1
30 TABLET, ORALLY DISINTEGRATING, DELAYED RELEASE ORAL
Qty: 60 TABLET | Refills: 1 | Status: SHIPPED | OUTPATIENT
Start: 2024-02-01 | End: 2024-02-05

## 2024-02-05 ENCOUNTER — OFFICE VISIT (OUTPATIENT)
Dept: FAMILY MEDICINE CLINIC | Facility: CLINIC | Age: 64
End: 2024-02-05
Payer: MEDICARE

## 2024-02-05 VITALS
HEART RATE: 104 BPM | SYSTOLIC BLOOD PRESSURE: 132 MMHG | OXYGEN SATURATION: 98 % | WEIGHT: 109.6 LBS | BODY MASS INDEX: 18.71 KG/M2 | HEIGHT: 64 IN | DIASTOLIC BLOOD PRESSURE: 68 MMHG

## 2024-02-05 DIAGNOSIS — K21.00 GASTROESOPHAGEAL REFLUX DISEASE WITH ESOPHAGITIS, UNSPECIFIED WHETHER HEMORRHAGE: Primary | ICD-10-CM

## 2024-02-05 DIAGNOSIS — R11.0 NAUSEA: ICD-10-CM

## 2024-02-05 DIAGNOSIS — I1A.0 RESISTANT HYPERTENSION: ICD-10-CM

## 2024-02-05 PROCEDURE — 3078F DIAST BP <80 MM HG: CPT | Performed by: NURSE PRACTITIONER

## 2024-02-05 PROCEDURE — 1159F MED LIST DOCD IN RCRD: CPT | Performed by: NURSE PRACTITIONER

## 2024-02-05 PROCEDURE — 3075F SYST BP GE 130 - 139MM HG: CPT | Performed by: NURSE PRACTITIONER

## 2024-02-05 PROCEDURE — 1160F RVW MEDS BY RX/DR IN RCRD: CPT | Performed by: NURSE PRACTITIONER

## 2024-02-05 PROCEDURE — 99213 OFFICE O/P EST LOW 20 MIN: CPT | Performed by: NURSE PRACTITIONER

## 2024-02-05 RX ORDER — ONDANSETRON 4 MG/1
4 TABLET, FILM COATED ORAL DAILY PRN
Qty: 15 TABLET | Refills: 0 | Status: SHIPPED | OUTPATIENT
Start: 2024-02-05

## 2024-02-05 RX ORDER — METOPROLOL SUCCINATE 50 MG/1
50 TABLET, EXTENDED RELEASE ORAL DAILY
COMMUNITY
Start: 2023-11-27

## 2024-02-05 RX ORDER — PANTOPRAZOLE SODIUM 20 MG/1
20 TABLET, DELAYED RELEASE ORAL DAILY
Qty: 90 TABLET | Refills: 1 | Status: SHIPPED | OUTPATIENT
Start: 2024-02-05 | End: 2024-02-08 | Stop reason: ALTCHOICE

## 2024-02-05 NOTE — PROGRESS NOTES
Subjective   Maya Ribera is a 63 y.o. female. Presents today for   Chief Complaint   Patient presents with    Osteoporosis     New to Establish Care       History Of Present Illness    Libra is not new to this practice; she saw [facility] once before she left.  She is here today to establish care with this provider.    She has history of esophageal cancer with resection of the esophagus, the stomach is her new esophagus, leaving her with a 6 oz. Pouch for her stomach.    Libra has history of hyperthyroidism and is curently on methimazole to control this.    She has history of hypertension and is currently on metoprolol succinate to control  her blood pressures effectively.  Although today's blood pressure is slightly elevated.  Will monitor over time    Osteoporosis:  She has history of osteoporosis and it was suggested she received the Prolia injection twice a year.  However, she is reluctant to do so, and instead is on 1200 mg Calcium with Vitamin D daily.  She would like to continue this course of treatment and re-check her osteoporosis in another year.      Patient Active Problem List   Diagnosis    Esophageal cancer    Asymptomatic varicose veins of unspecified lower extremity    Eczema    Encounter for screening for malignant neoplasm of cervix    Screening for malignant neoplasm of colon    Arthritis    Esophagitis    Gastric ulcer    Female cystocele    Flat foot(734)    Hiatal hernia    Hyperlipidemia    Hypertension    Hyperthyroidism    Irritable bowel syndrome    Mitral regurgitation    Paroxysmal atrial fibrillation    Severe esophageal dysplasia    Mitral valve prolapse    Encounter for management of implanted device    Dislodged jejunostomy tube    Chronic pain    Aftercare following surgery for neoplasm    Port-A-Cath in place       Social History     Socioeconomic History    Marital status: Significant Other    Number of children: 1    Years of education: High school   Tobacco Use    Smoking  status: Every Day     Packs/day: 1.00     Years: 40.00     Additional pack years: 0.00     Total pack years: 40.00     Types: Cigarettes     Start date:      Last attempt to quit: 2022     Years since quittin.7     Passive exposure: Current    Smokeless tobacco: Never    Tobacco comments:     2 per day   Vaping Use    Vaping Use: Never used   Substance and Sexual Activity    Alcohol use: Not Currently    Drug use: Never    Sexual activity: Yes     Partners: Female     Birth control/protection: Same-sex partner       Allergies   Allergen Reactions    Codeine Hives     Patient states this is when she was very young.        Current Outpatient Medications on File Prior to Visit   Medication Sig Dispense Refill    Eliquis 5 MG tablet tablet Take 1 tablet by mouth Every 12 (Twelve) Hours. LD  pm dose      metoprolol succinate XL (TOPROL-XL) 50 MG 24 hr tablet Take 1 tablet by mouth Daily.      pravastatin (PRAVACHOL) 20 MG tablet Take 1 tablet by mouth Daily.      [DISCONTINUED] lansoprazole (PREVACID SOLUTAB) 30 MG Tablet Delayed Release Dispersible disintegrating tablet Take 1 tablet by mouth Every Morning Before Breakfast. DISSOLVE ONE TABLET BY MOUTH EVERY MORNING BEFORE BREAKFAST 60 tablet 1    [DISCONTINUED] ondansetron (Zofran) 4 MG tablet Take 1 tablet by mouth Daily As Needed for Nausea or Vomiting. 15 tablet 0    methIMAzole (TAPAZOLE) 10 MG tablet Take 1 tablet by mouth Daily.      [DISCONTINUED] metoprolol tartrate (LOPRESSOR) 25 MG tablet Take 1 tablet by mouth 2 (Two) Times a Day.       Current Facility-Administered Medications on File Prior to Visit   Medication Dose Route Frequency Provider Last Rate Last Admin    heparin injection 500 Units  500 Units Intravenous PRN Iggy Harrell MD   500 Units at 23 1452    sodium chloride 0.9 % flush 10 mL  10 mL Intravenous PRN Iggy Harrell MD   10 mL at 23 1452       Objective   Vitals:    24 1030   BP: 132/68   Pulse:  "104   SpO2: 98%   Weight: 49.7 kg (109 lb 9.6 oz)   Height: 162.6 cm (64\")     Body mass index is 18.81 kg/m².    Physical Exam  Constitutional:       Appearance: She is normal weight.   HENT:      Nose: Nose normal.      Mouth/Throat:      Mouth: Mucous membranes are moist.   Eyes:      Pupils: Pupils are equal, round, and reactive to light.   Cardiovascular:      Rate and Rhythm: Normal rate and regular rhythm.      Pulses: Normal pulses.      Heart sounds: Normal heart sounds.   Pulmonary:      Effort: Pulmonary effort is normal.      Breath sounds: Normal breath sounds.   Abdominal:      General: Abdomen is flat. Bowel sounds are normal.      Palpations: Abdomen is soft.   Musculoskeletal:         General: Normal range of motion.   Skin:     General: Skin is warm and dry.      Capillary Refill: Capillary refill takes less than 2 seconds.   Neurological:      General: No focal deficit present.      Mental Status: She is alert.   Psychiatric:         Mood and Affect: Mood normal.         Procedures     Assessment & Plan   Diagnoses and all orders for this visit:    1. Gastroesophageal reflux disease with esophagitis, unspecified whether hemorrhage (Primary)  -     pantoprazole (Protonix) 20 MG EC tablet; Take 1 tablet by mouth Daily.  Dispense: 90 tablet; Refill: 1    2. Nausea  -     ondansetron (Zofran) 4 MG tablet; Take 1 tablet by mouth Daily As Needed for Nausea or Vomiting.  Dispense: 15 tablet; Refill: 0    3. Resistant hypertension    3.  Continue current medications for hypertension.       Discussed Care Gaps, ordered referrals and encouraged vaccination updates.       - Pt agrees with plan of care and denies further questions/concerns today  - This document is intended for medical expert use only. Persons  reading this document without medical staff guidance may result in misinterpretation and unintended morbidity     Go to the ER for any possible life-threatening symptoms such as chest pain or shortness " of air.      Please allow 3-5 business days for recommendations based on new results      I personally spent time with this patient, preparing for the visit, reviewing tests, obtaining and/or reviewing a separately obtained history, performing a medically appropriate examination and/or evaluation, counseling and educating the patient/family/caregiver, ordering medications,  documenting information in the medical record and indepentently interpreting results.         BMI is within normal parameters. No other follow-up for BMI required.     Return in about 6 months (around 8/5/2024) for Recheck and to have labwork completed..

## 2024-02-07 ENCOUNTER — TELEPHONE (OUTPATIENT)
Dept: FAMILY MEDICINE CLINIC | Facility: CLINIC | Age: 64
End: 2024-02-07
Payer: COMMERCIAL

## 2024-02-07 NOTE — TELEPHONE ENCOUNTER
"Caller: Maya Ribera \"COLLEEN\"    Relationship to patient: Self    Best call back number: [phone]    Patient is needing: PATIENT STATED THAT BRANDI ANASTASIA HAD CALLED IN pantoprazole (Protonix) 20 MG EC tablet  FOR HER FOR OSTEOPOROSIS BUT THE PHARMACY SAID THAT HER INSURANCE NEEDED MORE INFORMATION.    "

## 2024-02-08 RX ORDER — LANSOPRAZOLE 30 MG/1
30 CAPSULE, DELAYED RELEASE ORAL DAILY
Start: 2024-02-08

## 2024-02-08 NOTE — TELEPHONE ENCOUNTER
Pt looked at drug class for the pantoprazole and was hoping it wouldn't affect her osteoporosis as much as the lansoprazole. She is just trying to find something that won't mess with her osteoporosis. Pt said she will continue lansoprazole for now. She is asking would it be better for her to take antacid everyday instead of the lansoprazole? Please advise.

## 2024-03-05 NOTE — OP NOTE
OPERATIVE NOTE     Date of procedure: 11/17/2023     Patient name: Maya Ribera  MRN: [id number]    Pre OP diagnosis:  Patient Active Problem List   Diagnosis    Esophageal cancer    Asymptomatic varicose veins of unspecified lower extremity    Eczema    Encounter for screening for malignant neoplasm of cervix    Screening for malignant neoplasm of colon    Arthritis    Esophagitis    Gastric ulcer    Female cystocele    Flat foot(734)    Hiatal hernia    Hyperlipidemia    Hypertension    Hyperthyroidism    Irritable bowel syndrome    Mitral regurgitation    Paroxysmal atrial fibrillation    Severe esophageal dysplasia    Mitral valve prolapse    Encounter for management of implanted device    Dislodged jejunostomy tube    Chronic pain    Aftercare following surgery for neoplasm    Port-A-Cath in place       Post OP diagnosis:  Patient Active Problem List   Diagnosis    Esophageal cancer    Asymptomatic varicose veins of unspecified lower extremity    Eczema    Encounter for screening for malignant neoplasm of cervix    Screening for malignant neoplasm of colon    Arthritis    Esophagitis    Gastric ulcer    Female cystocele    Flat foot(734)    Hiatal hernia    Hyperlipidemia    Hypertension    Hyperthyroidism    Irritable bowel syndrome    Mitral regurgitation    Paroxysmal atrial fibrillation    Severe esophageal dysplasia    Mitral valve prolapse    Encounter for management of implanted device    Dislodged jejunostomy tube    Chronic pain    Aftercare following surgery for neoplasm    Port-A-Cath in place       Procedure performed:   Removal of vascular access device    Indications:   Ms. Ribera is a very pleasant 63-year-old female with a history significant for esophageal carcinoma and underwent esophagectomy on 4/29/2022 by Dr. Hayder Finch.  She had mild dysphagia and underwent dilation of her anastomosis and pylorus on 5/19/2023 by me.  There was no evidence of recurrent malignancy at this time. A  CT of the chest in 6 months was recommended for continued postoperative surveillance showed no evidence of recurrence.  She has been followed by Dr. Cooley, it was recommended she receive nivolumab every 4 weeks x 1 year as adjuvant therapy although she declined.      She denied any residual esophageal dysphagia following her EGD with dilation in May 2023. She has been tolerating her oral nutrition without any difficulties.  Her primary complaint was lower abdominal discomfort and cramping.  She denied any constipation or diarrhea.  She weighed 101lbs. she requested port removal as she received confirmation from her oncologist that she may proceed with removal of her port. The patient understood the risk and benefit of the proposed procedure.  She signed the consent for the above procedure.     Surgeon: Reilly Patricia MD     Assistants: No qualified assistant available for this procedure.    Anesthesia: Monitored Local Anesthesia with Sedation    ASA Class: 3    Procedure Details   On 11/17/2023, the patient was brought to the operating room and placed in the supine position on the operating room table.  The procedure was performed under monitored anesthesia care with sedation managed by anesthesiologist. Pneumatic compression devices were placed on the lower extremities. The patient received 2 gram of Cefazolin for intravenous antibiotic prophylaxis.  The anterior chest and neck was prepped and draped in the usual sterile fashion. Prior to beginning the operation, a time-out was conducted with all members of surgical team present. The patient was identified as Maya Ribera, the procedure and the correct site were verified.      I injected 0.25% Marcaine over the left Mediport site.  The skin was incised.  Dissection was performed around the capsule of the port.  The Mediport was removed with the capsule.  The venous catheter was removed with the tip intact.  Hemostasis was secured. The incisions were closed  in multiple layers with Vicryl and Monocryl in a standard fashion.  Dermabond was applied as dressing.     The sponge, needle, and instrument counts were correct at the end of the operation.  The patient was awakened from anesthesia and was transported to the Post Anesthesia Care Unit in stable condition.    Findings:  Normal anatomic finding.  The catheter was removed with the tip intact.    Estimated Blood Loss:  minimal           Drains: None                 Specimens: None           Implants: None           Complications: None           Disposition: PACU - hemodynamically stable.           Condition: Stable     Reilly Patricia MD   Thoracic Surgeon  [facility]     98

## 2024-03-12 ENCOUNTER — TELEPHONE (OUTPATIENT)
Dept: SURGERY | Facility: CLINIC | Age: 64
End: 2024-03-12
Payer: COMMERCIAL

## 2024-03-12 NOTE — TELEPHONE ENCOUNTER
CALLED ZULEMA TO CONFIRM APPT, PT ASKED WHEN HER CT WAS WARREN, INFORM PT OF APPT TIME. PT STATED UNDERSTANDING AND WAS AGREEABLE

## 2024-04-02 ENCOUNTER — PRIOR AUTHORIZATION (OUTPATIENT)
Dept: ONCOLOGY | Facility: HOSPITAL | Age: 64
End: 2024-04-02
Payer: COMMERCIAL

## 2024-04-02 NOTE — TELEPHONE ENCOUNTER
Outcome  Approved on April 1  Your PA request has been approved. Additional information will be provided in the approval communication. (Message 1149)  Authorization Expiration Date: 3/31/2025  Drug  Lansoprazole 30MG dr dispersible tablets  ePA cloud logo  Form  [facility] Electronic PA Form (2017 [employer])  Original Claim Info  76

## 2024-04-04 NOTE — PLAN OF CARE
Goal Outcome Evaluation:        Problem: Adult Inpatient Plan of Care  Goal: Plan of Care Review  Outcome: Ongoing, Progressing            Pt remains in ccu on 2L NC. Complaints of incisional pain through night treated with PRN medications. Will continue to monitor.    no depression/no anxiety/no insomnia

## 2024-04-11 ENCOUNTER — TELEPHONE (OUTPATIENT)
Dept: SURGERY | Facility: CLINIC | Age: 64
End: 2024-04-11
Payer: COMMERCIAL

## 2024-04-11 NOTE — TELEPHONE ENCOUNTER
"        Hub staff attempted to follow warm transfer process and was unsuccessful     Caller: Maya Ribera \"COLLEEN\"    Relationship to patient: Self    Best call back number: [phone]     Patient is needing: PT RETURNING [facility] CALL        "

## 2024-04-11 NOTE — TELEPHONE ENCOUNTER
Called patient back and went over instructions and appointment date for upcoming procedure patient was agreeable and stated understanding

## 2024-05-02 ENCOUNTER — HOSPITAL ENCOUNTER (OUTPATIENT)
Dept: CT IMAGING | Facility: HOSPITAL | Age: 64
Discharge: HOME | End: 2024-05-02
Payer: MEDICARE

## 2024-05-02 DIAGNOSIS — Z48.3 AFTERCARE FOLLOWING SURGERY FOR NEOPLASM: ICD-10-CM

## 2024-05-02 DIAGNOSIS — C15.9 MALIGNANT NEOPLASM OF ESOPHAGUS, UNSPECIFIED LOCATION: ICD-10-CM

## 2024-05-02 PROCEDURE — 74176 CT ABD & PELVIS W/O CONTRAST: CPT

## 2024-05-02 PROCEDURE — 71250 CT THORAX DX C-: CPT

## 2024-05-03 ENCOUNTER — TELEPHONE (OUTPATIENT)
Dept: SURGERY | Facility: CLINIC | Age: 64
End: 2024-05-03
Payer: COMMERCIAL

## 2024-05-08 ENCOUNTER — TELEPHONE (OUTPATIENT)
Dept: SURGERY | Facility: CLINIC | Age: 64
End: 2024-05-08
Payer: COMMERCIAL

## 2024-05-09 ENCOUNTER — PREP FOR SURGERY (OUTPATIENT)
Dept: OTHER | Facility: HOSPITAL | Age: 64
End: 2024-05-09
Payer: COMMERCIAL

## 2024-05-09 ENCOUNTER — OFFICE VISIT (OUTPATIENT)
Dept: SURGERY | Facility: CLINIC | Age: 64
End: 2024-05-09
Payer: COMMERCIAL

## 2024-05-09 VITALS
HEART RATE: 61 BPM | OXYGEN SATURATION: 99 % | BODY MASS INDEX: 19.19 KG/M2 | HEIGHT: 64 IN | DIASTOLIC BLOOD PRESSURE: 70 MMHG | WEIGHT: 112.4 LBS | SYSTOLIC BLOOD PRESSURE: 118 MMHG

## 2024-05-09 DIAGNOSIS — C15.9 MALIGNANT NEOPLASM OF ESOPHAGUS, UNSPECIFIED LOCATION: Primary | ICD-10-CM

## 2024-05-09 PROCEDURE — 99214 OFFICE O/P EST MOD 30 MIN: CPT | Performed by: SURGERY

## 2024-05-09 NOTE — PROGRESS NOTES
THORACIC SURGERY CLINIC FOLLOW  UP NOTE    REASON FOR VISIT: History of esophageal cancer s/p esophagectomy developed esophageal dysphagia s/p esophageal dilation.    Subjective   HISTORY OF PRESENTING ILLNESS:   Maya Ribera is a 63 y.o. female has significant medical problems as mentioned below.  She also has history of esophageal carcinoma and underwent esophagectomy.  She had mild dysphagia and underwent dilation of the anastomosis and pylorus on 5/19/2023. There was mild narrowing of the esophagogastric anastomosis and pylorus which were dilated up to 16 mm. There was no evidence of recurrent malignancy.  She came to clinic for follow-up visit.    She is doing well since her endoscopy. She denies any difficulty swallowing. She has noticed an improvement in her swallowing since her endoscopy. She denies feeling any bolus sticking in her neck or chest. She still has her port in situ and inquires about when this can be removed.    The patient underwent surgery in 2023 and reports good health overall. She denies experiencing any dysphagia, maintaining her ability to eat and swallow. She recalls a noticeable improvement in her condition following an esophageal dilation procedure. Her weight today is 112 pounds, and she attempts to maintain a daily walking regimen. She is scheduled for an endoscopy on 05/31/2024. She is currently on Eliquis.    She is scheduled for dental work on 05/22/2024.    The patient has a pessary and is inquiring about incorrect paperwork stating she is post hysterectomy. She intends to consult her primary care physician regarding her pessary status.    Past Medical History:   Diagnosis Date    Boil, breast 12/13/2021    HEALING UNDER LEFT BREAST     Dysphagia     Esophageal cancer     Esophagitis 09/21/2021    Gastric ulcer     GERD (gastroesophageal reflux disease)     Hyperlipidemia     Hypertension     Hyperthyroidism     Irritable bowel     Mitral valve prolapse     FOLLOWED BY       PAF (paroxysmal atrial fibrillation)     Presence of pessary        Past Surgical History:   Procedure Laterality Date    CHOLECYSTECTOMY      COLONOSCOPY  08/27/2021    [place]    ENDOSCOPY N/A 10/13/2022    Procedure: ESOPHAGOGASTRODUODENOSCOPY WITH  SAVARY DILATATION (12.8;14;15;16) WITH FLUOROSCOPY;  Surgeon: Hayder Finch III, MD;  Location: [facility] ENDOSCOPY;  Service: Gastroenterology;  Laterality: N/A;  FOLLOW UP S/P ESOPHAGECTOMY    ENDOSCOPY N/A 05/19/2023    Procedure: ESOPHAGOGASTRODUODENOSCOPY WITH SAVORY DILITATION 12/12.8,14,15,16MM;  Surgeon: Reilly Patricia MD;  Location: [facility] ENDOSCOPY;  Service: Gastroenterology;  Laterality: N/A;  PRE- DYSPHAGIA  POST-SAME    ESOPHAGOGASTRECTOMY Right 04/29/2022    Procedure: BRONCHOSCOPY, RIGHT THORACOSCOPY WITH DAVINCI ROBOT WITH TOTAL ESOPHAGECTOMY, INTERCOSTAL NERVE BLOCK, JEJUNOSTOMY TUBE REPLACEMENT;  Surgeon: Hayder Finch III, MD;  Location: [facility] OR;  Service: Robotics - DaVinci;  Laterality: Right;    EXTERNAL EAR SURGERY Left     JEJUNOSTOMY N/A 12/15/2021    Procedure: open JEJUNOSTOMY tube placement;  Surgeon: Bhanu Hollis MD;  Location: [facility] OR;  Service: General;  Laterality: N/A;    KNEE SURGERY Left     REPLACEMENT TOTAL KNEE Left     UPPER ENDOSCOPIC ULTRASOUND W/ FNA N/A 12/07/2021    Procedure: Esophagogastroduodenoscopy with endoscopic ultrasound  WITH STAGING AND FINE NEEDLE BIOPSY;  Surgeon: Amrit Green MD;  Location: [facility] ENDOSCOPY;  Service: Gastroenterology;  Laterality: N/A;  post op: inlet patch, esophageal mass, T2    VENOUS ACCESS DEVICE (PORT) INSERTION Left 12/15/2021    Procedure: INSERTION OF PORTACATH;  Surgeon: Bhanu Hollis MD;  Location: [facility] MAIN OR;  Service: General;  Laterality: Left;    VENOUS ACCESS DEVICE (PORT) REMOVAL Left 11/17/2023    Procedure: REMOVAL VENOUS ACCESS DEVICE;  Surgeon: Reilly Patricia MD;  Location: [facility] MAIN OR;  Service: Thoracic;   Laterality: Left;       Family History   Problem Relation Age of Onset    Breast cancer Mother     Diabetes Mother     Bipolar disorder Father     Diabetes Father     Hypertension Father     Breast cancer Sister     COPD Sister     Diabetes Sister     Hypertension Sister     Kidney cancer Sister     Hypertension Sister     Diabetes Sister     Alcohol abuse Brother     Asthma Brother     Bipolar disorder Brother     Diabetes Brother     Seizures Brother     Breast cancer Maternal Grandmother     Diabetes Maternal Grandmother     Diabetes Maternal Grandfather     Diabetes Paternal Grandmother     Diabetes Paternal Grandfather     Hypertension Son     Malig Hyperthermia Neg Hx        Social History     Socioeconomic History    Marital status: Significant Other    Number of children: 1    Years of education: High school   Tobacco Use    Smoking status: Every Day     Current packs/day: 0.00     Average packs/day: 1 pack/day for 41.3 years (41.3 ttl pk-yrs)     Types: Cigarettes     Start date:      Last attempt to quit: 2022     Years since quittin.0     Passive exposure: Current    Smokeless tobacco: Never    Tobacco comments:     2 per day   Vaping Use    Vaping status: Never Used   Substance and Sexual Activity    Alcohol use: Not Currently    Drug use: Never    Sexual activity: Yes     Partners: Female     Birth control/protection: Same-sex partner         Current Outpatient Medications:     Eliquis 5 MG tablet tablet, Take 1 tablet by mouth Every 12 (Twelve) Hours. HELD 48 HRS AS DIRECTED, Disp: , Rfl:     metoprolol succinate XL (TOPROL-XL) 50 MG 24 hr tablet, Take 1 tablet by mouth Daily., Disp: , Rfl:     ondansetron (Zofran) 4 MG tablet, Take 1 tablet by mouth Daily As Needed for Nausea or Vomiting., Disp: 15 tablet, Rfl: 0    pravastatin (PRAVACHOL) 20 MG tablet, Take 1 tablet by mouth Daily., Disp: , Rfl:     calcium carbonate (OS-JEANINE) 600 MG tablet, Take 2 tablets by mouth Daily., Disp: , Rfl:     " methIMAzole (TAPAZOLE) 10 MG tablet, Take 1 tablet by mouth Daily., Disp: , Rfl:     pantoprazole (PROTONIX) 20 MG EC tablet, Take 1 tablet by mouth Daily., Disp: , Rfl:   No current facility-administered medications for this visit.    Facility-Administered Medications Ordered in Other Visits:     heparin injection 500 Units, 500 Units, Intravenous, PRN, Iggy Harrell MD, 500 Units at 04/05/23 1452    sodium chloride 0.9 % flush 10 mL, 10 mL, Intravenous, PRN, Iggy Harrell MD, 10 mL at 04/05/23 1452     Allergies   Allergen Reactions    Codeine Hives     Patient states this is when she was very young.              Objective    OBJECTIVE:     VITAL SIGNS:  /70 (BP Location: Left arm, Patient Position: Sitting, Cuff Size: Adult)   Pulse 61   Ht 162.6 cm (64.02\")   Wt 51 kg (112 lb 6.4 oz)   SpO2 99%   BMI 19.28 kg/m²     PHYSICAL EXAM:  Constitutional:       Appearance: Normal appearance.   HENT:      Head: Normocephalic.      Right Ear: External ear normal.      Left Ear: External ear normal.      Nose: Nose normal.      Mouth/Throat: No obvious deformity     Pharynx: Oropharynx is clear.   Eyes:      Conjunctiva/sclera: Conjunctivae normal.   Cardiovascular:      Rate and Rhythm: Normal rate.      Pulses: Normal pulses.   Pulmonary:      Effort: Pulmonary effort is normal.  Abdominal:      Palpations: Abdomen is soft.   Musculoskeletal:         General: Normal range of motion.      Cervical back: Normal range of motion.   Skin:     General: Skin is warm.   Neurological:      General: No focal deficit present.      Mental Status: He is alert and oriented to person, place, and time.     LAB RESULTS:  I have reviewed all the available laboratory results in the chart.    Imaging  6-month scan shows no concern for cancer recurrence.    RESULTS REVIEW:  I have reviewed the patient's all relevant laboratory and imaging findings.           ASSESSMENT & PLAN:  Maya Ribera is a 63 y.o. female " with significant medical conditions as mentioned above presented to my clinic.    The patient's 6-month scan results do not indicate any signs of cancer recurrence. Her last endoscopy was conducted on 05/2023. An endoscopy is scheduled for 05/31/2024. It is recommended that she undergoes scans every 6 months and an annual endoscopy for 5 years. It is deemed safe for her to undergo dental procedures under general anesthesia. For minor or major procedures, she should discontinue Eliquis for 2-3 days. However, this decision will be deferred to her dental procedures. Should an endoscopy reveal a narrow area, dilation will be necessary.    I discussed the patients findings and my recommendations with the patient. The patient was given adequate time to ask questions and all questions were answered to patient satisfaction. Thank you for this consult and allowing us to participate in the care of your patient.      Reilly Patricia MD  Thoracic Surgeon  [facility]        Dictated utilizing Dragon dictation    I spent 30 minutes caring for Maya on this date of service. This time includes time spent by me in the following activities:preparing for the visit, reviewing tests, obtaining and/or reviewing a separately obtained history, performing a medically appropriate examination and/or evaluation , counseling and educating the patient/family/caregiver, ordering medications, tests, or procedures, referring and communicating with other health care professionals , documenting information in the medical record, independently interpreting results and communicating that information with the patient/family/caregiver, and care coordination and more than half the time was spent in direct face to face evaluation and decision making.       Transcribed from ambient dictation for Reilly Patricia MD by Isela Gordon .  05/09/24   11:25 EDT    Patient or patient representative verbalized consent to the visit  recording.  I have personally performed the services described in this document as transcribed by the above individual, and it is both accurate and complete.

## 2024-05-09 NOTE — H&P (VIEW-ONLY)
THORACIC SURGERY CLINIC FOLLOW  UP NOTE    REASON FOR VISIT: History of esophageal cancer s/p esophagectomy developed esophageal dysphagia s/p esophageal dilation.    Subjective   HISTORY OF PRESENTING ILLNESS:   Maya Ribera is a 63 y.o. female has significant medical problems as mentioned below.  She also has history of esophageal carcinoma and underwent esophagectomy.  She had mild dysphagia and underwent dilation of the anastomosis and pylorus on 5/19/2023. There was mild narrowing of the esophagogastric anastomosis and pylorus which were dilated up to 16 mm. There was no evidence of recurrent malignancy.  She came to clinic for follow-up visit.    She is doing well since her endoscopy. She denies any difficulty swallowing. She has noticed an improvement in her swallowing since her endoscopy. She denies feeling any bolus sticking in her neck or chest. She still has her port in situ and inquires about when this can be removed.    The patient underwent surgery in 2023 and reports good health overall. She denies experiencing any dysphagia, maintaining her ability to eat and swallow. She recalls a noticeable improvement in her condition following an esophageal dilation procedure. Her weight today is 112 pounds, and she attempts to maintain a daily walking regimen. She is scheduled for an endoscopy on 05/31/2024. She is currently on Eliquis.    She is scheduled for dental work on 05/22/2024.    The patient has a pessary and is inquiring about incorrect paperwork stating she is post hysterectomy. She intends to consult her primary care physician regarding her pessary status.    Past Medical History:   Diagnosis Date    Boil, breast 12/13/2021    HEALING UNDER LEFT BREAST     Dysphagia     Esophageal cancer     Esophagitis 09/21/2021    Gastric ulcer     GERD (gastroesophageal reflux disease)     Hyperlipidemia     Hypertension     Hyperthyroidism     Irritable bowel     Mitral valve prolapse     FOLLOWED BY       PAF (paroxysmal atrial fibrillation)     Presence of pessary        Past Surgical History:   Procedure Laterality Date    CHOLECYSTECTOMY      COLONOSCOPY  08/27/2021    [facility]    ENDOSCOPY N/A 10/13/2022    Procedure: ESOPHAGOGASTRODUODENOSCOPY WITH  SAVARY DILATATION (12.8;14;15;16) WITH FLUOROSCOPY;  Surgeon: Hayder Finch III, MD;  Location: [facility] ENDOSCOPY;  Service: Gastroenterology;  Laterality: N/A;  FOLLOW UP S/P ESOPHAGECTOMY    ENDOSCOPY N/A 05/19/2023    Procedure: ESOPHAGOGASTRODUODENOSCOPY WITH SAVORY DILITATION 12/12.8,14,15,16MM;  Surgeon: Reilly Patricia MD;  Location: [facility] ENDOSCOPY;  Service: Gastroenterology;  Laterality: N/A;  PRE- DYSPHAGIA  POST-SAME    ESOPHAGOGASTRECTOMY Right 04/29/2022    Procedure: BRONCHOSCOPY, RIGHT THORACOSCOPY WITH DAVINCI ROBOT WITH TOTAL ESOPHAGECTOMY, INTERCOSTAL NERVE BLOCK, JEJUNOSTOMY TUBE REPLACEMENT;  Surgeon: Hayder Finch III, MD;  Location: [facility] OR;  Service: Robotics - DaVinci;  Laterality: Right;    EXTERNAL EAR SURGERY Left     JEJUNOSTOMY N/A 12/15/2021    Procedure: open JEJUNOSTOMY tube placement;  Surgeon: Bhanu Hollis MD;  Location: [facility] OR;  Service: General;  Laterality: N/A;    KNEE SURGERY Left     REPLACEMENT TOTAL KNEE Left     UPPER ENDOSCOPIC ULTRASOUND W/ FNA N/A 12/07/2021    Procedure: Esophagogastroduodenoscopy with endoscopic ultrasound  WITH STAGING AND FINE NEEDLE BIOPSY;  Surgeon: Amrit Green MD;  Location: [facility] ENDOSCOPY;  Service: Gastroenterology;  Laterality: N/A;  post op: inlet patch, esophageal mass, T2    VENOUS ACCESS DEVICE (PORT) INSERTION Left 12/15/2021    Procedure: INSERTION OF PORTACATH;  Surgeon: Bhanu Hollis MD;  Location: [facility] MAIN OR;  Service: General;  Laterality: Left;    VENOUS ACCESS DEVICE (PORT) REMOVAL Left 11/17/2023    Procedure: REMOVAL VENOUS ACCESS DEVICE;  Surgeon: Reilly Patricia MD;  Location: [facility] MAIN OR;  Service: Thoracic;   Laterality: Left;       Family History   Problem Relation Age of Onset    Breast cancer Mother     Diabetes Mother     Bipolar disorder Father     Diabetes Father     Hypertension Father     Breast cancer Sister     COPD Sister     Diabetes Sister     Hypertension Sister     Kidney cancer Sister     Hypertension Sister     Diabetes Sister     Alcohol abuse Brother     Asthma Brother     Bipolar disorder Brother     Diabetes Brother     Seizures Brother     Breast cancer Maternal Grandmother     Diabetes Maternal Grandmother     Diabetes Maternal Grandfather     Diabetes Paternal Grandmother     Diabetes Paternal Grandfather     Hypertension Son     Malig Hyperthermia Neg Hx        Social History     Socioeconomic History    Marital status: Significant Other    Number of children: 1    Years of education: High school   Tobacco Use    Smoking status: Every Day     Current packs/day: 0.00     Average packs/day: 1 pack/day for 41.3 years (41.3 ttl pk-yrs)     Types: Cigarettes     Start date:      Last attempt to quit: 2022     Years since quittin.0     Passive exposure: Current    Smokeless tobacco: Never    Tobacco comments:     2 per day   Vaping Use    Vaping status: Never Used   Substance and Sexual Activity    Alcohol use: Not Currently    Drug use: Never    Sexual activity: Yes     Partners: Female     Birth control/protection: Same-sex partner         Current Outpatient Medications:     Eliquis 5 MG tablet tablet, Take 1 tablet by mouth Every 12 (Twelve) Hours. HELD 48 HRS AS DIRECTED, Disp: , Rfl:     metoprolol succinate XL (TOPROL-XL) 50 MG 24 hr tablet, Take 1 tablet by mouth Daily., Disp: , Rfl:     ondansetron (Zofran) 4 MG tablet, Take 1 tablet by mouth Daily As Needed for Nausea or Vomiting., Disp: 15 tablet, Rfl: 0    pravastatin (PRAVACHOL) 20 MG tablet, Take 1 tablet by mouth Daily., Disp: , Rfl:     calcium carbonate (OS-JEANINE) 600 MG tablet, Take 2 tablets by mouth Daily., Disp: , Rfl:     " methIMAzole (TAPAZOLE) 10 MG tablet, Take 1 tablet by mouth Daily., Disp: , Rfl:     pantoprazole (PROTONIX) 20 MG EC tablet, Take 1 tablet by mouth Daily., Disp: , Rfl:   No current facility-administered medications for this visit.    Facility-Administered Medications Ordered in Other Visits:     heparin injection 500 Units, 500 Units, Intravenous, PRN, Iggy Harrell MD, 500 Units at 04/05/23 1452    sodium chloride 0.9 % flush 10 mL, 10 mL, Intravenous, PRN, Iggy Harrell MD, 10 mL at 04/05/23 1452     Allergies   Allergen Reactions    Codeine Hives     Patient states this is when she was very young.              Objective    OBJECTIVE:     VITAL SIGNS:  /70 (BP Location: Left arm, Patient Position: Sitting, Cuff Size: Adult)   Pulse 61   Ht 162.6 cm (64.02\")   Wt 51 kg (112 lb 6.4 oz)   SpO2 99%   BMI 19.28 kg/m²     PHYSICAL EXAM:  Constitutional:       Appearance: Normal appearance.   HENT:      Head: Normocephalic.      Right Ear: External ear normal.      Left Ear: External ear normal.      Nose: Nose normal.      Mouth/Throat: No obvious deformity     Pharynx: Oropharynx is clear.   Eyes:      Conjunctiva/sclera: Conjunctivae normal.   Cardiovascular:      Rate and Rhythm: Normal rate.      Pulses: Normal pulses.   Pulmonary:      Effort: Pulmonary effort is normal.  Abdominal:      Palpations: Abdomen is soft.   Musculoskeletal:         General: Normal range of motion.      Cervical back: Normal range of motion.   Skin:     General: Skin is warm.   Neurological:      General: No focal deficit present.      Mental Status: He is alert and oriented to person, place, and time.     LAB RESULTS:  I have reviewed all the available laboratory results in the chart.    Imaging  6-month scan shows no concern for cancer recurrence.    RESULTS REVIEW:  I have reviewed the patient's all relevant laboratory and imaging findings.           ASSESSMENT & PLAN:  Maya Ribera is a 63 y.o. female " with significant medical conditions as mentioned above presented to my clinic.    The patient's 6-month scan results do not indicate any signs of cancer recurrence. Her last endoscopy was conducted on 05/2023. An endoscopy is scheduled for 05/31/2024. It is recommended that she undergoes scans every 6 months and an annual endoscopy for 5 years. It is deemed safe for her to undergo dental procedures under general anesthesia. For minor or major procedures, she should discontinue Eliquis for 2-3 days. However, this decision will be deferred to her dental procedures. Should an endoscopy reveal a narrow area, dilation will be necessary.    I discussed the patients findings and my recommendations with the patient. The patient was given adequate time to ask questions and all questions were answered to patient satisfaction. Thank you for this consult and allowing us to participate in the care of your patient.      Reilly Patricia MD  Thoracic Surgeon  [facility]        Dictated utilizing Dragon dictation    I spent 30 minutes caring for Maya on this date of service. This time includes time spent by me in the following activities:preparing for the visit, reviewing tests, obtaining and/or reviewing a separately obtained history, performing a medically appropriate examination and/or evaluation , counseling and educating the patient/family/caregiver, ordering medications, tests, or procedures, referring and communicating with other health care professionals , documenting information in the medical record, independently interpreting results and communicating that information with the patient/family/caregiver, and care coordination and more than half the time was spent in direct face to face evaluation and decision making.       Transcribed from ambient dictation for Reilly Patricia MD by Isela Gordon .  05/09/24   11:25 EDT    Patient or patient representative verbalized consent to the visit  recording.  I have personally performed the services described in this document as transcribed by the above individual, and it is both accurate and complete.

## 2024-05-30 RX ORDER — PANTOPRAZOLE SODIUM 20 MG/1
20 TABLET, DELAYED RELEASE ORAL DAILY
COMMUNITY

## 2024-05-30 RX ORDER — PHENOL 1.4 %
1200 AEROSOL, SPRAY (ML) MUCOUS MEMBRANE DAILY
COMMUNITY

## 2024-05-31 ENCOUNTER — ANESTHESIA EVENT (OUTPATIENT)
Dept: GASTROENTEROLOGY | Facility: HOSPITAL | Age: 64
End: 2024-05-31
Payer: MEDICARE

## 2024-05-31 ENCOUNTER — APPOINTMENT (OUTPATIENT)
Dept: GENERAL RADIOLOGY | Facility: HOSPITAL | Age: 64
End: 2024-05-31
Payer: MEDICARE

## 2024-05-31 ENCOUNTER — HOSPITAL ENCOUNTER (OUTPATIENT)
Facility: HOSPITAL | Age: 64
Setting detail: HOSPITAL OUTPATIENT SURGERY
Discharge: HOME | End: 2024-05-31
Attending: SURGERY | Admitting: SURGERY
Payer: MEDICARE

## 2024-05-31 ENCOUNTER — ANESTHESIA (OUTPATIENT)
Dept: GASTROENTEROLOGY | Facility: HOSPITAL | Age: 64
End: 2024-05-31
Payer: MEDICARE

## 2024-05-31 VITALS
OXYGEN SATURATION: 100 % | BODY MASS INDEX: 18.92 KG/M2 | DIASTOLIC BLOOD PRESSURE: 56 MMHG | RESPIRATION RATE: 18 BRPM | HEART RATE: 62 BPM | SYSTOLIC BLOOD PRESSURE: 100 MMHG | HEIGHT: 64 IN | WEIGHT: 110.8 LBS

## 2024-05-31 DIAGNOSIS — C15.9 MALIGNANT NEOPLASM OF ESOPHAGUS, UNSPECIFIED LOCATION: Primary | ICD-10-CM

## 2024-05-31 PROCEDURE — 25010000002 PROPOFOL 10 MG/ML EMULSION: Performed by: NURSE ANESTHETIST, CERTIFIED REGISTERED

## 2024-05-31 PROCEDURE — 25010000002 ONDANSETRON PER 1 MG: Performed by: ANESTHESIOLOGY

## 2024-05-31 PROCEDURE — 25810000003 LACTATED RINGERS PER 1000 ML: Performed by: SURGERY

## 2024-05-31 PROCEDURE — 71045 X-RAY EXAM CHEST 1 VIEW: CPT

## 2024-05-31 PROCEDURE — 25010000002 GLYCOPYRROLATE 0.2 MG/ML SOLUTION: Performed by: NURSE ANESTHETIST, CERTIFIED REGISTERED

## 2024-05-31 PROCEDURE — 43249 ESOPH EGD DILATION <30 MM: CPT | Performed by: SURGERY

## 2024-05-31 PROCEDURE — C1726 CATH, BAL DIL, NON-VASCULAR: HCPCS | Performed by: SURGERY

## 2024-05-31 PROCEDURE — 74018 RADEX ABDOMEN 1 VIEW: CPT

## 2024-05-31 PROCEDURE — 25010000002 FENTANYL CITRATE (PF) 50 MCG/ML SOLUTION: Performed by: SURGERY

## 2024-05-31 RX ORDER — LIDOCAINE HYDROCHLORIDE 20 MG/ML
INJECTION, SOLUTION INFILTRATION; PERINEURAL AS NEEDED
Status: DISCONTINUED | OUTPATIENT
Start: 2024-05-31 | End: 2024-05-31 | Stop reason: SURG

## 2024-05-31 RX ORDER — PROPOFOL 10 MG/ML
VIAL (ML) INTRAVENOUS AS NEEDED
Status: DISCONTINUED | OUTPATIENT
Start: 2024-05-31 | End: 2024-05-31 | Stop reason: SURG

## 2024-05-31 RX ORDER — GLYCOPYRROLATE 0.2 MG/ML
INJECTION INTRAMUSCULAR; INTRAVENOUS AS NEEDED
Status: DISCONTINUED | OUTPATIENT
Start: 2024-05-31 | End: 2024-05-31 | Stop reason: SURG

## 2024-05-31 RX ORDER — FENTANYL CITRATE 50 UG/ML
25 INJECTION, SOLUTION INTRAMUSCULAR; INTRAVENOUS ONCE
Status: COMPLETED | OUTPATIENT
Start: 2024-05-31 | End: 2024-05-31

## 2024-05-31 RX ORDER — TRAMADOL HYDROCHLORIDE 50 MG/1
25 TABLET ORAL EVERY 8 HOURS PRN
Qty: 6 TABLET | Refills: 0 | Status: SHIPPED | OUTPATIENT
Start: 2024-05-31 | End: 2024-06-04

## 2024-05-31 RX ORDER — SODIUM CHLORIDE, SODIUM LACTATE, POTASSIUM CHLORIDE, CALCIUM CHLORIDE 600; 310; 30; 20 MG/100ML; MG/100ML; MG/100ML; MG/100ML
1000 INJECTION, SOLUTION INTRAVENOUS CONTINUOUS
Status: DISCONTINUED | OUTPATIENT
Start: 2024-05-31 | End: 2024-05-31 | Stop reason: HOSPADM

## 2024-05-31 RX ORDER — ONDANSETRON 2 MG/ML
4 INJECTION INTRAMUSCULAR; INTRAVENOUS ONCE AS NEEDED
Status: COMPLETED | OUTPATIENT
Start: 2024-05-31 | End: 2024-05-31

## 2024-05-31 RX ORDER — SODIUM CHLORIDE 0.9 % (FLUSH) 0.9 %
10 SYRINGE (ML) INJECTION AS NEEDED
Status: DISCONTINUED | OUTPATIENT
Start: 2024-05-31 | End: 2024-05-31 | Stop reason: HOSPADM

## 2024-05-31 RX ADMIN — PROPOFOL 10 MG: 10 INJECTION, EMULSION INTRAVENOUS at 07:53

## 2024-05-31 RX ADMIN — FENTANYL CITRATE 25 MCG: 50 INJECTION, SOLUTION INTRAMUSCULAR; INTRAVENOUS at 08:38

## 2024-05-31 RX ADMIN — PROPOFOL 10 MG: 10 INJECTION, EMULSION INTRAVENOUS at 07:48

## 2024-05-31 RX ADMIN — LIDOCAINE HYDROCHLORIDE 60 MG: 20 INJECTION, SOLUTION INFILTRATION; PERINEURAL at 07:39

## 2024-05-31 RX ADMIN — GLYCOPYRROLATE 0.1 MG: 0.2 INJECTION INTRAMUSCULAR; INTRAVENOUS at 07:39

## 2024-05-31 RX ADMIN — ONDANSETRON 4 MG: 2 INJECTION INTRAMUSCULAR; INTRAVENOUS at 08:29

## 2024-05-31 RX ADMIN — PROPOFOL 10 MG: 10 INJECTION, EMULSION INTRAVENOUS at 07:46

## 2024-05-31 RX ADMIN — PROPOFOL 100 MG: 10 INJECTION, EMULSION INTRAVENOUS at 07:40

## 2024-05-31 RX ADMIN — PROPOFOL 10 MG: 10 INJECTION, EMULSION INTRAVENOUS at 07:44

## 2024-05-31 RX ADMIN — SODIUM CHLORIDE, POTASSIUM CHLORIDE, SODIUM LACTATE AND CALCIUM CHLORIDE 1000 ML: 600; 310; 30; 20 INJECTION, SOLUTION INTRAVENOUS at 07:14

## 2024-05-31 RX ADMIN — PROPOFOL 20 MG: 10 INJECTION, EMULSION INTRAVENOUS at 07:42

## 2024-05-31 RX ADMIN — PROPOFOL 10 MG: 10 INJECTION, EMULSION INTRAVENOUS at 07:50

## 2024-05-31 NOTE — OP NOTE
Operative Note     Date of procedure: 5/31/2024     Patient name: Maya Ribera  MRN: [id number]    Pre OP diagnosis:  Patient Active Problem List   Diagnosis    Esophageal cancer    Asymptomatic varicose veins of unspecified lower extremity    Eczema    Encounter for screening for malignant neoplasm of cervix    Screening for malignant neoplasm of colon    Arthritis    Esophagitis    Gastric ulcer    Female cystocele    Flat foot(734)    Hiatal hernia    Hyperlipidemia    Hypertension    Hyperthyroidism    Irritable bowel syndrome    Mitral regurgitation    Paroxysmal atrial fibrillation    Severe esophageal dysplasia    Mitral valve prolapse    Encounter for management of implanted device    Dislodged jejunostomy tube    Chronic pain    Aftercare following surgery for neoplasm    Port-A-Cath in place     Post OP diagnosis:  Patient Active Problem List   Diagnosis    Esophageal cancer    Asymptomatic varicose veins of unspecified lower extremity    Eczema    Encounter for screening for malignant neoplasm of cervix    Screening for malignant neoplasm of colon    Arthritis    Esophagitis    Gastric ulcer    Female cystocele    Flat foot(734)    Hiatal hernia    Hyperlipidemia    Hypertension    Hyperthyroidism    Irritable bowel syndrome    Mitral regurgitation    Paroxysmal atrial fibrillation    Severe esophageal dysplasia    Mitral valve prolapse    Encounter for management of implanted device    Dislodged jejunostomy tube    Chronic pain    Aftercare following surgery for neoplasm    Port-A-Cath in place     Procedure performed:   Flexible esophagogastroduodenoscopy.  CRE balloon dilation of the esophagogastric anastomosis and pylorus up to 18 mm.    Indications:   Maya Ribera is a 63 y.o. female has significant medical problems as mentioned below.  She also has history of esophageal carcinoma and underwent esophagectomy.  She had mild dysphagia and underwent dilation of the anastomosis and pylorus  on 5/19/2023. There was mild narrowing of the esophagogastric anastomosis and pylorus which were dilated up to 16 mm. There was no evidence of recurrent malignancy.  She came to clinic for follow-up visit.     She has noticed an improvement in her swallowing since her endoscopy.  She reported difficulty swallowing recently.  She was scheduled for EGD for cancer surveillance and anastomotic dilation.  The risks and benefit of the procedure were discussed and she agreed to proceed.       Surgeon: Reilly Patricia MD     Anesthesia: Monitored Local Anesthesia with Sedation    ASA Class: 3    Procedure Details   On 5/31/2024, the patient was brought to the endoscopy suite on a fluoroscopy bed. Maya Ribera was positioned in the left lateral decubitus position.  A bite-block was placed.  Prophylactic intravenous antibiotics were not indicated.  Prior to beginning the procedure, a time-out was conducted during which all members of the surgical team were present.      Following the administration of adequate intravenous sedation, I began by performing a flexible esophagogastroduodenoscopy.   A flexible adult endoscope was placed into the oropharynx and advanced down the proximal esophagus under direct vision.  The cricopharyngeus was located 16 cm.  There was mild esophagitis in the upper esophagus.  The esophagogastric anastomosis was located at 21 cm which was patent.  There was mild narrowing of the esophagogastric anastomosis. The conduit appeared normal and had normal orientation.  The diaphragmatic pinch was at 40 cm.  The gastric antrum appeared normal.  The pylorus was tight and I was able to intubate with gentle maneuvering.  The proximal duodenum appeared normal.    I passed the CRE™ balloon dilator through the working channel of the endoscope.  The wire-guided catheter was advanced into the second part of the duodenum.  The endoscope was retracted and the balloon dilator was parked at the pyloric narrowing.   The manufacture pressure guidelines were used to achieve desired dilation diameter.  The stenosis was serially dilated up to 18 mm starting from 15 mm.  Sustained pressures were maintained for a minute with each dilation to allow breaking the scar tissue.  The balloon dilator was deflated and retracted.  The area of concern was re-examined.  In a similar fashion, the anastomosis was dilated serially from 15 mm to 18 mm.  There was no evidence of deep injury or perforation.  The duodenum, stomach and esophagus were evacuated free of air and debris.  The endoscope was removed.     The patient was awakened from sedation and was transported to the post-anesthesia care unit in stable condition.    Findings:  Mild narrowing of the esophagogastric anastomosis.  Mild narrowing of the pylorus.  No evidence of deep tissue injury after dilating the anastomosis and pylorus up to 18 mm.    Estimated Blood Loss:  none           Specimens: None           Complications: None           Disposition: PACU - hemodynamically stable.           Condition: stable    Post-procedure recommendations:   Resume Eliquis on 6/2/2024.  Follow-up in clinic in 6 months with repeat CT scan of the chest and abdomen..     Reilly Patricia MD   Thoracic Surgeon  [facility]

## 2024-05-31 NOTE — ANESTHESIA POSTPROCEDURE EVALUATION
Patient: Maya Ribera    Procedure Summary       Date: 05/31/24 Room / Location: [facility] ENDOSCOPY 7 /  [facility]    Anesthesia Start: 0728 Anesthesia Stop: 0804    Procedure: ESOPHAGOGASTRODUODENOSCOPY WITH BALLOON DILATATION 15mm-16.5mm-18mm (Esophagus) Diagnosis:       Malignant neoplasm of esophagus, unspecified location      (Malignant neoplasm of esophagus, unspecified location [C15.9])    Surgeons: Reilly Patricia MD Provider: Blanche Hutchinson MD    Anesthesia Type: MAC ASA Status: 3            Anesthesia Type: MAC    Vitals  Vitals Value Taken Time   /81 05/31/24 0903   Temp     Pulse 62 05/31/24 0911   Resp 22 05/31/24 0845   SpO2 98 % 05/31/24 0911   Vitals shown include unfiled device data.        Post Anesthesia Care and Evaluation    Patient location during evaluation: bedside  Patient participation: complete - patient participated  Level of consciousness: awake and alert  Pain management: adequate    Airway patency: patent  Anesthetic complications: No anesthetic complications  PONV Status: controlled  Cardiovascular status: acceptable  Respiratory status: acceptable  Hydration status: acceptable

## 2024-05-31 NOTE — ANESTHESIA PREPROCEDURE EVALUATION
Anesthesia Evaluation     Patient summary reviewed and Nursing notes reviewed   NPO Solid Status: > 8 hours  NPO Liquid Status: > 2 hours           Airway   Mallampati: II  TM distance: >3 FB  Neck ROM: full  No difficulty expected  Dental    (+) poor dentition        Pulmonary - normal exam    breath sounds clear to auscultation  (+) a smoker Current,  Cardiovascular - normal exam    ECG reviewed  PT is on anticoagulation therapy  Patient on routine beta blocker and Beta blocker given within 24 hours of surgery  Rhythm: regular  Rate: normal    (+) hypertension less than 2 medications, valvular problems/murmurs MR and MVP, dysrhythmias Paroxysmal Atrial Fib, hyperlipidemia      Neuro/Psych  GI/Hepatic/Renal/Endo    (+) hiatal hernia, GERD, PUD, thyroid problem hypothyroidism    ROS Comment: Hx esophageal CA, s/p total esophagectomy in 4/22 and dilatation in 10/22    Musculoskeletal     (+) chronic pain  Abdominal    Substance History      OB/GYN          Other   arthritis,   history of cancer      Other Comment: Hx esophageal CA/cervical CA                Anesthesia Plan    ASA 3     MAC     (I have reviewed the patient's history and chart with the patient, including all pertinent laboratory results and imaging. I have explained the risks of anesthesia including but not limited to dental damage, corneal abrasion, nerve injury, MI, stroke, aspiration, and death. Patient has agreed to proceed.        HOB elevated as much as possible for procedure)  intravenous induction     Anesthetic plan, risks, benefits, and alternatives have been provided, discussed and informed consent has been obtained with: patient.      CODE STATUS:

## 2024-05-31 NOTE — DISCHARGE INSTRUCTIONS
For the next 24 hours patient needs to be with a responsible adult.    For 24 hours DO NOT drive, operate machinery, appliances, drink alcohol, make important decisions or sign legal documents.    Start with a light or bland diet if you are feeling sick to your stomach otherwise advance to regular diet as tolerated.    Follow recommendations on procedure report if provided by your doctor.    Call Dr Patricia for problems [phone]    Problems may include but not limited to: large amounts of bleeding, trouble breathing, repeated vomiting, severe unrelieved pain, fever or chills.        RESUME ELIQUIS ON 6/2/2024

## 2024-08-04 NOTE — PROGRESS NOTES
Subjective   Maya Ribera is a 64 y.o. female. Presents today for   Chief Complaint   Patient presents with    Annual Exam       History Of Present Illness Maya presents Annual physical examination:  She complains of fatigue from chemo/radiation.    Esophagitis:  She is recovering from reconstructive surgery on her esophagus 2 years ago.    Hypertension:  controlled with medication    Paroxysmal A-Fib:      CareGaps:  Pending care gaps due to chemo/radiation    Covid-19d - pending due to chemo/radiation treatments  Pneumonia - pending due to chemo/radiation treatments  Tdap- pending due to chemo/radiation treatments  Zoster Vaccine - pending due to chemo/radiation treatments  Influenza - pending due to chemo/radiation treatments  Annual Wellness Visit - done today  Mammogram - pending due to chemo/radiation treatments      Patient Active Problem List   Diagnosis    Esophageal cancer    Asymptomatic varicose veins of unspecified lower extremity    Eczema    Encounter for screening for malignant neoplasm of cervix    Screening for malignant neoplasm of colon    Arthritis    Esophagitis    Gastric ulcer    Female cystocele    Flat foot(734)    Hiatal hernia    Hyperlipidemia    Hypertension    Hyperthyroidism    Irritable bowel syndrome    Mitral regurgitation    Paroxysmal atrial fibrillation    Severe esophageal dysplasia    Mitral valve prolapse    Encounter for management of implanted device    Dislodged jejunostomy tube    Chronic pain    Aftercare following surgery for neoplasm    Port-A-Cath in place       Social History     Socioeconomic History    Marital status: Significant Other    Number of children: 1    Years of education: High school   Tobacco Use    Smoking status: Every Day     Current packs/day: 1.00     Average packs/day: 1 pack/day for 43.3 years (43.3 ttl pk-yrs)     Types: Cigarettes     Start date: 1981     Last attempt to quit: 4/29/2022     Passive exposure: Current    Smokeless  tobacco: Never    Tobacco comments:     2 per day   Vaping Use    Vaping status: Never Used   Substance and Sexual Activity    Alcohol use: Not Currently    Drug use: Never    Sexual activity: Yes     Partners: Female     Birth control/protection: Same-sex partner       Allergies   Allergen Reactions    Codeine Hives     Patient states this is when she was very young.        Current Outpatient Medications on File Prior to Visit   Medication Sig Dispense Refill    calcium carbonate (OS-JEANINE) 600 MG tablet Take 2 tablets by mouth Daily.      Eliquis 5 MG tablet tablet Take 1 tablet by mouth Every 12 (Twelve) Hours. HELD 48 HRS AS DIRECTED      lansoprazole (PREVACID SOLUTAB) 30 MG Tablet Delayed Release Dispersible disintegrating tablet Take 1 tablet by mouth Daily As Needed (acid reflux).      metoprolol succinate XL (TOPROL-XL) 50 MG 24 hr tablet Take 1 tablet by mouth Daily.      ondansetron (Zofran) 4 MG tablet Take 1 tablet by mouth Daily As Needed for Nausea or Vomiting. 15 tablet 0    pantoprazole (PROTONIX) 20 MG EC tablet TAKE 1 TABLET BY MOUTH DAILY 90 tablet 0    pravastatin (PRAVACHOL) 20 MG tablet Take 1 tablet by mouth Daily.      vitamin D3 125 MCG (5000 UT) capsule capsule Take 1 capsule by mouth Daily.      methIMAzole (TAPAZOLE) 10 MG tablet Take 1 tablet by mouth Daily.      [DISCONTINUED] pantoprazole (PROTONIX) 20 MG EC tablet Take 1 tablet by mouth Daily.       Current Facility-Administered Medications on File Prior to Visit   Medication Dose Route Frequency Provider Last Rate Last Admin    heparin injection 500 Units  500 Units Intravenous Iggy Peterson MD   500 Units at 04/05/23 1452    sodium chloride 0.9 % flush 10 mL  10 mL Intravenous PRIggy Henry MD   10 mL at 04/05/23 1452   Occupation/Lives with: retired. Lives with partner    Sleep: Gets 3-4 hours of sleep/night    Exercise: mows the lawn and walks    Nutrition: she is maintaining her weight    Caffeine: all day and  "all night - coffee    Tobb/Vaping/Illicit Drugs/ETOH: smoking cigarettes    Sexual hx (#partners, m/f, bc, LMP): female    Mood: up and down, she has good and bad moments    Safety: Feels safe at home with the people he/she is around.    Health Maintenance/Labs: will get today    Last eye exam: July 2024    Last dentist visit: next visit September    Specialists: oncology  ___________________________  Review of Systems   Denies dizziness, fever/chills, CP, SOA, headache, blood in urine/stool, abd pain, leg swelling.    Positive fatigue    Libra has little appetite.  She has gained weight to 114 lbs, the first time since her cancer diagnosis and surgery.  She is thrilled that she has picked up that much weight.    Objective   Vitals:    08/05/24 0811   BP: 106/54   Pulse: 68   SpO2: 97%   Weight: 50.8 kg (112 lb)   Height: 162.6 cm (64\")     Body mass index is 19.22 kg/m².    Physical Exam  Constitutional:       Appearance: She is normal weight.   HENT:      Head: Normocephalic and atraumatic.      Nose: Nose normal.      Mouth/Throat:      Mouth: Mucous membranes are moist.   Eyes:      Pupils: Pupils are equal, round, and reactive to light.   Cardiovascular:      Rate and Rhythm: Normal rate.   Pulmonary:      Effort: Pulmonary effort is normal.   Abdominal:      General: Abdomen is flat.   Musculoskeletal:         General: Normal range of motion.   Skin:     General: Skin is warm.      Capillary Refill: Capillary refill takes less than 2 seconds.   Neurological:      General: No focal deficit present.      Mental Status: She is alert.   Psychiatric:         Mood and Affect: Mood normal.       Procedures         Assessment & Plan   Diagnoses and all orders for this visit:    1. Annual physical exam (Primary)    2. Restless leg  -     Iron Profile  -     rOPINIRole (REQUIP) 1 MG tablet; Take 1 tablet by mouth Every Night. Take 1 hour before bedtime.  Dispense: 90 tablet; Refill: 3    3. Primary hypertension  -     " CBC & Differential  -     Comprehensive Metabolic Panel    4. Gastroesophageal reflux disease with esophagitis without hemorrhage      4.  Takes prevacid daily to combat.    BMI is within normal parameters. No other follow-up for BMI required.     Discussed Care Gaps, ordered referrals and encouraged vaccination updates.       - Pt agrees with plan of care and denies further questions/concerns today  - This document is intended for medical expert use only. Persons  reading this document without medical staff guidance may result in misinterpretation and unintended morbidity     Go to the ER for any possible life-threatening symptoms such as chest pain or shortness of air.      Please allow 3-5 business days for recommendations based on new results      I personally spent time with this patient, preparing for the visit, reviewing tests, obtaining and/or reviewing a separately obtained history, performing a medically appropriate examination and/or evaluation, counseling and educating the patient/family/caregiver, ordering medications,  documenting information in the medical record and indepentently interpreting results.       Return in about 6 months (around 2/5/2025).

## 2024-08-05 ENCOUNTER — OFFICE VISIT (OUTPATIENT)
Dept: FAMILY MEDICINE CLINIC | Facility: CLINIC | Age: 64
End: 2024-08-05
Payer: MEDICARE

## 2024-08-05 VITALS
OXYGEN SATURATION: 97 % | DIASTOLIC BLOOD PRESSURE: 54 MMHG | HEIGHT: 64 IN | SYSTOLIC BLOOD PRESSURE: 106 MMHG | HEART RATE: 68 BPM | BODY MASS INDEX: 19.12 KG/M2 | WEIGHT: 112 LBS

## 2024-08-05 DIAGNOSIS — G25.81 RESTLESS LEG: ICD-10-CM

## 2024-08-05 DIAGNOSIS — I10 PRIMARY HYPERTENSION: ICD-10-CM

## 2024-08-05 DIAGNOSIS — Z00.00 ANNUAL PHYSICAL EXAM: Primary | ICD-10-CM

## 2024-08-05 DIAGNOSIS — K21.00 GASTROESOPHAGEAL REFLUX DISEASE WITH ESOPHAGITIS WITHOUT HEMORRHAGE: ICD-10-CM

## 2024-08-05 PROCEDURE — 3074F SYST BP LT 130 MM HG: CPT | Performed by: NURSE PRACTITIONER

## 2024-08-05 PROCEDURE — 3078F DIAST BP <80 MM HG: CPT | Performed by: NURSE PRACTITIONER

## 2024-08-05 PROCEDURE — 99214 OFFICE O/P EST MOD 30 MIN: CPT | Performed by: NURSE PRACTITIONER

## 2024-08-05 PROCEDURE — 1170F FXNL STATUS ASSESSED: CPT | Performed by: NURSE PRACTITIONER

## 2024-08-05 PROCEDURE — 1126F AMNT PAIN NOTED NONE PRSNT: CPT | Performed by: NURSE PRACTITIONER

## 2024-08-05 PROCEDURE — 1159F MED LIST DOCD IN RCRD: CPT | Performed by: NURSE PRACTITIONER

## 2024-08-05 PROCEDURE — 1160F RVW MEDS BY RX/DR IN RCRD: CPT | Performed by: NURSE PRACTITIONER

## 2024-08-05 RX ORDER — PANTOPRAZOLE SODIUM 20 MG/1
20 TABLET, DELAYED RELEASE ORAL DAILY
Qty: 90 TABLET | Refills: 0 | Status: SHIPPED | OUTPATIENT
Start: 2024-08-05

## 2024-08-05 RX ORDER — ROPINIROLE 1 MG/1
1 TABLET, FILM COATED ORAL NIGHTLY
Qty: 90 TABLET | Refills: 3 | Status: SHIPPED | OUTPATIENT
Start: 2024-08-05

## 2024-08-05 RX ORDER — LANSOPRAZOLE 30 MG/1
30 TABLET, ORALLY DISINTEGRATING, DELAYED RELEASE ORAL DAILY PRN
COMMUNITY
Start: 2024-06-03

## 2024-08-06 LAB
ALBUMIN SERPL-MCNC: 4.4 G/DL (ref 3.9–4.9)
ALP SERPL-CCNC: 100 IU/L (ref 44–121)
ALT SERPL-CCNC: 6 IU/L (ref 0–32)
AST SERPL-CCNC: 14 IU/L (ref 0–40)
BASOPHILS # BLD AUTO: 0.1 X10E3/UL (ref 0–0.2)
BASOPHILS NFR BLD AUTO: 1 %
BILIRUB SERPL-MCNC: 0.3 MG/DL (ref 0–1.2)
BUN SERPL-MCNC: 18 MG/DL (ref 8–27)
BUN/CREAT SERPL: 20 (ref 12–28)
CALCIUM SERPL-MCNC: 11 MG/DL (ref 8.7–10.3)
CHLORIDE SERPL-SCNC: 100 MMOL/L (ref 96–106)
CO2 SERPL-SCNC: 27 MMOL/L (ref 20–29)
CREAT SERPL-MCNC: 0.89 MG/DL (ref 0.57–1)
EGFRCR SERPLBLD CKD-EPI 2021: 72 ML/MIN/1.73
EOSINOPHIL # BLD AUTO: 0.3 X10E3/UL (ref 0–0.4)
EOSINOPHIL NFR BLD AUTO: 3 %
ERYTHROCYTE [DISTWIDTH] IN BLOOD BY AUTOMATED COUNT: 14.1 % (ref 11.7–15.4)
GLOBULIN SER CALC-MCNC: 2.8 G/DL (ref 1.5–4.5)
GLUCOSE SERPL-MCNC: 114 MG/DL (ref 70–99)
HCT VFR BLD AUTO: 46.2 % (ref 34–46.6)
HGB BLD-MCNC: 14.2 G/DL (ref 11.1–15.9)
IMM GRANULOCYTES # BLD AUTO: 0 X10E3/UL (ref 0–0.1)
IMM GRANULOCYTES NFR BLD AUTO: 0 %
IRON SATN MFR SERPL: 15 % (ref 15–55)
IRON SERPL-MCNC: 69 UG/DL (ref 27–139)
LYMPHOCYTES # BLD AUTO: 1.7 X10E3/UL (ref 0.7–3.1)
LYMPHOCYTES NFR BLD AUTO: 20 %
MCH RBC QN AUTO: 28.7 PG (ref 26.6–33)
MCHC RBC AUTO-ENTMCNC: 30.7 G/DL (ref 31.5–35.7)
MCV RBC AUTO: 94 FL (ref 79–97)
MONOCYTES # BLD AUTO: 0.6 X10E3/UL (ref 0.1–0.9)
MONOCYTES NFR BLD AUTO: 7 %
NEUTROPHILS # BLD AUTO: 5.8 X10E3/UL (ref 1.4–7)
NEUTROPHILS NFR BLD AUTO: 69 %
PLATELET # BLD AUTO: 277 X10E3/UL (ref 150–450)
POTASSIUM SERPL-SCNC: 5.6 MMOL/L (ref 3.5–5.2)
PROT SERPL-MCNC: 7.2 G/DL (ref 6–8.5)
RBC # BLD AUTO: 4.94 X10E6/UL (ref 3.77–5.28)
SODIUM SERPL-SCNC: 142 MMOL/L (ref 134–144)
TIBC SERPL-MCNC: 447 UG/DL (ref 250–450)
UIBC SERPL-MCNC: 378 UG/DL (ref 118–369)
WBC # BLD AUTO: 8.5 X10E3/UL (ref 3.4–10.8)

## 2024-08-18 NOTE — TELEPHONE ENCOUNTER
Spoke with patient and her partner regarding her labs today. Due to her labs being low again today we are going to recheck her labs on Monday 2/7/22 with plans to restart that day. All questions answered at this time and patient knows she can call if she needs anything additional.    yes...

## 2024-09-23 NOTE — DISCHARGE INSTRUCTIONS
Offered patient repeat stress test however she does not wish to undergo this at this time.  Believes her chest pain is unlikely cardiac related.  She believes it could be from intermittent reflux (just started back on her daily Prilosec).    Post-op Esophagectomy Discharge Instructions    1. Activity:  Climb stairs as tolerated  May drive car after 2 weeks or as directed by Physician  Walk at least 3-4 times a day  Limit lifting for first 6 weeks. No lifting, pushing, or pulling greater than 10 pounds till seen by MD.  Continue to use your incentive spirometer at least 4 times per day.    2. Nutrition:  Tube feeds only. Strict nothing by mouth.  All meds should be crushed or given in liquid formulation via j-tube.      3. Incision (wound) Care:  Remove dressing after 48 hours  If continued drainage, change dressing every 24 hours and as needed.  May shower after discharge.  Wash around incision area with soap and water daily.  No lotions or creams on incision area.  Take temperature daily for the first week after discharge.     4. When to call for Medical Advise:  Fever greater than 101 degrees  Unusual or severe pain  Difficulty breathing  Incision changes (redness, swelling, or increased drainage)  Any questions or problems    5. Medication Instructions:  Take Pain medications as directed to stay comfortable  No driving or drinking alcohol when taking prescribed pain meds  Laxative of choice if needed for constipation.    6. Medication and dosages:  See discharge medication instruction form

## 2024-10-04 NOTE — PLAN OF CARE
Goal Outcome Evaluation:  Plan of Care Reviewed With: patient, significant other           Outcome Summary: pt c/o constant nausea and abd pain. educated pt on IS, getting oob and the need to ambulate halls with s.o at bs they verbalized understanding. this afternoon s/o was concerned about pts still feeling the way that she does, what happens when they go home with the decrease of the TF and the fact that she has been on TF last few days and mybe its too much. attempted to educate the best that they would listen and this evening disconnected [facility] TF and adv MD about their concerns and he adv ok to leave off and resume in a bit. Pt at this time up in chair states she is feeling better and that she will ambulate in halls this evening. No s/s of distress noted. Will continue to monitor and support as needed.   Patient was seen and assessed while wearing personal protective equipment including facemask, protective eyewear and gloves.  Hand hygiene performed prior to entering the room and upon exiting with doffing of gloves.     Disp-30 tablet, R-2Normal  2. Attention deficit hyperactivity disorder (ADHD), combined type-continue Adderall  Will take over the refill for the next month since patient wants to get all her care at 1 place  3. Elevated BP without diagnosis of hypertension  Maintain BP log at home, return in 1 month, watch sodiu, intake and start exercising,      Return in 1 month for physical               An electronic signature was used to authenticate this note.    --Dee Mora MD     This note was generated completely or in part utilizing Dragon dictation speech recognition software.  Occasionally, words are mistranscribed and despite editing, the text may contain inaccuracies due to incorrect word recognition.  If further clarification is needed please contact the office at [phone]

## 2024-10-30 RX ORDER — PANTOPRAZOLE SODIUM 20 MG/1
20 TABLET, DELAYED RELEASE ORAL DAILY
Qty: 90 TABLET | Refills: 1 | Status: SHIPPED | OUTPATIENT
Start: 2024-10-30

## 2024-11-07 ENCOUNTER — HOSPITAL ENCOUNTER (OUTPATIENT)
Dept: CT IMAGING | Facility: HOSPITAL | Age: 64
Discharge: HOME | End: 2024-11-07
Admitting: SURGERY
Payer: MEDICARE

## 2024-11-07 DIAGNOSIS — C15.9 MALIGNANT NEOPLASM OF ESOPHAGUS, UNSPECIFIED LOCATION: ICD-10-CM

## 2024-11-07 PROCEDURE — 71260 CT THORAX DX C+: CPT

## 2024-11-07 PROCEDURE — 74160 CT ABDOMEN W/CONTRAST: CPT

## 2024-11-07 PROCEDURE — 25510000001 IOPAMIDOL 61 % SOLUTION: Performed by: SURGERY

## 2024-11-07 RX ORDER — IOPAMIDOL 612 MG/ML
100 INJECTION, SOLUTION INTRAVASCULAR
Status: COMPLETED | OUTPATIENT
Start: 2024-11-07 | End: 2024-11-07

## 2024-11-07 RX ADMIN — IOPAMIDOL 85 ML: 612 INJECTION, SOLUTION INTRAVENOUS at 08:53

## 2024-11-11 ENCOUNTER — OFFICE VISIT (OUTPATIENT)
Dept: SURGERY | Facility: CLINIC | Age: 64
End: 2024-11-11
Payer: MEDICARE

## 2024-11-11 DIAGNOSIS — C15.9 MALIGNANT NEOPLASM OF ESOPHAGUS, UNSPECIFIED LOCATION: Primary | ICD-10-CM

## 2024-11-11 NOTE — LETTER
"November 29, 2024     BRANDI Porter  [address]  [address]  [facility] [address]    Patient: Maya Ribera   YOB: 1960   Date of Visit: 11/11/2024     Dear BRANDI Porter:       Thank you for referring Maya Ribera to me for evaluation. Below are the relevant portions of my assessment and plan of care.    If you have questions, please do not hesitate to call me. I look forward to following Maya along with you.         Sincerely,        Joan Moseley MD        CC: No Recipients    Joan Moseley MD  11/29/24 7435  Sign when Signing Visit  Chief Complaint  Esophageal cancer    Subjective       Maya Ribera presents to [facility] THORAC[facility] SUR[facility]ERY  History of Present Illness  Ms. Ribera is a very pleasant 64-year-old lady status post esophagectomy for an esophageal cancer in April 2022.  She is doing very well.  She does have a little bit of dysphagia, but not significant.  She has undergone an EGD with dilation in May.  Objective  Vital Signs:  There were no vitals taken for this visit.  Estimated body mass index is 19.5 kg/m² as calculated from the following:    Height as of 11/19/24: 162.6 cm (64\").    Weight as of 11/19/24: 51.5 kg (113 lb 9.6 oz).    BMI is within normal parameters. No other follow-up for BMI required.      Physical Exam  Vitals and nursing note reviewed.   Constitutional:       Appearance: She is well-developed.   HENT:      Head: Normocephalic and atraumatic.      Nose: Nose normal.   Eyes:      Conjunctiva/sclera: Conjunctivae normal.   Cardiovascular:      Rate and Rhythm: Normal rate.   Pulmonary:      Effort: Pulmonary effort is normal.   Abdominal:      Palpations: Abdomen is soft.   Musculoskeletal:      Cervical back: Neck supple.   Skin:     General: Skin is warm and dry.   Neurological:      Mental Status: She is alert and oriented to person, place, and time.   Psychiatric:         Behavior: Behavior normal.         " Thought Content: Thought content normal.         Judgment: Judgment normal.        Result Review:          I have independently reviewed the CT of the chest abdomen pelvis performed on 11/7/2024 and agree with radiology report which demonstrates a thyroid goiter.  No new lung nodules.  No significant mediastinal or hilar lymphadenopathy.  Bilateral apical scarring and emphysema.  Stable tree-in-bud nodules in the right middle and lower lobes.  Small liver cysts.     Assessment and Plan   Diagnoses and all orders for this visit:    1. Malignant neoplasm of esophagus, unspecified location (Primary)  -     CT abdomen pelvis wo contrast; Future  -     CT Chest Without Contrast; Future    Ms. Ribera is a pleasant 64-year-old lady who presents in follow-up for her ypT0 N0 esophageal adenocarcinoma resected in April 2022.  She is doing well.  She does not need a further dilation at this point.  Her CT of the chest does not demonstrate any evidence of recurrent disease.  She will need continued surveillance with a CT of the chest abdomen pelvis in 6 months.  She will plan to follow-up with our nurse practitioner at that time.         Follow Up   No follow-ups on file.  Patient was given instructions and counseling regarding her condition or for health maintenance advice. Please see specific information pulled into the AVS if appropriate.

## 2024-11-19 ENCOUNTER — OFFICE VISIT (OUTPATIENT)
Dept: FAMILY MEDICINE CLINIC | Facility: CLINIC | Age: 64
End: 2024-11-19
Payer: MEDICARE

## 2024-11-19 VITALS
HEIGHT: 64 IN | WEIGHT: 113.6 LBS | BODY MASS INDEX: 19.39 KG/M2 | DIASTOLIC BLOOD PRESSURE: 64 MMHG | HEART RATE: 67 BPM | OXYGEN SATURATION: 100 % | SYSTOLIC BLOOD PRESSURE: 122 MMHG

## 2024-11-19 DIAGNOSIS — M79.672 LEFT FOOT PAIN: Primary | ICD-10-CM

## 2024-11-19 PROCEDURE — 1126F AMNT PAIN NOTED NONE PRSNT: CPT | Performed by: NURSE PRACTITIONER

## 2024-11-19 PROCEDURE — 3074F SYST BP LT 130 MM HG: CPT | Performed by: NURSE PRACTITIONER

## 2024-11-19 PROCEDURE — 3078F DIAST BP <80 MM HG: CPT | Performed by: NURSE PRACTITIONER

## 2024-11-19 PROCEDURE — 99213 OFFICE O/P EST LOW 20 MIN: CPT | Performed by: NURSE PRACTITIONER

## 2024-11-19 RX ORDER — IBUPROFEN 800 MG/1
800 TABLET, FILM COATED ORAL EVERY 6 HOURS PRN
Qty: 40 TABLET | Refills: 1 | Status: SHIPPED | OUTPATIENT
Start: 2024-11-19

## 2024-11-19 NOTE — PROGRESS NOTES
Subjective   Maya Ribera is a 64 y.o. female. Presents today for No chief complaint on file.      History Of Present Illness Maya Ribera is a 64-year-old female presenting today with left foot pain    History of Present Illness  The patient presents for evaluation of left foot pain. She is accompanied by an adult female.    She experiences intermittent pain in her left foot, particularly when walking. The pain persists throughout the night, even when she is at rest. Despite trying various pain medications, including tramadol, she has not found relief. She has a history of knee replacement and has been diagnosed with osteopenia. She also reports that she has been wearing loose-fitting house slippers for an extended period.    Patient Active Problem List   Diagnosis    Esophageal cancer    Asymptomatic varicose veins of unspecified lower extremity    Eczema    Encounter for screening for malignant neoplasm of cervix    Screening for malignant neoplasm of colon    Arthritis    Esophagitis    Gastric ulcer    Female cystocele    Flat foot(734)    Hiatal hernia    Hyperlipidemia    Hypertension    Hyperthyroidism    Irritable bowel syndrome    Mitral regurgitation    Paroxysmal atrial fibrillation    Severe esophageal dysplasia    Mitral valve prolapse    Encounter for management of implanted device    Dislodged jejunostomy tube    Chronic pain    Aftercare following surgery for neoplasm    Port-A-Cath in place       Social History     Socioeconomic History    Marital status: Significant Other    Number of children: 1    Years of education: High school   Tobacco Use    Smoking status: Every Day     Current packs/day: 1.00     Average packs/day: 1 pack/day for 43.6 years (43.6 ttl pk-yrs)     Types: Cigarettes     Start date: 1981     Last attempt to quit: 4/29/2022     Passive exposure: Current    Smokeless tobacco: Never    Tobacco comments:     2 per day   Vaping Use    Vaping status: Never Used    Substance and Sexual Activity    Alcohol use: Not Currently    Drug use: Never    Sexual activity: Yes     Partners: Female     Birth control/protection: Partner of same sex       Allergies   Allergen Reactions    Codeine Hives     Patient states this is when she was very young.        Current Outpatient Medications on File Prior to Visit   Medication Sig Dispense Refill    calcium carbonate (OS-JEANINE) 600 MG tablet Take 2 tablets by mouth Daily.      Eliquis 5 MG tablet tablet Take 1 tablet by mouth Every 12 (Twelve) Hours. HELD 48 HRS AS DIRECTED      lansoprazole (PREVACID SOLUTAB) 30 MG Tablet Delayed Release Dispersible disintegrating tablet Take 1 tablet by mouth Daily As Needed (acid reflux).      methIMAzole (TAPAZOLE) 10 MG tablet Take 1 tablet by mouth Daily.      metoprolol succinate XL (TOPROL-XL) 50 MG 24 hr tablet Take 1 tablet by mouth Daily.      ondansetron (Zofran) 4 MG tablet Take 1 tablet by mouth Daily As Needed for Nausea or Vomiting. 15 tablet 0    pantoprazole (PROTONIX) 20 MG EC tablet TAKE 1 TABLET BY MOUTH DAILY 90 tablet 1    pravastatin (PRAVACHOL) 20 MG tablet Take 1 tablet by mouth Daily.      rOPINIRole (REQUIP) 1 MG tablet Take 1 tablet by mouth Every Night. Take 1 hour before bedtime. 90 tablet 3    vitamin D3 125 MCG (5000 UT) capsule capsule Take 1 capsule by mouth Daily.       Current Facility-Administered Medications on File Prior to Visit   Medication Dose Route Frequency Provider Last Rate Last Admin    heparin injection 500 Units  500 Units Intravenous PRN Iggy Harrell MD   500 Units at 04/05/23 1452    sodium chloride 0.9 % flush 10 mL  10 mL Intravenous PRN Iggy Harrell MD   10 mL at 04/05/23 1452       Objective   There were no vitals filed for this visit.  There is no height or weight on file to calculate BMI.    Physical Exam    Physical Exam  Constitutional:       Appearance: Normal appearance.   HENT:      Head: Normocephalic and atraumatic.      Nose:  Nose normal.      Mouth/Throat:      Mouth: Mucous membranes are moist.   Eyes:      Pupils: Pupils are equal, round, and reactive to light.   Cardiovascular:      Rate and Rhythm: Normal rate and regular rhythm.      Pulses: Normal pulses.      Heart sounds: Normal heart sounds.   Pulmonary:      Effort: Pulmonary effort is normal.      Breath sounds: Normal breath sounds.   Abdominal:      General: Bowel sounds are normal.   Musculoskeletal:      Cervical back: Normal range of motion.      Comments: Left foot is painful to walk on.  Lateral foot is slightly edematous.   Skin:     General: Skin is warm and dry.      Capillary Refill: Capillary refill takes less than 2 seconds.   Neurological:      General: No focal deficit present.      Mental Status: She is alert.   Psychiatric:         Mood and Affect: Mood normal.       Results         Procedures     Assessment & Plan   There are no diagnoses linked to this encounter.     Assessment & Plan  1. Left foot pain.  A stress fracture is suspected as the cause of her discomfort. An x-ray of the left foot has been ordered at [place]. She has been advised to take ibuprofen four times daily with meals. If the x-ray confirms a stress fracture, a walking boot will be recommended.             BMI is within normal parameters. No other follow-up for BMI required.   Discussed Care Gaps, ordered referrals and encouraged vaccination updates.       - Pt agrees with plan of care and denies further questions/concerns today  - This document is intended for medical expert use only. Persons  reading this document without medical staff guidance may result in misinterpretation and unintended morbidity     Go to the ER for any possible life-threatening symptoms such as chest pain or shortness of air.      Please allow 3-5 business days for recommendations based on new results      I personally spent time with this patient, preparing for the visit, reviewing tests, obtaining and/or  reviewing a separately obtained history, performing a medically appropriate examination and/or evaluation, counseling and educating the patient/family/caregiver, ordering medications,  documenting information in the medical record and indepentently interpreting results.       No follow-ups on file.     Patient or patient representative verbalized consent for the use of Ambient Listening during the visit with  BRANDI Porter for chart documentation. 11/19/2024  11:40 EST       MDM:  Foot painful to ambulate on; patient not wearing supportive shoes.  Questionable stress fracture.  Sent to [place] for imaging since our machine is down for upgrades.  Treating with NSAIDs per ortho recommendations.

## 2024-11-20 ENCOUNTER — TELEPHONE (OUTPATIENT)
Dept: FAMILY MEDICINE CLINIC | Facility: CLINIC | Age: 64
End: 2024-11-20
Payer: COMMERCIAL

## 2024-11-20 NOTE — PROGRESS NOTES
There were no fractures seen in the foot; however you do have degenerative changes in the midfoot (changes of aging - wear and tear).  We can send you to [facility] and see if they want to give you a steroid injection to settle the pain down.  Let me know if that is what you would like to do.

## 2024-11-20 NOTE — TELEPHONE ENCOUNTER
"Caller: Maya Ribera \"COLLEEN\"    Relationship: Self    Best call back number: [phone]     Caller requesting test results: PATIENT    What test was performed: XRAY    When was the test performed: 11/19/24    Where was the test performed: [facility]    Additional notes: PATIENT IS REQUESTING A CALL REGARDING HER XRAY RESULTS    "

## 2024-11-29 NOTE — PROGRESS NOTES
"Chief Complaint  Esophageal cancer    Subjective        Maya Ribera presents to [facility] THORACIC SURGERY  History of Present Illness  Ms. Ribera is a very pleasant 64-year-old lady status post esophagectomy for an esophageal cancer in April 2022.  She is doing very well.  She does have a little bit of dysphagia, but not significant.  She has undergone an EGD with dilation in May.  Objective   Vital Signs:  There were no vitals taken for this visit.  Estimated body mass index is 19.5 kg/m² as calculated from the following:    Height as of 11/19/24: 162.6 cm (64\").    Weight as of 11/19/24: 51.5 kg (113 lb 9.6 oz).    BMI is within normal parameters. No other follow-up for BMI required.      Physical Exam  Vitals and nursing note reviewed.   Constitutional:       Appearance: She is well-developed.   HENT:      Head: Normocephalic and atraumatic.      Nose: Nose normal.   Eyes:      Conjunctiva/sclera: Conjunctivae normal.   Cardiovascular:      Rate and Rhythm: Normal rate.   Pulmonary:      Effort: Pulmonary effort is normal.   Abdominal:      Palpations: Abdomen is soft.   Musculoskeletal:      Cervical back: Neck supple.   Skin:     General: Skin is warm and dry.   Neurological:      Mental Status: She is alert and oriented to person, place, and time.   Psychiatric:         Behavior: Behavior normal.         Thought Content: Thought content normal.         Judgment: Judgment normal.        Result Review :          I have independently reviewed the CT of the chest abdomen pelvis performed on 11/7/2024 and agree with radiology report which demonstrates a thyroid goiter.  No new lung nodules.  No significant mediastinal or hilar lymphadenopathy.  Bilateral apical scarring and emphysema.  Stable tree-in-bud nodules in the right middle and lower lobes.  Small liver cysts.     Assessment and Plan   Diagnoses and all orders for this visit:    1. Malignant neoplasm of esophagus, unspecified " location (Primary)  -     CT abdomen pelvis wo contrast; Future  -     CT Chest Without Contrast; Future    Ms. Ribera is a pleasant 64-year-old lady who presents in follow-up for her ypT0 N0 esophageal adenocarcinoma resected in April 2022.  She is doing well.  She does not need a further dilation at this point.  Her CT of the chest does not demonstrate any evidence of recurrent disease.  She will need continued surveillance with a CT of the chest abdomen pelvis in 6 months.  She will plan to follow-up with our nurse practitioner at that time.         Follow Up   No follow-ups on file.  Patient was given instructions and counseling regarding her condition or for health maintenance advice. Please see specific information pulled into the AVS if appropriate.

## 2024-12-06 DIAGNOSIS — C15.9 MALIGNANT NEOPLASM OF ESOPHAGUS, UNSPECIFIED LOCATION: ICD-10-CM

## 2024-12-09 ENCOUNTER — LAB (OUTPATIENT)
Dept: LAB | Facility: HOSPITAL | Age: 64
End: 2024-12-09
Payer: MEDICARE

## 2024-12-09 ENCOUNTER — OFFICE VISIT (OUTPATIENT)
Dept: ONCOLOGY | Facility: CLINIC | Age: 64
End: 2024-12-09
Payer: MEDICARE

## 2024-12-09 VITALS
TEMPERATURE: 97.8 F | BODY MASS INDEX: 19.62 KG/M2 | OXYGEN SATURATION: 99 % | HEIGHT: 64 IN | SYSTOLIC BLOOD PRESSURE: 116 MMHG | HEART RATE: 71 BPM | WEIGHT: 114.9 LBS | RESPIRATION RATE: 16 BRPM | DIASTOLIC BLOOD PRESSURE: 68 MMHG

## 2024-12-09 DIAGNOSIS — C15.9 MALIGNANT NEOPLASM OF ESOPHAGUS, UNSPECIFIED LOCATION: Primary | ICD-10-CM

## 2024-12-09 LAB
ALBUMIN SERPL-MCNC: 4.1 G/DL (ref 3.5–5.2)
ALBUMIN/GLOB SERPL: 1.4 G/DL
ALP SERPL-CCNC: 92 U/L (ref 39–117)
ALT SERPL W P-5'-P-CCNC: <5 U/L (ref 1–33)
ANION GAP SERPL CALCULATED.3IONS-SCNC: 10.9 MMOL/L (ref 5–15)
AST SERPL-CCNC: 18 U/L (ref 1–32)
BASOPHILS # BLD AUTO: 0.05 10*3/MM3 (ref 0–0.2)
BASOPHILS NFR BLD AUTO: 0.7 % (ref 0–1.5)
BILIRUB SERPL-MCNC: 0.3 MG/DL (ref 0–1.2)
BUN SERPL-MCNC: 10 MG/DL (ref 8–23)
BUN/CREAT SERPL: 11.9 (ref 7–25)
CALCIUM SPEC-SCNC: 9.8 MG/DL (ref 8.6–10.5)
CHLORIDE SERPL-SCNC: 104 MMOL/L (ref 98–107)
CO2 SERPL-SCNC: 26.1 MMOL/L (ref 22–29)
CREAT SERPL-MCNC: 0.84 MG/DL (ref 0.57–1)
DEPRECATED RDW RBC AUTO: 50.4 FL (ref 37–54)
EGFRCR SERPLBLD CKD-EPI 2021: 77.7 ML/MIN/1.73
EOSINOPHIL # BLD AUTO: 0.18 10*3/MM3 (ref 0–0.4)
EOSINOPHIL NFR BLD AUTO: 2.6 % (ref 0.3–6.2)
ERYTHROCYTE [DISTWIDTH] IN BLOOD BY AUTOMATED COUNT: 15.2 % (ref 12.3–15.4)
GLOBULIN UR ELPH-MCNC: 3 GM/DL
GLUCOSE SERPL-MCNC: 109 MG/DL (ref 65–99)
HCT VFR BLD AUTO: 42.9 % (ref 34–46.6)
HGB BLD-MCNC: 13.7 G/DL (ref 12–15.9)
IMM GRANULOCYTES # BLD AUTO: 0.02 10*3/MM3 (ref 0–0.05)
IMM GRANULOCYTES NFR BLD AUTO: 0.3 % (ref 0–0.5)
LYMPHOCYTES # BLD AUTO: 1.66 10*3/MM3 (ref 0.7–3.1)
LYMPHOCYTES NFR BLD AUTO: 24.4 % (ref 19.6–45.3)
MCH RBC QN AUTO: 28.7 PG (ref 26.6–33)
MCHC RBC AUTO-ENTMCNC: 31.9 G/DL (ref 31.5–35.7)
MCV RBC AUTO: 89.9 FL (ref 79–97)
MONOCYTES # BLD AUTO: 0.64 10*3/MM3 (ref 0.1–0.9)
MONOCYTES NFR BLD AUTO: 9.4 % (ref 5–12)
NEUTROPHILS NFR BLD AUTO: 4.25 10*3/MM3 (ref 1.7–7)
NEUTROPHILS NFR BLD AUTO: 62.6 % (ref 42.7–76)
NRBC BLD AUTO-RTO: 0 /100 WBC (ref 0–0.2)
PLATELET # BLD AUTO: 245 10*3/MM3 (ref 140–450)
PMV BLD AUTO: 8.6 FL (ref 6–12)
POTASSIUM SERPL-SCNC: 5.3 MMOL/L (ref 3.5–5.2)
PROT SERPL-MCNC: 7.1 G/DL (ref 6–8.5)
RBC # BLD AUTO: 4.77 10*6/MM3 (ref 3.77–5.28)
SODIUM SERPL-SCNC: 141 MMOL/L (ref 136–145)
WBC NRBC COR # BLD AUTO: 6.8 10*3/MM3 (ref 3.4–10.8)

## 2024-12-09 PROCEDURE — 99214 OFFICE O/P EST MOD 30 MIN: CPT | Performed by: INTERNAL MEDICINE

## 2024-12-09 PROCEDURE — 3074F SYST BP LT 130 MM HG: CPT | Performed by: INTERNAL MEDICINE

## 2024-12-09 PROCEDURE — 85025 COMPLETE CBC W/AUTO DIFF WBC: CPT

## 2024-12-09 PROCEDURE — 80053 COMPREHEN METABOLIC PANEL: CPT

## 2024-12-09 PROCEDURE — 1126F AMNT PAIN NOTED NONE PRSNT: CPT | Performed by: INTERNAL MEDICINE

## 2024-12-09 PROCEDURE — 36415 COLL VENOUS BLD VENIPUNCTURE: CPT

## 2024-12-09 PROCEDURE — 3078F DIAST BP <80 MM HG: CPT | Performed by: INTERNAL MEDICINE

## 2024-12-09 RX ORDER — PANTOPRAZOLE SODIUM 40 MG/1
40 TABLET, DELAYED RELEASE ORAL DAILY
Qty: 30 TABLET | Refills: 3 | Status: SHIPPED | OUTPATIENT
Start: 2024-12-09

## 2024-12-09 NOTE — PROGRESS NOTES
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      History of Present Illness   This is a 64 y.o. female with recent diagnosis of distal esophageal cancer, initiating concurrent chemoradiation with carboplatin/Taxol on 1/5/2022.  Unfortunately, the patient has had frequent delays due to pancytopenia, weight loss, malnutrition and COVID-19.  She is completed only 3 weeks of chemotherapy.  She is due today for her fourth dose of carboplatin Taxol.  She has 5 radiation treatments remaining.    Her case was presented at the multidisciplinary clinic this morning with plans to complete the last week of concurrent chemoradiation followed by PET scan.  It is unclear at this time if the patient would qualify for surgery given her struggles and malnutrition.  She does have a J-tube for which her partner utilizes for nutrition.  She is still able to swallow pills by mouth.  Her weight is stable over the past few weeks.    She has been struggling with her blood pressure.  She is on metoprolol originally 50 mg twice daily for her atrial fibrillation.  She has cut this down to once daily.  She does report she has been holding her Eliquis since her platelets dropped and has not yet resumed.  Her and her partner expressed concerns regarding the timeline of PET scan and possible surgery.  They are very eager to proceed with PET scan.  We did discuss today the possibility for false positives with esophagitis symptoms.  We will plan PET scan 4 weeks after the completion of radiation which is the soonest this should be  obtained.    A PET/CT was obtained 3/17/2022 demonstrating significant decrease in FDG avid thickened mid to distal esophagus, decrease in activity in gastrohepatic and left hilar nodes no longer demonstrating that above blood pool, new subcentimeter nodule right apex thought to be an endobronchial filling defect it was indeterminant with a new area of tree-in-bud nodularity medial aspect left upper lobe thought to be reactive.    The patient is seen with her partner 3/23/2022.  She recently had nausea and vomiting for several days thought to be?  Food poisoning which, fortunately, is now resolving.  We have reviewed her scans and she could well be a candidate for surgery which we will asked Dr. Finch to review her for next available.    The patient is next reviewed 4/20/2022.  She is anxious for surgery 4/29/2022 but ready to proceed.    The patient was admitted 4/29 - 5/9/2022 undergoing bronchoscopy with right thoracoscopy, partial decortication, robot-assisted total esophagectomy, jejunostomy tube placement and intercostal nerve block.  She had a satisfactory postoperative course and was able to be discharged 5/9/2022 though was briefly readmitted requiring replacement of her J-tube at bedside.    Pathology demonstrated rare focal metaplasia at the GE junction consistent with Cotto's esophagus, focal submucosal scar, chronic esophagitis, no evidence of dysplasia or malignancy identified by staining, focal active chronic gastritis, 13 lymph nodes negative, paraesophageal soft tissue margins negative, level 7 lymph node negative x3, final esophageal margin negative, final gastric margin negative-patient's final pathologic staging could be considered ypT0, N0.     The patient is seen with significant other 5/25/2022 and reviewed her findings recognizing that she could benefit substantially from immunotherapy  considering nivolumab every 4 weeks x1 year as adjuvant therapy.  This is discussed extensively as to  potential side effect profile and potential benefit.  The patient was seen by thoracic surgery 6/1/2022 and felt to be making good progress.      We made plans for her to undergo chemo education for the possible use of nivolumab but this was held into the patient was to return and she is next in 6/29/2022.        We discussed availability of information Checkmate 577 trial in patients who had residual pathologic disease at the time of surgery to nivolumab with median disease-free survival nearly twice as long 22 months versus 11 months across follow-up routine regardless of PD-L1 overexpression.     As result we reviewed the records available including biopsies done at more than 1 location with PD-L1 never obtained.     At some point this may be an issue with the patient does have recurrence.     She is seen back 6/29/2022 indicating that she does not wish to take immunotherapy at this point and we have discussed the above rationale.    It was elected to follow the patient and she is reviewed 8/24/2022.  She has gained approximately 4 pounds, CBC normalized except for mild leukocytosis of 14,560.  She is feeling reasonably well with her partner echoing her continued improvement.  She has a follow-up CT series scheduled in September, follow-up thereafter with thoracic surgery.    The patient is next assessed 11/30/2022 having undergone outpatient CT at another location which, fortunately, revealed no evidence of disease.  She is doing fairly well though slow to gain weight and requiring multiple small meals per day with occasional abdominal pain and reflux discomfort.  We have had a lengthy discussion about her eating habits and expected side effects after her particular surgery.  She is also been very concerned about continuing anticoagulation with an assessment for of her cardiac rhythm by long-term monitor anticipated initially.  She is urged to complete this and possibly come off anticoagulation.    The patient  is next seen 11/2/2023.[place] imaging available includes CT abdomen pelvis compared to April with findings of previous esophagectomy, mild enlargement infrarenal abdominal aorta measuring 2.3 cm, pessary present, uterus removed no evidence of metastatic disease.  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  The patient was assessed in June by thoracic surgery with no additional recommendations as well.    Now, with the above findings, the patient's port could be removed.    The patient is next assessed 12/9/2024 with CT of chest, abdomen, pelvis performed 11/7/2024.  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  Patient continues with reasonably good performance status of difficulty sleeping.      ONCOLOGY HISTORY    The patient is a 63-year-old female who had been seen in multidisciplinary thoracic oncology conference with complaints of painful swallowing and dysphagia over the previous several months with 10 to 15 pound weight loss.  This led to EGD and colonoscopy with normal colonoscopy but EGD demonstrated tumor at the GE junction with biopsies consistent with severe dysplasia including carcinoma in situ.  The patient states that she saw a number of physicians and attempting to have a diagnosis completed.  CT of chest demonstrated thickening of the mid esophagus with no lymphadenopathy and CT PET demonstrated uptake in the esophagus gastropathic lymph nodes it is felt that she likely had early stage carcinoma of the esophagus and that we could present to record with surgical resection.   Endoscopic ultrasound, however, was requested and we could not have this done any sooner than GI physicians available at [facility].     This was performed 12/7/2021 revealing a 5 cm esophageal mass present in the distal third esophagus without extension into the GE junction into the cardia, the mass extended into into the muscularis propria into the adventitia not penetrating through the adventitia with a single 1 cm gastrohepatic/celiac axis lymph node and FNB x1 performed-?  T2 N1 M0 distal esophageal cancer-clinical stage III, pathologic stage IIIa  In contacting the [facility] pathology department the results of this FNB are not yet available and will be by 12/9/2021.      The patient is seen with her sister and son all indicating that she has had difficulty with swallowing since late summer likely July 2021 and has been attempting to have a diagnosis made since that time.  They are all somewhat frustrated about the length of time to obtain an answer for this problem and we have spent considerable time discussing her findings thus far and how we might proceed to treat what appears to be a contained malignancy.  This has, additionally, been discussed with Dr. Stef cruz who feels that she is not immediately and operative candidate and should proceed with chemo radiation therapy initially-neoadjuvant treatment before consideration for subsequent surgery.  This has been discussed with the patient, her sister and her son again in detail 12/8/2021.  We went on to initiate pain control with liquid hydrocodone, referred the patient to general surgery and oncology nutrition.    The patient was seen by  who proceeded 12/15/2021 to PowerPort placement and open jejunostomy.  She was seen during her hospital stay by oncology nutrition required hospitalization up to including 12/19/2021.    The patient required hospitalization to 12/20/2021 and after discharge along with her partner's health was  "able to gradually improve her caloric intake and was seen back 12/27 by Dr. Hollis who felt she had improved enough to initiate chemotherapy.  The patient was seen by radiation therapy 12/21 with plans to proceed with 40 CT simulation and planning-IMRT/IGR T-4140 cGy in 23 fractions with pleased to be considered.    The patient is seen back with her partner Cristy 12/29/2021 now able to \"manage\" and we have discussed extensively concurrent chemotherapy radiation therapy using Carboplatin/Taxol weekly.    Currently the patient is scheduled to begin radiation therapy 1/3/2021, undergoing teaching 1/4/2022 and will begin concurrent chemotherapy 1/5/2022    The patient had difficulty tolerating this treatment but was eventually able to complete it though modified for toxicity-1/3/2022-2/18/2022 4680 with 26 total treatments, carboplatinum/Taxol weekly 1/5, 1/12, 1/19 and delayed until 2/10/2022.    A PET/CT was obtained 3/17/2022 demonstrating significant decrease in FDG avid thickened mid to distal esophagus, decrease in activity in gastrohepatic and left hilar nodes no longer demonstrating that above blood pool, new subcentimeter nodule right apex thought to be an endobronchial filling defect it was indeterminant with a new area of tree-in-bud nodularity medial aspect left upper lobe thought to be reactive.    Patient had been presented at thoracic conference and upon completion of therapy and repeat PET/CT was to be reevaluated for surgery.  Patient seen in office 3/23/2022 referred back to thoracic surgery.            The patient was briefly admitted 5//2022 with dislodged J-tube.  G-tube was placed and the patient was able to be discharged.     The patient's final pathologic staging could be considered ypT0, N0.     The patient is seen with significant other 5/25/2022 and reviewed her findings recognizing that she could benefit substantially from immunotherapy  considering nivolumab every 4 weeks x1 year " as adjuvant therapy.  This is discussed extensively as to potential side effect profile and potential benefit.    We discussed availability of information Checkmate 577 trial in patients who had residual pathologic disease at the time of surgery to nivolumab with median disease-free survival nearly twice as long 22 months versus 11 months across follow-up routine regardless of PD-L1 overexpression.     As result we reviewed the records available including biopsies done at more than 1 location with PD-L1 never obtained.     At some point this may be an issue with the patient does have recurrence.     She is seen back 6/29/2022 indicating that she does not wish to take immunotherapy at this point and we have discussed the above rationale.    Repeat scan scheduled September 2022.  The patient is seen 11/30/2022 stable clinically.  Repeat scans done outside location failed to show any evidence of recurrent disease and the patient is now followed at 6-month intervals.    The patient is next seen 5/18/2023 having undergone recent imaging at [facility] chest abdomen pelvis.  The studies demonstrate no metastatic disease, emphysematous changes bilaterally, acute small airways disease in the right middle lobe, no additional adenopathy, mild aneurysmal dilatation of thoracic aorta, thyromegaly and postoperative changes.  Her performance status remains reasonably good though her weight has not changed substantially.    Follow-up testing 10/26/2023 with CT chest, abdomen, pelvis demonstrates no evidence of recurrent disease.  The patient was to be seen by thoracic surgery, subsequent removal of her PowerPort anticipated.    The patient is next assessed 12/9/2024 with CT of chest, abdomen, pelvis performed 11/7/2024.  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  Patient continues with reasonably good performance status of difficulty sleeping.    Past Medical History:   Diagnosis Date    Boil, breast 12/13/2021    HEALING UNDER LEFT BREAST     Dysphagia     Esophageal cancer     Esophagitis 09/21/2021    Gastric ulcer     GERD (gastroesophageal reflux disease)     Hyperlipidemia     Hypertension     Hyperthyroidism     Irritable bowel     Mitral valve prolapse     FOLLOWED BY      Osteoporosis     PAF (paroxysmal atrial fibrillation)     Presence of pessary         Past Surgical History:   Procedure Laterality Date    CHOLECYSTECTOMY      COLONOSCOPY  08/27/2021    [facility]    ENDOSCOPY N/A 10/13/2022    Procedure: ESOPHAGOGASTRODUODENOSCOPY WITH  SAVARY DILATATION (12.8;14;15;16) WITH FLUOROSCOPY;  Surgeon: Hayder Finch III, MD;  Location: [facility] ENDOSCOPY;  Service: Gastroenterology;  Laterality: N/A;  FOLLOW UP S/P ESOPHAGECTOMY    ENDOSCOPY N/A 05/19/2023    Procedure: ESOPHAGOGASTRODUODENOSCOPY WITH SAVORY DILITATION 12/12.8,14,15,16MM;  Surgeon: Reilly Patriica MD;  Location: [facility] ENDOSCOPY;  Service: Gastroenterology;  Laterality: N/A;  PRE- DYSPHAGIA  POST-SAME    ENDOSCOPY N/A 5/31/2024    Procedure: ESOPHAGOGASTRODUODENOSCOPY WITH BALLOON DILATATION 15mm-16.5mm-18mm;  Surgeon: Reilly Patricia MD;  Location: [facility] ENDOSCOPY;  Service: Gastroenterology;  Laterality: N/A;  PRE: HX OF ESOPHAGEAL CANCER  POST: ESOPHAGEAL AND PYLORUS NARROWING    ESOPHAGOGASTRECTOMY Right 04/29/2022    Procedure: BRONCHOSCOPY, RIGHT THORACOSCOPY WITH ChangeTipINCI ROBOT WITH TOTAL ESOPHAGECTOMY, INTERCOSTAL NERVE BLOCK, JEJUNOSTOMY TUBE REPLACEMENT;  Surgeon: Hayder Finch III, MD;  Location: [facility] OR;  Service: Robotics - DaVinci;  Laterality: Right;    EXTERNAL EAR SURGERY  Left     JEJUNOSTOMY N/A 12/15/2021    Procedure: open JEJUNOSTOMY tube placement;  Surgeon: Bhanu Hollis MD;  Location: [facility] OR;  Service: General;  Laterality: N/A;    KNEE SURGERY Left     REPLACEMENT TOTAL KNEE Left     UPPER ENDOSCOPIC ULTRASOUND W/ FNA N/A 12/07/2021    Procedure: Esophagogastroduodenoscopy with endoscopic ultrasound  WITH STAGING AND FINE NEEDLE BIOPSY;  Surgeon: Amrit Green MD;  Location: [facility] ENDOSCOPY;  Service: Gastroenterology;  Laterality: N/A;  post op: inlet patch, esophageal mass, T2    VENOUS ACCESS DEVICE (PORT) INSERTION Left 12/15/2021    Procedure: INSERTION OF PORTACATH;  Surgeon: Bhanu Hollis MD;  Location: [facility] OR;  Service: General;  Laterality: Left;    VENOUS ACCESS DEVICE (PORT) REMOVAL Left 11/17/2023    Procedure: REMOVAL VENOUS ACCESS DEVICE;  Surgeon: Reilly Patricia MD;  Location: [facility] OR;  Service: Thoracic;  Laterality: Left;        Current Outpatient Medications on File Prior to Visit   Medication Sig Dispense Refill    calcium carbonate (OS-JEANINE) 600 MG tablet Take 2 tablets by mouth Daily.      Eliquis 5 MG tablet tablet Take 1 tablet by mouth Every 12 (Twelve) Hours. HELD 48 HRS AS DIRECTED      ibuprofen (ADVIL,MOTRIN) 800 MG tablet Take 1 tablet by mouth Every 6 (Six) Hours As Needed for Moderate Pain. 40 tablet 1    lansoprazole (PREVACID SOLUTAB) 30 MG Tablet Delayed Release Dispersible disintegrating tablet Take 1 tablet by mouth Daily As Needed (acid reflux).      metoprolol succinate XL (TOPROL-XL) 50 MG 24 hr tablet Take 1 tablet by mouth Daily.      ondansetron (Zofran) 4 MG tablet Take 1 tablet by mouth Daily As Needed for Nausea or Vomiting. 15 tablet 0    pravastatin (PRAVACHOL) 20 MG tablet Take 1 tablet by mouth Daily.      vitamin D3 125 MCG (5000 UT) capsule capsule Take 1 capsule by mouth Daily.      [DISCONTINUED] pantoprazole (PROTONIX) 20 MG EC tablet TAKE 1 TABLET BY MOUTH DAILY 90 tablet 1    methIMAzole  (TAPAZOLE) 10 MG tablet Take 1 tablet by mouth Daily.       Current Facility-Administered Medications on File Prior to Visit   Medication Dose Route Frequency Provider Last Rate Last Admin    heparin injection 500 Units  500 Units Intravenous PRN Iggy Harrell MD   500 Units at 04/05/23 1452    sodium chloride 0.9 % flush 10 mL  10 mL Intravenous PRN Iggy Harrell MD   10 mL at 04/05/23 1452        ALLERGIES:    Allergies   Allergen Reactions    Codeine Hives     Patient states this is when she was very young.         Social History     Socioeconomic History    Marital status: Significant Other    Number of children: 1    Years of education: High school   Tobacco Use    Smoking status: Every Day     Current packs/day: 1.00     Average packs/day: 1 pack/day for 43.7 years (43.7 ttl pk-yrs)     Types: Cigarettes     Start date: 1981     Last attempt to quit: 4/29/2022     Passive exposure: Current    Smokeless tobacco: Never    Tobacco comments:     2 per day   Vaping Use    Vaping status: Never Used   Substance and Sexual Activity    Alcohol use: Not Currently    Drug use: Never    Sexual activity: Yes     Partners: Female     Birth control/protection: Partner of same sex        Family History   Problem Relation Age of Onset    Breast cancer Mother     Diabetes Mother     Bipolar disorder Father     Diabetes Father     Hypertension Father     Breast cancer Sister     COPD Sister     Diabetes Sister     Hypertension Sister     Kidney cancer Sister     Hypertension Sister     Diabetes Sister     Alcohol abuse Brother     Asthma Brother     Bipolar disorder Brother     Diabetes Brother     Seizures Brother     Breast cancer Maternal Grandmother     Diabetes Maternal Grandmother     Diabetes Maternal Grandfather     Diabetes Paternal Grandmother     Diabetes Paternal Grandfather     Hypertension Son     Malig Hyperthermia Neg Hx           Objective     Vitals:    12/09/24 1309   BP: 116/68   Pulse: 71   Resp:  "16   Temp: 97.8 °F (36.6 °C)   TempSrc: Oral   SpO2: 99%   Weight: 52.1 kg (114 lb 14.4 oz)   Height: 162.6 cm (64.02\")   PainSc: 0-No pain           12/9/2024     1:02 PM   Current Status   ECOG score 0       Physical Exam  Constitutional:       Appearance: Normal appearance. She is underweight.   HENT:      Head: Normocephalic and atraumatic.      Nose: Nose normal.   Eyes:      Extraocular Movements: Extraocular movements intact.      Conjunctiva/sclera: Conjunctivae normal.      Pupils: Pupils are equal, round, and reactive to light.   Cardiovascular:      Rate and Rhythm: Normal rate and regular rhythm.      Pulses: Normal pulses.      Heart sounds: Normal heart sounds.   Pulmonary:      Effort: Pulmonary effort is normal.      Breath sounds: Normal breath sounds.   Abdominal:      General: Bowel sounds are normal.      Palpations: Abdomen is soft. There is no mass.      Comments: Status post esophagectomy, wounds well-healed, J-tube removed   Musculoskeletal:         General: Normal range of motion.      Cervical back: Normal range of motion.   Skin:     General: Skin is warm and dry.      Findings: No rash.   Neurological:      General: No focal deficit present.      Mental Status: She is alert and oriented to person, place, and time.   Psychiatric:         Mood and Affect: Mood normal.     I have reexamined the patient and the results are consistent with the previously documented exam. Iggy Harrell MD     RECENT LABS:  Results from last 7 days   Lab Units 12/09/24  1256   WBC 10*3/mm3 6.80   NEUTROS ABS 10*3/mm3 4.25   HEMOGLOBIN g/dL 13.7   HEMATOCRIT % 42.9   PLATELETS 10*3/mm3 245       Results from last 7 days   Lab Units 12/09/24  1256   SODIUM mmol/L 141   POTASSIUM mmol/L 5.3*   CHLORIDE mmol/L 104   CO2 mmol/L 26.1   BUN mg/dL 10   CREATININE mg/dL 0.84   CALCIUM mg/dL 9.8   ALBUMIN g/dL 4.1   BILIRUBIN mg/dL 0.3   ALK PHOS U/L 92   ALT (SGPT) U/L <5   AST (SGOT) U/L 18   GLUCOSE mg/dL 109* "              Assessment & Plan          63-year-old female with a history of hyperthyroidism, previous gastric ulcer, GE reflux, atrial fibrillation, hyperlipidemia, hypertension irritable bowel syndrome,          1.  T2 N1 M0 distal esophageal cancer-clinical stage III, pathologic stage IIIa  60-pack-year history of smoking with dysphagia and odynophagia developing over several months associated 10 to 15 pound weight loss  EGD and colonoscopy in June 2021 demonstrating severe dysplasia including carcinoma in situ.  here has been a number of physicians involved in the patient's case and several months before she was ultimately seen by Dr. Finch at [facility].   CT scan of the chest demonstrating thickening of the mid esophagus with no lymphadenopathy and PET/CT demonstrated uptake in the mid to distal esophagus with SUV intensely elevated at 16 with a few nonenlarged left hilar and gastrohepatic lymph nodes with mild increased FDG activity, few scattered subcentimeter pulmonary nodules bilaterally indeterminate.  EUS was felt necessary and ultimately obtained at [facility] performed 12/7/2021 revealing a 5 cm esophageal mass present in the distal third esophagus without extension into the GE junction into the cardia, the mass extended into into the muscularis propria into the adventitia not penetrating through the adventitia with a single 1 cm gastrohepatic/celiac axis lymph node and FNB x1 performed- FNA negative for malignancy  Discussed with Dr. Finch directly who feels that she is not immediately and operative candidate and should proceed with chemo radiation therapy initially-neoadjuvant treatment before consideration for subsequent surgery. Discussed extensively concurrent chemotherapy radiation therapy using Carboplatin/Taxol weekly.   who proceeded 12/15/2021 to PowerPort placement and open jejunostomy.  She was seen during her hospital stay by oncology nutrition required  hospitalization up to including 12/19/2021.  seen by radiation therapy 12/21 with plans to proceed with 40 CT simulation and planning-IMRT/IGR T-4140 cGy in 23 fractions with pleased to be considered.   Radiation therapy initiated 1/3/2022  1/5/2021 cycle 1 weekly carboplatin/Taxol.    Patient status post week 2 on 1/12/2022  Patient treated 1/19/2022-week 3  1/26/2022, hold treatment today due to platelet counts 42,000 and feeling unwell.  Covid positive at that time  Chemotherapy held 2/2/2022 due to ongoing normal cytopenia, platelets of 40,000  Presentation at multidisciplinary clinic 2/10/2022 with plans to complete fourth dose of carboplatin Taxol followed by PET scan.  It is unclear at this time if the patient will be a surgical candidate pending her response and nutritional state post treatment  We will proceed today with her fourth dose of carboplatin Taxol  Upon completion of chemotherapy patient reevaluated with PET/CT 3/17/2022 with significant response for esophageal lesion, gastrohepatic left hilar lymphadenopathy no longer FDG avid, indeterminate subcentimeter nodule right apex thought to be endobronchial filling defect, tree-in-bud nodularity felt to be reactive.  Patient seen in office 3/23/2022 referred back to thoracic surgery.  Discussed potential adjuvant nivolumab therapy post surgery.  Patient seen 4/20/2022 with surgery planned 4/29/2022.  The patient was admitted 4/29 - 5/9/2022 undergoing bronchoscopy with right thoracoscopy, partial decortication, robot-assisted total esophagectomy, jejunostomy tube placement and intercostal nerve block.  She had a satisfactory postoperative course and was able to be discharged 5/9/2022 though was briefly readmitted requiring replacement of her J-tube at bedside.  Pathology demonstrated rare focal metaplasia at the GE junction consistent with Cotto's esophagus, focal submucosal scar, chronic esophagitis, no evidence of dysplasia or malignancy identified  by staining, focal active chronic gastritis, 13 lymph nodes negative, paraesophageal soft tissue margins negative, level 7 lymph node negative x3, final esophageal margin negative, final gastric margin negative-patient's final pathologic staging could be considered ypT0, N0.  The patient is seen with significant other 5/25/2022 and reviewed her findings recognizing that she could benefit substantially from immunotherapy  considering nivolumab every 4 weeks x1 year as adjuvant therapy.  This is discussed extensively as to potential side effect profile and potential benefit.  Patient seen 6/29/2022,Checkmate 577 trial in patients who had residual pathologic disease at the time of surgery to nivolumab with median disease-free survival nearly twice as long 22 months versus 11 months across follow-up routine regardless of PD-L1 overexpression.   As result we reviewed the records available including biopsies done at more than 1 location with PD-L1 never obtained.   At some point this may be an issue with the patient does have recurrence.   She is seen back 6/29/2022 indicating that she does not wish to take immunotherapy at this point and we have discussed the above rationale.Consider guardant 360 analysis  Patient assessed 8/24/22 clinically improved, repeat CT scan scheduled September 2022  Repeat scans obtained and reviewed by thoracic surgery 10/26/2022 negative for recurrence  Follow-up assessment every 6 months.  This includes repeat CT scan in 4/26/2023 which show postoperative changes only.  The patient is seen 5/18/2023 and we decided to have her assessed again in 6 months.  The patient is next seen 11/2/2023.[place] imaging available includes CT abdomen pelvis compared to April with findings of previous esophagectomy, mild enlargement infrarenal abdominal aorta measuring 2.3 cm, pessary present, uterus removed no evidence of metastatic disease.  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  The patient was assessed in June by thoracic surgery with no additional recommendations as well.  Now, with the above findings, the patient's port could be removed  The patient is next assessed 12/9/2024 with CT of chest, abdomen, pelvis performed 11/7/2024.  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  Patient continues with reasonably good performance status of difficulty sleeping.        2.  Nutrition  Use of Jejunostomy tube. Continuous feed currently 12 hours a night, slowly working up to recommended 16 hours/day at 90 cc/h  She is still trying to drink at least 1 boost a day and able to take pills by mouth  She continues to take pills by mouth.  She does utilize her J-tube for 16 hours a day.  Her weight is stable over the past 3 weeks.  Patient seen 3/23/2022 with her J-tube no longer anchored properly, thoracic surgery request for evaluation  J-tube postoperative required replacement 5//22  Discussed additional nutrition, Enterade sample, prednisone 10 mg p.o. daily E scribed to pharmacy as trial appetite stimulant.  Assessed 8/24/2022 with associated leukocytosis, 1 additional month of prednisone, then discontinued  Patient seen 11/3/2022 continuing small meals routinely, medications assessed, Tums discontinued  Patient assessed 11/2/2023 with periodic discomfort with meals, thoracic assessment anticipated 11/6/2023    3.  Atrial fibrillation  Patient continues Eliquis 5 mg twice daily  Toprol 25 mg XL once daily.  Patient offered assessment by cardiology for atrial fibrillation and she is urged to  complete this possibly coming off Eliquis.    Plan:    *Repeat scans scheduled, follow-up MD assessment 5/12/2025, CBC

## 2024-12-26 ENCOUNTER — TELEPHONE (OUTPATIENT)
Dept: FAMILY MEDICINE CLINIC | Facility: CLINIC | Age: 64
End: 2024-12-26
Payer: COMMERCIAL

## 2024-12-26 DIAGNOSIS — B97.89 VIRAL SINUSITIS: Primary | ICD-10-CM

## 2024-12-26 DIAGNOSIS — J06.9 VIRAL URI WITH COUGH: ICD-10-CM

## 2024-12-26 DIAGNOSIS — J32.9 VIRAL SINUSITIS: Primary | ICD-10-CM

## 2024-12-26 NOTE — TELEPHONE ENCOUNTER
"     Caller: Maya Ribera \"COLLEEN\"    Relationship: Self    Best call back number: [phone]     What medication are you requesting:      What are your current symptoms: HEADACHE, FATIGUE, COUGH ,     How long have you been experiencing symptoms: 24 HOURS     Have you had these symptoms before:    [x] Yes  [x] No    Have you been treated for these symptoms before:   [] Yes  [x] No    If a prescription is needed, what is your preferred pharmacy and phone number: [facility] [id number] - [address] - [phone] [facility] [phone]      Additional notes:       "

## 2024-12-26 NOTE — TELEPHONE ENCOUNTER
I called pt to inform her that we do not have any sick appointments available until After Hours tomorrow even,  I said if she was that sick and need ed to be seen sooner I would advise her to go to Urgent care or ED  She stated that she is too sick stand up and  come in. I advised her if she was that sick she definitely would need to go to ED.  She said you mean I can't get something called in  with me being this sick   I shared with her that Bushra was only 1/2 day today and would not see her message until Morning  I again advised ED.  She said well Thank you for you ........   Help and hung up.

## 2024-12-27 RX ORDER — GUAIFENESIN 600 MG/1
1200 TABLET, EXTENDED RELEASE ORAL 2 TIMES DAILY
Qty: 40 TABLET | Refills: 0 | Status: SHIPPED | OUTPATIENT
Start: 2024-12-27

## 2024-12-27 RX ORDER — SENNOSIDES 8.6 MG
650 CAPSULE ORAL EVERY 8 HOURS PRN
Qty: 120 TABLET | Refills: 0 | Status: SHIPPED | OUTPATIENT
Start: 2024-12-27

## 2024-12-27 RX ORDER — BENZONATATE 100 MG/1
100 CAPSULE ORAL
Qty: 10 CAPSULE | Refills: 0 | Status: SHIPPED | OUTPATIENT
Start: 2024-12-27

## 2025-01-22 ENCOUNTER — OFFICE VISIT (OUTPATIENT)
Dept: FAMILY MEDICINE CLINIC | Facility: CLINIC | Age: 65
End: 2025-01-22
Payer: MEDICARE

## 2025-01-22 VITALS
SYSTOLIC BLOOD PRESSURE: 112 MMHG | HEIGHT: 64 IN | OXYGEN SATURATION: 95 % | DIASTOLIC BLOOD PRESSURE: 70 MMHG | WEIGHT: 105.2 LBS | TEMPERATURE: 98.1 F | BODY MASS INDEX: 17.96 KG/M2 | HEART RATE: 76 BPM

## 2025-01-22 DIAGNOSIS — R31.9 URINARY TRACT INFECTION WITH HEMATURIA, SITE UNSPECIFIED: ICD-10-CM

## 2025-01-22 DIAGNOSIS — R10.9 SIDE PAIN: Primary | ICD-10-CM

## 2025-01-22 DIAGNOSIS — R05.3 COUGH, PERSISTENT: ICD-10-CM

## 2025-01-22 DIAGNOSIS — N39.0 URINARY TRACT INFECTION WITH HEMATURIA, SITE UNSPECIFIED: ICD-10-CM

## 2025-01-22 LAB
BILIRUB BLD-MCNC: ABNORMAL MG/DL
CLARITY, POC: CLEAR
COLOR UR: ABNORMAL
EXPIRATION DATE: ABNORMAL
GLUCOSE UR STRIP-MCNC: NEGATIVE MG/DL
KETONES UR QL: NEGATIVE
LEUKOCYTE EST, POC: ABNORMAL
Lab: ABNORMAL
NITRITE UR-MCNC: NEGATIVE MG/ML
PH UR: 6 [PH] (ref 5–8)
PROT UR STRIP-MCNC: NEGATIVE MG/DL
RBC # UR STRIP: ABNORMAL /UL
SP GR UR: 1.02 (ref 1–1.03)
UROBILINOGEN UR QL: NORMAL

## 2025-01-22 PROCEDURE — 81003 URINALYSIS AUTO W/O SCOPE: CPT | Performed by: NURSE PRACTITIONER

## 2025-01-22 PROCEDURE — 1126F AMNT PAIN NOTED NONE PRSNT: CPT | Performed by: NURSE PRACTITIONER

## 2025-01-22 PROCEDURE — 3078F DIAST BP <80 MM HG: CPT | Performed by: NURSE PRACTITIONER

## 2025-01-22 PROCEDURE — 99213 OFFICE O/P EST LOW 20 MIN: CPT | Performed by: NURSE PRACTITIONER

## 2025-01-22 PROCEDURE — 3074F SYST BP LT 130 MM HG: CPT | Performed by: NURSE PRACTITIONER

## 2025-01-22 RX ORDER — SULFAMETHOXAZOLE AND TRIMETHOPRIM 800; 160 MG/1; MG/1
1 TABLET ORAL 2 TIMES DAILY
Qty: 14 TABLET | Refills: 0 | Status: SHIPPED | OUTPATIENT
Start: 2025-01-22

## 2025-01-22 RX ORDER — PREDNISONE 10 MG/1
TABLET ORAL
Qty: 28 TABLET | Refills: 0 | Status: SHIPPED | OUTPATIENT
Start: 2025-01-22 | End: 2025-02-02

## 2025-01-22 NOTE — PROGRESS NOTES
Subjective   Maya Ribera is a 64 y.o. female. Presents today for   Chief Complaint   Patient presents with    Pain       Left side    Radiates a little to back       History Of Present Illness Maya Ribera is a 64-year-old female presenting for left side pain, blood and leukocytes in urine today, left flank pain.    Care gaps: Patient ill today, will postpone until AWV in February  COVID  Pneumonia  Tdap  Zoster  Influenza    Mammogram  Lipid panel    History of Present Illness  The patient presents for evaluation of a persistent cough and kidney infection.    She has been experiencing fatigue, which she initially attributed to muscular strain. She developed a cough on Cheryl day, accompanied by mucus production. There was no associated fever, but she did experience congestion. She reports feeling generally well, with the exception of her cough. She has abstained from sweets since the onset of her symptoms. She was prescribed steroids, which alleviated her symptoms.    She has blood in her urine and leukocytes, indicating a kidney infection. Her blood pressure was fluctuating during this time.    ALLERGIES  The patient has an intolerance to CIPRO.    MEDICATIONS  Current: Bactrim DS, prednisone    Patient Active Problem List   Diagnosis    Esophageal cancer    Asymptomatic varicose veins of unspecified lower extremity    Eczema    Encounter for screening for malignant neoplasm of cervix    Screening for malignant neoplasm of colon    Arthritis    Esophagitis    Gastric ulcer    Female cystocele    Flat foot(734)    Hiatal hernia    Hyperlipidemia    Hypertension    Hyperthyroidism    Irritable bowel syndrome    Mitral regurgitation    Paroxysmal atrial fibrillation    Severe esophageal dysplasia    Mitral valve prolapse    Encounter for management of implanted device    Dislodged jejunostomy tube    Chronic pain    Aftercare following surgery for neoplasm    Port-A-Cath in place       Social  History     Socioeconomic History    Marital status: Significant Other    Number of children: 1    Years of education: High school   Tobacco Use    Smoking status: Every Day     Current packs/day: 1.00     Average packs/day: 1 pack/day for 43.8 years (43.8 ttl pk-yrs)     Types: Cigarettes     Start date: 1981     Last attempt to quit: 4/29/2022     Passive exposure: Current    Smokeless tobacco: Never    Tobacco comments:     2 per day   Vaping Use    Vaping status: Never Used   Substance and Sexual Activity    Alcohol use: Not Currently    Drug use: Never    Sexual activity: Yes     Partners: Female     Birth control/protection: Partner of same sex       Allergies   Allergen Reactions    Codeine Hives     Patient states this is when she was very young.        Current Outpatient Medications on File Prior to Visit   Medication Sig Dispense Refill    acetaminophen (Tylenol 8 Hour) 650 MG 8 hr tablet Take 1 tablet by mouth Every 8 (Eight) Hours As Needed for Mild Pain. 120 tablet 0    benzonatate (Tessalon Perles) 100 MG capsule Take 1 capsule by mouth every night at bedtime. 10 capsule 0    calcium carbonate (OS-JEANINE) 600 MG tablet Take 2 tablets by mouth Daily.      Eliquis 5 MG tablet tablet Take 1 tablet by mouth Every 12 (Twelve) Hours. HELD 48 HRS AS DIRECTED      guaiFENesin (Mucinex) 600 MG 12 hr tablet Take 2 tablets by mouth 2 (Two) Times a Day. 40 tablet 0    ibuprofen (ADVIL,MOTRIN) 800 MG tablet Take 1 tablet by mouth Every 6 (Six) Hours As Needed for Moderate Pain. 40 tablet 1    lansoprazole (PREVACID SOLUTAB) 30 MG Tablet Delayed Release Dispersible disintegrating tablet Take 1 tablet by mouth Daily As Needed (acid reflux).      metoprolol succinate XL (TOPROL-XL) 50 MG 24 hr tablet Take 1 tablet by mouth Daily.      ondansetron (Zofran) 4 MG tablet Take 1 tablet by mouth Daily As Needed for Nausea or Vomiting. 15 tablet 0    pantoprazole (PROTONIX) 40 MG EC tablet Take 1 tablet by mouth Daily. 30  "tablet 3    pravastatin (PRAVACHOL) 20 MG tablet Take 1 tablet by mouth Daily.      vitamin D3 125 MCG (5000 UT) capsule capsule Take 1 capsule by mouth Daily.      methIMAzole (TAPAZOLE) 10 MG tablet Take 1 tablet by mouth Daily.       Current Facility-Administered Medications on File Prior to Visit   Medication Dose Route Frequency Provider Last Rate Last Admin    heparin injection 500 Units  500 Units Intravenous PRIggy Henry MD   500 Units at 04/05/23 1452    sodium chloride 0.9 % flush 10 mL  10 mL Intravenous PRN Iggy Harrell MD   10 mL at 04/05/23 1452       Objective   Vitals:    01/22/25 1008   BP: 112/70   Pulse: 76   Temp: 98.1 °F (36.7 °C)   SpO2: 95%   Weight: 47.7 kg (105 lb 3.2 oz)   Height: 162.6 cm (64.02\")     Body mass index is 18.05 kg/m².    Physical Exam      Results  Laboratory Studies  Blood in urine and presence of leukocytes.       Procedures     Assessment & Plan   Diagnoses and all orders for this visit:    1. Side pain (Primary)  -     POCT urinalysis dipstick, automated    2. Cough, persistent  -     predniSONE (DELTASONE) 10 MG tablet; Take 4 tablets by mouth Daily for 4 days, THEN 2 tablets Daily for 4 days, THEN 1 tablet Daily for 4 days.  Dispense: 28 tablet; Refill: 0    3. Urinary tract infection with hematuria, site unspecified  -     sulfamethoxazole-trimethoprim (Bactrim DS) 800-160 MG per tablet; Take 1 tablet by mouth 2 (Two) Times a Day.  Dispense: 14 tablet; Refill: 0  -     Urine Culture - Urine, Urine, Clean Catch         Assessment & Plan  1. Cough.  A tapering dose of prednisone will be initiated to manage the cough. The potential side effects of prednisone, including increased appetite and energy levels, were discussed.    2. Kidney infection.  Bactrim DS will be prescribed to treat the kidney infection. A urine culture will be conducted to identify the specific pathogen causing the infection. If the current antibiotic proves ineffective, a different " one will be prescribed.    Follow-up  The patient will follow up in February for her 6-month checkup.           BMI is within normal parameters. No other follow-up for BMI required.    Discussed Care Gaps, ordered referrals and encouraged vaccination updates.       - Pt agrees with plan of care and denies further questions/concerns today  - This document is intended for medical expert use only. Persons  reading this document without medical staff guidance may result in misinterpretation and unintended morbidity     Go to the ER for any possible life-threatening symptoms such as chest pain or shortness of air.      Please allow 3-5 business days for recommendations based on new results      I personally spent time with this patient, preparing for the visit, reviewing tests, obtaining and/or reviewing a separately obtained history, performing a medically appropriate examination and/or evaluation, counseling and educating the patient/family/caregiver, ordering medications,  documenting information in the medical record and indepentently interpreting results.          Return in about 19 days (around 2/10/2025) for Annual physical.     Patient or patient representative verbalized consent for the use of Ambient Listening during the visit with  BRANDI Porter for chart documentation. 1/22/2025  10:56 EST

## 2025-01-23 LAB
BACTERIA UR CULT: NORMAL
BACTERIA UR CULT: NORMAL

## 2025-01-29 ENCOUNTER — PRIOR AUTHORIZATION (OUTPATIENT)
Dept: ONCOLOGY | Facility: HOSPITAL | Age: 65
End: 2025-01-29
Payer: COMMERCIAL

## 2025-01-29 NOTE — TELEPHONE ENCOUNTER
Outcome  Additional Information Required  The patient currently has access to the requested medication and a Prior Authorization is not needed for the patient/medication.  Drug  Pantoprazole Sodium 40MG dr tablets  ePA cloud logo  Form  [facility] Electronic PA Form (2017 [employer]

## 2025-02-07 NOTE — PROGRESS NOTES
Subjective   Maya Ribera is a 64 y.o. female. Presents today for   Chief Complaint   Patient presents with    Hyperlipidemia     Due Lipids       History Of Present Illness Maya Ribera is a 64-year-old female presenting for 6-month follow-up    Hyperlipidemia - labs today  hypothyroidism - labs today  PAF - not in Afib today on exam  history of esophageal cancer    Care gaps:  BMI [id number]  Mammogram - she will schedule at next visit  lipid panel-today    COVID declined  Tdap     declined  Zoster   declined  Influenza  declined      History of Present Illness  The patient is a 64-year-old female who presents for evaluation of dental pain, thyroid management, and health maintenance.    She reports experiencing intermittent dental discomfort, which she attributes to the deterioration of a 30-year-old crown. She suspects a potential infection and has been managing the condition with saltwater gargles. She is seeking an antibiotic prescription as a precautionary measure until she can consult with her dentist in mid-March 2025.    She is currently on methimazole for thyroid management and does not require any medication refills at this time.    She has been on a regimen of prescription vitamin D for over 5 months due to a diagnosis of osteoporosis. However, she was advised by a nurse that she may be exceeding the recommended dosage. She plans to discontinue the vitamin D supplement and will resume it based on future blood work results.    She is due for a mammogram and is requesting a lipid panel and vitamin D level check.    MEDICATIONS  methimazole    Patient Active Problem List   Diagnosis    Esophageal cancer    Asymptomatic varicose veins of unspecified lower extremity    Eczema    Encounter for screening for malignant neoplasm of cervix    Screening for malignant neoplasm of colon    Arthritis    Esophagitis    Gastric ulcer    Female cystocele    Flat foot(734)    Hiatal hernia     Hyperlipidemia    Hypertension    Hyperthyroidism    Irritable bowel syndrome    Mitral regurgitation    Paroxysmal atrial fibrillation    Severe esophageal dysplasia    Mitral valve prolapse    Encounter for management of implanted device    Dislodged jejunostomy tube    Chronic pain    Aftercare following surgery for neoplasm    Port-A-Cath in place       Social History     Socioeconomic History    Marital status: Significant Other    Number of children: 1    Years of education: High school   Tobacco Use    Smoking status: Every Day     Current packs/day: 1.00     Average packs/day: 1 pack/day for 43.9 years (43.9 ttl pk-yrs)     Types: Cigarettes     Start date: 1981     Last attempt to quit: 4/29/2022     Passive exposure: Current    Smokeless tobacco: Never    Tobacco comments:     2 per day   Vaping Use    Vaping status: Never Used   Substance and Sexual Activity    Alcohol use: Not Currently    Drug use: Never    Sexual activity: Yes     Partners: Female     Birth control/protection: Partner of same sex       Allergies   Allergen Reactions    Codeine Hives     Patient states this is when she was very young.        Current Outpatient Medications on File Prior to Visit   Medication Sig Dispense Refill    acetaminophen (Tylenol 8 Hour) 650 MG 8 hr tablet Take 1 tablet by mouth Every 8 (Eight) Hours As Needed for Mild Pain. 120 tablet 0    Eliquis 5 MG tablet tablet Take 1 tablet by mouth Every 12 (Twelve) Hours. HELD 48 HRS AS DIRECTED      ibuprofen (ADVIL,MOTRIN) 800 MG tablet Take 1 tablet by mouth Every 6 (Six) Hours As Needed for Moderate Pain. 40 tablet 1    methIMAzole (TAPAZOLE) 10 MG tablet Take 1 tablet by mouth Daily.      metoprolol succinate XL (TOPROL-XL) 50 MG 24 hr tablet Take 1 tablet by mouth Daily.      ondansetron (Zofran) 4 MG tablet Take 1 tablet by mouth Daily As Needed for Nausea or Vomiting. 15 tablet 0    pantoprazole (PROTONIX) 20 MG EC tablet       pravastatin (PRAVACHOL) 20 MG tablet  "Take 1 tablet by mouth Daily.      [DISCONTINUED] benzonatate (Tessalon Perles) 100 MG capsule Take 1 capsule by mouth every night at bedtime. 10 capsule 0    [DISCONTINUED] calcium carbonate (OS-JEANINE) 600 MG tablet Take 2 tablets by mouth Daily.      [DISCONTINUED] guaiFENesin (Mucinex) 600 MG 12 hr tablet Take 2 tablets by mouth 2 (Two) Times a Day. 40 tablet 0    [DISCONTINUED] lansoprazole (PREVACID SOLUTAB) 30 MG Tablet Delayed Release Dispersible disintegrating tablet Take 1 tablet by mouth Daily As Needed (acid reflux).      [DISCONTINUED] methIMAzole (TAPAZOLE) 10 MG tablet Take 1 tablet by mouth Daily.      [DISCONTINUED] pantoprazole (PROTONIX) 40 MG EC tablet Take 1 tablet by mouth Daily. 30 tablet 3    [DISCONTINUED] sulfamethoxazole-trimethoprim (Bactrim DS) 800-160 MG per tablet Take 1 tablet by mouth 2 (Two) Times a Day. 14 tablet 0    [DISCONTINUED] vitamin D3 125 MCG (5000 UT) capsule capsule Take 1 capsule by mouth Daily.       Current Facility-Administered Medications on File Prior to Visit   Medication Dose Route Frequency Provider Last Rate Last Admin    heparin injection 500 Units  500 Units Intravenous PRN Iggy Harrell MD   500 Units at 04/05/23 1452    sodium chloride 0.9 % flush 10 mL  10 mL Intravenous PRN Iggy Harrell MD   10 mL at 04/05/23 1452       Objective   Vitals:    02/10/25 0813   BP: 124/70   Pulse: 66   SpO2: 100%   Weight: 48.7 kg (107 lb 6.4 oz)   Height: 162.6 cm (64.02\")     Body mass index is 18.42 kg/m².    Physical Exam  Lungs were auscultated.    Vital Signs  Blood pressure is normal.    Results      Lab Results   Component Value Date    TSH 0.02 (L) 01/13/2025     T4, FREE  Order: [id number]  Status: Final result       Next appt: Today at 08:00 AM in Family Medicine (Bushra Gold, BRANDI)    Specimen Information: Blood, Venous Line          Component  Ref Range & Units 4 wk ago  (1/13/25) 3 mo ago  (10/14/24) 2 yr ago  (6/29/22) 2 yr ago  (5/1/22)   Free " T4  0.8 - 1.8 ng/dL 1.8 0.8 1.77 High  R 1.36             Procedures     Assessment & Plan   Diagnoses and all orders for this visit:    1. Tooth abscess (Primary)  -     penicillin v potassium (VEETID) 500 MG tablet; Take 1 tablet by mouth 3 times a day.  Dispense: 21 tablet; Refill: 0    2. Lipid screening  -     Lipid panel; Future    3. Vitamin D deficiency  -     Vitamin D,25-Hydroxy         Assessment & Plan  1. Dental pain.  She reports pain and deterioration of a 30-year-old dental crown, suspecting a possible infection. A prescription for penicillin has been issued to manage any potential infection until she can see her dentist in mid-March 2025.    2. Thyroid management.  She is currently on methimazole for thyroid management. No changes to her medication regimen are needed at this time.    3. Osteoporosis.  She has been taking prescription vitamin D for over 5 months. A vitamin D level test will be conducted today to assess if she needs to continue the supplementation.    4. Health maintenance.  A lipid panel will be conducted today. She has declined the scheduling of a mammogram at this time due to concerns about radiation exposure but may consider it during the next visit.    Follow-up  The patient will follow up in 6 months.           BMI is below normal parameters (malnutrition). Recommendations: none (medical contraindication)    Discussed Care Gaps, ordered referrals and encouraged vaccination updates.       - Pt agrees with plan of care and denies further questions/concerns today  - This document is intended for medical expert use only. Persons  reading this document without medical staff guidance may result in misinterpretation and unintended morbidity     Go to the ER for any possible life-threatening symptoms such as chest pain or shortness of air.      Please allow 3-5 business days for recommendations based on new results      I personally spent time with this patient, preparing for the visit,  reviewing tests, obtaining and/or reviewing a separately obtained history, performing a medically appropriate examination and/or evaluation, counseling and educating the patient/family/caregiver, ordering medications,  documenting information in the medical record and indepentently interpreting results.          Return in about 6 months (around 8/10/2025).     Patient or patient representative verbalized consent for the use of Ambient Listening during the visit with  BRANDI Porter for chart documentation. 2/10/2025  10:18 EST

## 2025-02-10 ENCOUNTER — OFFICE VISIT (OUTPATIENT)
Dept: FAMILY MEDICINE CLINIC | Facility: CLINIC | Age: 65
End: 2025-02-10
Payer: MEDICARE

## 2025-02-10 VITALS
HEIGHT: 64 IN | BODY MASS INDEX: 18.34 KG/M2 | WEIGHT: 107.4 LBS | OXYGEN SATURATION: 100 % | SYSTOLIC BLOOD PRESSURE: 124 MMHG | HEART RATE: 66 BPM | DIASTOLIC BLOOD PRESSURE: 70 MMHG

## 2025-02-10 DIAGNOSIS — Z13.220 LIPID SCREENING: ICD-10-CM

## 2025-02-10 DIAGNOSIS — E55.9 VITAMIN D DEFICIENCY: ICD-10-CM

## 2025-02-10 DIAGNOSIS — K04.7 TOOTH ABSCESS: Primary | ICD-10-CM

## 2025-02-10 LAB — 25(OH)D3+25(OH)D2 SERPL-MCNC: 37.2 NG/ML (ref 30–100)

## 2025-02-10 PROCEDURE — 1159F MED LIST DOCD IN RCRD: CPT | Performed by: NURSE PRACTITIONER

## 2025-02-10 PROCEDURE — 99213 OFFICE O/P EST LOW 20 MIN: CPT | Performed by: NURSE PRACTITIONER

## 2025-02-10 PROCEDURE — 3078F DIAST BP <80 MM HG: CPT | Performed by: NURSE PRACTITIONER

## 2025-02-10 PROCEDURE — 1126F AMNT PAIN NOTED NONE PRSNT: CPT | Performed by: NURSE PRACTITIONER

## 2025-02-10 PROCEDURE — 1160F RVW MEDS BY RX/DR IN RCRD: CPT | Performed by: NURSE PRACTITIONER

## 2025-02-10 PROCEDURE — 3074F SYST BP LT 130 MM HG: CPT | Performed by: NURSE PRACTITIONER

## 2025-02-10 RX ORDER — METHIMAZOLE 10 MG/1
10 TABLET ORAL DAILY
COMMUNITY
Start: 2025-01-23 | End: 2025-07-22

## 2025-02-10 RX ORDER — PANTOPRAZOLE SODIUM 20 MG/1
TABLET, DELAYED RELEASE ORAL
COMMUNITY
Start: 2025-01-28

## 2025-02-10 RX ORDER — PENICILLIN V POTASSIUM 500 MG/1
500 TABLET, FILM COATED ORAL 3 TIMES DAILY
Qty: 21 TABLET | Refills: 0 | Status: SHIPPED | OUTPATIENT
Start: 2025-02-10

## 2025-04-28 ENCOUNTER — HOSPITAL ENCOUNTER (OUTPATIENT)
Dept: CT IMAGING | Facility: HOSPITAL | Age: 65
Discharge: HOME | End: 2025-04-28
Admitting: THORACIC SURGERY (CARDIOTHORACIC VASCULAR SURGERY)
Payer: MEDICARE

## 2025-04-28 DIAGNOSIS — C15.9 MALIGNANT NEOPLASM OF ESOPHAGUS, UNSPECIFIED LOCATION: ICD-10-CM

## 2025-04-28 PROCEDURE — 74176 CT ABD & PELVIS W/O CONTRAST: CPT

## 2025-04-28 PROCEDURE — 71250 CT THORAX DX C-: CPT

## 2025-04-29 RX ORDER — PANTOPRAZOLE SODIUM 20 MG/1
20 TABLET, DELAYED RELEASE ORAL DAILY
Qty: 90 TABLET | Refills: 1 | Status: SHIPPED | OUTPATIENT
Start: 2025-04-29

## 2025-05-11 NOTE — PROGRESS NOTES
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      History of Present Illness   This is a 64 y.o. female with recent diagnosis of distal esophageal cancer, initiating concurrent chemoradiation with carboplatin/Taxol on 1/5/2022.  Unfortunately, the patient has had frequent delays due to pancytopenia, weight loss, malnutrition and COVID-19.  She is completed only 3 weeks of chemotherapy.  She is due today for her fourth dose of carboplatin Taxol.  She has 5 radiation treatments remaining.    Her case was presented at the multidisciplinary clinic this morning with plans to complete the last week of concurrent chemoradiation followed by PET scan.  It is unclear at this time if the patient would qualify for surgery given her struggles and malnutrition.  She does have a J-tube for which her partner utilizes for nutrition.  She is still able to swallow pills by mouth.  Her weight is stable over the past few weeks.    She has been struggling with her blood pressure.  She is on metoprolol originally 50 mg twice daily for her atrial fibrillation.  She has cut this down to once daily.  She does report she has been holding her Eliquis since her platelets dropped and has not yet resumed.  Her and her partner expressed concerns regarding the timeline of PET scan and possible surgery.  They are very eager to proceed with PET scan.  We did discuss today the possibility for false positives with esophagitis symptoms.  We will plan PET scan 4 weeks after the completion of radiation which is the soonest this should be  obtained.    A PET/CT was obtained 3/17/2022 demonstrating significant decrease in FDG avid thickened mid to distal esophagus, decrease in activity in gastrohepatic and left hilar nodes no longer demonstrating that above blood pool, new subcentimeter nodule right apex thought to be an endobronchial filling defect it was indeterminant with a new area of tree-in-bud nodularity medial aspect left upper lobe thought to be reactive.    The patient is seen with her partner 3/23/2022.  She recently had nausea and vomiting for several days thought to be?  Food poisoning which, fortunately, is now resolving.  We have reviewed her scans and she could well be a candidate for surgery which we will asked Dr. Finch to review her for next available.    The patient is next reviewed 4/20/2022.  She is anxious for surgery 4/29/2022 but ready to proceed.    The patient was admitted 4/29 - 5/9/2022 undergoing bronchoscopy with right thoracoscopy, partial decortication, robot-assisted total esophagectomy, jejunostomy tube placement and intercostal nerve block.  She had a satisfactory postoperative course and was able to be discharged 5/9/2022 though was briefly readmitted requiring replacement of her J-tube at bedside.    Pathology demonstrated rare focal metaplasia at the GE junction consistent with Cotto's esophagus, focal submucosal scar, chronic esophagitis, no evidence of dysplasia or malignancy identified by staining, focal active chronic gastritis, 13 lymph nodes negative, paraesophageal soft tissue margins negative, level 7 lymph node negative x3, final esophageal margin negative, final gastric margin negative-patient's final pathologic staging could be considered ypT0, N0.     The patient is seen with significant other 5/25/2022 and reviewed her findings recognizing that she could benefit substantially from immunotherapy  considering nivolumab every 4 weeks x1 year as adjuvant therapy.  This is discussed extensively as to  potential side effect profile and potential benefit.  The patient was seen by thoracic surgery 6/1/2022 and felt to be making good progress.      We made plans for her to undergo chemo education for the possible use of nivolumab but this was held into the patient was to return and she is next in 6/29/2022.        We discussed availability of information Checkmate 577 trial in patients who had residual pathologic disease at the time of surgery to nivolumab with median disease-free survival nearly twice as long 22 months versus 11 months across follow-up routine regardless of PD-L1 overexpression.     As result we reviewed the records available including biopsies done at more than 1 location with PD-L1 never obtained.     At some point this may be an issue with the patient does have recurrence.     She is seen back 6/29/2022 indicating that she does not wish to take immunotherapy at this point and we have discussed the above rationale.    It was elected to follow the patient and she is reviewed 8/24/2022.  She has gained approximately 4 pounds, CBC normalized except for mild leukocytosis of 14,560.  She is feeling reasonably well with her partner echoing her continued improvement.  She has a follow-up CT series scheduled in September, follow-up thereafter with thoracic surgery.    The patient is next assessed 11/30/2022 having undergone outpatient CT at another location which, fortunately, revealed no evidence of disease.  She is doing fairly well though slow to gain weight and requiring multiple small meals per day with occasional abdominal pain and reflux discomfort.  We have had a lengthy discussion about her eating habits and expected side effects after her particular surgery.  She is also been very concerned about continuing anticoagulation with an assessment for of her cardiac rhythm by long-term monitor anticipated initially.  She is urged to complete this and possibly come off anticoagulation.    The patient  is next seen 11/2/2023.[employer] imaging available includes CT abdomen pelvis compared to April with findings of previous esophagectomy, mild enlargement infrarenal abdominal aorta measuring 2.3 cm, pessary present, uterus removed no evidence of metastatic disease.  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  The patient was assessed in June by thoracic surgery with no additional recommendations as well.    Now, with the above findings, the patient's port could be removed.    The patient is next assessed 12/9/2024 with CT of chest, abdomen, pelvis performed 11/7/2024.  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  Patient continues with reasonably good performance status of difficulty sleeping.     The patient is seen next 4/20/2025 with CT chest, abdomen, pelvis demonstrating no significant findings except for a micronodule in the right lower lobe  Patient reviewed next 5/12/2025 and some groundglass density in the left lower lobe.  These are both likely inflammatory in nature.  There is no evidence of recurrent  malignant disease.  The patient is feeling well though does have periodic reflux symptoms.  She had decreased her Protonix which we then plan to increase to usual dosing and urged the raising of the head of the bed at least 30 degrees consistently.      ONCOLOGY HISTORY    The patient is a 64-year-old female who had been seen in multidisciplinary thoracic oncology conference with complaints of painful swallowing and dysphagia over the previous several months with 10 to 15 pound weight  loss.  This led to EGD and colonoscopy with normal colonoscopy but EGD demonstrated tumor at the GE junction with biopsies consistent with severe dysplasia including carcinoma in situ.  The patient states that she saw a number of physicians and attempting to have a diagnosis completed.  CT of chest demonstrated thickening of the mid esophagus with no lymphadenopathy and CT PET demonstrated uptake in the esophagus gastropathic lymph nodes it is felt that she likely had early stage carcinoma of the esophagus and that we could present to record with surgical resection.  Endoscopic ultrasound, however, was requested and we could not have this done any sooner than GI physicians available at [facility].     This was performed 12/7/2021 revealing a 5 cm esophageal mass present in the distal third esophagus without extension into the GE junction into the cardia, the mass extended into into the muscularis propria into the adventitia not penetrating through the adventitia with a single 1 cm gastrohepatic/celiac axis lymph node and FNB x1 performed-?  T2 N1 M0 distal esophageal cancer-clinical stage III, pathologic stage IIIa  In contacting the [facility] pathology department the results of this FNB are not yet available and will be by 12/9/2021.      The patient is seen with her sister and son all indicating that she has had difficulty with swallowing since late summer likely July 2021 and has been attempting to have a diagnosis made since that time.  They are all somewhat frustrated about the length of time to obtain an answer for this problem and we have spent considerable time discussing her findings thus far and how we might proceed to treat what appears to be a contained malignancy.  This has, additionally, been discussed with Dr. Stef cruz who feels that she is not immediately and operative candidate and should proceed with chemo radiation therapy initially-neoadjuvant treatment before consideration  "for subsequent surgery.  This has been discussed with the patient, her sister and her son again in detail 12/8/2021.  We went on to initiate pain control with liquid hydrocodone, referred the patient to general surgery and oncology nutrition.    The patient was seen by  who proceeded 12/15/2021 to PowerPort placement and open jejunostomy.  She was seen during her hospital stay by oncology nutrition required hospitalization up to including 12/19/2021.    The patient required hospitalization to 12/20/2021 and after discharge along with her partner's health was able to gradually improve her caloric intake and was seen back 12/27 by Dr. Hollis who felt she had improved enough to initiate chemotherapy.  The patient was seen by radiation therapy 12/21 with plans to proceed with 40 CT simulation and planning-IMRT/IGR T-4140 cGy in 23 fractions with pleased to be considered.    The patient is seen back with her partner Cristy 12/29/2021 now able to \"manage\" and we have discussed extensively concurrent chemotherapy radiation therapy using Carboplatin/Taxol weekly.    Currently the patient is scheduled to begin radiation therapy 1/3/2021, undergoing teaching 1/4/2022 and will begin concurrent chemotherapy 1/5/2022    The patient had difficulty tolerating this treatment but was eventually able to complete it though modified for toxicity-1/3/2022-2/18/2022 4680 with 26 total treatments, carboplatinum/Taxol weekly 1/5, 1/12, 1/19 and delayed until 2/10/2022.    A PET/CT was obtained 3/17/2022 demonstrating significant decrease in FDG avid thickened mid to distal esophagus, decrease in activity in gastrohepatic and left hilar nodes no longer demonstrating that above blood pool, new subcentimeter nodule right apex thought to be an endobronchial filling defect it was indeterminant with a new area of tree-in-bud nodularity medial aspect left upper lobe thought to be reactive.    Patient had been presented at thoracic " conference and upon completion of therapy and repeat PET/CT was to be reevaluated for surgery.  Patient seen in office 3/23/2022 referred back to thoracic surgery.            The patient was briefly admitted 5//2022 with dislodged J-tube.  G-tube was placed and the patient was able to be discharged.     The patient's final pathologic staging could be considered ypT0, N0.     The patient is seen with significant other 5/25/2022 and reviewed her findings recognizing that she could benefit substantially from immunotherapy  considering nivolumab every 4 weeks x1 year as adjuvant therapy.  This is discussed extensively as to potential side effect profile and potential benefit.    We discussed availability of information Checkmate 577 trial in patients who had residual pathologic disease at the time of surgery to nivolumab with median disease-free survival nearly twice as long 22 months versus 11 months across follow-up routine regardless of PD-L1 overexpression.     As result we reviewed the records available including biopsies done at more than 1 location with PD-L1 never obtained.     At some point this may be an issue with the patient does have recurrence.     She is seen back 6/29/2022 indicating that she does not wish to take immunotherapy at this point and we have discussed the above rationale.    Repeat scan scheduled September 2022.  The patient is seen 11/30/2022 stable clinically.  Repeat scans done outside location failed to show any evidence of recurrent disease and the patient is now followed at 6-month intervals.    The patient is next seen 5/18/2023 having undergone recent imaging at [facility] chest abdomen pelvis.  The studies demonstrate no metastatic disease, emphysematous changes bilaterally, acute small airways disease in the right middle lobe, no additional adenopathy, mild aneurysmal dilatation of thoracic aorta, thyromegaly and postoperative changes.  Her performance status remains  reasonably good though her weight has not changed substantially.    Follow-up testing 10/26/2023 with CT chest, abdomen, pelvis demonstrates no evidence of recurrent disease.  The patient was to be seen by thoracic surgery, subsequent removal of her PowerPort anticipated.    The patient is next assessed 12/9/2024 with CT of chest, abdomen, pelvis performed 11/7/2024.  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  Patient continues with reasonably good performance status of difficulty sleeping.    The patient is seen next 4/20/2025 with CT chest, abdomen, pelvis demonstrating no significant findings except for a micronodule in the right lower lobe  Patient reviewed next 5/12/2025 and some groundglass density in the left lower lobe.  These are both likely inflammatory in nature.  There is no evidence of recurrent  malignant disease.  The patient is feeling well though does have periodic reflux symptoms.  She had decreased her Protonix which we then plan to increase to usual dosing and urged the raising of the head of the bed at least 30 degrees consistently.        Past Medical History:   Diagnosis Date    Boil, breast 12/13/2021    HEALING UNDER LEFT BREAST     Dysphagia     Esophageal cancer     Esophagitis 09/21/2021    Gastric ulcer     GERD (gastroesophageal reflux disease)     Hyperlipidemia     Hypertension     Hyperthyroidism     Irritable bowel     Mitral valve prolapse     FOLLOWED BY      Osteoporosis     PAF (paroxysmal atrial fibrillation)     Presence of pessary         Past Surgical History:   Procedure Laterality Date    CHOLECYSTECTOMY       COLONOSCOPY  08/27/2021    [facility]    ENDOSCOPY N/A 10/13/2022    Procedure: ESOPHAGOGASTRODUODENOSCOPY WITH  SAVARY DILATATION (12.8;14;15;16) WITH FLUOROSCOPY;  Surgeon: Hayder Finch III, MD;  Location: [facility] ENDOSCOPY;  Service: Gastroenterology;  Laterality: N/A;  FOLLOW UP S/P ESOPHAGECTOMY    ENDOSCOPY N/A 05/19/2023    Procedure: ESOPHAGOGASTRODUODENOSCOPY WITH SAVORY DILITATION 12/12.8,14,15,16MM;  Surgeon: Reilly Patricia MD;  Location: [facility] ENDOSCOPY;  Service: Gastroenterology;  Laterality: N/A;  PRE- DYSPHAGIA  POST-SAME    ENDOSCOPY N/A 5/31/2024    Procedure: ESOPHAGOGASTRODUODENOSCOPY WITH BALLOON DILATATION 15mm-16.5mm-18mm;  Surgeon: Reilly Patricia MD;  Location: [facility] ENDOSCOPY;  Service: Gastroenterology;  Laterality: N/A;  PRE: HX OF ESOPHAGEAL CANCER  POST: ESOPHAGEAL AND PYLORUS NARROWING    ESOPHAGOGASTRECTOMY Right 04/29/2022    Procedure: BRONCHOSCOPY, RIGHT THORACOSCOPY WITH [employer] DirectINCI ROBOT WITH TOTAL ESOPHAGECTOMY, INTERCOSTAL NERVE BLOCK, JEJUNOSTOMY TUBE REPLACEMENT;  Surgeon: Hayder Finch III, MD;  Location: [facility] OR;  Service: Robotics - DaVinci;  Laterality: Right;    EXTERNAL EAR SURGERY Left     JEJUNOSTOMY N/A 12/15/2021    Procedure: open JEJUNOSTOMY tube placement;  Surgeon: Bhanu Hollis MD;  Location: [facility] OR;  Service: General;  Laterality: N/A;    KNEE SURGERY Left     REPLACEMENT TOTAL KNEE Left     UPPER ENDOSCOPIC ULTRASOUND W/ FNA N/A 12/07/2021    Procedure: Esophagogastroduodenoscopy with endoscopic ultrasound  WITH STAGING AND FINE NEEDLE BIOPSY;  Surgeon: Amrit Green MD;  Location: [facility] ENDOSCOPY;  Service: Gastroenterology;  Laterality: N/A;  post op: inlet patch, esophageal mass, T2    VENOUS ACCESS DEVICE (PORT) INSERTION Left 12/15/2021    Procedure: INSERTION OF PORTACATH;  Surgeon: Bhanu Hollis MD;  Location: [facility] MAIN OR;  Service: General;  Laterality: Left;    VENOUS ACCESS DEVICE (PORT) REMOVAL Left 11/17/2023     Procedure: REMOVAL VENOUS ACCESS DEVICE;  Surgeon: Reilly Particia MD;  Location: [facility] MAIN OR;  Service: Thoracic;  Laterality: Left;        Current Outpatient Medications on File Prior to Visit   Medication Sig Dispense Refill    acetaminophen (Tylenol 8 Hour) 650 MG 8 hr tablet Take 1 tablet by mouth Every 8 (Eight) Hours As Needed for Mild Pain. 120 tablet 0    Eliquis 5 MG tablet tablet Take 1 tablet by mouth Every 12 (Twelve) Hours. HELD 48 HRS AS DIRECTED      ibuprofen (ADVIL,MOTRIN) 800 MG tablet Take 1 tablet by mouth Every 6 (Six) Hours As Needed for Moderate Pain. 40 tablet 1    methIMAzole (TAPAZOLE) 10 MG tablet Take 1 tablet by mouth Daily.      metoprolol succinate XL (TOPROL-XL) 50 MG 24 hr tablet Take 1 tablet by mouth Daily.      ondansetron (Zofran) 4 MG tablet Take 1 tablet by mouth Daily As Needed for Nausea or Vomiting. 15 tablet 0    penicillin v potassium (VEETID) 500 MG tablet Take 1 tablet by mouth 3 times a day. 21 tablet 0    pravastatin (PRAVACHOL) 20 MG tablet Take 1 tablet by mouth Daily.      [DISCONTINUED] pantoprazole (PROTONIX) 20 MG EC tablet TAKE 1 TABLET BY MOUTH DAILY 90 tablet 1    B-12, Methylcobalamin, 1000 MCG sublingual tablet Place  under the tongue.      Vitamin D, Cholecalciferol, (CHOLECALCIFEROL) 10 MCG (400 UNIT) tablet Take 1 tablet by mouth Daily.       Current Facility-Administered Medications on File Prior to Visit   Medication Dose Route Frequency Provider Last Rate Last Admin    heparin injection 500 Units  500 Units Intravenous PRN Iggy Harrell MD   500 Units at 04/05/23 1452    sodium chloride 0.9 % flush 10 mL  10 mL Intravenous PRN Iggy Harrell MD   10 mL at 04/05/23 1452        ALLERGIES:    Allergies   Allergen Reactions    Codeine Hives     Patient states this is when she was very young.         Social History     Socioeconomic History    Marital status: Significant Other    Number of children: 1    Years of education: High school  "  Tobacco Use    Smoking status: Every Day     Current packs/day: 1.00     Average packs/day: 1 pack/day for 44.1 years (44.1 ttl pk-yrs)     Types: Cigarettes     Start date: 1981     Last attempt to quit: 4/29/2022     Passive exposure: Current    Smokeless tobacco: Never    Tobacco comments:     2 per day   Vaping Use    Vaping status: Never Used   Substance and Sexual Activity    Alcohol use: Not Currently    Drug use: Never    Sexual activity: Yes     Partners: Female     Birth control/protection: Partner of same sex        Family History   Problem Relation Age of Onset    Breast cancer Mother     Diabetes Mother     Bipolar disorder Father     Diabetes Father     Hypertension Father     Breast cancer Sister     COPD Sister     Diabetes Sister     Hypertension Sister     Kidney cancer Sister     Hypertension Sister     Diabetes Sister     Alcohol abuse Brother     Asthma Brother     Bipolar disorder Brother     Diabetes Brother     Seizures Brother     Breast cancer Maternal Grandmother     Diabetes Maternal Grandmother     Diabetes Maternal Grandfather     Diabetes Paternal Grandmother     Diabetes Paternal Grandfather     Hypertension Son     Malig Hyperthermia Neg Hx           Objective     Vitals:    05/12/25 1117   BP: 114/72   Pulse: 70   Temp: 98.1 °F (36.7 °C)   TempSrc: Oral   SpO2: 97%   Weight: 48.5 kg (107 lb)   Height: 162.6 cm (64.02\")   PainSc: 0-No pain             5/12/2025    11:17 AM   Current Status   ECOG score 0       Physical Exam  Constitutional:       Appearance: Normal appearance. She is underweight.   HENT:      Head: Normocephalic and atraumatic.      Nose: Nose normal.   Eyes:      Extraocular Movements: Extraocular movements intact.      Conjunctiva/sclera: Conjunctivae normal.      Pupils: Pupils are equal, round, and reactive to light.   Cardiovascular:      Rate and Rhythm: Normal rate and regular rhythm.      Pulses: Normal pulses.      Heart sounds: Normal heart sounds. "   Pulmonary:      Effort: Pulmonary effort is normal.      Breath sounds: Normal breath sounds.   Abdominal:      General: Bowel sounds are normal.      Palpations: Abdomen is soft. There is no mass.      Comments: Status post esophagectomy, wounds well-healed, J-tube removed   Musculoskeletal:         General: Normal range of motion.      Cervical back: Normal range of motion.   Skin:     General: Skin is warm and dry.      Findings: No rash.   Neurological:      General: No focal deficit present.      Mental Status: She is alert and oriented to person, place, and time.   Psychiatric:         Mood and Affect: Mood normal.     I have reexamined the patient and the results are consistent with the previously documented exam. Iggy Harrell MD     RECENT LABS:  Results from last 7 days   Lab Units 05/12/25  1040   WBC 10*3/mm3 8.37   NEUTROS ABS 10*3/mm3 6.05   HEMOGLOBIN g/dL 13.9   HEMATOCRIT % 46.6   PLATELETS 10*3/mm3 281     Assessment & Plan          64-year-old female with a history of hyperthyroidism, previous gastric ulcer, GE reflux, atrial fibrillation, hyperlipidemia, hypertension irritable bowel syndrome,          1.  T2 N1 M0 distal esophageal cancer-clinical stage III, pathologic stage IIIa  60-pack-year history of smoking with dysphagia and odynophagia developing over several months associated 10 to 15 pound weight loss  EGD and colonoscopy in June 2021 demonstrating severe dysplasia including carcinoma in situ.  here has been a number of physicians involved in the patient's case and several months before she was ultimately seen by Dr. Finch at [facility].   CT scan of the chest demonstrating thickening of the mid esophagus with no lymphadenopathy and PET/CT demonstrated uptake in the mid to distal esophagus with SUV intensely elevated at 16 with a few nonenlarged left hilar and gastrohepatic lymph nodes with mild increased FDG activity, few scattered subcentimeter pulmonary nodules  bilaterally indeterminate.  EUS was felt necessary and ultimately obtained at [facility] performed 12/7/2021 revealing a 5 cm esophageal mass present in the distal third esophagus without extension into the GE junction into the cardia, the mass extended into into the muscularis propria into the adventitia not penetrating through the adventitia with a single 1 cm gastrohepatic/celiac axis lymph node and FNB x1 performed- FNA negative for malignancy  Discussed with Dr. Stef cruz who feels that she is not immediately and operative candidate and should proceed with chemo radiation therapy initially-neoadjuvant treatment before consideration for subsequent surgery. Discussed extensively concurrent chemotherapy radiation therapy using Carboplatin/Taxol weekly.   who proceeded 12/15/2021 to PowerPort placement and open jejunostomy.  She was seen during her hospital stay by oncology nutrition required hospitalization up to including 12/19/2021.  seen by radiation therapy 12/21 with plans to proceed with 40 CT simulation and planning-IMRT/IGR T-4140 cGy in 23 fractions with pleased to be considered.   Radiation therapy initiated 1/3/2022  1/5/2021 cycle 1 weekly carboplatin/Taxol.    Patient status post week 2 on 1/12/2022  Patient treated 1/19/2022-week 3  1/26/2022, hold treatment today due to platelet counts 42,000 and feeling unwell.  Covid positive at that time  Chemotherapy held 2/2/2022 due to ongoing normal cytopenia, platelets of 40,000  Presentation at multidisciplinary clinic 2/10/2022 with plans to complete fourth dose of carboplatin Taxol followed by PET scan.  It is unclear at this time if the patient will be a surgical candidate pending her response and nutritional state post treatment  We will proceed today with her fourth dose of carboplatin Taxol  Upon completion of chemotherapy patient reevaluated with PET/CT 3/17/2022 with significant response for esophageal lesion, gastrohepatic  left hilar lymphadenopathy no longer FDG avid, indeterminate subcentimeter nodule right apex thought to be endobronchial filling defect, tree-in-bud nodularity felt to be reactive.  Patient seen in office 3/23/2022 referred back to thoracic surgery.  Discussed potential adjuvant nivolumab therapy post surgery.  Patient seen 4/20/2022 with surgery planned 4/29/2022.  The patient was admitted 4/29 - 5/9/2022 undergoing bronchoscopy with right thoracoscopy, partial decortication, robot-assisted total esophagectomy, jejunostomy tube placement and intercostal nerve block.  She had a satisfactory postoperative course and was able to be discharged 5/9/2022 though was briefly readmitted requiring replacement of her J-tube at bedside.  Pathology demonstrated rare focal metaplasia at the GE junction consistent with Cotto's esophagus, focal submucosal scar, chronic esophagitis, no evidence of dysplasia or malignancy identified by staining, focal active chronic gastritis, 13 lymph nodes negative, paraesophageal soft tissue margins negative, level 7 lymph node negative x3, final esophageal margin negative, final gastric margin negative-patient's final pathologic staging could be considered ypT0, N0.  The patient is seen with significant other 5/25/2022 and reviewed her findings recognizing that she could benefit substantially from immunotherapy  considering nivolumab every 4 weeks x1 year as adjuvant therapy.  This is discussed extensively as to potential side effect profile and potential benefit.  Patient seen 6/29/2022,Checkmate 577 trial in patients who had residual pathologic disease at the time of surgery to nivolumab with median disease-free survival nearly twice as long 22 months versus 11 months across follow-up routine regardless of PD-L1 overexpression.   As result we reviewed the records available including biopsies done at more than 1 location with PD-L1 never obtained.   At some point this may be an issue with the  patient does have recurrence.   She is seen back 6/29/2022 indicating that she does not wish to take immunotherapy at this point and we have discussed the above rationale.Consider guardant 360 analysis  Patient assessed 8/24/22 clinically improved, repeat CT scan scheduled September 2022  Repeat scans obtained and reviewed by thoracic surgery 10/26/2022 negative for recurrence  Follow-up assessment every 6 months.  This includes repeat CT scan in 4/26/2023 which show postoperative changes only.  The patient is seen 5/18/2023 and we decided to have her assessed again in 6 months.  The patient is next seen 11/2/2023.[facility] imaging available includes CT abdomen pelvis compared to April with findings of previous esophagectomy, mild enlargement infrarenal abdominal aorta measuring 2.3 cm, pessary present, uterus removed no evidence of metastatic disease.  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  The patient was assessed in June by thoracic surgery with no additional recommendations as well.  Now, with the above findings, the patient's port could be removed  The patient is next assessed 12/9/2024 with CT of chest, abdomen, pelvis performed 11/7/2024.  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  Patient continues with reasonably good performance status of difficulty sleeping.  The patient is seen next 4/20/2025 with CT chest, abdomen, pelvis demonstrating no significant findings except for a micronodule in the right lower lobe  Patient reviewed next 5/12/2025 and some groundglass density in the  left lower lobe.  These are both likely inflammatory in nature.  There is no evidence of recurrent  malignant disease.  The patient is feeling well though does have periodic reflux symptoms.  She had decreased her Protonix which we then plan to increase to usual dosing and urged the raising of the head of the bed at least 30 degrees consistently.    2.  Nutrition  Use of Jejunostomy tube. Continuous feed currently 12 hours a night, slowly working up to recommended 16 hours/day at 90 cc/h  She is still trying to drink at least 1 boost a day and able to take pills by mouth  She continues to take pills by mouth.  She does utilize her J-tube for 16 hours a day.  Her weight is stable over the past 3 weeks.  Patient seen 3/23/2022 with her J-tube no longer anchored properly, thoracic surgery request for evaluation  J-tube postoperative required replacement 5//22  Discussed additional nutrition, Enterade sample, prednisone 10 mg p.o. daily E scribed to pharmacy as trial appetite stimulant.  Assessed 8/24/2022 with associated leukocytosis, 1 additional month of prednisone, then discontinued  Patient seen 11/3/2022 continuing small meals routinely, medications assessed, Tums discontinued  Patient assessed 11/2/2023 with periodic discomfort with meals, thoracic assessment anticipated 11/6/2023    3.  Atrial fibrillation  Patient continues Eliquis 5 mg twice daily  Toprol 25 mg XL once daily.  Patient offered assessment by cardiology for atrial fibrillation and she is urged to complete this possibly coming off Eliquis.    Plan:  *Patient to be seen by thoracic surgery this afternoon  *Anticipate follow-up scans in 3 months-we will see her at approximate this time as well

## 2025-05-12 ENCOUNTER — OFFICE VISIT (OUTPATIENT)
Dept: SURGERY | Facility: CLINIC | Age: 65
End: 2025-05-12
Payer: MEDICARE

## 2025-05-12 ENCOUNTER — LAB (OUTPATIENT)
Dept: LAB | Facility: HOSPITAL | Age: 65
End: 2025-05-12
Payer: MEDICARE

## 2025-05-12 ENCOUNTER — OFFICE VISIT (OUTPATIENT)
Dept: ONCOLOGY | Facility: CLINIC | Age: 65
End: 2025-05-12
Payer: MEDICARE

## 2025-05-12 VITALS
WEIGHT: 107 LBS | DIASTOLIC BLOOD PRESSURE: 72 MMHG | SYSTOLIC BLOOD PRESSURE: 114 MMHG | HEART RATE: 70 BPM | BODY MASS INDEX: 18.27 KG/M2 | TEMPERATURE: 98.1 F | OXYGEN SATURATION: 97 % | HEIGHT: 64 IN

## 2025-05-12 VITALS
DIASTOLIC BLOOD PRESSURE: 70 MMHG | OXYGEN SATURATION: 99 % | SYSTOLIC BLOOD PRESSURE: 118 MMHG | WEIGHT: 114 LBS | HEIGHT: 64 IN | HEART RATE: 70 BPM | BODY MASS INDEX: 19.46 KG/M2

## 2025-05-12 DIAGNOSIS — Z85.01 HISTORY OF ESOPHAGEAL CANCER: Primary | ICD-10-CM

## 2025-05-12 DIAGNOSIS — C15.9 MALIGNANT NEOPLASM OF ESOPHAGUS, UNSPECIFIED LOCATION: ICD-10-CM

## 2025-05-12 DIAGNOSIS — C15.9 MALIGNANT NEOPLASM OF ESOPHAGUS, UNSPECIFIED LOCATION: Primary | ICD-10-CM

## 2025-05-12 LAB
BASOPHILS # BLD AUTO: 0.05 10*3/MM3 (ref 0–0.2)
BASOPHILS NFR BLD AUTO: 0.6 % (ref 0–1.5)
DEPRECATED RDW RBC AUTO: 54.9 FL (ref 37–54)
EOSINOPHIL # BLD AUTO: 0.11 10*3/MM3 (ref 0–0.4)
EOSINOPHIL NFR BLD AUTO: 1.3 % (ref 0.3–6.2)
ERYTHROCYTE [DISTWIDTH] IN BLOOD BY AUTOMATED COUNT: 16.2 % (ref 12.3–15.4)
HCT VFR BLD AUTO: 46.6 % (ref 34–46.6)
HGB BLD-MCNC: 13.9 G/DL (ref 12–15.9)
IMM GRANULOCYTES # BLD AUTO: 0.03 10*3/MM3 (ref 0–0.05)
IMM GRANULOCYTES NFR BLD AUTO: 0.4 % (ref 0–0.5)
LYMPHOCYTES # BLD AUTO: 1.58 10*3/MM3 (ref 0.7–3.1)
LYMPHOCYTES NFR BLD AUTO: 18.9 % (ref 19.6–45.3)
MCH RBC QN AUTO: 27.6 PG (ref 26.6–33)
MCHC RBC AUTO-ENTMCNC: 29.8 G/DL (ref 31.5–35.7)
MCV RBC AUTO: 92.5 FL (ref 79–97)
MONOCYTES # BLD AUTO: 0.55 10*3/MM3 (ref 0.1–0.9)
MONOCYTES NFR BLD AUTO: 6.6 % (ref 5–12)
NEUTROPHILS NFR BLD AUTO: 6.05 10*3/MM3 (ref 1.7–7)
NEUTROPHILS NFR BLD AUTO: 72.2 % (ref 42.7–76)
NRBC BLD AUTO-RTO: 0 /100 WBC (ref 0–0.2)
PLATELET # BLD AUTO: 281 10*3/MM3 (ref 140–450)
PMV BLD AUTO: 9 FL (ref 6–12)
RBC # BLD AUTO: 5.04 10*6/MM3 (ref 3.77–5.28)
WBC NRBC COR # BLD AUTO: 8.37 10*3/MM3 (ref 3.4–10.8)

## 2025-05-12 PROCEDURE — G2211 COMPLEX E/M VISIT ADD ON: HCPCS | Performed by: NURSE PRACTITIONER

## 2025-05-12 PROCEDURE — 3078F DIAST BP <80 MM HG: CPT | Performed by: NURSE PRACTITIONER

## 2025-05-12 PROCEDURE — 3074F SYST BP LT 130 MM HG: CPT | Performed by: INTERNAL MEDICINE

## 2025-05-12 PROCEDURE — 36415 COLL VENOUS BLD VENIPUNCTURE: CPT

## 2025-05-12 PROCEDURE — 1126F AMNT PAIN NOTED NONE PRSNT: CPT | Performed by: INTERNAL MEDICINE

## 2025-05-12 PROCEDURE — 99214 OFFICE O/P EST MOD 30 MIN: CPT | Performed by: INTERNAL MEDICINE

## 2025-05-12 PROCEDURE — 85025 COMPLETE CBC W/AUTO DIFF WBC: CPT

## 2025-05-12 PROCEDURE — 3078F DIAST BP <80 MM HG: CPT | Performed by: INTERNAL MEDICINE

## 2025-05-12 PROCEDURE — 3074F SYST BP LT 130 MM HG: CPT | Performed by: NURSE PRACTITIONER

## 2025-05-12 PROCEDURE — 99214 OFFICE O/P EST MOD 30 MIN: CPT | Performed by: NURSE PRACTITIONER

## 2025-05-12 PROCEDURE — 1159F MED LIST DOCD IN RCRD: CPT | Performed by: NURSE PRACTITIONER

## 2025-05-12 PROCEDURE — 1160F RVW MEDS BY RX/DR IN RCRD: CPT | Performed by: NURSE PRACTITIONER

## 2025-05-12 RX ORDER — CHOLECALCIFEROL (VITAMIN D3) 125 MCG
CAPSULE ORAL
COMMUNITY

## 2025-05-12 RX ORDER — ERGOCALCIFEROL (VITAMIN D2) 10 MCG
400 TABLET ORAL DAILY
COMMUNITY

## 2025-05-12 RX ORDER — PANTOPRAZOLE SODIUM 40 MG/1
40 TABLET, DELAYED RELEASE ORAL DAILY
Qty: 90 TABLET | Refills: 2 | Status: SHIPPED | OUTPATIENT
Start: 2025-05-12

## 2025-05-12 NOTE — PROGRESS NOTES
"Chief Complaint  Esophageal cancer, follow up    Subjective        Mayamarty Ribera presents to [facility] THORACIC SURGERY  History of Present Illness    Ms. Ribera is a very pleasant 64-year-old lady status post esophagectomy by Dr. Moseley for an esophageal cancer in April 2022.  Last EGD with dilation was in May 2024.  She is feeling well today. She has no dysphagia.  Her weight is stable.  She has recently seen medical oncology and presents with CT chest abdomen pelvis.      Objective   Vital Signs:  /70 (BP Location: Right arm, Patient Position: Sitting)   Pulse 70   Ht 162.6 cm (64.02\")   Wt 51.7 kg (114 lb)   SpO2 99%   BMI 19.56 kg/m²   Estimated body mass index is 19.56 kg/m² as calculated from the following:    Height as of this encounter: 162.6 cm (64.02\").    Weight as of this encounter: 51.7 kg (114 lb).    BMI is within normal parameters. No other follow-up for BMI required.      Physical Exam  Vitals and nursing note reviewed.   Constitutional:       Appearance: She is well-developed.   HENT:      Head: Normocephalic and atraumatic.      Nose: Nose normal.   Eyes:      Conjunctiva/sclera: Conjunctivae normal.   Cardiovascular:      Rate and Rhythm: Normal rate.   Pulmonary:      Effort: Pulmonary effort is normal.   Abdominal:      Palpations: Abdomen is soft.   Musculoskeletal:      Cervical back: Neck supple.   Skin:     General: Skin is warm and dry.   Neurological:      Mental Status: She is alert and oriented to person, place, and time.   Psychiatric:         Behavior: Behavior normal.         Thought Content: Thought content normal.         Judgment: Judgment normal.        Result Review :    CT chest abdomen pelvis 4/28/2025: There is a new micronodule in the right lower lobe and a new groundglass density in the left lower lobe.  Likely infectious/inflammatory.  Postsurgical changes consistent with prior esophagectomy.  No new intrathoracic lymphadenopathy.      " CT chest abdomen pelvis 11/7/2024: Thyroid goiter.  No new lung nodules.  No significant mediastinal or hilar lymphadenopathy.  Bilateral apical scarring and emphysema.  Stable tree-in-bud nodules in the right middle and lower lobes.  Small liver cysts.     Assessment and Plan   Diagnoses and all orders for this visit:    1. History of esophageal cancer (Primary)  -     CT Chest Without Contrast; Future  -     CT Abdomen Pelvis Without Contrast; Future      Ms. Ribera is a pleasant 64-year-old lady who presents in follow-up for a ypT0 N0 esophageal adenocarcinoma resected in April 2022.  She continues to do very well.  Her most recent imaging demonstrates a new micronodule in the right lower lobe and a small groundglass density in the left lower lobe.  This is likely an infectious/inflammatory process.  We discussed option of 3-month CT chest, however patient prefers to wait until 6 months for repeat imaging.  We will plan to see her with CT chest abdomen pelvis at that time.  Imaging reviewed with her and her partner today and they are in agreement with this plan.         Follow Up   Return in about 6 months (around 11/12/2025) for Next scheduled follow up.  Patient was given instructions and counseling regarding her condition or for health maintenance advice. Please see specific information pulled into the AVS if appropriate.     I spent 32 minutes caring for Maya on this date of service. This time includes time spent by me in the following activities:preparing for the visit, reviewing tests, obtaining and/or reviewing a separately obtained history, performing a medically appropriate examination and/or evaluation , counseling and educating the patient/family/caregiver, ordering medications, tests, or procedures, referring and communicating with other health care professionals , documenting information in the medical record, independently interpreting results and communicating that information with the  patient/family/caregiver, and care coordination           Internal Medicine

## 2025-08-08 ENCOUNTER — LAB (OUTPATIENT)
Dept: LAB | Facility: HOSPITAL | Age: 65
End: 2025-08-08
Payer: MEDICARE

## 2025-08-08 ENCOUNTER — OFFICE VISIT (OUTPATIENT)
Dept: ONCOLOGY | Facility: CLINIC | Age: 65
End: 2025-08-08
Payer: MEDICARE

## 2025-08-08 VITALS
TEMPERATURE: 98.4 F | HEART RATE: 78 BPM | OXYGEN SATURATION: 96 % | WEIGHT: 112.2 LBS | HEIGHT: 64 IN | BODY MASS INDEX: 19.15 KG/M2 | RESPIRATION RATE: 17 BRPM | SYSTOLIC BLOOD PRESSURE: 114 MMHG | DIASTOLIC BLOOD PRESSURE: 78 MMHG

## 2025-08-08 DIAGNOSIS — C15.9 MALIGNANT NEOPLASM OF ESOPHAGUS, UNSPECIFIED LOCATION: ICD-10-CM

## 2025-08-08 DIAGNOSIS — C15.9 MALIGNANT NEOPLASM OF ESOPHAGUS, UNSPECIFIED LOCATION: Primary | ICD-10-CM

## 2025-08-08 DIAGNOSIS — E87.5 HYPERKALEMIA: ICD-10-CM

## 2025-08-08 DIAGNOSIS — M89.8X9 BONE PAIN: ICD-10-CM

## 2025-08-08 LAB
ALBUMIN SERPL-MCNC: 4.2 G/DL (ref 3.5–5.2)
ALBUMIN/GLOB SERPL: 1.4 G/DL
ALP SERPL-CCNC: 123 U/L (ref 39–117)
ALT SERPL W P-5'-P-CCNC: 5 U/L (ref 1–33)
ANION GAP SERPL CALCULATED.3IONS-SCNC: 9.6 MMOL/L (ref 5–15)
AST SERPL-CCNC: 15 U/L (ref 1–32)
BASOPHILS # BLD AUTO: 0.06 10*3/MM3 (ref 0–0.2)
BASOPHILS NFR BLD AUTO: 0.9 % (ref 0–1.5)
BILIRUB SERPL-MCNC: 0.4 MG/DL (ref 0–1.2)
BUN SERPL-MCNC: 11.1 MG/DL (ref 8–23)
BUN/CREAT SERPL: 12.5 (ref 7–25)
CALCIUM SPEC-SCNC: 10.1 MG/DL (ref 8.6–10.5)
CHLORIDE SERPL-SCNC: 101 MMOL/L (ref 98–107)
CO2 SERPL-SCNC: 27.4 MMOL/L (ref 22–29)
CREAT SERPL-MCNC: 0.89 MG/DL (ref 0.57–1)
DEPRECATED RDW RBC AUTO: 49.7 FL (ref 37–54)
EGFRCR SERPLBLD CKD-EPI 2021: 72.1 ML/MIN/1.73
EOSINOPHIL # BLD AUTO: 0.17 10*3/MM3 (ref 0–0.4)
EOSINOPHIL NFR BLD AUTO: 2.5 % (ref 0.3–6.2)
ERYTHROCYTE [DISTWIDTH] IN BLOOD BY AUTOMATED COUNT: 15.2 % (ref 12.3–15.4)
GLOBULIN UR ELPH-MCNC: 2.9 GM/DL
GLUCOSE SERPL-MCNC: 124 MG/DL (ref 65–99)
HCT VFR BLD AUTO: 44.7 % (ref 34–46.6)
HGB BLD-MCNC: 13.9 G/DL (ref 12–15.9)
IMM GRANULOCYTES # BLD AUTO: 0.02 10*3/MM3 (ref 0–0.05)
IMM GRANULOCYTES NFR BLD AUTO: 0.3 % (ref 0–0.5)
LYMPHOCYTES # BLD AUTO: 1.42 10*3/MM3 (ref 0.7–3.1)
LYMPHOCYTES NFR BLD AUTO: 20.9 % (ref 19.6–45.3)
MCH RBC QN AUTO: 27.9 PG (ref 26.6–33)
MCHC RBC AUTO-ENTMCNC: 31.1 G/DL (ref 31.5–35.7)
MCV RBC AUTO: 89.6 FL (ref 79–97)
MONOCYTES # BLD AUTO: 0.46 10*3/MM3 (ref 0.1–0.9)
MONOCYTES NFR BLD AUTO: 6.8 % (ref 5–12)
NEUTROPHILS NFR BLD AUTO: 4.66 10*3/MM3 (ref 1.7–7)
NEUTROPHILS NFR BLD AUTO: 68.6 % (ref 42.7–76)
NRBC BLD AUTO-RTO: 0 /100 WBC (ref 0–0.2)
PLATELET # BLD AUTO: 261 10*3/MM3 (ref 140–450)
PMV BLD AUTO: 8.8 FL (ref 6–12)
POTASSIUM SERPL-SCNC: 5.4 MMOL/L (ref 3.5–5.2)
PROT SERPL-MCNC: 7.1 G/DL (ref 6–8.5)
RBC # BLD AUTO: 4.99 10*6/MM3 (ref 3.77–5.28)
SODIUM SERPL-SCNC: 138 MMOL/L (ref 136–145)
WBC NRBC COR # BLD AUTO: 6.79 10*3/MM3 (ref 3.4–10.8)

## 2025-08-08 PROCEDURE — 36415 COLL VENOUS BLD VENIPUNCTURE: CPT

## 2025-08-08 PROCEDURE — 80053 COMPREHEN METABOLIC PANEL: CPT

## 2025-08-08 PROCEDURE — 85025 COMPLETE CBC W/AUTO DIFF WBC: CPT

## 2025-08-12 ENCOUNTER — OFFICE VISIT (OUTPATIENT)
Dept: FAMILY MEDICINE CLINIC | Facility: CLINIC | Age: 65
End: 2025-08-12
Payer: MEDICARE

## 2025-08-12 VITALS
WEIGHT: 112 LBS | OXYGEN SATURATION: 100 % | SYSTOLIC BLOOD PRESSURE: 122 MMHG | DIASTOLIC BLOOD PRESSURE: 64 MMHG | BODY MASS INDEX: 19.84 KG/M2 | HEIGHT: 63 IN | HEART RATE: 47 BPM

## 2025-08-12 DIAGNOSIS — G89.29 OTHER CHRONIC BACK PAIN: ICD-10-CM

## 2025-08-12 DIAGNOSIS — M79.672 LEFT FOOT PAIN: ICD-10-CM

## 2025-08-12 DIAGNOSIS — Z13.220 LIPID SCREENING: Primary | ICD-10-CM

## 2025-08-12 DIAGNOSIS — Z12.31 ENCOUNTER FOR SCREENING MAMMOGRAM FOR MALIGNANT NEOPLASM OF BREAST: ICD-10-CM

## 2025-08-12 DIAGNOSIS — M54.89 OTHER CHRONIC BACK PAIN: ICD-10-CM

## 2025-08-12 DIAGNOSIS — R11.0 NAUSEA: ICD-10-CM

## 2025-08-12 DIAGNOSIS — R07.81 RIB PAIN: ICD-10-CM

## 2025-08-12 DIAGNOSIS — K04.7 DENTAL ABSCESS: ICD-10-CM

## 2025-08-12 RX ORDER — ONDANSETRON 4 MG/1
4 TABLET, FILM COATED ORAL DAILY PRN
Qty: 15 TABLET | Refills: 0 | Status: SHIPPED | OUTPATIENT
Start: 2025-08-12

## 2025-08-12 RX ORDER — IBUPROFEN 800 MG/1
800 TABLET, FILM COATED ORAL EVERY 6 HOURS PRN
Qty: 40 TABLET | Refills: 1 | Status: SHIPPED | OUTPATIENT
Start: 2025-08-12 | End: 2025-08-12

## 2025-08-12 RX ORDER — PENICILLIN V POTASSIUM 500 MG/1
500 TABLET, FILM COATED ORAL 4 TIMES DAILY
Qty: 40 TABLET | Refills: 0 | Status: SHIPPED | OUTPATIENT
Start: 2025-08-12

## 2025-08-12 RX ORDER — IBUPROFEN 800 MG/1
800 TABLET, FILM COATED ORAL EVERY 6 HOURS PRN
Qty: 120 TABLET | Refills: 1 | Status: SHIPPED | OUTPATIENT
Start: 2025-08-12

## 2025-08-12 RX ORDER — CYANOCOBALAMIN (VITAMIN B-12) 1000 MCG
TABLET, SUBLINGUAL SUBLINGUAL
COMMUNITY

## 2025-08-21 ENCOUNTER — TELEPHONE (OUTPATIENT)
Dept: FAMILY MEDICINE CLINIC | Facility: CLINIC | Age: 65
End: 2025-08-21
Payer: COMMERCIAL

## 2025-08-22 ENCOUNTER — HOSPITAL ENCOUNTER (OUTPATIENT)
Facility: HOSPITAL | Age: 65
Discharge: HOME | End: 2025-08-22
Payer: MEDICARE

## 2025-08-22 DIAGNOSIS — C15.9 MALIGNANT NEOPLASM OF ESOPHAGUS, UNSPECIFIED LOCATION: ICD-10-CM

## 2025-08-22 PROCEDURE — 74176 CT ABD & PELVIS W/O CONTRAST: CPT

## 2025-08-22 PROCEDURE — 71250 CT THORAX DX C-: CPT

## 2025-08-26 ENCOUNTER — OFFICE VISIT (OUTPATIENT)
Dept: ONCOLOGY | Facility: CLINIC | Age: 65
End: 2025-08-26
Payer: MEDICARE

## 2025-08-26 ENCOUNTER — LAB (OUTPATIENT)
Dept: OTHER | Facility: HOSPITAL | Age: 65
End: 2025-08-26
Payer: MEDICARE

## 2025-08-26 VITALS
SYSTOLIC BLOOD PRESSURE: 158 MMHG | HEART RATE: 72 BPM | HEIGHT: 63 IN | DIASTOLIC BLOOD PRESSURE: 75 MMHG | OXYGEN SATURATION: 98 % | TEMPERATURE: 98.4 F | BODY MASS INDEX: 20.2 KG/M2 | WEIGHT: 114 LBS

## 2025-08-26 DIAGNOSIS — S22.000A THORACIC COMPRESSION FRACTURE, CLOSED, INITIAL ENCOUNTER: Primary | ICD-10-CM

## 2025-08-26 DIAGNOSIS — C15.9 MALIGNANT NEOPLASM OF ESOPHAGUS, UNSPECIFIED LOCATION: ICD-10-CM

## 2025-08-26 DIAGNOSIS — S22.000G COMPRESSION FRACTURE OF THORACIC VERTEBRA WITH DELAYED HEALING, UNSPECIFIED THORACIC VERTEBRAL LEVEL, SUBSEQUENT ENCOUNTER: ICD-10-CM

## 2025-08-26 LAB
ALBUMIN SERPL-MCNC: 4.1 G/DL (ref 3.5–5.2)
ALBUMIN/GLOB SERPL: 1.3 G/DL
ALP SERPL-CCNC: 118 U/L (ref 39–117)
ALT SERPL W P-5'-P-CCNC: <5 U/L (ref 1–33)
ANION GAP SERPL CALCULATED.3IONS-SCNC: 8.5 MMOL/L (ref 5–15)
AST SERPL-CCNC: 13 U/L (ref 1–32)
BASOPHILS # BLD AUTO: 0.06 10*3/MM3 (ref 0–0.2)
BASOPHILS NFR BLD AUTO: 0.9 % (ref 0–1.5)
BILIRUB SERPL-MCNC: 0.3 MG/DL (ref 0–1.2)
BUN SERPL-MCNC: 12.9 MG/DL (ref 8–23)
BUN/CREAT SERPL: 15.5 (ref 7–25)
CALCIUM SPEC-SCNC: 10.2 MG/DL (ref 8.6–10.5)
CHLORIDE SERPL-SCNC: 101 MMOL/L (ref 98–107)
CO2 SERPL-SCNC: 29.5 MMOL/L (ref 22–29)
CREAT SERPL-MCNC: 0.83 MG/DL (ref 0.57–1)
DEPRECATED RDW RBC AUTO: 49.3 FL (ref 37–54)
EGFRCR SERPLBLD CKD-EPI 2021: 78.3 ML/MIN/1.73
EOSINOPHIL # BLD AUTO: 0.14 10*3/MM3 (ref 0–0.4)
EOSINOPHIL NFR BLD AUTO: 2.1 % (ref 0.3–6.2)
ERYTHROCYTE [DISTWIDTH] IN BLOOD BY AUTOMATED COUNT: 14.9 % (ref 12.3–15.4)
GLOBULIN UR ELPH-MCNC: 3.2 GM/DL
GLUCOSE SERPL-MCNC: 88 MG/DL (ref 65–99)
HCT VFR BLD AUTO: 42.1 % (ref 34–46.6)
HGB BLD-MCNC: 13.1 G/DL (ref 12–15.9)
IMM GRANULOCYTES # BLD AUTO: 0.01 10*3/MM3 (ref 0–0.05)
IMM GRANULOCYTES NFR BLD AUTO: 0.2 % (ref 0–0.5)
LYMPHOCYTES # BLD AUTO: 1.81 10*3/MM3 (ref 0.7–3.1)
LYMPHOCYTES NFR BLD AUTO: 27.8 % (ref 19.6–45.3)
MCH RBC QN AUTO: 28.1 PG (ref 26.6–33)
MCHC RBC AUTO-ENTMCNC: 31.1 G/DL (ref 31.5–35.7)
MCV RBC AUTO: 90.1 FL (ref 79–97)
MONOCYTES # BLD AUTO: 0.53 10*3/MM3 (ref 0.1–0.9)
MONOCYTES NFR BLD AUTO: 8.1 % (ref 5–12)
NEUTROPHILS NFR BLD AUTO: 3.97 10*3/MM3 (ref 1.7–7)
NEUTROPHILS NFR BLD AUTO: 60.9 % (ref 42.7–76)
NRBC BLD AUTO-RTO: 0 /100 WBC (ref 0–0.2)
PLATELET # BLD AUTO: 239 10*3/MM3 (ref 140–450)
PMV BLD AUTO: 9.4 FL (ref 6–12)
POTASSIUM SERPL-SCNC: 4.9 MMOL/L (ref 3.5–5.2)
PROT SERPL-MCNC: 7.3 G/DL (ref 6–8.5)
RBC # BLD AUTO: 4.67 10*6/MM3 (ref 3.77–5.28)
SODIUM SERPL-SCNC: 139 MMOL/L (ref 136–145)
WBC NRBC COR # BLD AUTO: 6.52 10*3/MM3 (ref 3.4–10.8)

## 2025-08-26 PROCEDURE — 85025 COMPLETE CBC W/AUTO DIFF WBC: CPT | Performed by: NURSE PRACTITIONER

## 2025-08-26 PROCEDURE — 36415 COLL VENOUS BLD VENIPUNCTURE: CPT

## 2025-08-26 PROCEDURE — 80053 COMPREHEN METABOLIC PANEL: CPT | Performed by: NURSE PRACTITIONER

## 2025-08-26 RX ORDER — HYDROCODONE BITARTRATE AND ACETAMINOPHEN 5; 325 MG/1; MG/1
1 TABLET ORAL EVERY 6 HOURS PRN
Qty: 60 TABLET | Refills: 0 | Status: SHIPPED | OUTPATIENT
Start: 2025-08-26

## (undated) DEVICE — ADAPT CLN BIOGUARD AIR/H2O DISP

## (undated) DEVICE — GW DREAMWIRE STR SS .035IN 260CM

## (undated) DEVICE — SUT MNCRYL PLS ANTIB UD 4/0 PS2 18IN

## (undated) DEVICE — DRP C/ARM 41X74IN

## (undated) DEVICE — SUT SILK 3/0 RB1 CR8 18IN C053D

## (undated) DEVICE — ENDOPATH PNEUMONEEDLE INSUFFLATION NEEDLES WITH LUER LOCK CONNECTORS 120MM: Brand: ENDOPATH

## (undated) DEVICE — TUBING, SUCTION, 1/4" X 10', STRAIGHT: Brand: MEDLINE

## (undated) DEVICE — SUT PROLN 3/0 SH D/A 36IN 8522H

## (undated) DEVICE — SENSR O2 OXIMAX FNGR A/ 18IN NONSTR

## (undated) DEVICE — SYR LUERLOK 5CC

## (undated) DEVICE — GLV SURG PREMIERPRO ORTHO LTX PF SZ8 BRN

## (undated) DEVICE — GOWN,NON-REINFORCED,SIRUS,SET IN SLV,XXL: Brand: MEDLINE

## (undated) DEVICE — ANTIBACTERIAL UNDYED BRAIDED (POLYGLACTIN 910), SYNTHETIC ABSORBABLE SUTURE: Brand: COATED VICRYL

## (undated) DEVICE — TUBING, SUCTION, 1/4" X 20', STRAIGHT: Brand: MEDLINE INDUSTRIES, INC.

## (undated) DEVICE — BITEBLOCK OMNI BLOC

## (undated) DEVICE — PK ENDO GI 50

## (undated) DEVICE — SUT SILK 3/0 SH CR5 18IN C0135

## (undated) DEVICE — APPL CHLORAPREP HI/LITE 26ML ORNG

## (undated) DEVICE — LABEL SHEET CUSTOM 2X2 YELLOW: Brand: MEDLINE INDUSTRIES, INC.

## (undated) DEVICE — ELECTRD BLD EZ CLN MOD 6.5IN

## (undated) DEVICE — GLV SURG BIOGEL LTX PF 8 1/2

## (undated) DEVICE — ENDOPATH XCEL BLADELESS TROCARS WITH STABILITY SLEEVES: Brand: ENDOPATH XCEL

## (undated) DEVICE — NDL HYPO PRECISIONGLIDE REG 25G 1 1/2

## (undated) DEVICE — DECANT BG O JET

## (undated) DEVICE — 2, DISPOSABLE SUCTION/IRRIGATOR WITH DISPOSABLE TIP: Brand: STRYKEFLOW

## (undated) DEVICE — SPNG DISECTOR KTNER XRAY COTN 1/4X9/16IN PK/5

## (undated) DEVICE — KT ORCA ORCAPOD DISP STRL

## (undated) DEVICE — SUT SILK 0 TIES 18IN SA66G

## (undated) DEVICE — PATIENT RETURN ELECTRODE, SINGLE-USE, CONTACT QUALITY MONITORING, ADULT, WITH 9FT CORD, FOR PATIENTS WEIGING OVER 33LBS. (15KG): Brand: MEGADYNE

## (undated) DEVICE — BITEBLOCK ENDO W/STRAP 60F A/ LF DISP

## (undated) DEVICE — TBG INSUFFLATION LUER LOCK: Brand: MEDLINE INDUSTRIES, INC.

## (undated) DEVICE — ADHS SKIN SURG TISS VISC PREMIERPRO EXOFIN HI/VISC FAST/DRY

## (undated) DEVICE — Device

## (undated) DEVICE — DRSNG WND GZ PAD BORDERED 4X8IN STRL

## (undated) DEVICE — MARKR SKIN W/RULR 2TP

## (undated) DEVICE — VESSEL SEALER EXTEND: Brand: ENDOWRIST

## (undated) DEVICE — GLV SURG BIOGEL LTX PF 8

## (undated) DEVICE — OASIS DRAIN, SINGLE, INLINE & ATS COMPATIBLE: Brand: OASIS

## (undated) DEVICE — CANN O2 ETCO2 FITS ALL CONN CO2 SMPL A/ 7IN DISP LF

## (undated) DEVICE — 3M™ IOBAN™ 2 ANTIMICROBIAL INCISE DRAPE 6651EZ: Brand: IOBAN™ 2

## (undated) DEVICE — POOLE SUCTION HANDLE: Brand: CARDINAL HEALTH

## (undated) DEVICE — CAP CONN RED

## (undated) DEVICE — EXTENSION SET, MALE LUER LOCK ADAPTER WITH RETRACTABLE COLLAR

## (undated) DEVICE — MEDI-VAC YANKAUER SUCTION HANDLE W/BULBOUS TIP: Brand: CARDINAL HEALTH

## (undated) DEVICE — LOU MINOR PROCEDURE: Brand: MEDLINE INDUSTRIES, INC.

## (undated) DEVICE — DEV INFL CRE STERIFLATE 60CC DISP

## (undated) DEVICE — BALN DIL ESOPH CRE CONTRL RADL 15/18MM 8CM BX5

## (undated) DEVICE — SOL ANTISTICK CAUTRY ELECTROLUBE LF

## (undated) DEVICE — MSK PROC CURAPLEX O2 2/ADAPT 7FT

## (undated) DEVICE — BLADELESS OBTURATOR: Brand: WECK VISTA

## (undated) DEVICE — SPNG LAP CIGARETTE KITTNER 5MM STRL PK/5

## (undated) DEVICE — STAPLER SHEATH: Brand: ENDOWRIST

## (undated) DEVICE — 3M™ STERI-DRAPE™ INSTRUMENT POUCH 1018L: Brand: STERI-DRAPE™

## (undated) DEVICE — SUT SILK 0 PSL 18IN 580H

## (undated) DEVICE — GLV SURG BIOGEL LTX PF 6

## (undated) DEVICE — PENCL E/S ULTRAVAC TELESCP NOSE HOLSTR 10FT

## (undated) DEVICE — SUT ETHLN 3/0 PS1 18IN 1663H

## (undated) DEVICE — PENROSE DRAIN, 36 X 3 4: Brand: CARDINAL HEALTH

## (undated) DEVICE — DRP SLUSH WARMR MACH CIR 44X44IN

## (undated) DEVICE — BLCK/BITE BLOX W/DENTL/RIM W/STRAP 54F

## (undated) DEVICE — SOL NS 500ML

## (undated) DEVICE — WOUND RETRACTOR AND PROTECTOR: Brand: ALEXIS WOUND PROTECTOR-RETRACTOR

## (undated) DEVICE — PENCL ES MEGADINE EZ/CLEAN BUTN W/HOLSTR 10FT

## (undated) DEVICE — BALN ENDOSCOPE FOR EUS/70K SCP DISP STRL

## (undated) DEVICE — SUT PROLN 1 CT1 30IN 8425H

## (undated) DEVICE — TRAP FLD MINIVAC MEGADYNE 100ML

## (undated) DEVICE — REDUCER: Brand: ENDOWRIST

## (undated) DEVICE — ADAPTER,CATHETER/SYRINGE/LUER,STERILE: Brand: MEDLINE

## (undated) DEVICE — ARM DRAPE

## (undated) DEVICE — KT CLN CLEANOR SCPE

## (undated) DEVICE — SPONGE,LAP,12"X12",XR,ST,5/PK,40PK/CS: Brand: MEDLINE

## (undated) DEVICE — 3M™ STERI-DRAPE™ INSTRUMENT POUCH 1018: Brand: STERI-DRAPE™

## (undated) DEVICE — SUT SILK 2/0 SUTUPAK TIES 24IN SA75H

## (undated) DEVICE — 3M™ IOBAN™ 2 ANTIMICROBIAL INCISE DRAPE 6640EZ: Brand: IOBAN™ 2

## (undated) DEVICE — LOU THORACIC: Brand: MEDLINE INDUSTRIES, INC.

## (undated) DEVICE — COUNT NDL MAG FM/BLCK 40COUNT

## (undated) DEVICE — SYR LL TP 10ML STRL

## (undated) DEVICE — 28 FR STRAIGHT – SOFT PVC CATHETER: Brand: PVC THORACIC CATHETERS

## (undated) DEVICE — INTENDED FOR TISSUE SEPARATION, AND OTHER PROCEDURES THAT REQUIRE A SHARP SURGICAL BLADE TO PUNCTURE OR CUT.: Brand: BARD-PARKER ® CARBON RIB-BACK BLADES

## (undated) DEVICE — SUT SILK 0 TIES 30IN A306H

## (undated) DEVICE — TOTAL TRAY, 16FR 10ML SIL FOLEY, URN: Brand: MEDLINE

## (undated) DEVICE — LN SMPL CO2 SHTRM SD STREAM W/M LUER

## (undated) DEVICE — CANNULA SEAL

## (undated) DEVICE — ELECTRD BLD EZ CLN MOD XLNG 2.75IN

## (undated) DEVICE — TP UMB COTN 1/8X36 U12T

## (undated) DEVICE — ENDOSCOPIC ULTRASOUND FINE NEEDLE BIOPSY (FNB) DEVICE: Brand: ACQUIRE

## (undated) DEVICE — DEV OPN LIGASURE CRV 180D 36MM 13.5CM  1P/U

## (undated) DEVICE — DRAPE,UTILITY,TAPE,15X26,STERILE: Brand: MEDLINE

## (undated) DEVICE — PK PROC MAJ 40

## (undated) DEVICE — NDL INJ UROL WILLIAMS CYSTO 3.7F 23G 8MM

## (undated) DEVICE — SUT PDS 0 CT1 36IN Z346H

## (undated) DEVICE — SUT SILK 2/0 SH CR8 18IN CR8 C012D

## (undated) DEVICE — LAPAROSCOPIC SMOKE FILTRATION SYSTEM: Brand: PALL LAPAROSHIELD® PLUS LAPAROSCOPIC SMOKE FILTRATION SYSTEM

## (undated) DEVICE — SHEET, T, LAPAROTOMY, STERILE: Brand: MEDLINE

## (undated) DEVICE — SEAL

## (undated) DEVICE — PAPR PRNT PK SONY W RIBN UPC55

## (undated) DEVICE — SUT PDS 4-0 RB-1 Z304H

## (undated) DEVICE — STRL PENROSE DRAIN 18" X 1/4": Brand: CARDINAL HEALTH

## (undated) DEVICE — UNIVERSAL STAPLER: Brand: ENDO GIA ULTRA

## (undated) DEVICE — COLUMN DRAPE

## (undated) DEVICE — GOWN,REINFORCE,POLY,SIRUS,BREATH SLV,XLG: Brand: MEDLINE

## (undated) DEVICE — SUT SILK 3/0 SUTUPAK TIES 24IN SA74H

## (undated) DEVICE — UNIVERSAL PACK: Brand: CARDINAL HEALTH

## (undated) DEVICE — CATHETER,URETHRAL,REDRUBBER,STRL,18FR: Brand: MEDLINE